# Patient Record
Sex: MALE | Race: WHITE | NOT HISPANIC OR LATINO | Employment: OTHER | URBAN - METROPOLITAN AREA
[De-identification: names, ages, dates, MRNs, and addresses within clinical notes are randomized per-mention and may not be internally consistent; named-entity substitution may affect disease eponyms.]

---

## 2017-02-06 ENCOUNTER — GENERIC CONVERSION - ENCOUNTER (OUTPATIENT)
Dept: OTHER | Facility: OTHER | Age: 57
End: 2017-02-06

## 2017-04-07 ENCOUNTER — ALLSCRIPTS OFFICE VISIT (OUTPATIENT)
Dept: OTHER | Facility: OTHER | Age: 57
End: 2017-04-07

## 2017-04-26 ENCOUNTER — ALLSCRIPTS OFFICE VISIT (OUTPATIENT)
Dept: OTHER | Facility: OTHER | Age: 57
End: 2017-04-26

## 2017-04-26 DIAGNOSIS — R35.1 NOCTURIA: ICD-10-CM

## 2017-04-26 DIAGNOSIS — E11.9 TYPE 2 DIABETES MELLITUS WITHOUT COMPLICATIONS (HCC): ICD-10-CM

## 2017-04-26 DIAGNOSIS — I10 ESSENTIAL (PRIMARY) HYPERTENSION: ICD-10-CM

## 2017-05-03 ENCOUNTER — APPOINTMENT (OUTPATIENT)
Dept: LAB | Facility: CLINIC | Age: 57
End: 2017-05-03
Payer: MEDICARE

## 2017-05-03 DIAGNOSIS — E11.9 TYPE 2 DIABETES MELLITUS WITHOUT COMPLICATIONS (HCC): ICD-10-CM

## 2017-05-03 DIAGNOSIS — R35.1 NOCTURIA: ICD-10-CM

## 2017-05-03 DIAGNOSIS — I10 ESSENTIAL (PRIMARY) HYPERTENSION: ICD-10-CM

## 2017-05-03 DIAGNOSIS — R53.83 OTHER FATIGUE: ICD-10-CM

## 2017-05-03 LAB
ALBUMIN SERPL BCP-MCNC: 3.4 G/DL (ref 3.5–5)
ALP SERPL-CCNC: 74 U/L (ref 46–116)
ALT SERPL W P-5'-P-CCNC: 40 U/L (ref 12–78)
ANION GAP SERPL CALCULATED.3IONS-SCNC: 7 MMOL/L (ref 4–13)
AST SERPL W P-5'-P-CCNC: 34 U/L (ref 5–45)
BILIRUB SERPL-MCNC: 0.74 MG/DL (ref 0.2–1)
BUN SERPL-MCNC: 19 MG/DL (ref 5–25)
CALCIUM SERPL-MCNC: 9.3 MG/DL (ref 8.3–10.1)
CHLORIDE SERPL-SCNC: 101 MMOL/L (ref 100–108)
CHOLEST SERPL-MCNC: 150 MG/DL (ref 50–200)
CO2 SERPL-SCNC: 32 MMOL/L (ref 21–32)
CREAT SERPL-MCNC: 0.88 MG/DL (ref 0.6–1.3)
EST. AVERAGE GLUCOSE BLD GHB EST-MCNC: 166 MG/DL
GFR SERPL CREATININE-BSD FRML MDRD: >60 ML/MIN/1.73SQ M
GLUCOSE P FAST SERPL-MCNC: 106 MG/DL (ref 65–99)
HBA1C MFR BLD: 7.4 % (ref 4.2–6.3)
HDLC SERPL-MCNC: 48 MG/DL (ref 40–60)
LDLC SERPL CALC-MCNC: 85 MG/DL (ref 0–100)
POTASSIUM SERPL-SCNC: 4.5 MMOL/L (ref 3.5–5.3)
PROT SERPL-MCNC: 7.6 G/DL (ref 6.4–8.2)
PSA SERPL-MCNC: 0.8 NG/ML (ref 0–4)
SODIUM SERPL-SCNC: 140 MMOL/L (ref 136–145)
TRIGL SERPL-MCNC: 83 MG/DL
TSH SERPL DL<=0.05 MIU/L-ACNC: 0.65 UIU/ML (ref 0.36–3.74)

## 2017-05-03 PROCEDURE — G0103 PSA SCREENING: HCPCS

## 2017-05-03 PROCEDURE — 80061 LIPID PANEL: CPT

## 2017-05-03 PROCEDURE — 83036 HEMOGLOBIN GLYCOSYLATED A1C: CPT

## 2017-05-03 PROCEDURE — 36415 COLL VENOUS BLD VENIPUNCTURE: CPT

## 2017-05-03 PROCEDURE — 84443 ASSAY THYROID STIM HORMONE: CPT

## 2017-05-03 PROCEDURE — 80053 COMPREHEN METABOLIC PANEL: CPT

## 2017-05-05 ENCOUNTER — GENERIC CONVERSION - ENCOUNTER (OUTPATIENT)
Dept: OTHER | Facility: OTHER | Age: 57
End: 2017-05-05

## 2017-06-29 ENCOUNTER — GENERIC CONVERSION - ENCOUNTER (OUTPATIENT)
Dept: OTHER | Facility: OTHER | Age: 57
End: 2017-06-29

## 2017-07-10 ENCOUNTER — GENERIC CONVERSION - ENCOUNTER (OUTPATIENT)
Dept: OTHER | Facility: OTHER | Age: 57
End: 2017-07-10

## 2017-10-27 ENCOUNTER — ALLSCRIPTS OFFICE VISIT (OUTPATIENT)
Dept: OTHER | Facility: OTHER | Age: 57
End: 2017-10-27

## 2017-10-27 DIAGNOSIS — I10 ESSENTIAL (PRIMARY) HYPERTENSION: ICD-10-CM

## 2017-10-27 DIAGNOSIS — E11.9 TYPE 2 DIABETES MELLITUS WITHOUT COMPLICATIONS (HCC): ICD-10-CM

## 2017-11-03 ENCOUNTER — GENERIC CONVERSION - ENCOUNTER (OUTPATIENT)
Dept: OTHER | Facility: OTHER | Age: 57
End: 2017-11-03

## 2017-11-03 ENCOUNTER — APPOINTMENT (OUTPATIENT)
Dept: LAB | Facility: CLINIC | Age: 57
End: 2017-11-03
Payer: MEDICARE

## 2017-11-03 DIAGNOSIS — E11.9 TYPE 2 DIABETES MELLITUS WITHOUT COMPLICATIONS (HCC): ICD-10-CM

## 2017-11-03 LAB
ALBUMIN SERPL BCP-MCNC: 3.6 G/DL (ref 3.5–5)
ALP SERPL-CCNC: 83 U/L (ref 46–116)
ALT SERPL W P-5'-P-CCNC: 39 U/L (ref 12–78)
ANION GAP SERPL CALCULATED.3IONS-SCNC: 6 MMOL/L (ref 4–13)
AST SERPL W P-5'-P-CCNC: 28 U/L (ref 5–45)
BASOPHILS # BLD AUTO: 0.04 THOUSANDS/ΜL (ref 0–0.1)
BASOPHILS NFR BLD AUTO: 1 % (ref 0–1)
BILIRUB SERPL-MCNC: 0.45 MG/DL (ref 0.2–1)
BUN SERPL-MCNC: 15 MG/DL (ref 5–25)
CALCIUM SERPL-MCNC: 8.9 MG/DL (ref 8.3–10.1)
CHLORIDE SERPL-SCNC: 98 MMOL/L (ref 100–108)
CHOLEST SERPL-MCNC: 162 MG/DL (ref 50–200)
CO2 SERPL-SCNC: 33 MMOL/L (ref 21–32)
CREAT SERPL-MCNC: 0.96 MG/DL (ref 0.6–1.3)
EOSINOPHIL # BLD AUTO: 0.19 THOUSAND/ΜL (ref 0–0.61)
EOSINOPHIL NFR BLD AUTO: 2 % (ref 0–6)
ERYTHROCYTE [DISTWIDTH] IN BLOOD BY AUTOMATED COUNT: 13.5 % (ref 11.6–15.1)
EST. AVERAGE GLUCOSE BLD GHB EST-MCNC: 154 MG/DL
GFR SERPL CREATININE-BSD FRML MDRD: 88 ML/MIN/1.73SQ M
GLUCOSE P FAST SERPL-MCNC: 105 MG/DL (ref 65–99)
HBA1C MFR BLD: 7 % (ref 4.2–6.3)
HCT VFR BLD AUTO: 38.1 % (ref 36.5–49.3)
HDLC SERPL-MCNC: 47 MG/DL (ref 40–60)
HGB BLD-MCNC: 13 G/DL (ref 12–17)
LDLC SERPL CALC-MCNC: 96 MG/DL (ref 0–100)
LYMPHOCYTES # BLD AUTO: 2.89 THOUSANDS/ΜL (ref 0.6–4.47)
LYMPHOCYTES NFR BLD AUTO: 35 % (ref 14–44)
MCH RBC QN AUTO: 28.2 PG (ref 26.8–34.3)
MCHC RBC AUTO-ENTMCNC: 34.1 G/DL (ref 31.4–37.4)
MCV RBC AUTO: 83 FL (ref 82–98)
MONOCYTES # BLD AUTO: 0.72 THOUSAND/ΜL (ref 0.17–1.22)
MONOCYTES NFR BLD AUTO: 9 % (ref 4–12)
NEUTROPHILS # BLD AUTO: 4.32 THOUSANDS/ΜL (ref 1.85–7.62)
NEUTS SEG NFR BLD AUTO: 53 % (ref 43–75)
NRBC BLD AUTO-RTO: 0 /100 WBCS
PLATELET # BLD AUTO: 277 THOUSANDS/UL (ref 149–390)
PMV BLD AUTO: 9.6 FL (ref 8.9–12.7)
POTASSIUM SERPL-SCNC: 3.1 MMOL/L (ref 3.5–5.3)
PROT SERPL-MCNC: 7.2 G/DL (ref 6.4–8.2)
RBC # BLD AUTO: 4.61 MILLION/UL (ref 3.88–5.62)
SODIUM SERPL-SCNC: 137 MMOL/L (ref 136–145)
TRIGL SERPL-MCNC: 97 MG/DL
TSH SERPL DL<=0.05 MIU/L-ACNC: 1.67 UIU/ML (ref 0.36–3.74)
WBC # BLD AUTO: 8.2 THOUSAND/UL (ref 4.31–10.16)

## 2017-11-03 PROCEDURE — 83036 HEMOGLOBIN GLYCOSYLATED A1C: CPT

## 2017-11-03 PROCEDURE — 36415 COLL VENOUS BLD VENIPUNCTURE: CPT

## 2017-11-03 PROCEDURE — 84443 ASSAY THYROID STIM HORMONE: CPT

## 2017-11-03 PROCEDURE — 80061 LIPID PANEL: CPT

## 2017-11-03 PROCEDURE — 80053 COMPREHEN METABOLIC PANEL: CPT

## 2017-11-03 PROCEDURE — 85025 COMPLETE CBC W/AUTO DIFF WBC: CPT

## 2017-11-08 ENCOUNTER — GENERIC CONVERSION - ENCOUNTER (OUTPATIENT)
Dept: OTHER | Facility: OTHER | Age: 57
End: 2017-11-08

## 2017-11-22 ENCOUNTER — APPOINTMENT (OUTPATIENT)
Dept: LAB | Facility: CLINIC | Age: 57
End: 2017-11-22
Payer: MEDICARE

## 2017-11-22 DIAGNOSIS — I10 ESSENTIAL (PRIMARY) HYPERTENSION: ICD-10-CM

## 2017-11-22 LAB
ANION GAP SERPL CALCULATED.3IONS-SCNC: 8 MMOL/L (ref 4–13)
BUN SERPL-MCNC: 13 MG/DL (ref 5–25)
CALCIUM SERPL-MCNC: 9 MG/DL (ref 8.3–10.1)
CHLORIDE SERPL-SCNC: 103 MMOL/L (ref 100–108)
CO2 SERPL-SCNC: 28 MMOL/L (ref 21–32)
CREAT SERPL-MCNC: 0.78 MG/DL (ref 0.6–1.3)
GFR SERPL CREATININE-BSD FRML MDRD: 101 ML/MIN/1.73SQ M
GLUCOSE P FAST SERPL-MCNC: 136 MG/DL (ref 65–99)
POTASSIUM SERPL-SCNC: 3.4 MMOL/L (ref 3.5–5.3)
SODIUM SERPL-SCNC: 139 MMOL/L (ref 136–145)

## 2017-11-22 PROCEDURE — 80048 BASIC METABOLIC PNL TOTAL CA: CPT

## 2017-11-22 PROCEDURE — 36415 COLL VENOUS BLD VENIPUNCTURE: CPT

## 2017-11-24 ENCOUNTER — GENERIC CONVERSION - ENCOUNTER (OUTPATIENT)
Dept: OTHER | Facility: OTHER | Age: 57
End: 2017-11-24

## 2017-12-05 ENCOUNTER — GENERIC CONVERSION - ENCOUNTER (OUTPATIENT)
Dept: OTHER | Facility: OTHER | Age: 57
End: 2017-12-05

## 2018-01-11 NOTE — RESULT NOTES
Discussion/Summary   Patient's blood pressure shows low potassium level  I recommend taking a prescription potassium tablet once a day for 2 weeks as well as mailing patient list of foods rich in potassium and repeat blood work in 3-4 weeks     Verified Results  (1) CBC/PLT/DIFF 57QTX0554 53:85WW Chelsea Overall    Order Number: [id number]     Test Name Result Flag Reference   WBC COUNT 8 20 Thousand/uL  4 31-10 16   RBC COUNT 4 61 Million/uL  3 88-5 62   HEMOGLOBIN 13 0 g/dL  12 0-17 0   HEMATOCRIT 38 1 %  36 5-49 3   MCV 83 fL  82-98   MCH 28 2 pg  26 8-34 3   MCHC 34 1 g/dL  31 4-37 4   RDW 13 5 %  11 6-15 1   MPV 9 6 fL  8 9-12 7   PLATELET COUNT 699 Thousands/uL  149-390   nRBC AUTOMATED 0 /100 WBCs     NEUTROPHILS RELATIVE PERCENT 53 %  43-75   LYMPHOCYTES RELATIVE PERCENT 35 %  14-44   MONOCYTES RELATIVE PERCENT 9 %  4-12   EOSINOPHILS RELATIVE PERCENT 2 %  0-6   BASOPHILS RELATIVE PERCENT 1 %  0-1   NEUTROPHILS ABSOLUTE COUNT 4 32 Thousands/? ??L  1 85-7 62   LYMPHOCYTES ABSOLUTE COUNT 2 89 Thousands/? ??L  0 60-4 47   MONOCYTES ABSOLUTE COUNT 0 72 Thousand/? ??L  0 17-1 22   EOSINOPHILS ABSOLUTE COUNT 0 19 Thousand/? ??L  0 00-0 61   BASOPHILS ABSOLUTE COUNT 0 04 Thousands/? ??L  0 00-0 10     (1) COMPREHENSIVE METABOLIC PANEL [id number] 63:82WT Funmi Sever Order Number: [id number]     Test Name Result Flag Reference   SODIUM 137 mmol/L  136-145   POTASSIUM 3 1 mmol/L L 3 5-5 3   CHLORIDE 98 mmol/L L 100-108   CARBON DIOXIDE 33 mmol/L H 21-32   ANION GAP (CALC) 6 mmol/L  4-13   BLOOD UREA NITROGEN 15 mg/dL  5-25   CREATININE 0 96 mg/dL  0 60-1 30   Standardized to IDMS reference method   CALCIUM 8 9 mg/dL  8 3-10 1   BILI, TOTAL 0 45 mg/dL  0 20-1 00   ALK PHOSPHATAS 83 U/L     ALT (SGPT) 39 U/L  12-78   Specimen collection should occur prior to Sulfasalazine and/or Sulfapyridine administration due to the potential for falsely depressed results     AST(SGOT) 28 U/L  5-45 Specimen collection should occur prior to Sulfasalazine administration due to the potential for falsely depressed results  ALBUMIN 3 6 g/dL  3 5-5 0   TOTAL PROTEIN 7 2 g/dL  6 4-8 2   eGFR 88 ml/min/1 73sq m     National Kidney Disease Education Program recommendations are as follows:  GFR calculation is accurate only with a steady state creatinine  Chronic Kidney disease less than 60 ml/min/1 73 sq  meters  Kidney failure less than 15 ml/min/1 73 sq  meters  GLUCOSE FASTING 105 mg/dL H 65-99   Specimen collection should occur prior to Sulfasalazine administration due to the potential for falsely depressed results  Specimen collection should occur prior to Sulfapyridine administration due to the potential for falsely elevated results  (1) LIPID PANEL, FASTING [id number] 08:74MC [facility] Order Number: [id number]     Test Name Result Flag Reference   CHOLESTEROL 162 mg/dL     HDL,DIRECT 47 mg/dL  40-60   Specimen collection should occur prior to Metamizole administration due to the potential for falsley depressed results  LDL CHOLESTEROL CALCULATED 96 mg/dL  0-100   Triglyceride:        Normal <150 mg/dl   Borderline High 150-199 mg/dl   High 200-499 mg/dl   Very High >499 mg/dl      Cholesterol:       Desirable <200 mg/dl    Borderline High 200-239 mg/dl    High >239 mg/dl      HDL Cholesterol:       High>59 mg/dL    Low <41 mg/dL      This screening LDL is a calculated result  It does not have the accuracy of the Direct Measured LDL in the monitoring of patients with hyperlipidemia and/or statin therapy  Direct Measure LDL (TJP218) must be ordered separately in these patients  TRIGLYCERIDES 97 mg/dL  <=150   Specimen collection should occur prior to N-Acetylcysteine or Metamizole administration due to the potential for falsely depressed results       (1) HEMOGLOBIN A1C [id number] 03:21CE Resonergy Order Number: [id number]     Test Name Result Flag Reference   HEMOGLOBIN A1C 7 0 % H 4 2-6 3   EST  AVG  GLUCOSE 154 mg/dl       (1) TSH 59AHG7574 58:58AB Andria Mcneil Order Number: [id number]     Test Name Result Flag Reference   TSH 1 670 uIU/mL  0 358-3 740   Patients undergoing fluorescein dye angiography may retain small amounts of fluorescein in the body for 48-72 hours post procedure  Samples containing fluorescein can produce falsely depressed TSH values  If the patient had this procedure,a specimen should be resubmitted post fluorescein clearance  Plan  Benign essential hypertension    · (1) BASIC METABOLIC PROFILE; Status:Active; Requested WWK:[id number];   Hypokalemia    · Potassium Chloride ER 20 MEQ Oral Tablet Extended Release;  Take 1 tablet daily

## 2018-01-11 NOTE — RESULT NOTES
Verified Results  * CT HEAD WO CONTRAST [id number] 33:66SO Luz Sheets Order Number: [id number]     Test Name Result Flag Reference   CT HEAD WO CONTRAST (Report)     CT BRAIN - WITHOUT CONTRAST     INDICATION: Dizziness  Syncopal episode  COMPARISON: None  TECHNIQUE: CT examination of the brain was performed  In addition to axial images, coronal reformatted images were created and submitted for interpretation  Examination was performed utilizing techniques to minimize radiation dose, including the use    of dose reduction software  IMAGE QUALITY: Diagnostic  FINDINGS:      PARENCHYMA: No mass, mass effect or midline shift  Subtle areas of decreased attenuation within the frontal lobes may represent chronic microangiopathic disease  No extra-axial fluid collections  Normal basal ganglia and basilar cisterns  Brainstem    and cerebellum demonstrate normal density  No hemorrhage  No signs of acute territorial infarction  VENTRICLES AND EXTRA-AXIAL SPACES: Normal for patient's age  VISUALIZED ORBITS AND PARANASAL SINUSES: Unremarkable  CALVARIUM AND EXTRACRANIAL SOFT TISSUES: Opacification of the left mastoid air cells and partial opacification of the middle ear cavity suggesting chronic otomastoiditis  IMPRESSION:     Subtle areas of decreased attenuation 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

## 2018-01-12 VITALS
HEART RATE: 76 BPM | DIASTOLIC BLOOD PRESSURE: 60 MMHG | OXYGEN SATURATION: 95 % | HEIGHT: 68 IN | WEIGHT: 242.25 LBS | BODY MASS INDEX: 36.71 KG/M2 | TEMPERATURE: 97.6 F | SYSTOLIC BLOOD PRESSURE: 124 MMHG

## 2018-01-13 VITALS
BODY MASS INDEX: 38.23 KG/M2 | DIASTOLIC BLOOD PRESSURE: 80 MMHG | SYSTOLIC BLOOD PRESSURE: 140 MMHG | WEIGHT: 252.25 LBS | HEART RATE: 82 BPM | TEMPERATURE: 97.2 F | HEIGHT: 68 IN

## 2018-01-13 VITALS
HEIGHT: 68 IN | RESPIRATION RATE: 16 BRPM | HEART RATE: 78 BPM | WEIGHT: 240 LBS | SYSTOLIC BLOOD PRESSURE: 158 MMHG | TEMPERATURE: 97.1 F | DIASTOLIC BLOOD PRESSURE: 70 MMHG | BODY MASS INDEX: 36.37 KG/M2

## 2018-01-13 NOTE — PROGRESS NOTES
Patient Health Assessment    Date:            10/25/2016  Blood Pressure:  146/86  Pulse:           68  Age:             56  Weight:          230 lbs  Height/Length:   5' 10"  Body Mass Index: 33 0  Provider:        [id number]  Clinic:          [facility]: Diabetes    High Blood Pressure    Colitis    Jaw Pain  Medications: Allergies:  Since Last Visit: Medical Alert: No Change    Medications: No Change    Allergies:        No Change  Pain Scale Type: Numeric Pain ScalePain Level: 0  Description:    S: Pt was scheduled for comp exam but pt wanted to take care only of tooth  #11 today and wants to re-schedule for his comp exam cause has xrays at home  Pt is concerned about #11 because he sees stain on the tooth and is afraid that   there is a cavity  O: Clinical exam shows decay on distal and facial of #11  A: #11-DF resin needed    P: pt wanted to do the filling today  Notified about the fee and pt agreed to  pay  Used 1 Carpule 2% lido 1:100K epi infiltration used  Removed the caries  Etched, rinsed, bonded and placed Beautifil A-2 composite  Restoration polished   with finishing burs  Advised pt to come back for comp exam and bring his  X-rays  Pt agreed and left in good health  NV: comp exam, pt said he will bring his X-rays    CLARISA Gates    ----- Signed on Tuesday, October 25, 2016 at 12:24:44 PM  -----  ----- Provider: Judith [facility] Dentist -- Clinic: [facility] -----

## 2018-01-13 NOTE — RESULT NOTES
Discussion/Summary   bw shows potassium improving but still a little low  cont potassium tab and recheck prior to next ov     Verified Results  (1) BASIC METABOLIC PROFILE [id number] 78:83WI Don Rodarte    Order Number: [id number]     Test Name Result Flag Reference   SODIUM 139 mmol/L  136-145   POTASSIUM 3 4 mmol/L L 3 5-5 3   CHLORIDE 103 mmol/L  100-108   CARBON DIOXIDE 28 mmol/L  21-32   ANION GAP (CALC) 8 mmol/L  4-13   BLOOD UREA NITROGEN 13 mg/dL  5-25   CREATININE 0 78 mg/dL  0 60-1 30   Standardized to IDMS reference method   CALCIUM 9 0 mg/dL  8 3-10 1   eGFR 101 ml/min/1 73sq m     National Kidney Disease Education Program recommendations are as follows:  GFR calculation is accurate only with a steady state creatinine  Chronic Kidney disease less than 60 ml/min/1 73 sq  meters  Kidney failure less than 15 ml/min/1 73 sq  meters  GLUCOSE FASTING 136 mg/dL H 65-99   Specimen collection should occur prior to Sulfasalazine administration due to the potential for falsely depressed results  Specimen collection should occur prior to Sulfapyridine administration due to the potential for falsely elevated results

## 2018-01-15 NOTE — PROGRESS NOTES
Chief Complaint  Patient is here to receive the influenza vaccination  Active Problems    1  Abnormal weight loss (783 21) (R63 4)   2  Benign essential hypertension (401 1) (I10)   3  Depression (311) (F32 9)   4  Dizziness (780 4) (R42)   5  Dorsodynia (724 5) (M54 9)   6  Encounter for screening colonoscopy (V76 51) (Z12 11)   7  Esophageal reflux (530 81) (K21 9)   8  Fatigue (780 79) (R53 83)   9  Hearing Loss (389 9)   10  Insulin dependent diabetes mellitus type IA (250 01) (E10 9)   11  Neck pain (723 1) (M54 2)   12  Need for influenza vaccination (V04 81) (Z23)   13  Night sweats (780 8) (R61)   14  Nocturia (788 43) (R35 1)   15  Non-toxic uninodular goiter (241 0) (E04 1)   16  Obstructive sleep apnea (327 23) (G47 33)   17  Pain syndrome, chronic (338 4) (G89 4)   18  Postoperative examination (V67 00) (Z09)   19  Sleep disorder (780 50) (G47 9)   20  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 8)   21  Type 2 diabetes mellitus (250 00) (E11 9)   22  URI, acute (465 9) (J06 9)    Current Meds   1  Benazepril-Hydrochlorothiazide 20-12 5 MG Oral Tablet; TAKE 2 TABLETS BY MOUTH   ONCE DAILY EVERY MORNING -DO NOT USE A SALTSUBSTITUTE WITHOUT   CHECKING WITH YOUR DOCTOR OR PHARMACIST; Therapy: 16OUB0849 to [phone]  Requested for: 27Apr2016; Last   Rx:27Apr2016 Ordered   2  Biotene Dry Mouth LIQD;   Therapy: (Recorded:23Mar2015) to Recorded   3  CeleBREX 200 MG Oral Capsule; Therapy: (Recorded:23Mar2015) to Recorded   4  Cyclobenzaprine HCl - 10 MG Oral Tablet; TAKE 1 TABLET 3 TIMES DAILY AS NEEDED; Therapy: 95DXM3875 to (Evaluate:19Umq9370); Last Rx:10Aug2015 Ordered   5  Duragesic-100 100 MCG/HR Transdermal Patch 72 Hour; APPLY ! PATCH EVERY   OTHER DAY; Therapy: [phone]) to Recorded   6  Gabapentin 300 MG Oral Capsule; one tab AM,PM, and two at hs;   Therapy: 20ERQ2149 to Recorded   7  Gentle Stool Softener CAPS;    Therapy: (Recorded:23Mar2015) to Recorded   8  Glimepiride 1 MG Oral Tablet; take one tablet by mouth twice daily; Therapy: 92VVG9681 to (Evaluate:40Ltw0877)  Requested for: 32RGR4330; Last   Rx:66Sow8211 Ordered   9  HumaLOG KwikPen 100 UNIT/ML Subcutaneous Solution Pen-injector; Inject 2 - 4 units   subQ before each meal;   Therapy: 71HQK9630 to Recorded   10  HumaLOG KwikPen 100 UNIT/ML Subcutaneous Solution Pen-injector; INJECT 4UNITS    TO 6 UNITS BEFORE EACH MEAL; Therapy: 96PGR1154 to (Evaluate:15Kgo2500)  Requested for: 74XFZ7762; Last    Rx:95Zgg5703 Ordered   11  Hyoscyamine Sulfate ER 0 375 MG Oral Tablet Extended Release 12 Hour; Therapy: (Recorded:23Mar2015) to Recorded   12  Jentadueto 2 5-1000 MG Oral Tablet; One po BID; Therapy: 06Fjc3950 to (Last Rx:27Apr2016) Ordered   13  Lantus SoloStar 100 UNIT/ML Subcutaneous Solution Pen-injector; inject 15 units at    betime; Therapy: 79MXO7841 to (Last Lisbeth Sea)  Requested for: 43Fhn3637 Ordered   14  Lunesta 3 MG Oral Tablet; Therapy: (Recorded:23Mar2015) to Recorded   15  Galdino Multivitamin for Men TABS; Therapy: (Recorded:23Mar2015) to Recorded   16  OxyCODONE HCl - 30 MG Oral Tablet; Therapy: (Recorded:23Mar2015) to Recorded   17  Prevacid 30 MG Oral Capsule Delayed Release; Therapy: (Recorded:23Mar2015) to Recorded   18  RA E-Zject Lancets Ultra Thin Miscellaneous; Test Blood Sugars four (4) times a day    DX:250 01; Last Rx:20Mar2015 Ordered   19  RA TRUEresult Blood Glucose w/Device Kit; Test Blood Sugars four (4) times a day DX:    250 01; Therapy: (Recorded:20Mar2015) to Recorded   20  RA TRUEtest Test In Vitro Strip; Test Blood Sugars four (4) times a day  DX: 250 01; Therapy: (Recorded:20Mar2015) to Recorded   21  Red Yeast Rice 600 MG Oral Capsule; Therapy: (Recorded:23Mar2015) to Recorded   22  Sertraline HCl - 50 MG Oral Tablet; TAKE 1 & 1/2 TABLET DAILY  Requested for:    53FSR7536; Last Rx:10Rmz7419 Ordered   23   Terazosin HCl - 5 MG Oral Capsule; TAKE 1 CAPSULE DAILY -MAY CAUSE DIZZINESS; Therapy: 74DOR5651 to ([phone]  Requested for: 27Apr2016; Last    Rx:27Apr2016 Ordered   24  TiZANidine HCl - 4 MG Oral Tablet; Therapy: (Recorded:23Mar2015) to Recorded   25  Unifine Pentips 32G X 4 MM Miscellaneous; USE AS DIRECTED WITH INSULIN PENS; Therapy: 09Apr2015 to (Evaluate:76Lwm6189)  Requested for: 27ZXZ6925; Last    Rx:19Nov2015 Ordered    Allergies    1  Cymbalta CPEP   2  Propulsid   3  Vancomycin HCl SOLR    Plan  Need for influenza vaccination    · Fluzone Quadrivalent 0 5 ML Intramuscular Suspension    Future Appointments    Date/Time Provider Specialty Site   91/85/5138 45:53 AM JODI Moya   Family Medicine [address] [place] [address]     Signatures   Electronically signed by : JODI Alexander ; Sep 19 3747  9:30AM EST                       (Author)

## 2018-01-16 NOTE — RESULT NOTES
Discussion/Summary   Blood work results are stable  Continue with current regimen of medications     Verified Results  (1) COMPREHENSIVE METABOLIC PANEL 36KQY3307 04:85VK Melani Marking Order Number: [id number]     Test Name Result Flag Reference   SODIUM 140 mmol/L  136-145   POTASSIUM 4 5 mmol/L  3 5-5 3   Slightly Hemolyzed; Results May be Affected   CHLORIDE 101 mmol/L  100-108   CARBON DIOXIDE 32 mmol/L  21-32   ANION GAP (CALC) 7 mmol/L  4-13   BLOOD UREA NITROGEN 19 mg/dL  5-25   CREATININE 0 88 mg/dL  0 60-1 30   Standardized to IDMS reference method   CALCIUM 9 3 mg/dL  8 3-10 1   BILI, TOTAL 0 74 mg/dL  0 20-1 00   ALK PHOSPHATAS 74 U/L     ALT (SGPT) 40 U/L  12-78   AST(SGOT) 34 U/L  5-45   Slightly Hemolyzed; Results May be Affected   ALBUMIN 3 4 g/dL L 3 5-5 0   TOTAL PROTEIN 7 6 g/dL  6 4-8 2   eGFR Non-African American      >60 0 ml/min/1 73sq [facility] Disease Education Program recommendations are as follows:  GFR calculation is accurate only with a steady state creatinine  Chronic Kidney disease less than 60 ml/min/1 73 sq  meters  Kidney failure less than 15 ml/min/1 73 sq  meters  GLUCOSE FASTING 106 mg/dL H 65-99     (1) LIPID PANEL, FASTING 63YXN9024 19:88FZ Melani Marking Order Number: [id number]     Test Name Result Flag Reference   CHOLESTEROL 150 mg/dL     HDL,DIRECT 48 mg/dL  40-60   Specimen collection should occur prior to Metamizole administration due to the potential for falsely depressed results  LDL CHOLESTEROL CALCULATED 85 mg/dL  0-100   - Patient Instructions:  This is a fasting blood test  Water,black tea or black  coffee only after 9:00pm the night before test   Drink 2 glasses of water the morning of test       Triglyceride:         Normal              <150 mg/dl       Borderline High    150-199 mg/dl       High               200-499 mg/dl       Very High          >499 mg/dl  Cholesterol:         Desirable        <200 mg/dl      Borderline High  200-239 mg/dl      High             >239 mg/dl  HDL Cholesterol:        High    >59 mg/dL      Low     <41 mg/dL  LDL CALCULATED:    This screening LDL is a calculated result  It does not have the accuracy of the Direct Measured LDL in the monitoring of patients with hyperlipidemia and/or statin therapy  Direct Measure LDL (OFD977) must be ordered separately in these patients  TRIGLYCERIDES 83 mg/dL  <=150   Specimen collection should occur prior to N-Acetylcysteine or Metamizole administration due to the potential for falsely depressed results  (1) TSH 52RIV2751 36:52MC Cori Lowing Order Number: [id number]     Test Name Result Flag Reference   TSH 0 650 uIU/mL  0 358-3 740   - Patient Instructions: This bloodwork is non-fasting  Please drink two glasses of water morning of bloodwork  Patients undergoing fluorescein dye angiography may retain small amounts of fluorescein in the body for 48-72 hours post procedure  Samples containing fluorescein can produce falsely depressed TSH values  If the patient had this procedure,a specimen should be resubmitted post fluorescein clearance  (1) HEMOGLOBIN A1C 96MZI5264 86:50FM Cori Lowing Order Number: [id number]     Test Name Result Flag Reference   HEMOGLOBIN A1C 7 4 % H 4 2-6 3   EST  AVG  GLUCOSE 166 mg/dl       (1) PSA (SCREEN) (Dx V76 44 Screen for Prostate Cancer) 66DMZ2408 26:90MI Che Mitchell     Test Name Result Flag Reference   PROSTATE SPECIFIC ANTIGEN 0 8 ng/mL  0 0-4 0   American U[employer] Association Guidelines define biochemical recurrence of prostate cancer as a detectable or rising PSA value post-radical prostatectomy that is greater than or equal to 0 2 ng/mL with a second confirmatory level of greater than or equal to 0 2 ng/mL

## 2018-01-18 NOTE — PROGRESS NOTES
Assessment    1  Insomnia (780 52) (G47 00)   2  Benign essential hypertension (401 1) (I10)   3  Type 2 diabetes mellitus (250 00) (E11 9)    Plan  Type 2 diabetes mellitus    · (1) CBC/PLT/DIFF; Status:Active; Requested IVN:29ZRG2948;    · (1) COMPREHENSIVE METABOLIC PANEL; Status:Active; Requested WWL:31HHJ4406;    · (1) HEMOGLOBIN A1C; Status:Active; Requested BPQ:31IBJ3795;    · (1) LIPID PANEL, FASTING; Status:Active; Requested NNA:[id number];    · (1) TSH; Status:Active; Requested SCE:48KSX5542;     Discussion/Summary  Impression: Subsequent Annual Wellness Visit  Cardiovascular screening and counseling: screening is current  Diabetes screening and counseling: screening is current  Prostate cancer screening and counseling: screening is current  Immunizations: influenza vaccine is up to date this year, influenza vaccination is recommended annually and Zostavax vaccination up to date  Advance Directive Planning: complete and up to date  Patient Discussion: plan discussed with the patient, follow-up visit needed in one year  Chief Complaint  AWV      History of Present Illness  AWV       The patient is being seen for the subsequent annual wellness visit  Medicare Screening and Risk Factors   Hospitalizations: no previous hospitalizations  Medicare Screening Tests Risk Questions   Osteoporosis risk assessment:  and over 48years of age  Drug and Alcohol Use: The patient is a former cigarette smoker  He has smoked for 20 year(s) and has 1 1/2 pack year(s) of cigarette use  The patient reports rare alcohol use  Alcohol concern:   The patient has no concerns about alcohol abuse  He has never used illicit drugs  Diet and Physical Activity: Current diet includes unhealthy food choices, frequent junk food, 3 reg cups of coffee per day and 8 bottles water/day  He is sedentary and exercises infrequently  Exercise: walking, stretching 20 minutes per day     Mood Disorder and Cognitive Impairment Screening: Anxiety screening MMSE=30  He reports feeling down, depressed, or hopeless over the past two weeks  He reports feeling little interest or pleasure in doing things over the past two weeks  Cognitive impairment screening: denies difficulty learning/retaining new information, denies difficulty handling complex tasks, denies difficulty with reasoning, denies difficulty with spatial ability and orientation, denies difficulty with language and denies difficulty with behavior  Functional Ability/Level of Safety: Hearing is slightly decreased  He reports hearing difficulties  He does not use a hearing aid  The patient is currently unable to participate in social activities and able to drive with limitations, but able to do activities of daily living without limitations and able to do instrumental activities of daily living without limitations  Activities of daily living details: does not need help using the phone, no transportation help needed, does not need help shopping, no meal preparation help needed, does not need help doing housework, does not need help doing laundry, does not need help managing medications and does not need help managing money  Fall risk factors: The patient fell 4 times in the past 12 months  Sore muscles/stiffness  Injury History: mobility impairment and up and go test was unsteady or greater than thirty seconds  Home safety risk factors:  no unfamiliar surroundings, no loose rugs, no poor household lighting, no uneven floors, no household clutter, grab bars in the bathroom and handrails on the stairs  Advance Directives: Advance directives: no living will, no durable power of  for health care directives and no advance directives     Co-Managers and Medical Equipment/Suppliers: See Patient Care Team      Patient Care Team    Care Team Member Role Specialty Office Number   Aurealla Gather [address] [phone]   Nikole BURNETT  [facility] [phone]     Review of Systems    Constitutional: negative  Head and Face: negative  Eyes: negative  ENT: negative  Cardiovascular: negative  Respiratory: negative  Genitourinary: negative  Musculoskeletal: Chronic back pain  Integumentary and Breasts: negative  Neurological: negative  Over the past 2 weeks, how often have you been bothered by the following problems? 1 ) Little interest or pleasure in doing things? Nearly every day  2 ) Feeling down, depressed or hopeless? Nearly every day  3 ) Trouble falling asleep or sleeping too much? Nearly every day  4 ) Feeling tired or having little energy? Nearly every day  5 ) Poor appetite or overeating? Half the days or more  6 ) Feeling bad about yourself, or that you are a failure, or have let yourself or your family down? Nearly every day    7 ) Trouble concentrating on things, such as reading a newspaper or watching television? Half the days or more  8 ) Moving or speaking so slowly that other people could have noticed, or the opposite, moving or speaking faster than usual? Half the days or more  severity of depression is severe   How difficult have these problems made it for you to do your work, take care of things at home, or get along with people? Extremely difficult  Score 21      Active Problems    1  Abnormal weight loss (783 21) (R63 4)   2  Benign essential hypertension (401 1) (I10)   3  Bronchitis (490) (J40)   4  Depression (311) (F32 9)   5  Dizziness (780 4) (R42)   6  Dorsodynia (724 5) (M54 9)   7  Encounter for screening colonoscopy (V76 51) (Z12 11)   8  Esophageal reflux (530 81) (K21 9)   9  Fatigue (780 79) (R53 83)   10  Hearing Loss (389 9)   11  Insulin dependent diabetes mellitus type IA (250 01) (E10 9)   12  Neck pain (723 1) (M54 2)   13  Need for influenza vaccination (V04 81) (Z23)   14  Night sweats (780 8) (R61)   15  Nocturia (788 43) (R35 1)   16   Non-toxic uninodular goiter (241 0) (E04 1)   17  Obstructive sleep apnea (327 23) (G47 33)   18  Pain syndrome, chronic (338 4) (G89 4)   19  Postoperative examination (V67 00) (Z09)   20  Sleep disorder (780 50) (G47 9)   21  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 8)   22  Type 2 diabetes mellitus (250 00) (E11 9)   23  URI, acute (465 9) (J06 9)    Surgical History    1  History of Back Surgery   2  History of Back Surgery   3  History of Laminectomy With Disc Removal   4  History of Shoulder Surgery Left   5  History of Surgery Spermatic Cord Excision Of Hydrocele Bilateral    Family History  Maternal Grandmother    1  Family history of Diabetes Mellitus (V18 0)  Family History    2  Family history of Family Health Status Mother   3  Family history of Family Health Status Of Father - Alive   4  Family history of Family Health Status Siblings 6  Living    Social History    · Activities   · Denied: History of Alcohol Use (History)   · Educational Level - Has High School Diploma   · Former smoker (G77 16) (E17 747)   · Living Independently With Spouse   · Marital History - Currently    · Sexual Activity Denied   · Denied: History of Tobacco Use  The social history was reviewed and updated today  Current Meds   1  Benazepril-Hydrochlorothiazide 20-12 5 MG Oral Tablet; TAKE 2 TABLETS BY MOUTH   ONCE DAILY EVERY MORNING -DO NOT USE A SALTSUBSTITUTE WITHOUT   CHECKING WITH YOUR DOCTOR OR PHARMACIST; Therapy: 29QUS2259 to ([phone])  Requested for: 28RFV1833; Last   Rx:07Qgt2137 Ordered   2  Biotene Dry Mouth LIQD; USE AS AN ORAL RINSE, AS DIRECTED ON PACKAGE; Therapy: (Recorded:27Oct2017) to Recorded   3  CeleBREX 200 MG Oral Capsule; Therapy: (Recorded:23Mar2015) to Recorded   4  Duragesic-100 100 MCG/HR Transdermal Patch 72 Hour; APPLY ! PATCH EVERY   OTHER DAY; Therapy: ([id number]) to Recorded   5   Gabapentin 300 MG Oral Capsule; one tab AM,PM, and two at hs; Therapy: 74LXK2481 to Recorded   6  Gentle Stool Softener CAPS; TAKE 1 CAPSULE TWICE DAILY AS NEEDED; Therapy: (OCZUQFQC:14YMM0193) to Recorded   7  Glimepiride 1 MG Oral Tablet; take one tablet by mouth twice daily; Therapy: 19KKX0046 to (Evaluate:16Bea6915)  Requested for: 12Nzd7441; Last   Rx:11Git0300 Ordered   8  Hyoscyamine Sulfate ER 0 375 MG Oral Tablet Extended Release 12 Hour; TAKE 1   TABLET DAILY; Therapy: (JAQDRVRM:05DTL1246) to Recorded   9  Galdino Multivitamin for Men TABS; Therapy: (Recorded:23Mar2015) to Recorded   10  OneTouch Ultra Blue In Vitro Strip; TEST 4 TIMES A DAY/ DX Code: E11 9; Therapy: 03Kpe0352 to (Last Rx:21Aug2017)  Requested for: 12Hgj0953 Ordered   11  OxyCODONE HCl - 30 MG Oral Tablet; Therapy: (Recorded:23Mar2015) to Recorded   12  Prevacid 30 MG Oral Capsule Delayed Release; Therapy: (Recorded:23Mar2015) to Recorded   13  RA TRUEresult Blood Glucose w/Device Kit; Test Blood Sugars four (4) times a day DX:    250 01; Therapy: (Recorded:20Mar2015) to Recorded   14  RA TRUEtest Test In Vitro Strip; Test Blood Sugars once a day  DX: 250 01; Therapy: (Recorded:07Apr2017) to Recorded   15  Red Yeast Rice 600 MG Oral Capsule; Therapy: (Recorded:23Mar2015) to Recorded   16  Sertraline HCl - 50 MG Oral Tablet; TAKE 1 & 1/2 TABLET DAILY  Requested for:    88MAZ6857; Last Rx:25Sep2015 Ordered   17  Terazosin HCl - 5 MG Oral Capsule; TAKE 1 CAPSULE DAILY -MAY CAUSE DIZZINESS; Therapy: 94BHO6451 to (DSARGDGS:28VKJ4398)  Requested for: 01KLR3346; Last    Rx:03Igq1562 Ordered   18  TiZANidine HCl - 4 MG Oral Tablet; Therapy: (IJXDQRCC:72JFZ1156) to Recorded   19  Unifine Pentips 32G X 4 MM Miscellaneous; USE AS DIRECTED WITH INSULIN PENS; Therapy: 09Apr2015 to (Evaluate:73Gyv0101)  Requested for: 97DLV9305; Last    Rx:19Nov2015 Ordered   20  Zaleplon 10 MG Oral Capsule; Therapy: 16QBU0874 to Recorded    The medication list was reviewed and updated today  Allergies    1  Cymbalta CPEP   2  Propulsid   3  Vancomycin HCl SOLR    Immunizations  Influenza --- Rande Finical: 05-Oct-2009  (49y); Series2: 05-Oct-2010  (50y); Series3: 07-Oct-2013   (53y); Series4: 26-Sep-2014  (54y); Series5: 25-Sep-2015  (55y); Series6: 19-Sep-2016  (56y); Series7: 02-Oct-2017  (88P)   PPSV --- Lupeerika Finical: 04-Nov-2003  (43y)   Tdap --- Rande Finical: 18-Jun-2004  (43y); Series2: 18-Jan-2016  (55y)   Zoster --- Rande Finical: 26-Sep-2014  (54y)     Vitals  Signs   Recorded: 27Oct2017 10:34AM   Temperature: 97 1 F, Tympanic  Heart Rate: 78, L Radial  Respiration: 16  Systolic: 793, LUE, Sitting  Diastolic: 70, LUE, Sitting  Height: 5 ft 8 in  Weight: 240 lb   BMI Calculated: 36 49  BSA Calculated: 2 21    Results/Data  Falls Risk Assessment (Dx Z13 89 Screen for Neurologic Disorder) 27Oct2017 10:44AM User, Ahs     Test Name Result Flag Reference   Falls Risk      2 or more falls past year       Health Management  Health Maintenance   Medicare Annual Wellness Visit; every 1 year; Next Due: 92RFN1542; Active    Future Appointments    Date/Time Provider Specialty Site   54/34/7098 26:26 AM JODI Garcia   Family Medicine [address] [place] [address]     Signatures   Electronically signed by : JODI Mccomrack ; Oct 27 8935 11:47AM EST                       (Author)

## 2018-02-14 DIAGNOSIS — E11.9 TYPE 2 DIABETES MELLITUS WITHOUT COMPLICATION, WITHOUT LONG-TERM CURRENT USE OF INSULIN (HCC): ICD-10-CM

## 2018-02-14 DIAGNOSIS — E11.9 TYPE 2 DIABETES MELLITUS WITHOUT COMPLICATION, WITHOUT LONG-TERM CURRENT USE OF INSULIN (HCC): Primary | ICD-10-CM

## 2018-02-18 DIAGNOSIS — E11.9 TYPE 2 DIABETES MELLITUS WITHOUT COMPLICATION, WITHOUT LONG-TERM CURRENT USE OF INSULIN (HCC): Primary | ICD-10-CM

## 2018-02-19 ENCOUNTER — APPOINTMENT (OUTPATIENT)
Dept: RADIOLOGY | Facility: CLINIC | Age: 58
End: 2018-02-19
Payer: OTHER MISCELLANEOUS

## 2018-02-19 ENCOUNTER — OFFICE VISIT (OUTPATIENT)
Dept: NEUROSURGERY | Facility: CLINIC | Age: 58
End: 2018-02-19
Payer: OTHER MISCELLANEOUS

## 2018-02-19 VITALS
SYSTOLIC BLOOD PRESSURE: 135 MMHG | WEIGHT: 251 LBS | DIASTOLIC BLOOD PRESSURE: 75 MMHG | HEART RATE: 78 BPM | HEIGHT: 70 IN | RESPIRATION RATE: 16 BRPM | TEMPERATURE: 98.2 F | BODY MASS INDEX: 35.93 KG/M2

## 2018-02-19 DIAGNOSIS — M96.1 POST LAMINECTOMY SYNDROME: ICD-10-CM

## 2018-02-19 DIAGNOSIS — G89.4 CHRONIC PAIN SYNDROME: ICD-10-CM

## 2018-02-19 DIAGNOSIS — M54.16 LUMBAR RADICULOPATHY: ICD-10-CM

## 2018-02-19 DIAGNOSIS — G89.4 CHRONIC PAIN SYNDROME: Primary | ICD-10-CM

## 2018-02-19 PROCEDURE — 99215 OFFICE O/P EST HI 40 MIN: CPT | Performed by: NEUROLOGICAL SURGERY

## 2018-02-19 PROCEDURE — 72100 X-RAY EXAM L-S SPINE 2/3 VWS: CPT

## 2018-02-19 RX ORDER — NALOXEGOL OXALATE 12.5 MG/1
TABLET, FILM COATED ORAL
Refills: 2 | COMMUNITY
Start: 2018-01-21 | End: 2019-02-28 | Stop reason: ALTCHOICE

## 2018-02-19 RX ORDER — GLIMEPIRIDE 1 MG/1
TABLET ORAL
Qty: 60 TABLET | Refills: 3 | Status: SHIPPED | OUTPATIENT
Start: 2018-02-19 | End: 2018-06-11 | Stop reason: SDUPTHER

## 2018-02-19 RX ORDER — AMPICILLIN TRIHYDRATE 250 MG
600 CAPSULE ORAL 2 TIMES DAILY
COMMUNITY

## 2018-02-19 RX ORDER — LANCETS 33 GAUGE
EACH MISCELLANEOUS DAILY
COMMUNITY
Start: 2017-11-30 | End: 2018-03-19 | Stop reason: SDUPTHER

## 2018-02-19 NOTE — PROGRESS NOTES
Assessment/Plan:    No problem-specific Assessment & Plan notes found for this encounter  Diagnoses and all orders for this visit:    Chronic pain syndrome  -     X-ray lumbar spine 2 or 3 views; Future    Post laminectomy syndrome    Lumbar radiculopathy    Other orders  -     glucose blood test strip; USE ONCE DAILY AS DIRECTED  -     Mouthwashes (BIOTENE DRY MOUTH GENTLE) LIQD; Apply to the mouth or throat Daily  -     MOVANTIK 12 5 MG TABS; TAKE ONE TABLET BY MOUTH EVERY MORNING AS NEEDED FOR CONSTIPATION  -     ONETOUCH DELICA LANCETS 16J MISC; by Does not apply route daily  -     Red Yeast Rice 600 MG CAPS; Take by mouth          Summary:  He is now 4 5 years from placement of his [employer] thoracic spinal cord stimulator system with additional level laminectomy  He no longer has efficacy with the device  In addition, the generator was removed by Dr Leonidas Nguyen  During this office visit I have sent the patient for additional lumbar spine x-rays to evaluate the distal end of the electrode  As suspected, Dr Leonidas Nguyen had cut the electrodes as supposed to  it from the generator  Secondary to this the lead could no longer be utilized and the option of another companies generator cannot be used  His CT of the lumbar spine demonstrates postoperative changes from decompression and bony fusion from L4 through S1  He also has moderate to severe 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 with central stenosis  This would need to be further characterized with either a CT myelogram or MRI of the lumbar spine  This may be the etiology of his right lower extremity pain for the past several months  After discussion with the patient and wife for greater than 60 minutes there is decision to proceed with removal of the thoracic spinal cord stimulator electrode in order to obtain MRI imaging of his lumbar spine  Based on this MRI, decisions can be made of revision spinal surgery    The patient and wife remained very hesitant to proceed with any further lumbar surgical intervention  A CT of his abdomen and pelvis demonstrates the base of the paddle is at the entry laminectomy site  Given his chronic pain pattern, he is a candidate for an intrathecal pain pump  I will have him return to Dr Kacey Pascual to undergo an intrathecal pain pump trial   He can return to me for implantation of the permanent device if successful  The risks and benefits of removal of the thoracic spinal cord stimulator electrode were reviewed with the patient and wife and they wish to proceed  They understand the additional risk of spinal cord injury with revision spinal surgery for the electrode  This is especially true given his history of an infection with removal of an electrode, and then placement of a new electrode with additional level laminectomy via a 2nd procedure  These risks include, but are not limited to bleeding, infection, and paralysis  Subjective:    Patient ID: Janna Ravi is a 65 y o  male  HPI:   He is now 4 5 years from placement of his Medtronic thoracic spinal cord stimulator system with additional level laminectomy  He reports he lost efficacy of the device and head turned off 2 years ago  Dr Kacey Pascual had removed the generator 1 year ago  He remains with his chronic pain, but also has a 4 month history of right lateral and anterior thigh pain to his calf  He presents to discuss options  He is on fentanyl patch, and Roxycodone  From previous office notes:   Patient presented 7 months from SCS placement with addiitonal level laminectomy afte removal from a perc SCS for infection placed at another institution  He is follwed by Dr Kacey Pascual for pain management  He denies incisional pain  No fevers or chills  He uses the stimulator  He obtains some relief of his left leg pain but remains with LBP  He has not had his stimulator adjusted in over 4 months  He remains with good parasthesia coverage   He wears a TLSO brace for comfort that is prescribed by Dr Leonidas Nguyen  Patient presented 6 weeks from Thoracic SCS impantation with additional level laminectomy  He is doing very well and is very pleased  He reports near 100% relief of his leg pain  He has improved ambulation  He remains with some lower back pain which is improved  He underwent a programming adjustment a few days ago with the [employer] rep and had his sensor activated  He feels that one of the programs gives him abdominal stimulation which is uncomfortable  No fevers or chills  No incisional pain  Patient presented in 4 month follow up to rediscuss paddle eletrode implantation  His latest UE doppler returned negative  He is off the coumadin  He remains with his baseline pain  He is undergoing a hypercoagulable workup  In review, this is a 48 yo male with history of chronic LBP and leg pain who underwent placement of a percutanteous SCS system with Dr Leonidas Nguyen who presented to [facility] two weeks following the surgery with an infection requiring removal of the system  He is now 6 weeks post op and doing well  He has completed his course of anitbiotics  He denies incisional pain  He does report his baseline pain which includes low back pain which is a dull ache and bilateral left greater than right posterior thigh and full calf pain  There is also associated numbness in the top of his foot  He obtained great relief with the stimulator  He developed upper extremity DVT's following PICC line placement for which he is now on Lovenox  He will likely be transitioned to oral coumadin for the next three months by his PCP per his report  He presents now as post op and also to discuss reimplantation  He has a history of previous lumbar fusions  Denies associated fecal incontinence, urine incontinence, numbness, tingling and weakness         The following portions of the patient's history were reviewed and updated as appropriate: allergies, current medications, past family history, past medical history, past social history and past surgical history  Review of Systems   Constitutional: Positive for fatigue  HENT: Negative  Eyes:        Eye pressure  Respiratory: Positive for apnea  CPAP for sleep apnea   Cardiovascular: Negative  Gastrointestinal: Positive for constipation  Endocrine: Negative  Genitourinary: Positive for frequency and urgency  Musculoskeletal: Positive for back pain  Skin: Negative  Allergic/Immunologic: Negative  Neurological: Positive for weakness and numbness  Bilateral leg numbness, weakness with heaviness sensation  Hematological: Negative  Psychiatric/Behavioral: Positive for dysphoric mood  Objective:      /75 (BP Location: Left arm, Patient Position: Sitting, Cuff Size: Standard)   Pulse 78   Temp 98 2 °F (36 8 °C) (Tympanic)   Resp 16   Ht 5' 10" (1 778 m)   Wt 114 kg (251 lb)   BMI 36 01 kg/m²          Physical Exam   Constitutional: He is oriented to person, place, and time  He appears well-developed  Neck: Normal range of motion  Pulmonary/Chest: Effort normal    Musculoskeletal: Normal range of motion  Neurological: He is alert and oriented to person, place, and time  He has normal strength and normal reflexes  No sensory deficit  Incisions well healed   Psychiatric: He has a normal mood and affect  His behavior is normal            Results:  Reviewed images and report of lumbar x-ray February 2018 obtained during this office visit  The [employer] tripole paddle electrode is lying flat in the spinal canal   The distal end that would attached to the generator has been cut and there are no contacts  Reviewed images and report of CT lumbar spine November 2017  There are postoperative changes from lumbar decompression and bony fusion from L4 through S1  There is adjacent level disease that is moderate to severe with central stenosis at L3-4    There is mild to moderate degenerative changes at L2-3  Reviewed images of CT abdomen and pelvis 2015 to assess bony anatomy an entry of the thoracic spinal cord stimulator  The base of the paddle remains at the entry laminectomy site which appears to be at the level of T9-10

## 2018-02-20 ENCOUNTER — OFFICE VISIT (OUTPATIENT)
Dept: LAB | Facility: CLINIC | Age: 58
End: 2018-02-20
Payer: OTHER MISCELLANEOUS

## 2018-02-20 ENCOUNTER — TRANSCRIBE ORDERS (OUTPATIENT)
Dept: LAB | Facility: CLINIC | Age: 58
End: 2018-02-20

## 2018-02-20 ENCOUNTER — APPOINTMENT (OUTPATIENT)
Dept: LAB | Facility: CLINIC | Age: 58
End: 2018-02-20
Payer: OTHER MISCELLANEOUS

## 2018-02-20 ENCOUNTER — LAB REQUISITION (OUTPATIENT)
Dept: LAB | Facility: HOSPITAL | Age: 58
End: 2018-02-20
Payer: OTHER MISCELLANEOUS

## 2018-02-20 DIAGNOSIS — G89.4 CHRONIC PAIN SYNDROME: ICD-10-CM

## 2018-02-20 DIAGNOSIS — M96.1 POSTLAMINECTOMY SYNDROME, CERVICAL REGION: ICD-10-CM

## 2018-02-20 DIAGNOSIS — M54.16 LUMBAR RADICULOPATHY: ICD-10-CM

## 2018-02-20 DIAGNOSIS — M96.1 POST LAMINECTOMY SYNDROME: ICD-10-CM

## 2018-02-20 DIAGNOSIS — M54.16 RADICULOPATHY OF LUMBAR REGION: ICD-10-CM

## 2018-02-20 DIAGNOSIS — G89.4 CHRONIC PAIN SYNDROME: Primary | ICD-10-CM

## 2018-02-20 DIAGNOSIS — M96.1 POSTLAMINECTOMY SYNDROME, NOT ELSEWHERE CLASSIFIED: ICD-10-CM

## 2018-02-20 LAB
ABO GROUP BLD: NORMAL
ALBUMIN SERPL BCP-MCNC: 3.6 G/DL (ref 3.5–5)
ALP SERPL-CCNC: 86 U/L (ref 46–116)
ALT SERPL W P-5'-P-CCNC: 37 U/L (ref 12–78)
ANION GAP SERPL CALCULATED.3IONS-SCNC: 5 MMOL/L (ref 4–13)
APTT PPP: 33 SECONDS (ref 23–35)
AST SERPL W P-5'-P-CCNC: 20 U/L (ref 5–45)
BASOPHILS # BLD AUTO: 0.02 THOUSANDS/ΜL (ref 0–0.1)
BASOPHILS NFR BLD AUTO: 0 % (ref 0–1)
BILIRUB SERPL-MCNC: 0.3 MG/DL (ref 0.2–1)
BILIRUB UR QL STRIP: NEGATIVE
BLD GP AB SCN SERPL QL: NEGATIVE
BUN SERPL-MCNC: 14 MG/DL (ref 5–25)
CALCIUM SERPL-MCNC: 9.1 MG/DL (ref 8.3–10.1)
CHLORIDE SERPL-SCNC: 100 MMOL/L (ref 100–108)
CLARITY UR: CLEAR
CO2 SERPL-SCNC: 34 MMOL/L (ref 21–32)
COLOR UR: YELLOW
CREAT SERPL-MCNC: 0.91 MG/DL (ref 0.6–1.3)
EOSINOPHIL # BLD AUTO: 0.13 THOUSAND/ΜL (ref 0–0.61)
EOSINOPHIL NFR BLD AUTO: 2 % (ref 0–6)
ERYTHROCYTE [DISTWIDTH] IN BLOOD BY AUTOMATED COUNT: 13 % (ref 11.6–15.1)
EST. AVERAGE GLUCOSE BLD GHB EST-MCNC: 166 MG/DL
GFR SERPL CREATININE-BSD FRML MDRD: 94 ML/MIN/1.73SQ M
GLUCOSE P FAST SERPL-MCNC: 180 MG/DL (ref 65–99)
GLUCOSE UR STRIP-MCNC: NEGATIVE MG/DL
HBA1C MFR BLD: 7.4 % (ref 4.2–6.3)
HCT VFR BLD AUTO: 38.9 % (ref 36.5–49.3)
HGB BLD-MCNC: 13.3 G/DL (ref 12–17)
HGB UR QL STRIP.AUTO: NEGATIVE
INR PPP: 0.88 (ref 0.86–1.16)
KETONES UR STRIP-MCNC: NEGATIVE MG/DL
LEUKOCYTE ESTERASE UR QL STRIP: NEGATIVE
LYMPHOCYTES # BLD AUTO: 1.83 THOUSANDS/ΜL (ref 0.6–4.47)
LYMPHOCYTES NFR BLD AUTO: 30 % (ref 14–44)
MCH RBC QN AUTO: 27 PG (ref 26.8–34.3)
MCHC RBC AUTO-ENTMCNC: 34.2 G/DL (ref 31.4–37.4)
MCV RBC AUTO: 79 FL (ref 82–98)
MONOCYTES # BLD AUTO: 0.42 THOUSAND/ΜL (ref 0.17–1.22)
MONOCYTES NFR BLD AUTO: 7 % (ref 4–12)
NEUTROPHILS # BLD AUTO: 3.65 THOUSANDS/ΜL (ref 1.85–7.62)
NEUTS SEG NFR BLD AUTO: 61 % (ref 43–75)
NITRITE UR QL STRIP: NEGATIVE
PH UR STRIP.AUTO: 6 [PH] (ref 4.5–8)
PLATELET # BLD AUTO: 265 THOUSANDS/UL (ref 149–390)
PMV BLD AUTO: 9.5 FL (ref 8.9–12.7)
POTASSIUM SERPL-SCNC: 3.5 MMOL/L (ref 3.5–5.3)
PROT SERPL-MCNC: 7.3 G/DL (ref 6.4–8.2)
PROT UR STRIP-MCNC: NEGATIVE MG/DL
PROTHROMBIN TIME: 12.2 SECONDS (ref 12.1–14.4)
RBC # BLD AUTO: 4.92 MILLION/UL (ref 3.88–5.62)
RH BLD: POSITIVE
SODIUM SERPL-SCNC: 139 MMOL/L (ref 136–145)
SP GR UR STRIP.AUTO: 1.02 (ref 1–1.03)
SPECIMEN EXPIRATION DATE: NORMAL
UROBILINOGEN UR QL STRIP.AUTO: 0.2 E.U./DL
WBC # BLD AUTO: 6.05 THOUSAND/UL (ref 4.31–10.16)

## 2018-02-20 PROCEDURE — 86901 BLOOD TYPING SEROLOGIC RH(D): CPT | Performed by: NEUROLOGICAL SURGERY

## 2018-02-20 PROCEDURE — 83036 HEMOGLOBIN GLYCOSYLATED A1C: CPT

## 2018-02-20 PROCEDURE — 85025 COMPLETE CBC W/AUTO DIFF WBC: CPT

## 2018-02-20 PROCEDURE — 80053 COMPREHEN METABOLIC PANEL: CPT

## 2018-02-20 PROCEDURE — 86850 RBC ANTIBODY SCREEN: CPT | Performed by: NEUROLOGICAL SURGERY

## 2018-02-20 PROCEDURE — 93005 ELECTROCARDIOGRAM TRACING: CPT

## 2018-02-20 PROCEDURE — 36415 COLL VENOUS BLD VENIPUNCTURE: CPT

## 2018-02-20 PROCEDURE — 85610 PROTHROMBIN TIME: CPT

## 2018-02-20 PROCEDURE — 86900 BLOOD TYPING SEROLOGIC ABO: CPT | Performed by: NEUROLOGICAL SURGERY

## 2018-02-20 PROCEDURE — 85730 THROMBOPLASTIN TIME PARTIAL: CPT

## 2018-02-20 PROCEDURE — 81003 URINALYSIS AUTO W/O SCOPE: CPT | Performed by: NEUROLOGICAL SURGERY

## 2018-02-21 LAB
ATRIAL RATE: 63 BPM
P AXIS: 48 DEGREES
PR INTERVAL: 190 MS
QRS AXIS: 51 DEGREES
QRSD INTERVAL: 104 MS
QT INTERVAL: 398 MS
QTC INTERVAL: 407 MS
T WAVE AXIS: 24 DEGREES
VENTRICULAR RATE: 63 BPM

## 2018-02-21 PROCEDURE — 93010 ELECTROCARDIOGRAM REPORT: CPT | Performed by: INTERNAL MEDICINE

## 2018-02-22 ENCOUNTER — OFFICE VISIT (OUTPATIENT)
Dept: FAMILY MEDICINE CLINIC | Facility: CLINIC | Age: 58
End: 2018-02-22
Payer: OTHER MISCELLANEOUS

## 2018-02-22 VITALS
BODY MASS INDEX: 35.81 KG/M2 | WEIGHT: 249.6 LBS | RESPIRATION RATE: 16 BRPM | DIASTOLIC BLOOD PRESSURE: 86 MMHG | SYSTOLIC BLOOD PRESSURE: 130 MMHG | HEART RATE: 82 BPM | TEMPERATURE: 96.6 F

## 2018-02-22 DIAGNOSIS — M54.16 LUMBAR RADICULOPATHY: ICD-10-CM

## 2018-02-22 DIAGNOSIS — M96.1 POST LAMINECTOMY SYNDROME: ICD-10-CM

## 2018-02-22 DIAGNOSIS — G89.4 CHRONIC PAIN SYNDROME: ICD-10-CM

## 2018-02-22 DIAGNOSIS — Z01.818 PREOPERATIVE EVALUATION OF A MEDICAL CONDITION TO RULE OUT SURGICAL CONTRAINDICATIONS (TAR REQUIRED): Primary | ICD-10-CM

## 2018-02-22 PROBLEM — K59.00 CONSTIPATION: Status: ACTIVE | Noted: 2017-10-27

## 2018-02-22 PROCEDURE — 99243 OFF/OP CNSLTJ NEW/EST LOW 30: CPT | Performed by: FAMILY MEDICINE

## 2018-02-22 RX ORDER — GABAPENTIN 600 MG/1
TABLET ORAL
Refills: 2 | COMMUNITY
Start: 2018-02-21 | End: 2018-04-07 | Stop reason: ALTCHOICE

## 2018-02-22 RX ORDER — TIZANIDINE 4 MG/1
4 TABLET ORAL EVERY 8 HOURS
Refills: 2 | COMMUNITY
Start: 2018-02-21 | End: 2018-03-07 | Stop reason: SDUPTHER

## 2018-02-22 RX ORDER — OXYCODONE HYDROCHLORIDE 30 MG/1
TABLET ORAL
Refills: 0 | COMMUNITY
Start: 2018-02-21

## 2018-02-22 RX ORDER — ZALEPLON 5 MG/1
CAPSULE ORAL
Refills: 2 | COMMUNITY
Start: 2018-02-21 | End: 2018-06-11 | Stop reason: SDUPTHER

## 2018-02-22 NOTE — PROGRESS NOTES
FAMILY PRACTICE  OFFICE VISIT       NAME: Yaa Crowell  AGE: 65 y o  SEX: male       : 1960        MRN: [id number]    DATE: 2018  TIME: 12:01 PM    Assessment and Plan     Problem List Items Addressed This Visit     Pain syndrome, chronic    Post laminectomy syndrome    Lumbar radiculopathy    Preoperative evaluation of a medical condition to rule out surgical contraindications (TAR required) - Primary        Patient appears to be in stable health  His pre-admission testing including blood work, EKG, and urinalysis were reviewed and appear stable  He is medically cleared for upcoming procedure as described    There are no Patient Instructions on file for this visit  Chief Complaint     Chief Complaint   Patient presents with    Pre-op Exam     back surgery on  by dr Leo Braxton       History of Present Illness     Patient scheduled to have procedure to have electro pads removed from his prior spinal stimulator that were left in place last year  Patient has chronic back pain and needs his these electric pads removed prior to proceeding with an MRI of his spine  Pending results of his MRI is neurosurgeon may consider a pain medicine pump for his chronic pain  Patient denies any recent illness  I reviewed his pre-admission testing including blood work, EKG, and urinalysis        Review of Systems   Review of Systems   Constitutional: Positive for fever  HENT: Negative  Respiratory: Negative  Cardiovascular: Negative  Gastrointestinal: Negative  Genitourinary: Negative  Musculoskeletal:        As per HPI   Skin: Negative  Psychiatric/Behavioral: Negative          Active Problem List     Patient Active Problem List   Diagnosis    Pain syndrome, chronic    Post laminectomy syndrome    Benign essential hypertension    Constipation    Depression    Hearing loss    Non-toxic uninodular goiter    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    [facility]    Lumbar radiculopathy    Preoperative evaluation of a medical condition to rule out surgical contraindications (TAR required)       Past Medical History:  Past Medical History:   Diagnosis Date    Depression     [facility]     Esophageal reflux     Hearing loss     Hypertension     Sleep apnea        Past Surgical History:  Past Surgical History:   Procedure Laterality Date    BACK SURGERY      COLONOSCOPY N/A 8/10/2016    Procedure: COLONOSCOPY;  Surgeon: Rebecca Coon MD;  Location: [facility]; Service:     KNEE ARTHROSCOPY      right X2    SHOULDER SURGERY Left        Family History:  No family history on file  Social History:  Social History     Social History    Marital status: /Civil Union     Spouse name: N/A    Number of children: N/A    Years of education: N/A     Occupational History    Not on file  Social History Main Topics    Smoking status: Former Smoker     Quit date: 1992    Smokeless tobacco: Not on file    Alcohol use No    Drug use: No    Sexual activity: Not on file     Other Topics Concern    Not on file     Social History Narrative    No narrative on file       Objective     Vitals:    02/22/18 0955   BP: 130/86   Pulse: 82   Resp: 16   Temp: (!) 96 6 °F (35 9 °C)     Wt Readings from Last 3 Encounters:   02/22/18 113 kg (249 lb 9 6 oz)   02/19/18 114 kg (251 lb)   10/27/17 109 kg (240 lb)       Physical Exam   Constitutional:   Patient is in no acute distress   HENT:   Mouth/Throat: Oropharynx is clear and moist  No oropharyngeal exudate  Tympanic membranes within normal limits bilaterally   Cardiovascular:   Regular rate and rhythm with no murmurs   Pulmonary/Chest:   Lungs are clear to auscultation without wheezes,rales, or rhonchi   Abdominal:   Abdomen is soft, nontender with positive bowel sounds  There is no rebound or guarding  No masses palpated   Musculoskeletal: He exhibits no edema     Lymphadenopathy: He has no cervical adenopathy  Pertinent Laboratory/Diagnostic Studies:  Lab Results   Component Value Date    GLUCOSE 105 11/08/2016    BUN 14 02/20/2018    CREATININE 0 91 02/20/2018    CALCIUM 9 1 02/20/2018     02/20/2018    K 3 5 02/20/2018    CO2 34 (H) 02/20/2018     02/20/2018     Lab Results   Component Value Date    ALT 37 02/20/2018    AST 20 02/20/2018    ALKPHOS 86 02/20/2018    BILITOT 0 30 02/20/2018       Lab Results   Component Value Date    WBC 6 05 02/20/2018    HGB 13 3 02/20/2018    HCT 38 9 02/20/2018    MCV 79 (L) 02/20/2018     02/20/2018       No results found for: TSH    Lab Results   Component Value Date    CHOL 162 11/03/2017     Lab Results   Component Value Date    TRIG 97 11/03/2017     Lab Results   Component Value Date    HDL 47 11/03/2017     Lab Results   Component Value Date    LDLCALC 96 11/03/2017     Lab Results   Component Value Date    HGBA1C 7 4 (H) 02/20/2018       Results for orders placed or performed in visit on 02/20/18   Type and screen   Result Value Ref Range    ABO Grouping O     Rh Factor Positive     Antibody Screen Negative     Specimen Expiration Date [id number]        No orders of the defined types were placed in this encounter  ALLERGIES:  Allergies   Allergen Reactions    Cymbalta [Duloxetine Hcl]     Propulsid [Cisapride]     Vancomycin        Current Medications     Current Outpatient Prescriptions   Medication Sig Dispense Refill    benazepril-hydrochlorthiazide (LOTENSIN HCT) 20-12 5 MG per tablet Take 1 tablet by mouth daily   celecoxib (CeleBREX) 200 mg capsule Take 200 mg by mouth daily   fentaNYL (DURAGESIC) 100 mcg/hr TD 72 hr patch Place 1 patch on the skin every third day        gabapentin (NEURONTIN) 600 MG tablet TAKE ONE-HALF TO ONE TABLET THREE TIMES DAILY AS TOLERATED  2    glimepiride (AMARYL) 1 mg tablet TAKE 1 TABLET BY MOUTH TWICE A DAY 60 tablet 3    glucose blood (ONE TOUCH ULTRA TEST) test strip Use once daily as directed 100 each 5    glucose blood test strip USE ONCE DAILY AS DIRECTED  0    hyoscyamine (LEVBID) 0 375 mg 12 hr tablet Take 0 375 mg by mouth daily   lansoprazole (PREVACID) 30 mg capsule Take 30 mg by mouth daily   Mouthwashes (BIOTENE DRY MOUTH GENTLE) LIQD Apply to the mouth or throat Daily      MOVANTIK 12 5 MG TABS TAKE ONE TABLET BY MOUTH EVERY MORNING AS NEEDED FOR CONSTIPATION  2    ONETOUCH DELICA LANCETS 74M MISC by Does not apply route daily      oxyCODONE (ROXICODONE) 30 MG immediate release tablet TAKE ONE TABLET BY MOUTH EVERY 8 HOURS AS NEEDED FOR PAIN max daily AMOUNT THREE tablets  0    Red Yeast Rice 600 MG CAPS Take by mouth      sertraline (ZOLOFT) 50 mg tablet Take 75 mg by mouth daily   TiZANidine (ZANAFLEX) 4 MG capsule Take 4 mg by mouth 3 (three) times a day   zaleplon (SONATA) 5 MG capsule TAKE ONE CAPSULE BY MOUTH AT BEDTIME FOR INSOMNIA  2    eszopiclone (LUNESTA) tablet Take 3 mg by mouth daily at bedtime  Take immediately before bedtime      gabapentin (NEURONTIN) 300 mg capsule Take 600 mg by mouth 3 (three) times a day        OxyCODONE HCl ER (OxyCONTIN) 30 MG T12A Take 30 mg by mouth every 12 (twelve) hours   tiZANidine (ZANAFLEX) 4 mg tablet Take 4 mg by mouth every 8 (eight) hours  2     No current facility-administered medications for this visit            Health Maintenance     Health Maintenance   Topic Date Due    HIV SCREENING  1960    Hepatitis C Screening  1960    [facility] PLAN OF CARE  1960    Diabetic Foot Exam  06/25/1971    URINE MICROALBUMIN  06/25/1971    Depression Screening PHQ-9  06/25/1973    DTaP,Tdap,and Td Vaccines (3 - Td) 07/18/2016    OPHTHALMOLOGY EXAM  06/29/2018    INFLUENZA VACCINE  Completed    PNEUMOCOCCAL POLYSACCHARIDE VACCINE AGE 2-64 HIGH RISK  Completed     Immunization History   Administered Date(s) Administered    Influenza Quadrivalent Preservative Free 3 years and older IM 09/25/2015, 09/19/2016    Influenza TIV (IM) 10/05/2009, 10/05/2010, 10/07/2013, 09/26/2014, 10/02/2017    Pneumococcal Polysaccharide PPV23 11/04/2003    Tdap 06/18/2004, 01/18/2016    Zoster 15/64/4944       Dl Botello MD

## 2018-03-06 ENCOUNTER — TELEPHONE (OUTPATIENT)
Dept: NEUROSURGERY | Facility: CLINIC | Age: 58
End: 2018-03-06

## 2018-03-06 NOTE — TELEPHONE ENCOUNTER
Returned call has question regarding medication hold list if ok to continue preascribed oxycodone  ---OK to continue not a combination drug containing ASA or IBUPROFEN

## 2018-03-07 RX ORDER — TERAZOSIN 5 MG/1
5 CAPSULE ORAL DAILY
COMMUNITY
End: 2018-05-13 | Stop reason: SDUPTHER

## 2018-03-07 RX ORDER — DIPHENOXYLATE HYDROCHLORIDE AND ATROPINE SULFATE 2.5; .025 MG/1; MG/1
1 TABLET ORAL DAILY
COMMUNITY

## 2018-03-07 RX ORDER — LISINOPRIL AND HYDROCHLOROTHIAZIDE 20; 12.5 MG/1; MG/1
1 TABLET ORAL DAILY
COMMUNITY
End: 2018-03-24 | Stop reason: ALTCHOICE

## 2018-03-07 RX ORDER — CHOLECALCIFEROL (VITAMIN D3) 125 MCG
10 CAPSULE ORAL
COMMUNITY

## 2018-03-07 NOTE — PRE-PROCEDURE INSTRUCTIONS
Pre-Surgery Instructions:   Medication Instructions    fentaNYL (DURAGESIC) 100 mcg/hr TD 72 hr patch Instructed patient per Anesthesia Guidelines   gabapentin (NEURONTIN) 600 MG tablet Instructed patient per Anesthesia Guidelines   glimepiride (AMARYL) 1 mg tablet Instructed patient per Anesthesia Guidelines   hyoscyamine (LEVBID) 0 375 mg 12 hr tablet Instructed patient per Anesthesia Guidelines   lansoprazole (PREVACID) 30 mg capsule Instructed patient per Anesthesia Guidelines   lisinopril-hydrochlorothiazide (PRINZIDE,ZESTORETIC) 20-12 5 MG per tablet Instructed patient per Anesthesia Guidelines   Melatonin 5 MG TABS Instructed patient per Anesthesia Guidelines   Mouthwashes (BIOTENE DRY MOUTH GENTLE) LIQD Instructed patient per Anesthesia Guidelines   MOVANTIK 12 5 MG TABS Instructed patient per Anesthesia Guidelines   oxyCODONE (ROXICODONE) 30 MG immediate release tablet Instructed patient per Anesthesia Guidelines   sertraline (ZOLOFT) 50 mg tablet Instructed patient per Anesthesia Guidelines   terazosin (HYTRIN) 5 mg capsule Instructed patient per Anesthesia Guidelines   TiZANidine (ZANAFLEX) 4 MG capsule Instructed patient per Anesthesia Guidelines   zaleplon (SONATA) 5 MG capsule Instructed patient per Anesthesia Guidelines  Before your operation, you play an important role in decreasing your risk for infection by washing with special antiseptic soap  This is an effective way to reduce bacteria on the skin which may help to prevent infections at the surgical site  Please read the following directions in advance  1  In the week before your operation purchase a 4 ounce bottle of antiseptic soap containing chlorhexidine gluconate 4%  Some brand names include: Aplicare, Endure, and Hibiclens  The cost is usually less than $5 00  · For your convenience, the [address] [place] [address] carries the soap    · It may also be available at your doctor's office or pre-admission testing center, and at most retail pharmacies  · If you are allergic or sensitive to soaps containing chlorhexidine gluconate (CHG), please let your doctor know so another antiseptic soap can be suggested  · CHG antiseptic soap is for external use only  2      The day before your operation follow these directions carefully to get ready  · Place clean lines (sheets) on your bed; you should sleep on clean sheets after your evening shower  · Get clean towels and washcloths ready - you need enough for 2 showers  · Set aside clean underwear, pajamas, and clothing to wear after the shower  Reminders:  · DO NOT use any other soap or body rinse on your skin during or after the antiseptic showers  · DO NOT use lotion , powder, deodorant, or perfume/aftershave of any kind on your skin after your antiseptic shower  · DO NOT shave any body parts in the 24 hours/the day before your operation  · DO NOT get the antiseptic soap in your eyes, ears, nose, mouth, or vaginal area  3      You will need to shower the night before AND the morning of your Surgery  Shower 1:  · The evening before your operation, take the fist shower  · First, shampoo your hair with regular shampoo and rinse it completely before you use the anitseptic soap  After washing and rinsing your hair, rinse your body  · Next, use a clean wash cloth to apply the antiseptic soap and wash your body from the neck down to your toes using 1/2 bottle of the antiseptic soap  You will use the other 1/2 bottle for the second shower  · Clean the area where your incision will be; later this area well for about 2 minutes  · If you ar having head or neck surgery, wash areas with the antiseptic soap  · Rinse yourself completely with running water  · Use a clean towel to dry off  · Wear clean underwear and clothing/pajamas    Shower 2:  · The Morning of your operation, take the second shower following the same steps as Shower 1 using the second 1/2 of the bottle of antiseptic soap  · Use clean cloths and towels to was and dry yourself off  · Wear clean underwear and clothing

## 2018-03-08 ENCOUNTER — TELEPHONE (OUTPATIENT)
Dept: NEUROSURGERY | Facility: CLINIC | Age: 58
End: 2018-03-08

## 2018-03-08 DIAGNOSIS — Z98.890 STATUS POST SURGERY: Primary | ICD-10-CM

## 2018-03-08 RX ORDER — CEPHALEXIN 500 MG/1
500 CAPSULE ORAL EVERY 6 HOURS SCHEDULED
Qty: 28 CAPSULE | Refills: 0 | Status: SHIPPED | OUTPATIENT
Start: 2018-03-08 | End: 2018-03-15

## 2018-03-08 RX ORDER — BUPIVACAINE HYDROCHLORIDE AND EPINEPHRINE 5; 5 MG/ML; UG/ML
30 INJECTION, SOLUTION EPIDURAL; INTRACAUDAL; PERINEURAL ONCE
Status: CANCELLED | OUTPATIENT
Start: 2018-03-09

## 2018-03-09 ENCOUNTER — ANESTHESIA EVENT (OUTPATIENT)
Dept: PERIOP | Facility: HOSPITAL | Age: 58
End: 2018-03-09
Payer: OTHER MISCELLANEOUS

## 2018-03-09 ENCOUNTER — APPOINTMENT (OUTPATIENT)
Dept: RADIOLOGY | Facility: HOSPITAL | Age: 58
End: 2018-03-09
Payer: OTHER MISCELLANEOUS

## 2018-03-09 ENCOUNTER — ANESTHESIA (OUTPATIENT)
Dept: PERIOP | Facility: HOSPITAL | Age: 58
End: 2018-03-09
Payer: OTHER MISCELLANEOUS

## 2018-03-09 ENCOUNTER — HOSPITAL ENCOUNTER (OUTPATIENT)
Facility: HOSPITAL | Age: 58
Setting detail: OUTPATIENT SURGERY
Discharge: HOME | End: 2018-03-09
Attending: NEUROLOGICAL SURGERY | Admitting: NEUROLOGICAL SURGERY
Payer: OTHER MISCELLANEOUS

## 2018-03-09 VITALS
WEIGHT: 240 LBS | DIASTOLIC BLOOD PRESSURE: 81 MMHG | HEART RATE: 66 BPM | SYSTOLIC BLOOD PRESSURE: 141 MMHG | HEIGHT: 70 IN | BODY MASS INDEX: 34.36 KG/M2 | TEMPERATURE: 97.1 F | RESPIRATION RATE: 17 BRPM | OXYGEN SATURATION: 95 %

## 2018-03-09 DIAGNOSIS — G89.4 PAIN SYNDROME, CHRONIC: ICD-10-CM

## 2018-03-09 LAB
ABO GROUP BLD: NORMAL
BLD GP AB SCN SERPL QL: NEGATIVE
GLUCOSE SERPL-MCNC: 130 MG/DL (ref 65–140)
GLUCOSE SERPL-MCNC: 147 MG/DL (ref 65–140)
RH BLD: POSITIVE
SPECIMEN EXPIRATION DATE: NORMAL

## 2018-03-09 PROCEDURE — 86850 RBC ANTIBODY SCREEN: CPT | Performed by: PHYSICIAN ASSISTANT

## 2018-03-09 PROCEDURE — 63662 REMOVE SPINE ELTRD PLATE: CPT | Performed by: PHYSICIAN ASSISTANT

## 2018-03-09 PROCEDURE — 86900 BLOOD TYPING SEROLOGIC ABO: CPT | Performed by: PHYSICIAN ASSISTANT

## 2018-03-09 PROCEDURE — 72070 X-RAY EXAM THORAC SPINE 2VWS: CPT

## 2018-03-09 PROCEDURE — 82948 REAGENT STRIP/BLOOD GLUCOSE: CPT

## 2018-03-09 PROCEDURE — 86901 BLOOD TYPING SEROLOGIC RH(D): CPT | Performed by: PHYSICIAN ASSISTANT

## 2018-03-09 PROCEDURE — 63662 REMOVE SPINE ELTRD PLATE: CPT | Performed by: NEUROLOGICAL SURGERY

## 2018-03-09 RX ORDER — SODIUM CHLORIDE, SODIUM LACTATE, POTASSIUM CHLORIDE, CALCIUM CHLORIDE 600; 310; 30; 20 MG/100ML; MG/100ML; MG/100ML; MG/100ML
50 INJECTION, SOLUTION INTRAVENOUS CONTINUOUS
Status: DISCONTINUED | OUTPATIENT
Start: 2018-03-09 | End: 2018-03-09 | Stop reason: HOSPADM

## 2018-03-09 RX ORDER — FENTANYL CITRATE 50 UG/ML
INJECTION, SOLUTION INTRAMUSCULAR; INTRAVENOUS AS NEEDED
Status: DISCONTINUED | OUTPATIENT
Start: 2018-03-09 | End: 2018-03-09 | Stop reason: SURG

## 2018-03-09 RX ORDER — LIDOCAINE HYDROCHLORIDE AND EPINEPHRINE 10; 10 MG/ML; UG/ML
INJECTION, SOLUTION INFILTRATION; PERINEURAL AS NEEDED
Status: DISCONTINUED | OUTPATIENT
Start: 2018-03-09 | End: 2018-03-09 | Stop reason: HOSPADM

## 2018-03-09 RX ORDER — GLYCOPYRROLATE 0.2 MG/ML
INJECTION INTRAMUSCULAR; INTRAVENOUS AS NEEDED
Status: DISCONTINUED | OUTPATIENT
Start: 2018-03-09 | End: 2018-03-09 | Stop reason: SURG

## 2018-03-09 RX ORDER — PROPOFOL 10 MG/ML
INJECTION, EMULSION INTRAVENOUS AS NEEDED
Status: DISCONTINUED | OUTPATIENT
Start: 2018-03-09 | End: 2018-03-09 | Stop reason: SURG

## 2018-03-09 RX ORDER — ONDANSETRON 2 MG/ML
INJECTION INTRAMUSCULAR; INTRAVENOUS AS NEEDED
Status: DISCONTINUED | OUTPATIENT
Start: 2018-03-09 | End: 2018-03-09 | Stop reason: SURG

## 2018-03-09 RX ORDER — CHLORHEXIDINE GLUCONATE 0.12 MG/ML
15 RINSE ORAL EVERY 12 HOURS SCHEDULED
Status: DISCONTINUED | OUTPATIENT
Start: 2018-03-09 | End: 2018-03-09 | Stop reason: HOSPADM

## 2018-03-09 RX ORDER — METOCLOPRAMIDE HYDROCHLORIDE 5 MG/ML
10 INJECTION INTRAMUSCULAR; INTRAVENOUS ONCE AS NEEDED
Status: DISCONTINUED | OUTPATIENT
Start: 2018-03-09 | End: 2018-03-09 | Stop reason: HOSPADM

## 2018-03-09 RX ORDER — ROCURONIUM BROMIDE 10 MG/ML
INJECTION, SOLUTION INTRAVENOUS AS NEEDED
Status: DISCONTINUED | OUTPATIENT
Start: 2018-03-09 | End: 2018-03-09 | Stop reason: SURG

## 2018-03-09 RX ORDER — BUPIVACAINE HYDROCHLORIDE AND EPINEPHRINE 5; 5 MG/ML; UG/ML
INJECTION, SOLUTION EPIDURAL; INTRACAUDAL; PERINEURAL AS NEEDED
Status: DISCONTINUED | OUTPATIENT
Start: 2018-03-09 | End: 2018-03-09 | Stop reason: HOSPADM

## 2018-03-09 RX ORDER — MIDAZOLAM HYDROCHLORIDE 1 MG/ML
INJECTION INTRAMUSCULAR; INTRAVENOUS AS NEEDED
Status: DISCONTINUED | OUTPATIENT
Start: 2018-03-09 | End: 2018-03-09 | Stop reason: SURG

## 2018-03-09 RX ORDER — FENTANYL CITRATE/PF 50 MCG/ML
25 SYRINGE (ML) INJECTION
Status: DISCONTINUED | OUTPATIENT
Start: 2018-03-09 | End: 2018-03-09 | Stop reason: HOSPADM

## 2018-03-09 RX ORDER — OXYCODONE HYDROCHLORIDE AND ACETAMINOPHEN 5; 325 MG/1; MG/1
2 TABLET ORAL EVERY 4 HOURS PRN
Status: DISCONTINUED | OUTPATIENT
Start: 2018-03-09 | End: 2018-03-09 | Stop reason: HOSPADM

## 2018-03-09 RX ORDER — LIDOCAINE HYDROCHLORIDE 10 MG/ML
INJECTION, SOLUTION INFILTRATION; PERINEURAL AS NEEDED
Status: DISCONTINUED | OUTPATIENT
Start: 2018-03-09 | End: 2018-03-09 | Stop reason: SURG

## 2018-03-09 RX ADMIN — FENTANYL CITRATE 25 MCG: 50 INJECTION INTRAMUSCULAR; INTRAVENOUS at 14:02

## 2018-03-09 RX ADMIN — SODIUM CHLORIDE, SODIUM LACTATE, POTASSIUM CHLORIDE, AND CALCIUM CHLORIDE 50 ML/HR: .6; .31; .03; .02 INJECTION, SOLUTION INTRAVENOUS at 10:51

## 2018-03-09 RX ADMIN — GLYCOPYRROLATE 0.5 MG: 0.2 INJECTION, SOLUTION INTRAMUSCULAR; INTRAVENOUS at 13:20

## 2018-03-09 RX ADMIN — PROPOFOL 200 MG: 10 INJECTION, EMULSION INTRAVENOUS at 12:25

## 2018-03-09 RX ADMIN — ONDANSETRON 4 MG: 2 INJECTION INTRAMUSCULAR; INTRAVENOUS at 13:00

## 2018-03-09 RX ADMIN — LIDOCAINE HYDROCHLORIDE 20 MG: 10 INJECTION, SOLUTION INFILTRATION; PERINEURAL at 12:25

## 2018-03-09 RX ADMIN — CHLORHEXIDINE GLUCONATE 15 ML: 1.2 RINSE ORAL at 10:52

## 2018-03-09 RX ADMIN — OXYCODONE HYDROCHLORIDE AND ACETAMINOPHEN 2 TABLET: 5; 325 TABLET ORAL at 14:21

## 2018-03-09 RX ADMIN — FENTANYL CITRATE 100 MCG: 50 INJECTION, SOLUTION INTRAMUSCULAR; INTRAVENOUS at 12:25

## 2018-03-09 RX ADMIN — MIDAZOLAM HYDROCHLORIDE 2 MG: 1 INJECTION, SOLUTION INTRAMUSCULAR; INTRAVENOUS at 12:20

## 2018-03-09 RX ADMIN — ROCURONIUM BROMIDE 40 MG: 10 INJECTION INTRAVENOUS at 12:25

## 2018-03-09 RX ADMIN — CEFAZOLIN SODIUM 2000 MG: 2 SOLUTION INTRAVENOUS at 12:22

## 2018-03-09 RX ADMIN — NEOSTIGMINE METHYLSULFATE 3 MG: 1 INJECTION, SOLUTION INTRAMUSCULAR; INTRAVENOUS; SUBCUTANEOUS at 13:20

## 2018-03-09 NOTE — INTERIM OP NOTE
Removal of thoracic spinal cord stimulator paddle electrode placed via laminectomy  Postoperative Note  PATIENT NAME: Anthony Bella  : 1960  MRN: [id number]  [facility] OR ROOM 01    Surgery Date: 3/9/2018    Preop Diagnosis:  Chronic pain syndrome [G89 4]  Post laminectomy syndrome [M96 1]    Post-Op Diagnosis Codes:     * Chronic pain syndrome [G89 4]     * Post laminectomy syndrome [M96 1]    Procedure(s) (LRB):  Removal of thoracic spinal cord stimulator paddle electrode placed via laminectomy (N/A)    Surgeon(s) and Role:     * Mena Short MD - Primary     * Nel Gu PA-C - Assisting    Specimens:  * No specimens in log *    Estimated Blood Loss:   Minimal    Anesthesia Type:   General     Findings:     SSEPS Stable       Complications:   None    SIGNATURE: Mena Short MD   DATE: 2018   TIME: 1:08 PM

## 2018-03-09 NOTE — H&P (VIEW-ONLY)
Assessment/Plan:    No problem-specific Assessment & Plan notes found for this encounter  Diagnoses and all orders for this visit:    Chronic pain syndrome  -     X-ray lumbar spine 2 or 3 views; Future    Post laminectomy syndrome    Lumbar radiculopathy    Other orders  -     glucose blood test strip; USE ONCE DAILY AS DIRECTED  -     Mouthwashes (BIOTENE DRY MOUTH GENTLE) LIQD; Apply to the mouth or throat Daily  -     MOVANTIK 12 5 MG TABS; TAKE ONE TABLET BY MOUTH EVERY MORNING AS NEEDED FOR CONSTIPATION  -     ONETOUCH DELICA LANCETS 96F MISC; by Does not apply route daily  -     Red Yeast Rice 600 MG CAPS; Take by mouth          Summary:  He is now 4 5 years from placement of his [employer] thoracic spinal cord stimulator system with additional level laminectomy  He no longer has efficacy with the device  In addition, the generator was removed by Dr Connor Lopez  During this office visit I have sent the patient for additional lumbar spine x-rays to evaluate the distal end of the electrode  As suspected, Dr Connor Lopez had cut the electrodes as supposed to  it from the generator  Secondary to this the lead could no longer be utilized and the option of another companies generator cannot be used  His CT of the lumbar spine demonstrates postoperative changes from decompression and bony fusion from L4 through S1  He also has moderate to severe 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 with central stenosis  This would need to be further characterized with either a CT myelogram or MRI of the lumbar spine  This may be the etiology of his right lower extremity pain for the past several months  After discussion with the patient and wife for greater than 60 minutes there is decision to proceed with removal of the thoracic spinal cord stimulator electrode in order to obtain MRI imaging of his lumbar spine  Based on this MRI, decisions can be made of revision spinal surgery    The patient and wife remained very hesitant to proceed with any further lumbar surgical intervention  A CT of his abdomen and pelvis demonstrates the base of the paddle is at the entry laminectomy site  Given his chronic pain pattern, he is a candidate for an intrathecal pain pump  I will have him return to Dr Lyla Huerta to undergo an intrathecal pain pump trial   He can return to me for implantation of the permanent device if successful  The risks and benefits of removal of the thoracic spinal cord stimulator electrode were reviewed with the patient and wife and they wish to proceed  They understand the additional risk of spinal cord injury with revision spinal surgery for the electrode  This is especially true given his history of an infection with removal of an electrode, and then placement of a new electrode with additional level laminectomy via a 2nd procedure  These risks include, but are not limited to bleeding, infection, and paralysis  Subjective:    Patient ID: Virgen Alfaro is a 65 y o  male  HPI:   He is now 4 5 years from placement of his [employer] thoracic spinal cord stimulator system with additional level laminectomy  He reports he lost efficacy of the device and head turned off 2 years ago  Dr Lyla Huerta had removed the generator 1 year ago  He remains with his chronic pain, but also has a 4 month history of right lateral and anterior thigh pain to his calf  He presents to discuss options  He is on fentanyl patch, and Roxycodone  From previous office notes:   Patient presented 7 months from SCS placement with addiitonal level laminectomy afte removal from a perc SCS for infection placed at another institution  He is follwed by Dr Lyla Huerta for pain management  He denies incisional pain  No fevers or chills  He uses the stimulator  He obtains some relief of his left leg pain but remains with LBP  He has not had his stimulator adjusted in over 4 months  He remains with good parasthesia coverage   He wears a TLSO brace for comfort that is prescribed by Dr Lindy Overton  Patient presented 6 weeks from Thoracic SCS impantation with additional level laminectomy  He is doing very well and is very pleased  He reports near 100% relief of his leg pain  He has improved ambulation  He remains with some lower back pain which is improved  He underwent a programming adjustment a few days ago with the [employer] rep and had his sensor activated  He feels that one of the programs gives him abdominal stimulation which is uncomfortable  No fevers or chills  No incisional pain  Patient presented in 4 month follow up to rediscuss paddle eletrode implantation  His latest UE doppler returned negative  He is off the coumadin  He remains with his baseline pain  He is undergoing a hypercoagulable workup  In review, this is a 48 yo male with history of chronic LBP and leg pain who underwent placement of a percutanteous SCS system with Dr Lindy Overton who presented to [facility] two weeks following the surgery with an infection requiring removal of the system  He is now 6 weeks post op and doing well  He has completed his course of anitbiotics  He denies incisional pain  He does report his baseline pain which includes low back pain which is a dull ache and bilateral left greater than right posterior thigh and full calf pain  There is also associated numbness in the top of his foot  He obtained great relief with the stimulator  He developed upper extremity DVT's following PICC line placement for which he is now on Lovenox  He will likely be transitioned to oral coumadin for the next three months by his PCP per his report  He presents now as post op and also to discuss reimplantation  He has a history of previous lumbar fusions  Denies associated fecal incontinence, urine incontinence, numbness, tingling and weakness         The following portions of the patient's history were reviewed and updated as appropriate: allergies, current medications, past family history, past medical history, past social history and past surgical history  Review of Systems   Constitutional: Positive for fatigue  HENT: Negative  Eyes:        Eye pressure  Respiratory: Positive for apnea  CPAP for sleep apnea   Cardiovascular: Negative  Gastrointestinal: Positive for constipation  Endocrine: Negative  Genitourinary: Positive for frequency and urgency  Musculoskeletal: Positive for back pain  Skin: Negative  Allergic/Immunologic: Negative  Neurological: Positive for weakness and numbness  Bilateral leg numbness, weakness with heaviness sensation  Hematological: Negative  Psychiatric/Behavioral: Positive for dysphoric mood  Objective:      /75 (BP Location: Left arm, Patient Position: Sitting, Cuff Size: Standard)   Pulse 78   Temp 98 2 °F (36 8 °C) (Tympanic)   Resp 16   Ht 5' 10" (1 778 m)   Wt 114 kg (251 lb)   BMI 36 01 kg/m²          Physical Exam   Constitutional: He is oriented to person, place, and time  He appears well-developed  Neck: Normal range of motion  Pulmonary/Chest: Effort normal    Musculoskeletal: Normal range of motion  Neurological: He is alert and oriented to person, place, and time  He has normal strength and normal reflexes  No sensory deficit  Incisions well healed   Psychiatric: He has a normal mood and affect  His behavior is normal            Results:  Reviewed images and report of lumbar x-ray February 2018 obtained during this office visit  The [employer] tripole paddle electrode is lying flat in the spinal canal   The distal end that would attached to the generator has been cut and there are no contacts  Reviewed images and report of CT lumbar spine November 2017  There are postoperative changes from lumbar decompression and bony fusion from L4 through S1  There is adjacent level disease that is moderate to severe with central stenosis at L3-4    There is mild to moderate degenerative changes at L2-3  Reviewed images of CT abdomen and pelvis 2015 to assess bony anatomy an entry of the thoracic spinal cord stimulator  The base of the paddle remains at the entry laminectomy site which appears to be at the level of T9-10

## 2018-03-09 NOTE — ANESTHESIA PREPROCEDURE EVALUATION
Review of Systems/Medical History          Cardiovascular  Hypertension ,    Pulmonary  Sleep apnea CPAP,        GI/Hepatic    GERD well controlled,             Endo/Other  Diabetes , History of thyroid disease ,      GYN       Hematology   Musculoskeletal  Back pain ,        Neurology   Psychology   Depression ,              Physical Exam    Airway    Mallampati score: II  TM Distance: >3 FB  Neck ROM: full     Dental   upper dentures and lower dentures,     Cardiovascular  Rhythm: regular, Rate: normal,     Pulmonary  Breath sounds clear to auscultation,     Other Findings        Anesthesia Plan  ASA Score- 3     Anesthesia Type- general with ASA Monitors  Additional Monitors:   Airway Plan: ETT  Plan Factors-    Induction- intravenous  Postoperative Plan-     Informed Consent- Anesthetic plan and risks discussed with patient  I personally reviewed this patient with the CRNA  Discussed and agreed on the Anesthesia Plan with the CRNA  Charis Renae

## 2018-03-09 NOTE — DISCHARGE INSTRUCTIONS
Follow Up Dr [facility] 2 weeks  Remove Dressing 3 days  Antibiotics prescription at [facility] 16      Surgical site: The dressing may be removed after 3 days and left off  There is no special care for your incision  Activity:   Resume normal activity level, but limit bending, lifting, and twisting for 2 weeks  Bathing: You may shower after 3 days  Driving: You may resume driving in one week  Pain Medicine: If you were given a prescription for a new pain medicine, it can be taken in addition to any pain medicine you are already taking  The new medicine should only be needed for about one week to help with any surgical pain  Take the new medicine only as needed  Please call the office at [phone] if you have any of the followin  Redness, swelling, heat, or unusual drainage (green, yellow, white, smelly) from the surgical site  2  Heavy bleeding from the surgical site  3  Chills or fever above 101 degrees   4  Pain not relieved by medicine  5  Questions or concerns about your surgery

## 2018-03-09 NOTE — OP NOTE
OPERATIVE REPORT  PATIENT NAME: Alexandre Vera    :  1960  MRN: [id number]  Pt Location: [facility] OR ROOM 01    SURGERY DATE: 3/9/2018    Surgeon(s) and Role:     * Dakota Jewell MD - Primary     * Armen Botello PA-C - Assisting  No qualified resident available for exposure  PA Present throughout procedure  Assisted with exposure and wound closure providing essential assistance throughout including retraction and hemostasis to achieve the necessary surgical goal       Preop Diagnosis:  Chronic pain syndrome [G89 4]  Post laminectomy syndrome [M96 1]    Post-Op Diagnosis Codes:     * Chronic pain syndrome [G89 4]     * Post laminectomy syndrome [M96 1]    Specimen(s):  * No specimens in log *    Estimated Blood Loss:   Minimal    Drains:       Anesthesia Type:   General    Operative Indications:  Chronic pain syndrome [G89 4]  Post laminectomy syndrome [J88 2]      Complications:   None    Procedure and Technique:  1  Removal of a thoracic spinal cord stimulator paddle electrode placed via laminectomy        Operative Findings:  SSEPS Stable  Explants:  1  Medtronic Tripole Paddle Electrode    Indications for procedure; This is a 56-year-old male with a long-standing history of chronic pain involving the lower back and lower extremities  Underwent previous SCS implantation and presents for removal of the paddle  IPG previously removed  The risks and benefits of the procedure reviewed with the patient and family and they wished to proceed  Description of procedure; The patient was identified and brought to the operating room where they underwent the induction of general anesthesia  Placed prone on the operating room table with chest rolls  Prepped and draped in the usual sterile fashion  2g of Ancef was administered as antibiotic prophylaxis  Bilateral lower extremity SCDs were placed for DVT prophylaxis  A timeout was then performed      Live fluoroscopic imaging was performed in order to mary appropriate positioning of the spine as well as skin incision  The prior thoracic midline incision was incised with a 10 blade after it was anesthetized with 1% lidocaine with epinephrine  Bovie electrocautery dissected the subcutaneous tissue down to the underlying reaingspinous processes  Fluoroscopic imaging then confirmed appropriate level  Once this was confirmed the muscle was dissected off in a medial to lateral direction exposing the underlying remaining lamina  The strain relief loop was identified and freed from the underlying tissue  Sharp and blunt dissection was used, as well as Bovie electrocautery to follow the tails of the leads to the entry point at the lamina  The base of the paddle was identified and had the scar tissue capsule removed with a nerve hook at its inferior border  Under flouroscopc imaging the lead was removed in one smooth motion  SSEPS stable  Hemostasis maintained with bipolar electrocautery and Floseal  Flouroscopc imaging  confirmed complete removal of paddle and tails  Incision copiously irrigated with antibiotic-induced solution  Closure was begun  Thoracic paraspinal musculature and fascia was reapproximated with an interrupted 2-0 Vicryl plus suture  The skin was then approximated with an interrupted inverted 2-0 Vicryl plus suture with stainless steel staples for the skin  Routine sterile dressings were then applied  At the end of the case all counts were reported to be correct  There was no breaks in surgical technique or complications encountered during the procedure  The patient woke from anesthesia in stable condition being taken to the recovery room         I was present for the entire procedure    Patient Disposition:  extubated and stable    SIGNATURE: Nunu Hannah MD  DATE: March 9, 2018  TIME: 1:08 PM

## 2018-03-13 ENCOUNTER — TELEPHONE (OUTPATIENT)
Dept: NEUROSURGERY | Facility: CLINIC | Age: 58
End: 2018-03-13

## 2018-03-13 NOTE — TELEPHONE ENCOUNTER
Post operative call s/p surgery on   w/ DR Selvin Perkins s/p ;  Post operative pain:controlled on tylenol   Antibiotic:cephalexin started evening of surgery  Gastrointestinal (BM, NVD,  Appetite /Fluid intake),   --denies problems                  Call if you develop any signs or symptoms of incision (s) redness, drainage, dehiscence, or  fever of 101 or higher , uncontrolled nausea and /or vomiting  ---reinforced   Wound/dressing; as per postoperative discharge instructions remove dressings on Monady   Post operative Hygiene: Gently wash surgical incision(s) with a clean wash cloth and pat dry using a clean wash towel  Reinforced use clean wash cloth and towel daily, use antibacterial soap, change bed linens 1-2 times per week and pajamas several times per week  --reinforced   Post operative Activity:  Ambulate as tolerate, Lift no greater than 10 LBS until 6 weeks, Avoid submersion in water x 3 weeks, and refrain for bending or twisting until about 4 weeks -light duty until then  --reinforced   No NSAID (aspirin, Motrin, ibuprofen, OTC, supplements etc  ) for 2 weeks, may resume after 2 week postoperative visit  reinforced   Confused thought he was to hold B/P medication Prinzide -Zestoretic x 14 days ---advised to take dose immediately as proscribed and continue ---reviewed med he shoul hold dietary supplements  OTC and asa   Patent verbalized an understanding of information communicated

## 2018-03-16 ENCOUNTER — TELEPHONE (OUTPATIENT)
Dept: NEUROSURGERY | Facility: CLINIC | Age: 58
End: 2018-03-16

## 2018-03-16 NOTE — TELEPHONE ENCOUNTER
Returned call 1 week post op  Reports increasing pain in low back, hips , legs, bilateral ankles   Current pain regimen Neurontin, Duragesic 100 mcg, Roxicodone  30 mg , added tylenol 325 mg every 4 hrs post op  Expresses increased pain may be secondary to no longer on Celebrex --nee 14 day hold postop   He also not wearing Calm shell brace over concern of thoracic incision  Suggest he wear over clothing as he already does, wear at intervals when walking/stanmding then remove at rest, the configuration of the brace should not add pressure to thoracic incision  He was having similar pain prior surgery , reason for SCS removal;l, need MRI to assess vertebrae and need for  Fusion or pain pump  Is satisfied with my response will try brace

## 2018-03-19 DIAGNOSIS — E11.9 TYPE 2 DIABETES MELLITUS WITHOUT COMPLICATION, WITHOUT LONG-TERM CURRENT USE OF INSULIN (HCC): Primary | ICD-10-CM

## 2018-03-19 RX ORDER — LANCETS 33 GAUGE
EACH MISCELLANEOUS
Qty: 100 EACH | Refills: 5 | Status: SHIPPED | OUTPATIENT
Start: 2018-03-19 | End: 2019-04-12 | Stop reason: SDUPTHER

## 2018-03-24 ENCOUNTER — HOSPITAL ENCOUNTER (INPATIENT)
Facility: HOSPITAL | Age: 58
LOS: 11 days | Discharge: HOME | DRG: 214 | End: 2018-04-04
Attending: EMERGENCY MEDICINE | Admitting: INTERNAL MEDICINE
Payer: OTHER MISCELLANEOUS

## 2018-03-24 ENCOUNTER — APPOINTMENT (INPATIENT)
Dept: RADIOLOGY | Facility: HOSPITAL | Age: 58
DRG: 214 | End: 2018-03-24
Payer: OTHER MISCELLANEOUS

## 2018-03-24 DIAGNOSIS — M43.16 SPONDYLOLISTHESIS OF LUMBAR REGION: ICD-10-CM

## 2018-03-24 DIAGNOSIS — M47.816 FACET ARTHROPATHY, LUMBAR: ICD-10-CM

## 2018-03-24 DIAGNOSIS — M54.16 LUMBAR RADICULOPATHY: ICD-10-CM

## 2018-03-24 DIAGNOSIS — M51.16 RADICULOPATHY DUE TO LUMBAR INTERVERTEBRAL DISC DISORDER: ICD-10-CM

## 2018-03-24 DIAGNOSIS — M51.26 HERNIATED LUMBAR INTERVERTEBRAL DISC: ICD-10-CM

## 2018-03-24 DIAGNOSIS — M96.1 POST LAMINECTOMY SYNDROME: ICD-10-CM

## 2018-03-24 DIAGNOSIS — G89.4 PAIN SYNDROME, CHRONIC: ICD-10-CM

## 2018-03-24 DIAGNOSIS — M54.10 RADICULAR PAIN OF RIGHT LOWER EXTREMITY: Primary | ICD-10-CM

## 2018-03-24 DIAGNOSIS — I10 BENIGN ESSENTIAL HYPERTENSION: ICD-10-CM

## 2018-03-24 PROBLEM — M79.651 PAIN OF RIGHT THIGH: Status: ACTIVE | Noted: 2018-03-24

## 2018-03-24 PROBLEM — G47.30 SLEEP APNEA: Chronic | Status: ACTIVE | Noted: 2018-03-24

## 2018-03-24 LAB — GLUCOSE SERPL-MCNC: 164 MG/DL (ref 65–140)

## 2018-03-24 PROCEDURE — 96372 THER/PROPH/DIAG INJ SC/IM: CPT

## 2018-03-24 PROCEDURE — 99284 EMERGENCY DEPT VISIT MOD MDM: CPT

## 2018-03-24 PROCEDURE — 99223 1ST HOSP IP/OBS HIGH 75: CPT | Performed by: INTERNAL MEDICINE

## 2018-03-24 PROCEDURE — 73502 X-RAY EXAM HIP UNI 2-3 VIEWS: CPT

## 2018-03-24 PROCEDURE — 82948 REAGENT STRIP/BLOOD GLUCOSE: CPT

## 2018-03-24 PROCEDURE — 94660 CPAP INITIATION&MGMT: CPT

## 2018-03-24 RX ORDER — OXYCODONE HYDROCHLORIDE 10 MG/1
10 TABLET ORAL EVERY 4 HOURS PRN
Status: DISCONTINUED | OUTPATIENT
Start: 2018-03-24 | End: 2018-03-26

## 2018-03-24 RX ORDER — ONDANSETRON 2 MG/ML
4 INJECTION INTRAMUSCULAR; INTRAVENOUS EVERY 6 HOURS PRN
Status: DISCONTINUED | OUTPATIENT
Start: 2018-03-24 | End: 2018-04-04 | Stop reason: HOSPADM

## 2018-03-24 RX ORDER — CELECOXIB 200 MG/1
200 CAPSULE ORAL DAILY
Status: DISCONTINUED | OUTPATIENT
Start: 2018-03-25 | End: 2018-03-29

## 2018-03-24 RX ORDER — LANOLIN ALCOHOL/MO/W.PET/CERES
5 CREAM (GRAM) TOPICAL
Status: DISCONTINUED | OUTPATIENT
Start: 2018-03-24 | End: 2018-04-04 | Stop reason: HOSPADM

## 2018-03-24 RX ORDER — SENNOSIDES 8.6 MG
1 TABLET ORAL 2 TIMES DAILY
Status: DISCONTINUED | OUTPATIENT
Start: 2018-03-24 | End: 2018-04-04 | Stop reason: HOSPADM

## 2018-03-24 RX ORDER — CLONIDINE HYDROCHLORIDE 0.1 MG/1
0.1 TABLET ORAL EVERY 8 HOURS PRN
Status: DISCONTINUED | OUTPATIENT
Start: 2018-03-24 | End: 2018-03-25

## 2018-03-24 RX ORDER — LIDOCAINE 50 MG/G
1 PATCH TOPICAL ONCE
Status: COMPLETED | OUTPATIENT
Start: 2018-03-24 | End: 2018-03-25

## 2018-03-24 RX ORDER — PANTOPRAZOLE SODIUM 40 MG/1
40 TABLET, DELAYED RELEASE ORAL
Status: DISCONTINUED | OUTPATIENT
Start: 2018-03-25 | End: 2018-04-04 | Stop reason: HOSPADM

## 2018-03-24 RX ORDER — FENTANYL 100 UG/H
1 PATCH TRANSDERMAL
Status: DISCONTINUED | OUTPATIENT
Start: 2018-03-25 | End: 2018-03-30 | Stop reason: ALTCHOICE

## 2018-03-24 RX ORDER — INSULIN GLARGINE 100 [IU]/ML
10 INJECTION, SOLUTION SUBCUTANEOUS
Status: DISCONTINUED | OUTPATIENT
Start: 2018-03-24 | End: 2018-04-04 | Stop reason: HOSPADM

## 2018-03-24 RX ORDER — TIZANIDINE 4 MG/1
4 TABLET ORAL 3 TIMES DAILY
Status: DISCONTINUED | OUTPATIENT
Start: 2018-03-24 | End: 2018-03-26

## 2018-03-24 RX ORDER — HYDROCHLOROTHIAZIDE 25 MG/1
25 TABLET ORAL DAILY
Status: DISCONTINUED | OUTPATIENT
Start: 2018-03-25 | End: 2018-03-25

## 2018-03-24 RX ORDER — LORAZEPAM 2 MG/ML
1 INJECTION INTRAMUSCULAR ONCE
Status: COMPLETED | OUTPATIENT
Start: 2018-03-24 | End: 2018-03-25

## 2018-03-24 RX ORDER — CELECOXIB 200 MG/1
200 CAPSULE ORAL DAILY
COMMUNITY
End: 2018-08-09

## 2018-03-24 RX ORDER — HYOSCYAMINE SULFATE EXTENDED-RELEASE 0.38 MG/1
0.38 TABLET ORAL DAILY
Status: DISCONTINUED | OUTPATIENT
Start: 2018-03-25 | End: 2018-04-04 | Stop reason: HOSPADM

## 2018-03-24 RX ORDER — ZOLPIDEM TARTRATE 5 MG/1
5 TABLET ORAL
Status: DISCONTINUED | OUTPATIENT
Start: 2018-03-24 | End: 2018-04-04 | Stop reason: HOSPADM

## 2018-03-24 RX ORDER — GABAPENTIN 300 MG/1
600 CAPSULE ORAL 3 TIMES DAILY
Status: DISCONTINUED | OUTPATIENT
Start: 2018-03-24 | End: 2018-04-04 | Stop reason: HOSPADM

## 2018-03-24 RX ORDER — OXYCODONE HYDROCHLORIDE 5 MG/1
5 TABLET ORAL EVERY 4 HOURS PRN
Status: DISCONTINUED | OUTPATIENT
Start: 2018-03-24 | End: 2018-03-26

## 2018-03-24 RX ORDER — ACETAMINOPHEN 325 MG/1
650 TABLET ORAL EVERY 6 HOURS PRN
Status: DISCONTINUED | OUTPATIENT
Start: 2018-03-24 | End: 2018-03-26

## 2018-03-24 RX ORDER — TERAZOSIN 5 MG/1
5 CAPSULE ORAL
Status: DISCONTINUED | OUTPATIENT
Start: 2018-03-24 | End: 2018-04-04 | Stop reason: HOSPADM

## 2018-03-24 RX ORDER — OXYCODONE HYDROCHLORIDE 10 MG/1
30 TABLET ORAL EVERY 8 HOURS
Status: DISCONTINUED | OUTPATIENT
Start: 2018-03-24 | End: 2018-03-26

## 2018-03-24 RX ORDER — AMPICILLIN TRIHYDRATE 250 MG
CAPSULE ORAL DAILY
Status: DISCONTINUED | OUTPATIENT
Start: 2018-03-25 | End: 2018-03-24

## 2018-03-24 RX ORDER — BENAZEPRIL HYDROCHLORIDE 10 MG/1
40 TABLET ORAL DAILY
Status: DISCONTINUED | OUTPATIENT
Start: 2018-03-25 | End: 2018-04-04 | Stop reason: HOSPADM

## 2018-03-24 RX ORDER — KETOROLAC TROMETHAMINE 30 MG/ML
15 INJECTION, SOLUTION INTRAMUSCULAR; INTRAVENOUS ONCE
Status: COMPLETED | OUTPATIENT
Start: 2018-03-24 | End: 2018-03-24

## 2018-03-24 RX ORDER — BENAZEPRIL HYDROCHLORIDE AND HYDROCHLOROTHIAZIDE 20; 12.5 MG/1; MG/1
2 TABLET ORAL DAILY
COMMUNITY
End: 2018-04-04 | Stop reason: HOSPADM

## 2018-03-24 RX ADMIN — TIZANIDINE 4 MG: 4 TABLET ORAL at 22:10

## 2018-03-24 RX ADMIN — GABAPENTIN 600 MG: 300 CAPSULE ORAL at 22:09

## 2018-03-24 RX ADMIN — MELATONIN TAB 3 MG 4.5 MG: 3 TAB at 22:09

## 2018-03-24 RX ADMIN — KETOROLAC TROMETHAMINE 15 MG: 30 INJECTION, SOLUTION INTRAMUSCULAR at 16:05

## 2018-03-24 RX ADMIN — OXYCODONE HYDROCHLORIDE 30 MG: 10 TABLET ORAL at 21:06

## 2018-03-24 RX ADMIN — INSULIN LISPRO 1 UNITS: 100 INJECTION, SOLUTION INTRAVENOUS; SUBCUTANEOUS at 22:16

## 2018-03-24 RX ADMIN — HYDROMORPHONE HYDROCHLORIDE 0.5 MG: 1 INJECTION, SOLUTION INTRAMUSCULAR; INTRAVENOUS; SUBCUTANEOUS at 20:25

## 2018-03-24 RX ADMIN — HYDROMORPHONE HYDROCHLORIDE 1 MG: 1 INJECTION, SOLUTION INTRAMUSCULAR; INTRAVENOUS; SUBCUTANEOUS at 17:29

## 2018-03-24 RX ADMIN — LIDOCAINE 1 PATCH: 50 PATCH TOPICAL at 17:15

## 2018-03-24 RX ADMIN — SENNOSIDES 8.6 MG: 8.6 TABLET, FILM COATED ORAL at 22:09

## 2018-03-24 RX ADMIN — OXYCODONE HYDROCHLORIDE 10 MG: 10 TABLET ORAL at 23:47

## 2018-03-24 RX ADMIN — TERAZOSIN HYDROCHLORIDE 5 MG: 5 CAPSULE ORAL at 22:15

## 2018-03-24 RX ADMIN — ZOLPIDEM TARTRATE 5 MG: 5 TABLET ORAL at 22:10

## 2018-03-24 RX ADMIN — INSULIN GLARGINE 10 UNITS: 100 INJECTION, SOLUTION SUBCUTANEOUS at 22:11

## 2018-03-24 NOTE — ASSESSMENT & PLAN NOTE
· Patient has definite pains on his right thigh and hip  This may be referred pain from his post laminectomy syndrome of the lumbar spine  However, we cannot still totally rule out possibility that this is a separate problem  · We will do a right hip x-ray  This was also discussed with the neurosurgeon

## 2018-03-24 NOTE — ED ATTENDING ATTESTATION
Ankur Balbuena MD, saw and evaluated the patient  I have discussed the patient with the resident/non-physician practitioner and agree with the resident's/non-physician practitioner's findings, Plan of Care, and MDM as documented in the resident's/non-physician practitioner's note, except where noted  All available labs and Radiology studies were reviewed  At this point I agree with the current assessment done in the Emergency Department  I have conducted an independent evaluation of this patient a history and physical is as follows:  Pt had spinal stimulator removed on march 9th Since then pain has worsened and he is unable to walk due to pain   Pt was seen by neurosurgery and is pending an mri to be set up for eval of radicular pain Pt is currently on significant medicines for pain and he is taking them He sees pain specialist PE; alert tender r gluteus and r lower back + back pain with any movement of legs motor 5/5 decreased dtr no numbness will attempt pain mgt and re-eval will discuss with neuro     Critical Care Time  CritCare Time    Procedures

## 2018-03-24 NOTE — ED NOTES
Patient assigned dirty room on [id number]  Patient provided lunch box at this time         Jennifer Garrido RN  03/24/18 [id number]

## 2018-03-24 NOTE — ASSESSMENT & PLAN NOTE
· Hold off on patient's oral hypoglycemic medications  · In the meantime, will have the patient on insulin  · Adjust insulin doses accordingly

## 2018-03-24 NOTE — ED NOTES
Patient's bed noted to be changed to 338 in the cleaning stage  Patient updated on bed change and status         Dario Amin RN  03/24/18 7247

## 2018-03-24 NOTE — ASSESSMENT & PLAN NOTE
· Likely patient has lumbar radiculopathy with pain on his right hip and right thigh  However, no further pains below his right thigh  · Patient denies any numbness on his bilateral lower extremities  No perineal numbness or anesthesia  No urinary incontinence or bowel incontinence  · Neurosurgery consult

## 2018-03-24 NOTE — ASSESSMENT & PLAN NOTE
· Continue home pain medications  · Pain specialist consult  Possible pain pump  · Pain control medications p r n  Yarelis Claire · Continue patient's Movantik and I will also add laxatives/stool softeners as patient has history of constipation may develop more constipation induced by narcotics

## 2018-03-24 NOTE — ASSESSMENT & PLAN NOTE
· The patient previously had a spine stimulator, that was removed this month  · As per my discussion with the neurosurgeon, Dr Ira Roberts, patient is for MRI of the lumbar spine without contrast   · Patient staples should be removed prior to the MRI  According to my discussion with the ER resident, he will remove those staples to facilitate the MRI  · Pain control  · As per discussion with Neurosurgery, plan is to eventually put a pain pump  Thus for pain specialist consult  · Continue patient's home medications for pain    · PT OT eval and treat
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
## 2018-03-25 ENCOUNTER — APPOINTMENT (INPATIENT)
Dept: RADIOLOGY | Facility: HOSPITAL | Age: 58
DRG: 214 | End: 2018-03-25
Payer: OTHER MISCELLANEOUS

## 2018-03-25 LAB
ALBUMIN SERPL BCP-MCNC: 3.8 G/DL (ref 3.5–5)
ALP SERPL-CCNC: 76 U/L (ref 46–116)
ALT SERPL W P-5'-P-CCNC: 32 U/L (ref 12–78)
ANION GAP SERPL CALCULATED.3IONS-SCNC: 5 MMOL/L (ref 4–13)
APTT PPP: 33 SECONDS (ref 23–35)
AST SERPL W P-5'-P-CCNC: 20 U/L (ref 5–45)
BILIRUB SERPL-MCNC: 0.41 MG/DL (ref 0.2–1)
BUN SERPL-MCNC: 14 MG/DL (ref 5–25)
CALCIUM SERPL-MCNC: 9.1 MG/DL (ref 8.3–10.1)
CHLORIDE SERPL-SCNC: 102 MMOL/L (ref 100–108)
CO2 SERPL-SCNC: 32 MMOL/L (ref 21–32)
CREAT SERPL-MCNC: 0.87 MG/DL (ref 0.6–1.3)
ERYTHROCYTE [DISTWIDTH] IN BLOOD BY AUTOMATED COUNT: 12.8 % (ref 11.6–15.1)
GFR SERPL CREATININE-BSD FRML MDRD: 96 ML/MIN/1.73SQ M
GLUCOSE SERPL-MCNC: 123 MG/DL (ref 65–140)
GLUCOSE SERPL-MCNC: 129 MG/DL (ref 65–140)
GLUCOSE SERPL-MCNC: 131 MG/DL (ref 65–140)
GLUCOSE SERPL-MCNC: 139 MG/DL (ref 65–140)
GLUCOSE SERPL-MCNC: 148 MG/DL (ref 65–140)
HCT VFR BLD AUTO: 36.6 % (ref 36.5–49.3)
HGB BLD-MCNC: 12.6 G/DL (ref 12–17)
INR PPP: 0.96 (ref 0.86–1.16)
MCH RBC QN AUTO: 27.3 PG (ref 26.8–34.3)
MCHC RBC AUTO-ENTMCNC: 34.4 G/DL (ref 31.4–37.4)
MCV RBC AUTO: 79 FL (ref 82–98)
PLATELET # BLD AUTO: 280 THOUSANDS/UL (ref 149–390)
PMV BLD AUTO: 9.1 FL (ref 8.9–12.7)
POTASSIUM SERPL-SCNC: 3.4 MMOL/L (ref 3.5–5.3)
PROT SERPL-MCNC: 7.7 G/DL (ref 6.4–8.2)
PROTHROMBIN TIME: 12.8 SECONDS (ref 12.1–14.4)
RBC # BLD AUTO: 4.61 MILLION/UL (ref 3.88–5.62)
SODIUM SERPL-SCNC: 139 MMOL/L (ref 136–145)
WBC # BLD AUTO: 9.81 THOUSAND/UL (ref 4.31–10.16)

## 2018-03-25 PROCEDURE — 82948 REAGENT STRIP/BLOOD GLUCOSE: CPT

## 2018-03-25 PROCEDURE — 85730 THROMBOPLASTIN TIME PARTIAL: CPT | Performed by: INTERNAL MEDICINE

## 2018-03-25 PROCEDURE — 80053 COMPREHEN METABOLIC PANEL: CPT | Performed by: INTERNAL MEDICINE

## 2018-03-25 PROCEDURE — 85610 PROTHROMBIN TIME: CPT | Performed by: INTERNAL MEDICINE

## 2018-03-25 PROCEDURE — 70030 X-RAY EYE FOR FOREIGN BODY: CPT

## 2018-03-25 PROCEDURE — 72072 X-RAY EXAM THORAC SPINE 3VWS: CPT

## 2018-03-25 PROCEDURE — 99232 SBSQ HOSP IP/OBS MODERATE 35: CPT | Performed by: NURSE PRACTITIONER

## 2018-03-25 PROCEDURE — 72148 MRI LUMBAR SPINE W/O DYE: CPT

## 2018-03-25 PROCEDURE — 99024 POSTOP FOLLOW-UP VISIT: CPT | Performed by: PHYSICIAN ASSISTANT

## 2018-03-25 PROCEDURE — 85027 COMPLETE CBC AUTOMATED: CPT | Performed by: INTERNAL MEDICINE

## 2018-03-25 RX ORDER — METHOCARBAMOL 500 MG/1
500 TABLET, FILM COATED ORAL EVERY 6 HOURS PRN
Status: DISCONTINUED | OUTPATIENT
Start: 2018-03-25 | End: 2018-03-26

## 2018-03-25 RX ORDER — LORAZEPAM 0.5 MG/1
0.5 TABLET ORAL EVERY 6 HOURS PRN
Status: DISCONTINUED | OUTPATIENT
Start: 2018-03-25 | End: 2018-03-26

## 2018-03-25 RX ORDER — CLONIDINE HYDROCHLORIDE 0.1 MG/1
0.1 TABLET ORAL EVERY 12 HOURS SCHEDULED
Status: DISCONTINUED | OUTPATIENT
Start: 2018-03-25 | End: 2018-03-26

## 2018-03-25 RX ORDER — POTASSIUM CHLORIDE 20 MEQ/1
40 TABLET, EXTENDED RELEASE ORAL ONCE
Status: COMPLETED | OUTPATIENT
Start: 2018-03-25 | End: 2018-03-25

## 2018-03-25 RX ADMIN — PANTOPRAZOLE SODIUM 40 MG: 40 TABLET, DELAYED RELEASE ORAL at 05:06

## 2018-03-25 RX ADMIN — OXYCODONE HYDROCHLORIDE 10 MG: 10 TABLET ORAL at 03:36

## 2018-03-25 RX ADMIN — HYDROMORPHONE HYDROCHLORIDE 0.5 MG: 1 INJECTION, SOLUTION INTRAMUSCULAR; INTRAVENOUS; SUBCUTANEOUS at 00:29

## 2018-03-25 RX ADMIN — MELATONIN TAB 3 MG 4.5 MG: 3 TAB at 21:44

## 2018-03-25 RX ADMIN — METHOCARBAMOL 500 MG: 500 TABLET ORAL at 17:33

## 2018-03-25 RX ADMIN — BENAZEPRIL HYDROCHLORIDE 40 MG: 10 TABLET ORAL at 08:35

## 2018-03-25 RX ADMIN — POTASSIUM CHLORIDE 40 MEQ: 1500 TABLET, EXTENDED RELEASE ORAL at 08:35

## 2018-03-25 RX ADMIN — TIZANIDINE 4 MG: 4 TABLET ORAL at 15:42

## 2018-03-25 RX ADMIN — GABAPENTIN 600 MG: 300 CAPSULE ORAL at 08:35

## 2018-03-25 RX ADMIN — OXYCODONE HYDROCHLORIDE 30 MG: 10 TABLET ORAL at 05:06

## 2018-03-25 RX ADMIN — ZOLPIDEM TARTRATE 5 MG: 5 TABLET ORAL at 21:44

## 2018-03-25 RX ADMIN — GABAPENTIN 600 MG: 300 CAPSULE ORAL at 15:42

## 2018-03-25 RX ADMIN — HYDROCHLOROTHIAZIDE 25 MG: 25 TABLET ORAL at 08:36

## 2018-03-25 RX ADMIN — TERAZOSIN HYDROCHLORIDE 5 MG: 5 CAPSULE ORAL at 21:44

## 2018-03-25 RX ADMIN — SERTRALINE HYDROCHLORIDE 75 MG: 50 TABLET ORAL at 08:35

## 2018-03-25 RX ADMIN — FENTANYL 1 PATCH: 100 PATCH TRANSDERMAL at 14:44

## 2018-03-25 RX ADMIN — LORAZEPAM 1 MG: 2 INJECTION INTRAMUSCULAR; INTRAVENOUS at 13:35

## 2018-03-25 RX ADMIN — INSULIN GLARGINE 10 UNITS: 100 INJECTION, SOLUTION SUBCUTANEOUS at 21:45

## 2018-03-25 RX ADMIN — HYOSCYAMINE SULFATE 0.38 MG: 0.38 TABLET, EXTENDED RELEASE ORAL at 08:37

## 2018-03-25 RX ADMIN — GABAPENTIN 600 MG: 300 CAPSULE ORAL at 20:31

## 2018-03-25 RX ADMIN — ACETAMINOPHEN 650 MG: 325 TABLET, FILM COATED ORAL at 16:29

## 2018-03-25 RX ADMIN — OXYCODONE HYDROCHLORIDE 5 MG: 5 TABLET ORAL at 16:29

## 2018-03-25 RX ADMIN — TIZANIDINE 4 MG: 4 TABLET ORAL at 08:35

## 2018-03-25 RX ADMIN — METHOCARBAMOL 500 MG: 500 TABLET ORAL at 05:49

## 2018-03-25 RX ADMIN — CELECOXIB 200 MG: 200 CAPSULE ORAL at 08:37

## 2018-03-25 RX ADMIN — TIZANIDINE 4 MG: 4 TABLET ORAL at 20:31

## 2018-03-25 RX ADMIN — CLONIDINE HYDROCHLORIDE 0.1 MG: 0.1 TABLET ORAL at 20:31

## 2018-03-25 RX ADMIN — LORAZEPAM 0.5 MG: 0.5 TABLET ORAL at 17:32

## 2018-03-25 RX ADMIN — OXYCODONE HYDROCHLORIDE 30 MG: 10 TABLET ORAL at 13:30

## 2018-03-25 RX ADMIN — OXYCODONE HYDROCHLORIDE 30 MG: 10 TABLET ORAL at 20:31

## 2018-03-25 RX ADMIN — Medication 1 TABLET: at 08:35

## 2018-03-25 RX ADMIN — HYDROMORPHONE HYDROCHLORIDE 0.5 MG: 1 INJECTION, SOLUTION INTRAMUSCULAR; INTRAVENOUS; SUBCUTANEOUS at 05:51

## 2018-03-25 RX ADMIN — OXYCODONE HYDROCHLORIDE 10 MG: 10 TABLET ORAL at 08:36
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
## 2018-03-25 NOTE — PROGRESS NOTES
Spoke to Jorge A Kothari from [facility]  Pain meds not helping pts thigh pain  Muscle feels tight in the anterior thigh  No noticeable swelling, redness, warmth  Order to be placed for muscle relaxer,will continue to monitor

## 2018-03-25 NOTE — ASSESSMENT & PLAN NOTE
· Continue home pain medications  · Pain specialist consul, however will not see today  · Pain control medications p r n  Acworth Piles · Pt takes oxy 30mg q8 hrs (total 90mg daily) now on 10mg q4 which is 60mg daily will increase dosing to at least equal dosing of home dosing with prn break through dilaudid   · Continue patient's Movantik and I will also add laxatives/stool softeners as patient has history of constipation may develop more constipation induced by narcotics

## 2018-03-25 NOTE — PROGRESS NOTES
Progress Note - Jose Guadalupe Brought 1960, 65 y o  male MRN: [id number]    Unit/Bed#: CW3 338-01 Encounter: [id number]    Primary Care Provider: Nelly Juárez MD   Date and time admitted to hospital: 3/24/2018  3:33 PM        Pain of right thigh   Assessment & Plan    · Patient has definite pains on his right thigh and hip  · This may be referred pain from his post laminectomy syndrome of the lumbar spine  However, we cannot still totally rule out possibility that this is a separate problem  · We will do a right hip x-ray  · This was also discussed with the neurosurgeon  * Post laminectomy syndrome   Assessment & Plan    ·  previously had a spine stimulator, that was removed this month  · As per admitting providers discussion with the neurosurgeon, Dr Shavonne Jerome, patient is for MRI of the lumbar spine without contrast   · Patient staples should be removed prior to the MRI  ·   According to admitting providers discussion with the ER resident, he will remove those staples to facilitate the MRI  · Pain control, will modify meds until seen by pain service tomorrow   · As per discussion with Neurosurgery, plan is to eventually put a pain pump  · Continue patient's home medications for pain  · PT OT eval and treat still pending evaluation         Lumbar radiculopathy   Assessment & Plan    · Likely patient has lumbar radiculopathy with pain on his right hip and right thigh  ·   However, no further pains below his right thigh  · Patient denies any numbness on his bilateral lower extremities  · No perineal numbness    · No urinary incontinence or bowel incontinence  · Neurosurgery consult, appreciated         Type 2 diabetes mellitus ([facility] [address] )   Assessment & Plan    · Hold off on patient's oral hypoglycemic medications  · In the meantime, will have the patient on insulin  · Adjust insulin doses accordingly    · lantus 10 units         Depression   Assessment & Plan    · Continue home psychiatric medications  Benign essential hypertension   Assessment & Plan    · Poorly controlled at this point, but improving   · Blood pressure medications  (combination (lotensin/htz ),  Hytrin, hctz with clonidine ,(will dc hctz wonder if contributing to cramping like sensation in right upper lateral thigh may be a  Factor) will stop but pt with elevated bp adjust and and clonodine  · Adjust patient's blood pressure medications on as needed basis  · IV blood pressure medication as needed basis  · Elevation patient's blood pressures may be due to pain  Sleep apnea   Assessment & Plan    · CPAP  Pain syndrome, chronic   Assessment & Plan    · Continue home pain medications  · Pain specialist consul, however will not see today  · Pain control medications p r n  Emilia Sinks · Pt takes oxy 30mg q8 hrs (total 90mg daily) now on 10mg q4 which is 60mg daily will increase dosing to at least equal dosing of home dosing with prn break through dilaudid   · Continue patient's Movantik and I will also add laxatives/stool softeners as patient has history of constipation may develop more constipation induced by narcotics  VTE Pharmacologic Prophylaxis:   Pharmacologic: Enoxaparin (Lovenox)  Mechanical VTE Prophylaxis in Place: Yes    Patient Centered Rounds: I have performed bedside rounds with nursing staff today  Discussions with Specialists or Other Care Team Provider: nursing     Education and Discussions with Family / Patient: patient     Time Spent for Care: 30 minutes  More than 50% of total time spent on counseling and coordination of care as described above      Current Length of Stay: 1 day(s)    Current Patient Status: Inpatient   Certification Statement: The patient will continue to require additional inpatient hospital stay due to pending consult and ongoing workup     Discharge Plan: discharge home when medically stable     Code Status: Level 1 - Full Code      Subjective:   Pt with pain in upper thigh describes it like a cramping sensation ? Pees a lot ? Dehydration with low potassium  Pt afraid of mri due to pain     Objective:     Vitals:   Temp (24hrs), Av 9 °F (36 6 °C), Min:97 8 °F (36 6 °C), Max:98 2 °F (36 8 °C)    HR:  [62-80] 62  Resp:  [18-19] 18  BP: (163-197)/() 170/87  SpO2:  [93 %-96 %] 94 %  Body mass index is 34 44 kg/m²  Input and Output Summary (last 24 hours): Intake/Output Summary (Last 24 hours) at 18 0915  Last data filed at 18 0825   Gross per 24 hour   Intake              480 ml   Output               29 ml   Net              451 ml       Physical Exam:     Physical Exam   Constitutional: He is oriented to person, place, and time  He appears well-developed and well-nourished  No distress  HENT:   Head: Normocephalic  Mouth/Throat: No oropharyngeal exudate  Eyes: Conjunctivae and EOM are normal  Right eye exhibits no discharge  Left eye exhibits no discharge  No scleral icterus  Neck: No JVD present  No tracheal deviation present  No thyromegaly present  Cardiovascular: Normal rate  Exam reveals no gallop and no friction rub  No murmur heard  Pulmonary/Chest: Effort normal  No stridor  No respiratory distress  He has no wheezes  He has no rales  He exhibits no tenderness  Abdominal: He exhibits no distension and no mass  There is no tenderness  There is no rebound and no guarding  Musculoskeletal: He exhibits no edema, tenderness or deformity  Lymphadenopathy:     He has no cervical adenopathy  Neurological: He is oriented to person, place, and time  Skin: No rash noted  He is not diaphoretic  No erythema  No pallor     Psychiatric:   Frustrated          Additional Data:     Labs:      Results from last 7 days  Lab Units 18  0500   WBC Thousand/uL 9 81   HEMOGLOBIN g/dL 12 6   HEMATOCRIT % 36 6   PLATELETS Thousands/uL 280       Results from last 7 days  Lab Units 18  0500   SODIUM mmol/L 139   POTASSIUM mmol/L 3 4*   CHLORIDE mmol/L 102   CO2 mmol/L 32   BUN mg/dL 14   CREATININE mg/dL 0 87   CALCIUM mg/dL 9 1   TOTAL PROTEIN g/dL 7 7   BILIRUBIN TOTAL mg/dL 0 41   ALK PHOS U/L 76   ALT U/L 32   AST U/L 20   GLUCOSE RANDOM mg/dL 139       Results from last 7 days  Lab Units 03/25/18  0001   INR  0 96       * I Have Reviewed All Lab Data Listed Above  * Additional Pertinent Lab Tests Reviewed:  All Labs Within Last 24 Hours Reviewed    Imaging:    Imaging Reports Reviewed Today Include: reviewed       Recent Cultures (last 7 days):           Last 24 Hours Medication List:     Current Facility-Administered Medications:  acetaminophen 650 mg Oral Q6H PRN Jose E Palumbo MD   benazepril 40 mg Oral Daily Jose E Palumbo MD   And       hydrochlorothiazide 25 mg Oral Daily Jose E Palumbo MD   celecoxib 200 mg Oral Daily Jose E Palumbo MD   cloNIDine 0 1 mg Oral Q8H PRN Rene Benitez PA-C   enoxaparin 40 mg Subcutaneous Daily Jose E Palumbo MD   fentaNYL 1 patch Transdermal Q48H Jose E Palumbo MD   gabapentin 600 mg Oral TID Jose E Palumbo MD   HYDROmorphone 0 5 mg Intravenous Q4H PRN Jose E Palumbo MD   hyoscyamine 0 375 mg Oral Daily Jose E Palumbo MD   insulin glargine 10 Units Subcutaneous HS Jose E Palumbo MD   insulin lispro 1-5 Units Subcutaneous TID AC Jose E Palumbo MD   insulin lispro 1-5 Units Subcutaneous HS Jose E Palumbo MD   LORazepam 1 mg Intravenous Once Jose E Palumbo MD   melatonin 4 5 mg Oral HS Jose E Palumbo MD   methocarbamol 500 mg Oral Q6H PRN Rene Benitez, PA-C   multivitamin-minerals 1 tablet Oral Daily Jose E Palumbo MD   Naloxegol Oxalate  Oral Daily Jose E Palumbo MD   ondansetron 4 mg Intravenous Q6H PRN Jose E Palumbo MD   oxyCODONE 10 mg Oral Q4H PRN Jose E Palumbo MD   oxyCODONE 30 mg Oral Q8H Jose E Palumbo MD   oxyCODONE 5 mg Oral Q4H PRN Jose E Palumbo MD   pantoprazole 40 mg Oral Early Morning Jose E Palumbo MD   senna 1 tablet Oral BID Jose E Palumbo MD   sertraline 75 mg Oral Daily Jose E Palumbo MD   terazosin 5 mg Oral HS Jose E Palumbo MD   tiZANidine 4 mg Oral TID Jose E Palumbo MD   zolpidem 5 mg Oral HS PRN Alexa Delgado MD        Today, Patient Was Seen By: JAMIE Lainez    ** Please Note: Dictation voice to text software may have been used in the creation of this document   **

## 2018-03-25 NOTE — ASSESSMENT & PLAN NOTE
· Patient has definite pains on his right thigh and hip  · This may be referred pain from his post laminectomy syndrome of the lumbar spine  However, we cannot still totally rule out possibility that this is a separate problem  · We will do a right hip x-ray  · This was also discussed with the neurosurgeon

## 2018-03-25 NOTE — ASSESSMENT & PLAN NOTE
· Hold off on patient's oral hypoglycemic medications  · In the meantime, will have the patient on insulin  · Adjust insulin doses accordingly    · lantus 10 units

## 2018-03-25 NOTE — OCCUPATIONAL THERAPY NOTE
OCCUPATIONAL THERAPY CANCEL NOTE:    ORDERS RECEIVED  CHART REVIEW COMPLETED  PT PENDING MRI  WILL HOLD THERAPY AT THIS TIME  WILL CONT TO FOLLOW TO COMPLETE OT EVAL AS APPROPRIATE       Roney Jeronimo, ISMAEL, OTR/L

## 2018-03-25 NOTE — PROGRESS NOTES
MRI called stating patients fently patch was found on floor in MRI   PCA went to retrieve patch and I will waste dispose with next on coming nurse

## 2018-03-25 NOTE — PHYSICAL THERAPY NOTE
Physical Therapy Cancellation Note:    PENDING MRI   Cancel PT services for today and will cont to follow as able to initiate PT eval when appropriate

## 2018-03-25 NOTE — ASSESSMENT & PLAN NOTE
· Likely patient has lumbar radiculopathy with pain on his right hip and right thigh  ·   However, no further pains below his right thigh  · Patient denies any numbness on his bilateral lower extremities  · No perineal numbness    · No urinary incontinence or bowel incontinence    · Neurosurgery consult, appreciated

## 2018-03-25 NOTE — CASE MANAGEMENT
Initial Clinical Review    Thank you,  [facility] in the [facility] by Clint Hamm for 2017  Network Utilization Review Department  Phone: [phone]; Fax [phone]  ATTENTION: The Network Utilization Review Department is now centralized for our 7 Facilities  Make a note that we have a new phone and fax numbers for our Department  Please call with any questions or concerns to [phone] and carefully follow the prompts so that you are directed to the right person  All voicemails are confidential  Fax any determinations, approvals, denials, and requests for initial or continue stay review clinical to [phone]  Due to HIGH CALL volume, it would be easier if you could please send faxed requests to expedite your requests and in part, help us provide discharge notifications faster  Admission: Date/Time/Statement: 3/24/18 @ 1956    Orders Placed This Encounter   Procedures    Inpatient Admission     Standing Status:   Standing     Number of Occurrences:   1     Order Specific Question:   Admitting Physician     Answer:   Tonja Merino [[id number]]     Order Specific Question:   Level of Care     Answer:   Med Surg [[id number]]     Order Specific Question:   Estimated length of stay     Answer:   More than 2 Midnights     Order Specific Question:   Certification     Answer:   I certify that inpatient services are medically necessary for this patient for a duration of greater than two midnights  See H&P and MD Progress Notes for additional information about the patient's course of treatment         ED: Date/Time/Mode of Arrival:   ED Arrival Information     Expected Arrival Acuity Means of Arrival Escorted By Service Admission Type    - 3/24/2018 15:20 Urgent Wheelchair Family Member General Medicine Urgent    Arrival Complaint    right leg pain-unable to walk           Chief Complaint:   Chief Complaint   Patient presents with    Leg Pain     Patient presents to the E R  with right thigh pain that makes him unable to walk or bear weight  Patient had a spinal stimulator paddle removed 3/9/18 so that he could have an MRI related to the pain in his leg  History of Illness:  65 y o  male who presents to the emergency room with intractable pains  Patient has history of laminectomy of the lumbar spine and has a history of post-laminectomy syndrome  Patient also has chronic pain syndrome particularly with low back pains and history of lumbar radiculopathy  Earlier this month, patient's spine stimulator was removed as patient will need another MRI  Presently, the wound has healed on the back, but still has staples  Patient came in today due to intractable pains on his right lateral lower back, lateral part of the right hip and at the upper part of the lateral part of the right thigh  Patient denies any pains at the middle of the lower back  Patient also denies any numbness on bilateral lower extremities as well as denies any numbness at the perineal and groin areas  When asked, no weakness on the left lower extremity, but has some slight weakness on the right lower extremity, but patient told me that this is due to the pains  Patient denies any trauma to his back or right hip  Patient denies any urinary continence or any bowel incontinence  Patient claims that the pain that he is having is worse on ambulation and on standing as well as on laying down  Patient finds some relief of the pain only when he sits up  Patient denies any other symptoms other the ones mentioned above  Exam:  Musculoskeletal: He exhibits tenderness  He exhibits no edema or deformity  Upper back with staples (from where the previous spine stimulator was; status post removal); with good wound healing  Positive for surgical incision scars at the lower back  Positive for tenderness at the lateral part of the lower back on the right side    Positive for tenderness at the lateral part of the right hip and upper portion of the right femoral area  No overlying findings of erythema, abnormal warmth  Neurological: He is alert and oriented to person, place, and time  No cranial nerve deficit or sensory deficit  Motor examination were all 5/5 in all extremities except for the right lower extremity, were patient has around 4 to 5-over 5  However, according to the patient, this is due to pains rather than actual weakness of the right lower extremity  ED Vital Signs:   ED Triage Vitals [03/24/18 1527]   Temperature Pulse Respirations Blood Pressure SpO2   97 8 °F (36 6 °C) 70 19 (!) 190/105 95 %      Temp Source Heart Rate Source Patient Position - Orthostatic VS BP Location FiO2 (%)   Oral Monitor Sitting Left arm --      Pain Score       9        Wt Readings from Last 1 Encounters:   03/24/18 109 kg (240 lb)       Vital Signs (abnormal): /78 - 197-93    Abnormal Labs/Diagnostic Test Results: K 3 4, Glucose 164  XR right hip/pelvis -- No acute osseous abnormality  XR thoracic spine -- Unremarkable thoracic spine  No radiopaque foreign body    XR orbits -- No radiopaque orbital foreign body         ED Treatment:   Medication Administration from 03/24/2018 1519 to 03/24/2018 2041       Date/Time Order Dose Route Action Action by Comments     03/24/2018 1605 ketorolac (TORADOL) injection 15 mg 15 mg Intramuscular Given Elana Mondragon RN                 03/24/2018 1715 lidocaine (LIDODERM) 5 % patch 1 patch 1 patch Transdermal Medication Applied Ilia Berman RN      03/24/2018 1729 HYDROmorphone (DILAUDID) injection 1 mg 1 mg Intramuscular Given Elana Mondragon RN      03/24/2018 2025 HYDROmorphone (DILAUDID) injection 0 5 mg 0 5 mg Intravenous Given Ilia Berman RN           Past Medical/Surgical History:   Past Medical History:   Diagnosis Date    Chronic narcotic dependence (HCC)     Chronic pain disorder     CPAP (continuous positive airway pressure) dependence     Depression     Diabetes mellitus ([facility] [address] )     Esophageal reflux     Hearing loss     Hypertension     Sleep apnea        Admitting Diagnosis: Leg pain [M79 606]  Post laminectomy syndrome [M96 1]  Pain syndrome, chronic [G89 4]  Radicular pain of right lower extremity [M54 10]    Age/Sex: 65 y o  male    Assessment/Plan:   * Post laminectomy syndrome   Assessment & Plan     · The patient previously had a spine stimulator, that was removed this month  · As per my discussion with the neurosurgeon, Dr Arnulfo Peters, patient is for MRI of the lumbar spine without contrast   · Patient staples should be removed prior to the MRI  According to my discussion with the ER resident, he will remove those staples to facilitate the MRI  · Pain control  · As per discussion with Neurosurgery, plan is to eventually put a pain pump  Thus for pain specialist consult  · Continue patient's home medications for pain  · PT OT eval and treat           Lumbar radiculopathy   Assessment & Plan     · Likely patient has lumbar radiculopathy with pain on his right hip and right thigh  However, no further pains below his right thigh  · Patient denies any numbness on his bilateral lower extremities  No perineal numbness or anesthesia  No urinary incontinence or bowel incontinence  · Neurosurgery consult           Pain syndrome, chronic   Assessment & Plan     · Continue home pain medications  · Pain specialist consult  Possible pain pump  · Pain control medications p r n  Ceferino Abad · Continue patient's Movantik and I will also add laxatives/stool softeners as patient has history of constipation may develop more constipation induced by narcotics          Pain of right thigh   Assessment & Plan     · Patient has definite pains on his right thigh and hip  This may be referred pain from his post laminectomy syndrome of the lumbar spine  However, we cannot still totally rule out possibility that this is a separate problem    · We will do a right hip x-ray  This was also discussed with the neurosurgeon           Sleep apnea   Assessment & Plan     · CPAP           Type 2 diabetes mellitus ([facility] [address] )   Assessment & Plan     · Hold off on patient's oral hypoglycemic medications  · In the meantime, will have the patient on insulin  · Adjust insulin doses accordingly           Depression   Assessment & Plan     · Continue home psychiatric medications           Benign essential hypertension   Assessment & Plan     · Poorly controlled at this point  · Blood pressure medications  · Adjust patient's blood pressure medications on as needed basis  · IV blood pressure medication as needed basis  · Elevation patient's blood pressures may be due to pain                 VTE Prophylaxis: Enoxaparin (Lovenox) (as per discussion with Neurosurgery, no contraindication with pharmacologic DVT prophylaxis)  / sequential compression device   Code Status:  Full code  POLST: POLST form is not discussed and not completed at this time      Anticipated Length of Stay:  Patient will be admitted on an inpatient basis with an anticipated length of stay of  greater than 2 midnights     Justification for Hospital Stay:  Above findings and plans      Admission Orders:  Admit to M/S unit  Consult Ns, acute pain service  PT/OT evals  Cpap @ hs  Cons carb diet  accuchecks qid w/ ssi  SCD's  Up with assistance  Mri lumbar spine ordered    Scheduled Meds:   Current Facility-Administered Medications:  acetaminophen 650 mg Oral Q6H PRN Jose E Palumbo MD   benazepril 40 mg Oral Daily Jose E Palumbo MD   celecoxib 200 mg Oral Daily Jose E Palumbo MD   cloNIDine 0 1 mg Oral Q12H [address] JAMIE Boyce   enoxaparin 40 mg Subcutaneous Daily Jose E Palumbo MD   fentaNYL 1 patch Transdermal Q48H Jose E Palumbo MD   gabapentin 600 mg Oral TID Jose E Palumbo MD   HYDROmorphone 0 5 mg Intravenous Q4H PRN Jose E Palumbo MD   hyoscyamine 0 375 mg Oral Daily Echo Callaway MD   insulin glargine 10 Units Subcutaneous HS Jose E Palumbo MD   insulin lispro 1-5 Units Subcutaneous TID AC Jose E Palumbo MD   insulin lispro 1-5 Units Subcutaneous HS Jose E Palumbo MD   LORazepam 1 mg Intravenous Once Jose E Palumbo MD   melatonin 4 5 mg Oral HS Jose E Palumbo MD   methocarbamol 500 mg Oral Q6H PRN Randeen Derick, PA-C   multivitamin-minerals 1 tablet Oral Daily Jose E Palumbo MD   Naloxegol Oxalate  Oral Daily Jose E Palumbo MD   ondansetron 4 mg Intravenous Q6H PRN Jose E Palumbo MD   oxyCODONE 10 mg Oral Q4H PRN Jose E Palumbo MD   oxyCODONE 30 mg Oral Q8H Jose E Palumbo MD   oxyCODONE 5 mg Oral Q4H PRN Jose E Palumbo MD   pantoprazole 40 mg Oral Early Morning Jose E Palumbo MD   senna 1 tablet Oral BID Jose E Palumbo MD   sertraline 75 mg Oral Daily Jose E Palumbo MD   terazosin 5 mg Oral HS Jose E Palumbo MD   tiZANidine 4 mg Oral TID Jose E Palumbo MD   zolpidem 5 mg Oral HS PRN Jose E Palumbo MD     Continuous Infusions:    PRN Meds:   acetaminophen    HYDROmorphone 0 5 mg IV 3/24 x1, 3/25 x2    Methocarbamol 500 mg po3/25 x1    ondansetron    oxyCODONE IR 10 mg po 3/24 x1, 3/25 x2    Zolpidem 3/24 x1

## 2018-03-25 NOTE — ASSESSMENT & PLAN NOTE
· Poorly controlled at this point, but improving   · Blood pressure medications  (combination (lotensin/htz ),  Hytrin, hctz with clonidine ,(will dc hctz wonder if contributing to cramping like sensation in right upper lateral thigh may be a  Factor) will stop but pt with elevated bp adjust and and clonodine  · Adjust patient's blood pressure medications on as needed basis  · IV blood pressure medication as needed basis  · Elevation patient's blood pressures may be due to pain

## 2018-03-25 NOTE — ASSESSMENT & PLAN NOTE
·  previously had a spine stimulator, that was removed this month  · As per admitting providers discussion with the neurosurgeon, Dr Buzz Rosas, patient is for MRI of the lumbar spine without contrast   · Patient staples should be removed prior to the MRI  ·   According to admitting providers discussion with the ER resident, he will remove those staples to facilitate the MRI  · Pain control, will modify meds until seen by pain service tomorrow   · As per discussion with Neurosurgery, plan is to eventually put a pain pump  · Continue patient's home medications for pain    · PT OT eval and treat still pending evaluation

## 2018-03-26 ENCOUNTER — APPOINTMENT (INPATIENT)
Dept: RADIOLOGY | Facility: HOSPITAL | Age: 58
DRG: 214 | End: 2018-03-26
Payer: OTHER MISCELLANEOUS

## 2018-03-26 LAB
ANION GAP SERPL CALCULATED.3IONS-SCNC: 4 MMOL/L (ref 4–13)
BUN SERPL-MCNC: 18 MG/DL (ref 5–25)
CALCIUM SERPL-MCNC: 9.2 MG/DL (ref 8.3–10.1)
CHLORIDE SERPL-SCNC: 101 MMOL/L (ref 100–108)
CO2 SERPL-SCNC: 34 MMOL/L (ref 21–32)
CREAT SERPL-MCNC: 0.91 MG/DL (ref 0.6–1.3)
GFR SERPL CREATININE-BSD FRML MDRD: 94 ML/MIN/1.73SQ M
GLUCOSE SERPL-MCNC: 139 MG/DL (ref 65–140)
GLUCOSE SERPL-MCNC: 139 MG/DL (ref 65–140)
GLUCOSE SERPL-MCNC: 140 MG/DL (ref 65–140)
GLUCOSE SERPL-MCNC: 140 MG/DL (ref 65–140)
GLUCOSE SERPL-MCNC: 175 MG/DL (ref 65–140)
POTASSIUM SERPL-SCNC: 3.7 MMOL/L (ref 3.5–5.3)
SODIUM SERPL-SCNC: 139 MMOL/L (ref 136–145)

## 2018-03-26 PROCEDURE — 80048 BASIC METABOLIC PNL TOTAL CA: CPT | Performed by: NURSE PRACTITIONER

## 2018-03-26 PROCEDURE — G8988 SELF CARE GOAL STATUS: HCPCS

## 2018-03-26 PROCEDURE — 99232 SBSQ HOSP IP/OBS MODERATE 35: CPT | Performed by: PHYSICIAN ASSISTANT

## 2018-03-26 PROCEDURE — G8987 SELF CARE CURRENT STATUS: HCPCS

## 2018-03-26 PROCEDURE — 97163 PT EVAL HIGH COMPLEX 45 MIN: CPT

## 2018-03-26 PROCEDURE — G8989 SELF CARE D/C STATUS: HCPCS

## 2018-03-26 PROCEDURE — 99024 POSTOP FOLLOW-UP VISIT: CPT | Performed by: PHYSICIAN ASSISTANT

## 2018-03-26 PROCEDURE — 82948 REAGENT STRIP/BLOOD GLUCOSE: CPT

## 2018-03-26 PROCEDURE — 72114 X-RAY EXAM L-S SPINE BENDING: CPT

## 2018-03-26 PROCEDURE — 99232 SBSQ HOSP IP/OBS MODERATE 35: CPT | Performed by: NURSE PRACTITIONER

## 2018-03-26 PROCEDURE — G8979 MOBILITY GOAL STATUS: HCPCS

## 2018-03-26 PROCEDURE — G8978 MOBILITY CURRENT STATUS: HCPCS

## 2018-03-26 PROCEDURE — 97166 OT EVAL MOD COMPLEX 45 MIN: CPT

## 2018-03-26 RX ORDER — ACETAMINOPHEN 325 MG/1
975 TABLET ORAL EVERY 8 HOURS SCHEDULED
Status: DISCONTINUED | OUTPATIENT
Start: 2018-03-26 | End: 2018-04-04 | Stop reason: HOSPADM

## 2018-03-26 RX ORDER — METHOCARBAMOL 750 MG/1
750 TABLET, FILM COATED ORAL EVERY 6 HOURS SCHEDULED
Status: DISCONTINUED | OUTPATIENT
Start: 2018-03-26 | End: 2018-03-29

## 2018-03-26 RX ORDER — LORAZEPAM 2 MG/ML
1 INJECTION INTRAMUSCULAR
Status: COMPLETED | OUTPATIENT
Start: 2018-03-26 | End: 2018-03-29

## 2018-03-26 RX ORDER — HALOPERIDOL 5 MG/ML
2 INJECTION INTRAMUSCULAR
Status: DISCONTINUED | OUTPATIENT
Start: 2018-03-26 | End: 2018-03-30

## 2018-03-26 RX ORDER — GLYCOPYRROLATE 0.2 MG/ML
0.2 INJECTION INTRAMUSCULAR; INTRAVENOUS EVERY 4 HOURS PRN
Status: DISCONTINUED | OUTPATIENT
Start: 2018-03-26 | End: 2018-03-30

## 2018-03-26 RX ORDER — CLONIDINE HYDROCHLORIDE 0.1 MG/1
0.2 TABLET ORAL EVERY 12 HOURS SCHEDULED
Status: DISCONTINUED | OUTPATIENT
Start: 2018-03-26 | End: 2018-04-04 | Stop reason: HOSPADM

## 2018-03-26 RX ORDER — OXYCODONE HYDROCHLORIDE 10 MG/1
20 TABLET ORAL EVERY 4 HOURS PRN
Status: DISCONTINUED | OUTPATIENT
Start: 2018-03-26 | End: 2018-04-02

## 2018-03-26 RX ADMIN — METHOCARBAMOL 750 MG: 750 TABLET ORAL at 17:33

## 2018-03-26 RX ADMIN — OXYCODONE HYDROCHLORIDE 10 MG: 10 TABLET ORAL at 08:27

## 2018-03-26 RX ADMIN — OXYCODONE HYDROCHLORIDE 30 MG: 10 TABLET ORAL at 04:51

## 2018-03-26 RX ADMIN — HYOSCYAMINE SULFATE 0.38 MG: 0.38 TABLET, EXTENDED RELEASE ORAL at 08:28

## 2018-03-26 RX ADMIN — METHOCARBAMOL 500 MG: 500 TABLET ORAL at 09:56

## 2018-03-26 RX ADMIN — HYDROMORPHONE HYDROCHLORIDE 0.5 MG: 1 INJECTION, SOLUTION INTRAMUSCULAR; INTRAVENOUS; SUBCUTANEOUS at 19:47

## 2018-03-26 RX ADMIN — GABAPENTIN 600 MG: 300 CAPSULE ORAL at 21:33

## 2018-03-26 RX ADMIN — INSULIN GLARGINE 10 UNITS: 100 INJECTION, SOLUTION SUBCUTANEOUS at 21:31

## 2018-03-26 RX ADMIN — CLONIDINE HYDROCHLORIDE 0.1 MG: 0.1 TABLET ORAL at 08:26

## 2018-03-26 RX ADMIN — KETAMINE HYDROCHLORIDE 0.1 MG/KG/HR: 50 INJECTION, SOLUTION INTRAMUSCULAR; INTRAVENOUS at 16:50

## 2018-03-26 RX ADMIN — CELECOXIB 200 MG: 200 CAPSULE ORAL at 08:23

## 2018-03-26 RX ADMIN — PANTOPRAZOLE SODIUM 40 MG: 40 TABLET, DELAYED RELEASE ORAL at 04:51

## 2018-03-26 RX ADMIN — SERTRALINE HYDROCHLORIDE 75 MG: 50 TABLET ORAL at 08:27

## 2018-03-26 RX ADMIN — GABAPENTIN 600 MG: 300 CAPSULE ORAL at 16:33

## 2018-03-26 RX ADMIN — MELATONIN TAB 3 MG 4.5 MG: 3 TAB at 21:37

## 2018-03-26 RX ADMIN — INSULIN LISPRO 1 UNITS: 100 INJECTION, SOLUTION INTRAVENOUS; SUBCUTANEOUS at 21:38

## 2018-03-26 RX ADMIN — LORAZEPAM 1 MG: 2 INJECTION INTRAMUSCULAR; INTRAVENOUS at 22:42

## 2018-03-26 RX ADMIN — GABAPENTIN 600 MG: 300 CAPSULE ORAL at 08:27

## 2018-03-26 RX ADMIN — CLONIDINE HYDROCHLORIDE 0.2 MG: 0.2 TABLET ORAL at 21:25

## 2018-03-26 RX ADMIN — HYDROMORPHONE HYDROCHLORIDE 0.5 MG: 1 INJECTION, SOLUTION INTRAMUSCULAR; INTRAVENOUS; SUBCUTANEOUS at 23:57

## 2018-03-26 RX ADMIN — TERAZOSIN HYDROCHLORIDE 5 MG: 5 CAPSULE ORAL at 21:21

## 2018-03-26 RX ADMIN — OXYCODONE HYDROCHLORIDE 30 MG: 10 TABLET ORAL at 13:54

## 2018-03-26 RX ADMIN — TIZANIDINE 4 MG: 4 TABLET ORAL at 08:27

## 2018-03-26 RX ADMIN — METHOCARBAMOL 750 MG: 750 TABLET ORAL at 23:52

## 2018-03-26 RX ADMIN — BENAZEPRIL HYDROCHLORIDE 40 MG: 10 TABLET ORAL at 08:24

## 2018-03-26 RX ADMIN — ACETAMINOPHEN 975 MG: 325 TABLET, FILM COATED ORAL at 21:32

## 2018-03-26 RX ADMIN — OXYCODONE HYDROCHLORIDE 20 MG: 10 TABLET ORAL at 21:43

## 2018-03-26 RX ADMIN — Medication 1 TABLET: at 08:24

## 2018-03-26 NOTE — ASSESSMENT & PLAN NOTE
· Likely patient has lumbar radiculopathy with pain on his right hip and right thigh  ·   However, no further pains below his right thigh  · Patient denies any numbness on his bilateral lower extremities  · No perineal numbness    · No urinary incontinence or bowel incontinence  · Neurosurgery consult, appreciated per attending plan for no further surgical procedures a this time  · Mri demonstrates: There is epidural soft tissue which extends into the right foramen at the level of the L3-4 and also extends cranially in relation to the mid body of the L3 vertebra with resultant narrowing of the right lateral recess of the L4 and severe   right foraminal narrowing likely due to an extruded/sequestered disc fragment  Evaluate for right L3 and L4 radiculopathy  Moderate central canal narrowing at L3 level   Adhesion of the cauda equina nerve roots at the level of the L4-L5 vertebra with the posterior aspect of the thecal sac, suggest arachnoiditis  Mild to moderate central canal narrowing at L2-3 with the small central protrusion  The study was marked in EPIC for significant notification

## 2018-03-26 NOTE — PLAN OF CARE
Problem: Potential for Falls  Goal: Patient will remain free of falls  INTERVENTIONS:  - Assess patient frequently for physical needs  -  Identify cognitive and physical deficits and behaviors that affect risk of falls    -  [place] fall precautions as indicated by assessment   - Educate patient/family on patient safety including physical limitations  - Instruct patient to call for assistance with activity based on assessment  - Modify environment to reduce risk of injury  - Consider OT/PT consult to assist with strengthening/mobility   Outcome: Progressing      Problem: PAIN - ADULT  Goal: Verbalizes/displays adequate comfort level or baseline comfort level  Interventions:  - Encourage patient to monitor pain and request assistance  - Assess pain using appropriate pain scale  - Administer analgesics based on type and severity of pain and evaluate response  - Implement non-pharmacological measures as appropriate and evaluate response  - Consider cultural and social influences on pain and pain management  - Notify physician/advanced practitioner if interventions unsuccessful or patient reports new pain   Outcome: Progressing      Problem: INFECTION - ADULT  Goal: Absence or prevention of progression during hospitalization  INTERVENTIONS:  - Assess and monitor for signs and symptoms of infection  - Monitor lab/diagnostic results  - Monitor all insertion sites, i e  indwelling lines, tubes, and drains  - Monitor endotracheal (as able) and nasal secretions for changes in amount and color  - [place] appropriate cooling/warming therapies per order  - Administer medications as ordered  - Instruct and encourage patient and family to use good hand hygiene technique  - Identify and instruct in appropriate isolation precautions for identified infection/condition   Outcome: Progressing    Goal: Absence of fever/infection during neutropenic period  INTERVENTIONS:  - Monitor WBC  - Implement neutropenic guidelines   Outcome: Progressing      Problem: SAFETY ADULT  Goal: Maintain or return to baseline ADL function  INTERVENTIONS:  -  Assess patient's ability to carry out ADLs; assess patient's baseline for ADL function and identify physical deficits which impact ability to perform ADLs (bathing, care of mouth/teeth, toileting, grooming, dressing, etc )  - Assess/evaluate cause of self-care deficits   - Assess range of motion  - Assess patient's mobility; develop plan if impaired  - Assess patient's need for assistive devices and provide as appropriate  - Encourage maximum independence but intervene and supervise when necessary  ¯ Involve family in performance of ADLs  ¯ Assess for home care needs following discharge   ¯ Request OT consult to assist with ADL evaluation and planning for discharge  ¯ Provide patient education as appropriate   Outcome: Progressing    Goal: Maintain or return mobility status to optimal level  INTERVENTIONS:  - Assess patient's baseline mobility status (ambulation, transfers, stairs, etc )    - Identify cognitive and physical deficits and behaviors that affect mobility  - Identify mobility aids required to assist with transfers and/or ambulation (gait belt, sit-to-stand, lift, walker, cane, etc )  - [place] fall precautions as indicated by assessment  - Record patient progress and toleration of activity level on Mobility SBAR; progress patient to next Phase/Stage  - Instruct patient to call for assistance with activity based on assessment  - Request Rehabilitation consult to assist with strengthening/weightbearing, etc    Outcome: Progressing      Problem: DISCHARGE PLANNING  Goal: Discharge to home or other facility with appropriate resources  INTERVENTIONS:  - Identify barriers to discharge w/patient and caregiver  - Arrange for needed discharge resources and transportation as appropriate  - Identify discharge learning needs (meds, wound care, etc )  - Arrange for interpretive services to assist at discharge as needed  - Refer to Case Management Department for coordinating discharge planning if the patient needs post-hospital services based on physician/advanced practitioner order or complex needs related to functional status, cognitive ability, or social support system   Outcome: Progressing      Problem: Knowledge Deficit  Goal: Patient/family/caregiver demonstrates understanding of disease process, treatment plan, medications, and discharge instructions  Complete learning assessment and assess knowledge base    Interventions:  - Provide teaching at level of understanding  - Provide teaching via preferred learning methods   Outcome: Progressing

## 2018-03-26 NOTE — ASSESSMENT & PLAN NOTE
· Poorly controlled at this point, but improving   · Blood pressure medications  (combination (lotensin/htz ),  Hytrin, hctz with clonidine ,( dcd hctz on 3/25 ? contributing to cramping like sensation in right upper lateral thigh may be a  Factor in conjunction with severe spinal issues ) will stop but pt with elevated bp adjust and added  clonodine  Which will now increase to 0 2mg now q12   · Adjust patient's blood pressure medications on as needed basis  · IV blood pressure medication as needed basis    · Elevation patient's blood pressures may be due to pain, vs chronic hypertension
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
## 2018-03-26 NOTE — PROGRESS NOTES
Progress Note - Kathy Gustafson 1960, 65 y o  male MRN: [id number]    Unit/Bed#: CW3 338-01 Encounter: [id number]    Primary Care Provider: Alee Foreman MD   Date and time admitted to hospital: 3/24/2018  3:33 PM        Pain of right thigh   Assessment & Plan    · Patient has definite pains on his right thigh and hip  · This may be referred pain from his post laminectomy syndrome of the lumbar spine  However, we cannot still totally rule out possibility that this is a separate problem  · We will do a right hip x-ray  No acute osseous abnormality  ·  pain a factor and pain management discussed with me today with probable plan to do surgery         * Post laminectomy syndrome   Assessment & Plan    ·  previously had a spine stimulator, that was removed this month  · As per admitting providers discussion with the neurosurgeon, Dr Anjel Lee, patient is for MRI of the lumbar spine without contrast   · Patient staples should be removed prior to the MRI  ·   According to admitting providers discussion with the ER resident, he will remove those staples to facilitate the MRI  · Pain control, will modify meds until seen by pain service tomorrow   · As per discussion with Neurosurgery, plan is to eventually put a pain pump  · Continue patient's home medications for pain  · PT OT eval and treat still pending evaluation         Lumbar radiculopathy   Assessment & Plan    · Likely patient has lumbar radiculopathy with pain on his right hip and right thigh  ·   However, no further pains below his right thigh  · Patient denies any numbness on his bilateral lower extremities  · No perineal numbness    · No urinary incontinence or bowel incontinence  · Neurosurgery consult, appreciated per attending plan for no further surgical procedures a this time  · Mri demonstrates:  There is epidural soft tissue which extends into the right foramen at the level of the L3-4 and also extends cranially in relation to the mid body of the L3 vertebra with resultant narrowing of the right lateral recess of the L4 and severe   right foraminal narrowing likely due to an extruded/sequestered disc fragment  Evaluate for right L3 and L4 radiculopathy  Moderate central canal narrowing at L3 level   Adhesion of the cauda equina nerve roots at the level of the L4-L5 vertebra with the posterior aspect of the thecal sac, suggest arachnoiditis  Mild to moderate central canal narrowing at L2-3 with the small central protrusion  The study was marked in EPIC for significant notification  Type 2 diabetes mellitus ([address] )   Assessment & Plan    · Hold off on patient's oral hypoglycemic medications  · In the meantime, will have the patient on insulin  · Adjust insulin doses accordingly  · lantus 10 units         Depression   Assessment & Plan    · Continue home psychiatric medications  Benign essential hypertension   Assessment & Plan    · Poorly controlled at this point, but improving   · Blood pressure medications  (combination (lotensin/htz ),  Hytrin, hctz with clonidine ,( dcd hctz on 3/25 ? contributing to cramping like sensation in right upper lateral thigh may be a  Factor in conjunction with severe spinal issues ) will stop but pt with elevated bp adjust and added  clonodine  Which will now increase to 0 2mg now q12   · Adjust patient's blood pressure medications on as needed basis  · IV blood pressure medication as needed basis  · Elevation patient's blood pressures may be due to pain, vs chronic hypertension         Sleep apnea   Assessment & Plan    · CPAP  Pain syndrome, chronic   Assessment & Plan    · Continue home pain medications  · Pain specialist consult, however will not see today  · Pain control medications p r n  Malathi Navarro   · See pain mgmt note medications changed to recommendations and ketamine started   · Will need to adjust as needed for procedure           VTE Pharmacologic Prophylaxis:   Pharmacologic: Enoxaparin (Lovenox)  Mechanical VTE Prophylaxis in Place: Yes    Patient Centered Rounds: I have performed bedside rounds with nursing staff today  Discussions with Specialists or Other Care Team Provider: reviewed    Education and Discussions with Family / Patient: patient   Time Spent for Care: 30 minutes  More than 50% of total time spent on counseling and coordination of care as described above  Current Length of Stay: 2 day(s)    Current Patient Status: Inpatient   Certification Statement: The patient will continue to require additional inpatient hospital stay due to pending final plan of care     Discharge Plan: rehab   Code Status: Level 1 - Full Code      Subjective:   Pt still with severe pain in right buttock to above the knee  No n/v/d pt sttes    Objective:     Vitals:   Temp (24hrs), Av 9 °F (36 6 °C), Min:97 6 °F (36 4 °C), Max:98 1 °F (36 7 °C)    HR:  [60-77] 75  Resp:  [18-20] 18  BP: (154-174)/() 174/83  SpO2:  [96 %-98 %] 97 %  Body mass index is 34 44 kg/m²  Input and Output Summary (last 24 hours): Intake/Output Summary (Last 24 hours) at 182  Last data filed at 18 1945   Gross per 24 hour   Intake             1160 ml   Output             1000 ml   Net              160 ml       Physical Exam:     Physical Exam   Constitutional: He is oriented to person, place, and time  He appears well-developed and well-nourished  No distress  HENT:   Head: Normocephalic and atraumatic  Mouth/Throat: No oropharyngeal exudate  Eyes: Conjunctivae are normal  Right eye exhibits no discharge  Left eye exhibits no discharge  No scleral icterus  Neck: No JVD present  No tracheal deviation present  No thyromegaly present  Cardiovascular: Normal rate  Exam reveals no gallop and no friction rub  No murmur heard  Pulmonary/Chest: No stridor  No respiratory distress  He has no wheezes  He has no rales  He exhibits no tenderness     Abdominal: He exhibits no distension and no mass  There is no tenderness  There is no rebound and no guarding  Musculoskeletal: He exhibits no edema, tenderness or deformity  Lymphadenopathy:     He has no cervical adenopathy  Neurological: He is oriented to person, place, and time  Skin: No rash noted  He is not diaphoretic  No erythema  No pallor  Psychiatric: He has a normal mood and affect  Additional Data:     Labs:      Results from last 7 days  Lab Units 03/25/18  0500   WBC Thousand/uL 9 81   HEMOGLOBIN g/dL 12 6   HEMATOCRIT % 36 6   PLATELETS Thousands/uL 280       Results from last 7 days  Lab Units 03/26/18  0449 03/25/18  0500   SODIUM mmol/L 139 139   POTASSIUM mmol/L 3 7 3 4*   CHLORIDE mmol/L 101 102   CO2 mmol/L 34* 32   BUN mg/dL 18 14   CREATININE mg/dL 0 91 0 87   CALCIUM mg/dL 9 2 9 1   TOTAL PROTEIN g/dL  --  7 7   BILIRUBIN TOTAL mg/dL  --  0 41   ALK PHOS U/L  --  76   ALT U/L  --  32   AST U/L  --  20   GLUCOSE RANDOM mg/dL 140 139       Results from last 7 days  Lab Units 03/25/18  0001   INR  0 96       * I Have Reviewed All Lab Data Listed Above  * Additional Pertinent Lab Tests Reviewed:  All Labs Within Last 24 Hours Reviewed    Imaging:    Imaging Reports Reviewed Today Include: reviewed       Recent Cultures (last 7 days):           Last 24 Hours Medication List:     Current Facility-Administered Medications:  acetaminophen 975 mg Oral Q8H [address] JAMIE Boyce    benazepril 40 mg Oral Daily Jose E Palumbo MD    celecoxib 200 mg Oral Daily Jose E Palumbo MD    cloNIDine 0 2 mg Oral Q12H [address] JAMIE Boyce    enoxaparin 40 mg Subcutaneous Daily Jose E Palumbo MD    fentaNYL 1 patch Transdermal Q48H Jose E Palumbo MD    gabapentin 600 mg Oral TID Jose E Palumbo MD    glycopyrrolate 0 2 mg Intravenous Q4H PRN Lanny Botello MD    haloperidol lactate 2 mg Intramuscular Q30 Min PRN Lanny Botello MD    HYDROmorphone 0 5 mg Intravenous Q4H PRN Jose E DE GUZMAN MD [place]    hyoscyamine 0 375 mg Oral Daily Jose E Palumbo MD    insulin glargine 10 Units Subcutaneous HS Jose E Palumbo MD    insulin lispro 1-5 Units Subcutaneous TID AC Jose E Palumbo MD    insulin lispro 1-5 Units Subcutaneous HS Jose E Palumbo MD    ketamine 0 1 mg/kg/hr (Adjusted) Intravenous Continuous Fariba Escobar MD Last Rate: 0 1 mg/kg/hr (03/26/18 1650)   LORazepam 1 mg Intravenous Q1H PRN Fariba Escobar MD    melatonin 4 5 mg Oral HS Jose E Palumbo MD    methocarbamol 750 mg Oral Q6H [address] JAMIE Pagan    multivitamin-minerals 1 tablet Oral Daily Jose E Palumbo MD    Naloxegol Oxalate 12 5 mg Oral Daily Jose E Palumbo MD    ondansetron 4 mg Intravenous Q6H PRN Jose E Palumbo MD    oxyCODONE 20 mg Oral Q4H PRN JAMIE Pagan    pantoprazole 40 mg Oral Early Morning Jose E Palumbo MD    senna 1 tablet Oral BID Jose E Palumbo MD    sertraline 75 mg Oral Daily Jose E Palumbo MD    terazosin 5 mg Oral HS Jose E Palumbo MD    zolpidem 5 mg Oral HS PRN Gus Stokes MD         Today, Patient Was Seen By: JAMIE Pagan    ** Please Note: Dictation voice to text software may have been used in the creation of this document   **

## 2018-03-26 NOTE — ASSESSMENT & PLAN NOTE
· Continue home pain medications  · Pain specialist consult, however will not see today  · Pain control medications p delilah Martin   · See pain mgmt note medications changed to recommendations and ketamine started   · Will need to adjust as needed for procedure

## 2018-03-26 NOTE — ASSESSMENT & PLAN NOTE
· Patient has definite pains on his right thigh and hip  · This may be referred pain from his post laminectomy syndrome of the lumbar spine  However, we cannot still totally rule out possibility that this is a separate problem  · We will do a right hip x-ray  No acute osseous abnormality    ·  pain a factor and pain management discussed with me today with probable plan to do surgery
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
## 2018-03-26 NOTE — ASSESSMENT & PLAN NOTE
·  previously had a spine stimulator, that was removed this month  · As per admitting providers discussion with the neurosurgeon, Dr Zoltan Mcbride, patient is for MRI of the lumbar spine without contrast   · Patient staples should be removed prior to the MRI  ·   According to admitting providers discussion with the ER resident, he will remove those staples to facilitate the MRI  · Pain control, will modify meds until seen by pain service tomorrow   · As per discussion with Neurosurgery, plan is to eventually put a pain pump  · Continue patient's home medications for pain    · PT OT eval and treat still pending evaluation
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
## 2018-03-26 NOTE — MEDICAL STUDENT
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  · Had back stimulator removed earlier this month  Pt states it was not effective for pain relief  Had previous back stimulator in which became infected, this was several years ago  · C/o pain "varied right thigh pain with new right hip pain as of this morning currently rated 7/10 in a sitting position  Pt states pain is the worst with ambulation, better with lying flat, best when in seated position  · Xray Lumbar Degenerative disc disease at L2-L3 and L3-L4  ,   · Xray right hip/pelvis There is no acute fracture or dislocation  · Xray of orbits shows The paranasal sinuses are clear and no radiopaque orbital foreign body  · Xray thoracic spine shows Unremarkable thoracic spine  No radiopaque foreign body     · MRI without contrast shows There is epidural soft tissue which extends into the right foramen at the level of the L3-4 and also extends cranially in relation to the mid body of the L3 vertebra with resultant narrowing of the right lateral recess of the L4 and severe   · right foraminal narrowing likely due to an extruded/sequestered disc fragment   Evaluate for right L3 and L4 radiculopathy and mild to moderate central canal narrowing at L2-3 with the small central protrusion  · Pain management currently  · Fentanyl patch 100mcg   · Gabapentin 600mg TID  · Levbid 0 375mg bid  · Oxycodone IR 5mg Q4hrs PRN  · Oxycodone 10mg Q4hrs PRN  · Pt management consulted-    Active Problems:    Pain syndrome, chronic  · Pain management following discussed increasing Robaxin, increasing frequency of oxycodone, d/c zanaflex, and giving IV dilaudid for breakthrough pain  An intrathecal pump was also discussed as well as a ketamine infusing during this hospitalization  · Pain is in Lower back  · PT/OT to eval today- appropriate discharge recommendations TBD pending plan per neurosurgery (possible surgery to take place)      Benign essential hypertension  · HTCZ on hold secondary to the possibility contributing to right hip cramping related to hypokalemia  Current pain is 7/10  · Blood pressure remains elevated -170  HR stable 60's so BP is likely not related to pain  · Increase clonidine to 0 2mg Q12hrs  · Continue Benazepril 40mg daily and hytrin 5mg at HS      Depression  · Continue Zoloft and Celebrex (home meds)  · Spouse at bedside, pt verbalizes good support system with spouse      Type 2 diabetes mellitus (HCC)  · Blood sugars 120-140's   · Continue lantus 15 units at HS  · Continue Carb controlled diet (75g/meal)  · Continue sliding scale with meals      Pain of right thigh  · "cramping pain of right hip and right thigh" 7/10  Feels best when in seated position  Hurts most when ambulating  · ??lumbar radiculopathy  · Per pt neurosurgery is requesting more xrays to determine if pt would benefit from laminectomy vs  Spinal fusion  · Neurosurgery following, awaiting final plan      Sleep apnea  · Continue home CPAP settings           VTE Pharmacologic Prophylaxis:   Pharmacologic: Enoxaparin (Lovenox)  Mechanical VTE Prophylaxis in Place: No    Patient Centered Rounds: I have performed bedside rounds with nursing staff today  Discussions with Specialists or Other Care Team Provider:     Education and Discussions with Family / Patient:     Time Spent for Care: 30 minutes  More than 50% of total time spent on counseling and coordination of care as described above      Current Length of Stay: 2 day(s)    Current Patient Status: Inpatient   Certification Statement: Continue testing and final plan by neurosurgery  Irractable pain continues, pain management following  Discharge Plan / Estimated Discharge Date: one week or more if surgical procedure done laminectomy vs  Spinal fusion  Code Status: Level 1 - Full Code      Subjective:   Pt c/o varied pain of right thigh and new right hip pain as of this morning, currently rated 7/10  States he thinks the robaxin helps the most and hates to have a "pain seeking" stigmatism with continual requests for increased medications  States he has been on these pain meds for a long time and "they just don't work as well as they used to"  Objective:     Vitals:   Temp (24hrs), Av °F (36 7 °C), Min:97 9 °F (36 6 °C), Max:98 1 °F (36 7 °C)    HR:  [66-77] 68  Resp:  [18] 18  BP: (154-174)/() 156/89  SpO2:  [93 %-98 %] 97 %  Body mass index is 34 44 kg/m²  Input and Output Summary (last 24 hours): Intake/Output Summary (Last 24 hours) at 18 1049  Last data filed at 18 0900   Gross per 24 hour   Intake              680 ml   Output             1100 ml   Net             -420 ml       Physical Exam:     Physical Exam   Constitutional: He is oriented to person, place, and time  He appears well-developed and well-nourished  No distress  HENT:   Head: Normocephalic and atraumatic  Eyes: Pupils are equal, round, and reactive to light  Neck: Normal range of motion  Neck supple  No JVD present  Cardiovascular: Normal rate, regular rhythm, normal heart sounds and intact distal pulses  Exam reveals no gallop and no friction rub  No murmur heard  Pulmonary/Chest: Breath sounds normal  No stridor  No respiratory distress  He has no wheezes  He has no rales  He exhibits no tenderness  Abdominal: Soft  Bowel sounds are normal  He exhibits no distension and no mass  There is no tenderness  There is no rebound and no guarding  Musculoskeletal: Normal range of motion  He exhibits no edema, tenderness or deformity     Neurological: He is alert and oriented to person, place, and time  No cranial nerve deficit  Skin: Skin is warm and dry  No rash noted  He is not diaphoretic  No erythema  No pallor  Psychiatric: He has a normal mood and affect  His behavior is normal  Judgment and thought content normal        Additional Data:     Labs:      Results from last 7 days  Lab Units 03/25/18  0500   WBC Thousand/uL 9 81   HEMOGLOBIN g/dL 12 6   HEMATOCRIT % 36 6   PLATELETS Thousands/uL 280       Results from last 7 days  Lab Units 03/26/18  0449 03/25/18  0500   SODIUM mmol/L 139 139   POTASSIUM mmol/L 3 7 3 4*   CHLORIDE mmol/L 101 102   CO2 mmol/L 34* 32   BUN mg/dL 18 14   CREATININE mg/dL 0 91 0 87   CALCIUM mg/dL 9 2 9 1   TOTAL PROTEIN g/dL  --  7 7   BILIRUBIN TOTAL mg/dL  --  0 41   ALK PHOS U/L  --  76   ALT U/L  --  32   AST U/L  --  20   GLUCOSE RANDOM mg/dL 140 139       Results from last 7 days  Lab Units 03/25/18  0001   INR  0 96       * I Have Reviewed All Lab Data Listed Above  * Additional Pertinent Lab Tests Reviewed:  [facility] Admission Reviewed    Imaging:    Imaging Reports Reviewed Today Include: xrays, MRI   Imaging Personally Reviewed by Myself Includes:  Xray of lumbar spine    Recent Cultures (last 7 days):           Last 24 Hours Medication List:     Current Facility-Administered Medications:  acetaminophen 650 mg Oral Q6H PRN Jose E Palumbo MD   benazepril 40 mg Oral Daily Jose E Palumbo MD   celecoxib 200 mg Oral Daily Jose E Palumbo MD   cloNIDine 0 1 mg Oral Q12H [address] JAMIE Boyce   enoxaparin 40 mg Subcutaneous Daily Jose E Palumbo MD   fentaNYL 1 patch Transdermal Q48H Jose E Palumbo MD   gabapentin 600 mg Oral TID Jose E Palumob MD   HYDROmorphone 0 5 mg Intravenous Q4H PRN Jose E Palumbo MD   hyoscyamine 0 375 mg Oral Daily Jose E Palumbo MD   insulin glargine 10 Units Subcutaneous HS Jose E Palumbo MD   insulin lispro 1-5 Units Subcutaneous TID Fortino Palumbo MD   insulin lispro 1-5 Units Subcutaneous HS Jose E Palumbo MD   LORazepam 0 5 mg Oral Q6H PRN JAMIE Guevara   melatonin 4 5 mg Oral HS Jose E Palumbo MD   methocarbamol 500 mg Oral Q6H PRN Keely Derick, PA-C   multivitamin-minerals 1 tablet Oral Daily Jose E Palumbo MD   Naloxegol Oxalate 12 5 mg Oral Daily Jose E Palumbo MD   ondansetron 4 mg Intravenous Q6H PRN Jose E Palumbo MD   oxyCODONE 10 mg Oral Q4H PRN Jose E Palumbo MD   oxyCODONE 30 mg Oral Q8H Jose E Palumbo MD   oxyCODONE 5 mg Oral Q4H PRN Jose E Palumbo MD   pantoprazole 40 mg Oral Early Morning Jose E Palumbo MD   senna 1 tablet Oral BID Jose E Palumbo MD   sertraline 75 mg Oral Daily Jose E Palumbo MD   terazosin 5 mg Oral HS Jose E Palumbo MD   tiZANidine 4 mg Oral TID Jose E Palumbo MD   zolpidem 5 mg Oral HS PRN Echo Callaway MD        Today, Patient Was Seen By: Valencia Prater    ** Please Note: This note has been constructed using a voice recognition system   **

## 2018-03-26 NOTE — PLAN OF CARE
Problem: PHYSICAL THERAPY ADULT  Goal: Performs mobility at highest level of function for planned discharge setting  See evaluation for individualized goals  Treatment/Interventions: Functional transfer training, LE strengthening/ROM, Therapeutic exercise, Elevations, Endurance training, Patient/family training, Equipment eval/education, Bed mobility, Gait training          See flowsheet documentation for full assessment, interventions and recommendations  Prognosis: Good  Problem List: Decreased strength, Decreased endurance, Impaired balance, Decreased mobility, Pain  Assessment: Pt is 65 y o  male seen for PT evaluation s/p admit to [facility] 3/24/2018 w/ Post laminectomy syndrome  Pt s/p removal of spine stimulator 3/9/18  He is now c/o R back pain radiating to anterior thigh and knee  MRI L-spine revealed: "epidural soft tissue which extends into the right foramen at the level of the L3-4 and also extends cranially in relation to the mid body of the L3 vertebra with resultant narrowing of the right lateral recess of the L4 and severe right foraminal narrowing likely due to an extruded/sequestered disc fragment  Moderate central canal narrowing at L3 level  Adhesion of the cauda equina nerve roots at the level of the L4-L5 vertebra with the posterior aspect of the thecal sac, suggest arachnoiditis  Mild to moderate central canal narrowing at L2-3 with the small central protrusion " PT consulted to assess pt's functional mobility and d/c needs  Order placed for PT eval and tx, w/ up w/ A order  Comorbidities affecting pt's physical performance at time of assessment include: chronic pain syndrome, chronic narcotic dependence, diabetes, hearing loss, depression, HTN  PTA, pt was independent w/ all functional mobility w/ SPC   Personal factors affecting pt at time of IE include: ambulating w/ assistive device, stairs to enter home, inability to ambulate household distances, inability to navigate community distances, inability to navigate level surfaces w/o external assistance and hearing impairments  Please find objective findings from PT assessment regarding body systems outlined above with impairments and limitations including weakness, impaired balance, decreased endurance, gait deviations, pain, decreased activity tolerance, decreased functional mobility tolerance and fall risk  The following objective measures performed on IE also reveal limitations: Barthel Index: 55/100  Pt's clinical presentation is currently unstable/unpredictable seen in pt's presentation of pain, fall risk, decline from baseline, and pending further w/u by neurosurgery  Pt able to perform transfers with close supervision and slow pace  Pt amb 20' x2 CGA/Jonathan with SPC and is limited by pain  Pt to benefit from continued PT tx to address deficits as defined above and maximize level of functional independent mobility and consistency  Recs pending neurosurgery plan at this time (surgical intervention?), as well as progress with functional mobility  Will continue to follow and make appropriate d/c recs  Recommendation:  (TBD; pending plan per neurosurgery)          See flowsheet documentation for full assessment

## 2018-03-26 NOTE — PROGRESS NOTES
Progress Note - Neurosurgery   Brittany Lewis 65 y o  male MRN: [id number]  Unit/Bed#: CW3 338-01 Encounter: [id number]    Assessment:  1  LBP with RLE radiculopathy  2  L3/4 foraminal stenosis secondary to extruded disc, ligamentous hypertrophy  3  Neurogenic claudication   4  Chronic pain syndrome  5  HTN  6  Type 2 DM  7  Sleep apnea    Plan:  · Exam reveals mild pain inhibition RLE  PP sensation grossly intact  TTP right PSIS  · Imaging reviewed personally and by attending  Final results as below  · MRI lumbar spine wo 3/25/18: L3/4 extruded disc herniation causing right lateral recess and foraminal stenosis  There is also facet arthropathy and ligamentous hypertrophy  Preserved surgical changes noted L4-S1   · Ordered lumbar spine flexion-extension x-rays to evaluate for any mobility at the L3-4 level to assist in termination appropriate intervention  · Pain control per primary team  APS consult appreciated  · Mobilize as tolerated  Using cane  · DVT PPX: Lovenox ordered and pending  · Discuss possible treatment options with patient including analgesics and conservative monitoring, epidural steroid injections, decompression of surgery +/-fixation of fusion  Patient's and wife's questions were answered  Subjective/Objective   Chief Complaint: "My leg hurts"/follow-up lumbar radiculopathy    Subjective:  Patient states severe sharp stabbing right lower extremity radicular pain radiating from lumbar spine through buttock for crosses thigh to the knee  As of this morning he noted a dull achy pain along the medial right lower leg  Pain is exacerbated with ambulation and quickly improves when he sits  Pain is also noted to be worse when lying flat  Denies any sensation changes  Patient states he is able to ambulate to the bathroom but unable to stand avoid secondary to pain  Denies any bowel bladder incontinence  Denies any saddle paresthesias  Denies any chest pain or shortness of breath    Patient states leg pain is greater than back pain  Admits to history of epidural steroid injections at least over one year ago with poor tolerance for procedure and limited benefit  Objective: Sitting up on edge of bed  I/O       03/24 3758 - 03/25 0700 03/25 0701 - 03/26 0700 03/26 0701 - 03/27 0700    P  O  480 800 480    Total Intake(mL/kg) 480 (4 4) 800 (7 3) 480 (4 4)    Urine (mL/kg/hr)  1029 (0 4) 450 (0 6)    Stool  0 (0)     Total Output   1029 450    Net +480 -229 +30           Unmeasured Urine Occurrence 13 x 6 x 2 x    Unmeasured Stool Occurrence  0 x           Invasive Devices     Peripheral Intravenous Line            Peripheral IV 03/24/18 Left Forearm 1 day                Physical Exam:  Vitals: Blood pressure 156/89, pulse 68, temperature 98 °F (36 7 °C), temperature source Oral, resp  rate 18, height 5' 10" (1 778 m), weight 109 kg (240 lb), SpO2 97 %  ,Body mass index is 34 44 kg/m²  General appearance: alert, appears stated age, cooperative and no distress  Head: Normocephalic, without obvious abnormality, atraumatic  Eyes: EOMI  Neck: supple, symmetrical, trachea midline and NT  Back: no kyphosis present, no tenderness to percussion or palpation, TTP right PSIS region  Lungs: non labored breathing  Heart: regular heart rate  Neurologic:   Mental status: Alert, oriented, thought content appropriate  Sensory: normal to PP   Tenderness to LT right thigh  Motor: moving all extremities with pain limitation RLE 4+/5  Reflexes: no clonus b/l     Lab Results:    Results from last 7 days  Lab Units 03/25/18  0500   WBC Thousand/uL 9 81   HEMOGLOBIN g/dL 12 6   HEMATOCRIT % 36 6   PLATELETS Thousands/uL 280       Results from last 7 days  Lab Units 03/26/18  0449 03/25/18  0500   SODIUM mmol/L 139 139   POTASSIUM mmol/L 3 7 3 4*   CHLORIDE mmol/L 101 102   CO2 mmol/L 34* 32   BUN mg/dL 18 14   CREATININE mg/dL 0 91 0 87   CALCIUM mg/dL 9 2 9 1   TOTAL PROTEIN g/dL  --  7 7   BILIRUBIN TOTAL mg/dL  -- 0 41   ALK PHOS U/L  --  76   ALT U/L  --  32   AST U/L  --  20   GLUCOSE RANDOM mg/dL 140 139               Results from last 7 days  Lab Units 03/25/18  0001   INR  0 96   PTT seconds 33     No results found for: TROPONINT  ABG:No results found for: PHART, LLL0JHK, PO2ART, OFP5GKG, N2SGKCKG, BEART, SOURCE    Imaging Studies: I have personally reviewed pertinent reports  and I have personally reviewed pertinent films in PACS    Xr Orbits For Foreign Body    Result Date: 3/25/2018  Narrative: ORBITS INDICATION:  Pre-MRI  COMPARISON:  None VIEWS:  2 IMAGES:  2 FINDINGS: There is no evidence of radiopaque orbital foreign body  The paranasal sinuses are clear  Impression: No radiopaque orbital foreign body  Workstation performed: [id number]     Xr Spine Thoracic 3 Vw    Result Date: 3/25/2018  Narrative: THORACIC SPINE INDICATION:   mri eval foreign body  COMPARISON:  None VIEWS:  XR SPINE THORACIC 3 VW FINDINGS: Thoracic vertebral alignment is within normal limits  No significant degenerative changes  There is no fracture or pathologic bone lesion  There is no displacement of the paraspinal line  The pedicles are intact  Impression: Unremarkable thoracic spine  No radiopaque foreign body    Workstation performed: [id number]     Xr Hip/pelv 2-3 Vws Right If Performed    Result Date: 3/25/2018  Narrative: RIGHT HIP INDICATION:   RIGHT HIP PAIN AND RIGHT FEMORAL PAIN  Noelle Lightning COMPARISON:  None VIEWS:  XR HIP/PELV 2-3 VWS RIGHT W PELVIS IF PERFORMED FINDINGS: There is no acute fracture or dislocation  No significant hip degenerative changes  No lytic or blastic osseous lesions  Soft tissues are unremarkable  The visualized lumbar spine is unremarkable  Impression: No acute osseous abnormality  Workstation performed: [id number]     Mri Lumbar Spine Wo Contrast    Result Date: 3/25/2018  Narrative: MRI LUMBAR SPINE WITHOUT CONTRAST INDICATION:  Right leg pain COMPARISON:  November 19, 2006   TECHNIQUE:  Sagittal T1, sagittal T2, sagittal inversion recovery, axial T1 and axial T2, coronal T2   IMAGE QUALITY:  Diagnostic FINDINGS: ALIGNMENT:  Normal alignment of the lumbar spine  No compression fracture  No spondylolysis or spondylolisthesis  No scoliosis  MARROW SIGNAL:  Normal marrow signal is identified within the visualized bony structures  No discrete marrow lesion  DISTAL CORD AND CONUS:  Normal size and signal within the distal cord and conus  The conus ends at the L1 level  PARASPINAL SOFT TISSUES:  Paraspinal soft tissues are unremarkable  SACRUM:  Normal signal within the sacrum  No evidence of insufficiency or stress fracture  LOWER THORACIC DISC SPACES:  Normal disc height and signal   No disc herniation, canal stenosis or foraminal narrowing  LUMBAR DISC SPACES: L1-L2:  Demonstrates a mild disc bulge with no significant central canal narrowing of foraminal narrowing L2-L3:  At L2-3 level there is disc desiccation with moderate size central protrusion with mild to moderate central canal narrowing  There is no significant right and there is no significant left foraminal narrowing L3-L4:  At L3-L4 level there is a moderate size central protrusion with moderate central canal narrowing and moderate indentation thecal sac  There is a epidural soft tissue which extends cranially in relation to the lower to mid body of the L3 vertebra  with impingement of the traversing right L4 nerve root and narrowing of the right lateral recess likely due to extruded or sequestered disc fragment  This measures 1 4 x 1 1 cm  This also extends into the right foramen and and results in severe right foraminal narrowing  There is moderate to severe left foraminal narrowing  There is associated deformity of the thecal sac There are facet proliferative changes    There is ligamentous hypertrophy at the level of the L4 L4-L5:  Postsurgical changes of laminectomy with fusion are noted at L4-5 level with the no evidence of central canal narrowing  There is a tethering of the cauda equina nerve roots with the posterior aspect of the thecal sac suggesting arachnoiditis  There is mild bilateral foraminal narrowing There is no evidence of disc herniation  There is no central canal narrowing L5-S1:  There are postsurgical surgical changes seen at L5-S1 with the solid osseous fusion noted  There is mild retrolisthesis of L5 over S1  There is mild right and mild left foraminal narrowing  Left renal cyst seen    Impression: There is epidural soft tissue which extends into the right foramen at the level of the L3-4 and also extends cranially in relation to the mid body of the L3 vertebra with resultant narrowing of the right lateral recess of the L4 and severe right foraminal narrowing likely due to an extruded/sequestered disc fragment  Evaluate for right L3 and L4 radiculopathy  Moderate central canal narrowing at L3 level Adhesion of the cauda equina nerve roots at the level of the L4-L5 vertebra with the posterior aspect of the thecal sac, suggest arachnoiditis Mild to moderate central canal narrowing at L2-3 with the small central protrusion The study was marked in EPIC for significant notification  Workstation performed: [id number]     EKG, Pathology, and Other Studies: I have personally reviewed pertinent reports        VTE Pharmacologic Prophylaxis: Enoxaparin (Lovenox)    VTE Mechanical Prophylaxis: sequential compression device

## 2018-03-27 ENCOUNTER — ANESTHESIA EVENT (INPATIENT)
Dept: PERIOP | Facility: HOSPITAL | Age: 58
DRG: 214 | End: 2018-03-27
Payer: OTHER MISCELLANEOUS

## 2018-03-27 PROBLEM — M47.816 FACET ARTHROPATHY, LUMBAR: Status: ACTIVE | Noted: 2018-03-27

## 2018-03-27 PROBLEM — M51.16 RADICULOPATHY DUE TO LUMBAR INTERVERTEBRAL DISC DISORDER: Status: ACTIVE | Noted: 2018-03-27

## 2018-03-27 PROBLEM — M43.16 SPONDYLOLISTHESIS OF LUMBAR REGION: Status: ACTIVE | Noted: 2018-03-27

## 2018-03-27 PROBLEM — M54.10 RADICULAR PAIN OF RIGHT LOWER EXTREMITY: Status: ACTIVE | Noted: 2018-03-24

## 2018-03-27 PROBLEM — M51.26 HERNIATED LUMBAR INTERVERTEBRAL DISC: Status: ACTIVE | Noted: 2018-03-27

## 2018-03-27 LAB
ABO GROUP BLD: NORMAL
BLD GP AB SCN SERPL QL: NEGATIVE
GLUCOSE SERPL-MCNC: 134 MG/DL (ref 65–140)
GLUCOSE SERPL-MCNC: 137 MG/DL (ref 65–140)
GLUCOSE SERPL-MCNC: 141 MG/DL (ref 65–140)
GLUCOSE SERPL-MCNC: 155 MG/DL (ref 65–140)
RH BLD: POSITIVE
SPECIMEN EXPIRATION DATE: NORMAL

## 2018-03-27 PROCEDURE — 87081 CULTURE SCREEN ONLY: CPT | Performed by: PHYSICIAN ASSISTANT

## 2018-03-27 PROCEDURE — 99232 SBSQ HOSP IP/OBS MODERATE 35: CPT | Performed by: PHYSICIAN ASSISTANT

## 2018-03-27 PROCEDURE — 94760 N-INVAS EAR/PLS OXIMETRY 1: CPT

## 2018-03-27 PROCEDURE — 82948 REAGENT STRIP/BLOOD GLUCOSE: CPT

## 2018-03-27 PROCEDURE — 86850 RBC ANTIBODY SCREEN: CPT | Performed by: PHYSICIAN ASSISTANT

## 2018-03-27 PROCEDURE — 99024 POSTOP FOLLOW-UP VISIT: CPT | Performed by: PHYSICIAN ASSISTANT

## 2018-03-27 PROCEDURE — 97530 THERAPEUTIC ACTIVITIES: CPT

## 2018-03-27 PROCEDURE — 94660 CPAP INITIATION&MGMT: CPT

## 2018-03-27 PROCEDURE — 97116 GAIT TRAINING THERAPY: CPT

## 2018-03-27 PROCEDURE — 86901 BLOOD TYPING SEROLOGIC RH(D): CPT | Performed by: PHYSICIAN ASSISTANT

## 2018-03-27 PROCEDURE — 86900 BLOOD TYPING SEROLOGIC ABO: CPT | Performed by: PHYSICIAN ASSISTANT

## 2018-03-27 PROCEDURE — 99232 SBSQ HOSP IP/OBS MODERATE 35: CPT | Performed by: INTERNAL MEDICINE

## 2018-03-27 RX ADMIN — OXYCODONE HYDROCHLORIDE 20 MG: 10 TABLET ORAL at 15:16

## 2018-03-27 RX ADMIN — OXYCODONE HYDROCHLORIDE 20 MG: 10 TABLET ORAL at 05:34

## 2018-03-27 RX ADMIN — ZOLPIDEM TARTRATE 5 MG: 5 TABLET ORAL at 00:02

## 2018-03-27 RX ADMIN — HYOSCYAMINE SULFATE 0.38 MG: 0.38 TABLET, EXTENDED RELEASE ORAL at 08:04

## 2018-03-27 RX ADMIN — HYDROMORPHONE HYDROCHLORIDE 0.5 MG: 1 INJECTION, SOLUTION INTRAMUSCULAR; INTRAVENOUS; SUBCUTANEOUS at 08:08

## 2018-03-27 RX ADMIN — METHOCARBAMOL 750 MG: 750 TABLET ORAL at 11:25

## 2018-03-27 RX ADMIN — Medication 1 TABLET: at 08:02

## 2018-03-27 RX ADMIN — TERAZOSIN HYDROCHLORIDE 5 MG: 5 CAPSULE ORAL at 21:24

## 2018-03-27 RX ADMIN — METHOCARBAMOL 750 MG: 750 TABLET ORAL at 05:28

## 2018-03-27 RX ADMIN — ACETAMINOPHEN 975 MG: 325 TABLET, FILM COATED ORAL at 21:24

## 2018-03-27 RX ADMIN — GABAPENTIN 600 MG: 300 CAPSULE ORAL at 08:01

## 2018-03-27 RX ADMIN — CLONIDINE HYDROCHLORIDE 0.2 MG: 0.2 TABLET ORAL at 08:00

## 2018-03-27 RX ADMIN — CLONIDINE HYDROCHLORIDE 0.2 MG: 0.2 TABLET ORAL at 21:24

## 2018-03-27 RX ADMIN — CELECOXIB 200 MG: 200 CAPSULE ORAL at 08:03

## 2018-03-27 RX ADMIN — METHOCARBAMOL 750 MG: 750 TABLET ORAL at 17:41

## 2018-03-27 RX ADMIN — HYDROMORPHONE HYDROCHLORIDE 0.5 MG: 1 INJECTION, SOLUTION INTRAMUSCULAR; INTRAVENOUS; SUBCUTANEOUS at 23:04

## 2018-03-27 RX ADMIN — MELATONIN TAB 3 MG 4.5 MG: 3 TAB at 21:25

## 2018-03-27 RX ADMIN — INSULIN LISPRO 1 UNITS: 100 INJECTION, SOLUTION INTRAVENOUS; SUBCUTANEOUS at 21:40

## 2018-03-27 RX ADMIN — ACETAMINOPHEN 975 MG: 325 TABLET, FILM COATED ORAL at 13:21

## 2018-03-27 RX ADMIN — KETAMINE HYDROCHLORIDE 0.2 MG/KG/HR: 50 INJECTION, SOLUTION INTRAMUSCULAR; INTRAVENOUS at 16:47

## 2018-03-27 RX ADMIN — PANTOPRAZOLE SODIUM 40 MG: 40 TABLET, DELAYED RELEASE ORAL at 05:27

## 2018-03-27 RX ADMIN — GABAPENTIN 600 MG: 300 CAPSULE ORAL at 21:24

## 2018-03-27 RX ADMIN — OXYCODONE HYDROCHLORIDE 20 MG: 10 TABLET ORAL at 10:15

## 2018-03-27 RX ADMIN — GABAPENTIN 600 MG: 300 CAPSULE ORAL at 17:41

## 2018-03-27 RX ADMIN — BENAZEPRIL HYDROCHLORIDE 40 MG: 10 TABLET ORAL at 08:02

## 2018-03-27 RX ADMIN — FENTANYL 1 PATCH: 100 PATCH TRANSDERMAL at 13:46

## 2018-03-27 RX ADMIN — OXYCODONE HYDROCHLORIDE 20 MG: 10 TABLET ORAL at 20:09

## 2018-03-27 RX ADMIN — INSULIN GLARGINE 5 UNITS: 100 INJECTION, SOLUTION SUBCUTANEOUS at 21:40

## 2018-03-27 RX ADMIN — ACETAMINOPHEN 975 MG: 325 TABLET, FILM COATED ORAL at 05:26

## 2018-03-27 RX ADMIN — METHOCARBAMOL 750 MG: 750 TABLET ORAL at 23:04

## 2018-03-27 RX ADMIN — SERTRALINE HYDROCHLORIDE 75 MG: 50 TABLET ORAL at 08:02

## 2018-03-27 NOTE — ASSESSMENT & PLAN NOTE
· Continue zoloft daily and ambien qHS (on sonata at home)   · Patient is tearful today, and per our conversation suspect his depression derived from chronic back pain

## 2018-03-27 NOTE — PROGRESS NOTES
Progress Note - Radha Carey 1960, 65 y o  male MRN: [id number]  Unit/Bed#: CW3 338-01 Encounter: [id number] DOS: 3/27/18  Primary Care Provider: Dudley Hansen MD   Date and time admitted to hospital: 3/24/2018  3:33 PM    Lumbar radiculopathy   Assessment & Plan    · Patient with hx of chronic LBP presented with RLE pain likely consistent with L3-4/lumbar radiculopathy  Now reporting pain distal to right knee, continues to denies saddle anesthesia or paresthesias  · Appreciate neurosurgery input   · S/p MRI lumbar spine   · L3/4 extruded disc herniation causing right lateral recess and foraminal stenosis, right facet arthropathy and ligamentous hypertrophy   · Lumbar spine flexion/extension xrays yesterday   · APS following and patient was started on ketamine infusion 3/26 and rate increased today   · Per neurosx attending attestation, patient will likely need extensive decompression with fixation/fusion, which would be best accomplished after integrated with outpatient pain management and outpatient discussion   · Consider medrol dose charles?  Will reach out to neurosx        * Post laminectomy syndrome   Assessment & Plan    · Previously had a spine stimulator that was removed this month   · Staples removed in ER on admission  · Per neurosx, plan is to eventually place a pain pump          Benign essential hypertension   Assessment & Plan    · Poorly controlled at this point, although improving  · SBP ranging 140-170s but suspect in setting of acute pain   · Home regimen includes, benazepril 40mg daily with HCTZ 25mg daily, clonidine 0 1mg q12, terazosin 5mg daily   · Clonidine was increased to 0 2 mg BID   · PRN antihypertensives for SBP > 180        Type 2 diabetes mellitus ([facility] [address] )   Assessment & Plan    · Hold amaryl, resume on d/c   · Last HA1C 7 4%   · Started on lantus 10units qHS for better glycemic control inpatient   · Continue SSI, accuchecks, diabetic diet         Pain syndrome, chronic Assessment & Plan    · Home regimen includes Celebrex, fentanyl, oxy IR   · Secondary to chronic back pain         Depression   Assessment & Plan    · Continue zoloft daily and ambien qHS (on sonata at home)   · Patient is tearful today, and per our conversation suspect his depression derived from chronic back pain         Sleep apnea   Assessment & Plan    · Continue CPAP qhs          VTE Pharmacologic Prophylaxis:   Pharmacologic: Enoxaparin (Lovenox)  Mechanical VTE Prophylaxis in Place: Yes    Patient Centered Rounds: I have performed bedside rounds with nursing staff today  Discussions with Specialists or Other Care Team Provider: nursing, tiger text sent to neurosx    Education and Discussions with Family / Patient: patient    Time Spent for Care: 30 minutes  More than 50% of total time spent on counseling and coordination of care as described above  Current Length of Stay: 3 day(s)    Current Patient Status: Inpatient   Certification Statement: The patient will continue to require additional inpatient hospital stay due to acute on chronic back pain requiring IV ketamine inusion, pain management, safe discharge planning       Discharge Plan / Estimated Discharge Date: pending clinical course     Code Status: Level 1 - Full Code    Subjective:   Patient seen and examined at bedside  Still with left hip and thigh pain now radiating down to his shin which is to  Able to ambulate but painful  No numbness or tingling  No saddle anesthesia  He is tearful  States he worked hard all his life but had to retire early due to back pain and is remorseful of working too hard  He is inquiring about medrol dose charles as this has helped him before  Objective:     Vitals:   Temp (24hrs), Av 9 °F (36 6 °C), Min:97 6 °F (36 4 °C), Max:98 1 °F (36 7 °C)    HR:  [59-75] 60  Resp:  [17-20] 18  BP: (144-174)/(76-94) 170/89  SpO2:  [93 %-98 %] 96 %  Body mass index is 34 44 kg/m²       Input and Output Summary (last  hours): Intake/Output Summary (Last 24 hours) at 03/27/18 1357  Last data filed at 03/27/18 1232   Gross per 24 hour   Intake             1380 ml   Output             1050 ml   Net              330 ml     Physical Exam:     Physical Exam   Constitutional: He is oriented to person, place, and time  He appears well-developed and well-nourished  No distress  HENT:   Head: Normocephalic and atraumatic  Mouth/Throat: Oropharynx is clear and moist    Eyes: EOM are normal  No scleral icterus  Neck: Normal range of motion  Neck supple  Cardiovascular: Normal rate, regular rhythm and normal heart sounds  No murmur heard  Pulmonary/Chest: Effort normal and breath sounds normal    Abdominal: Soft  Bowel sounds are normal    Obese abdomen    Musculoskeletal: Normal range of motion  He exhibits no edema  Neurological: He is alert and oriented to person, place, and time  Tenderness to palpation right thigh, sensation grossly intact  Moves all extrem x 4   Skin: Skin is warm and dry  Psychiatric: He has a normal mood and affect  Nursing note and vitals reviewed  Additional Data:   Labs:    Results from last 7 days  Lab Units 03/25/18  0500   WBC Thousand/uL 9 81   HEMOGLOBIN g/dL 12 6   HEMATOCRIT % 36 6   PLATELETS Thousands/uL 280       Results from last 7 days  Lab Units 03/26/18  0449 03/25/18  0500   SODIUM mmol/L 139 139   POTASSIUM mmol/L 3 7 3 4*   CHLORIDE mmol/L 101 102   CO2 mmol/L 34* 32   BUN mg/dL 18 14   CREATININE mg/dL 0 91 0 87   CALCIUM mg/dL 9 2 9 1   TOTAL PROTEIN g/dL  --  7 7   BILIRUBIN TOTAL mg/dL  --  0 41   ALK PHOS U/L  --  76   ALT U/L  --  32   AST U/L  --  20   GLUCOSE RANDOM mg/dL 140 139       Results from last 7 days  Lab Units 03/25/18  0001   INR  0 96     * I Have Reviewed All Lab Data Listed Above  * Additional Pertinent Lab Tests Reviewed:  All Labs For Current Hospital Admission Reviewed    Imaging:    Imaging Reports Reviewed Today Include: lumbar mri, xray  Imaging Personally Reviewed by Myself Includes:  none    Recent Cultures (last 7 days):         Last 24 Hours Medication List:     Current Facility-Administered Medications:  acetaminophen 975 mg Oral Q8H [address] JAMIE Boyce    benazepril 40 mg Oral Daily Jose E Palumbo MD    celecoxib 200 mg Oral Daily Jose E Palumbo MD    cloNIDine 0 2 mg Oral Q12H [address] JAMIE Boyce    enoxaparin 40 mg Subcutaneous Daily Jose E Palumbo MD    fentaNYL 1 patch Transdermal Q48H Jose E Palumbo MD    gabapentin 600 mg Oral TID Jose E Palumbo MD    glycopyrrolate 0 2 mg Intravenous Q4H PRN Alona Park MD    haloperidol lactate 2 mg Intramuscular Q30 Min PRN Alona Park MD    HYDROmorphone 0 5 mg Intravenous Q4H PRN Jose E Palumbo MD    hyoscyamine 0 375 mg Oral Daily Jose E Palumbo MD    insulin glargine 10 Units Subcutaneous HS Jose E Palumbo MD    insulin lispro 1-5 Units Subcutaneous TID AC Jose E Palumbo MD    insulin lispro 1-5 Units Subcutaneous HS Jose E Palumbo MD    ketamine 0 2 mg/kg/hr (Adjusted) Intravenous Continuous Alona Park MD Last Rate: 0 2 mg/kg/hr (03/27/18 1012)   LORazepam 1 mg Intravenous Q1H PRN Alona Park MD    melatonin 4 5 mg Oral HS Jose E Palumbo MD    methocarbamol 750 mg Oral Q6H [address] JAMIE Vargas    multivitamin-minerals 1 tablet Oral Daily Jose E Palumbo MD    Naloxegol Oxalate 12 5 mg Oral Daily Jose E Palumbo MD    ondansetron 4 mg Intravenous Q6H PRN Jose E Palumbo MD    oxyCODONE 20 mg Oral Q4H PRN JAMIE Vargas    pantoprazole 40 mg Oral Early Morning Jose E Palumbo MD    senna 1 tablet Oral BID Jose E Palumbo MD    sertraline 75 mg Oral Daily Jose E Palumbo MD    terazosin 5 mg Oral HS Jose E Palumbo MD    zolpidem 5 mg Oral HS PRN Ji Arechiga MD         Today, Patient Was Seen By: Nisha Rao PA-C    ** Please Note: This note has been constructed using a voice recognition system   **

## 2018-03-27 NOTE — PROGRESS NOTES
Progress Note - Neurosurgery   Rufina Mercado 65 y o  male MRN: [id number]  Unit/Bed#: CW3 338-01 Encounter: [id number]    Assessment:  1  LBP with RLE radiculopathy  2  L3/4 foraminal stenosis secondary to extruded disc, ligamentous hypertrophy and facet arthropathy   3  Chronic pain syndrome  4  HTN  5  Type 2 DM  6  Sleep apnea    Plan:  · Exam reveals mild pain inhibition RLE  PP sensation grossly intact  · Imaging reviewed personally and by attending  Final results as below  · MRI lumbar spine wo 3/25/18: L3/4 extruded disc herniation causing right lateral recess and foraminal stenosis  There is also facet arthropathy and ligamentous hypertrophy  Preserved surgical changes noted L4-S1  · Lumbar spine flexion-extension x-rays 3/26/18: Grade 1 anterolisthesis L3 on L4 without instability on flexion/extension  · Pain control per primary team  APS consult appreciated  On ketamine gtt  · Mobilize as tolerated  Using cane  · DVT PPX:SCDs at this time  Hold lovenox in AM    · Aggressive bowel regimen  Add Miralax daily postoperatively  · Discussed recommendations for lumbar decompression and TLIF L2/3 and L3/4  Diaphragms drawn  Surgery explained  Expectation for postoperative course reviewed  Anticipate improvement of RLE pain but likely increased LBP following surgery  · Will order pre-op labs and nose to toes  · Call with questions/concerns  Subjective/Objective   Chief Complaint: "My leg pain in worse"    Subjective: Patient states severe leg pain overnight  States LBP with radiation into RLE and now extending below his knee  C/w L3 and L4 radiculopathy  Denies CP/SOB/N/V  Patient admits to bowel obstruction following prior fusion requiring rectal tube  Objective: sitting on edge of bed  NAD  I/O       03/25 1273 - 03/26 0700 03/26 0701 - 03/27 0700 03/27 0701 - 03/28 0700    P  O  800 1440 420    Total Intake(mL/kg) 800 (7 3) 1440 (13 2) 420 (3 9)    Urine (mL/kg/hr) 1029 (0 4) 1500 (0 6)     Stool 0 (0)      Total Output 1029 1500      Net -229 -60 +420           Unmeasured Urine Occurrence 6 x 4 x     Unmeasured Stool Occurrence 0 x            Invasive Devices     Peripheral Intravenous Line            Peripheral IV 03/24/18 Left Forearm 2 days                Physical Exam:  Vitals: Blood pressure 170/89, pulse 60, temperature 97 8 °F (36 6 °C), temperature source Oral, resp  rate 18, height 5' 10" (1 778 m), weight 109 kg (240 lb), SpO2 96 %  ,Body mass index is 34 44 kg/m²  General appearance: alert, appears stated age, cooperative and no distress  Head: Normocephalic, without obvious abnormality, atraumatic  Eyes: EOMI  Neck: supple, symmetrical, trachea midline and NT  Back: TTP paraspinal low lumbar spine  Lungs: non labored breathing  Heart: regular heart rate  Neurologic:   Mental status: Alert, oriented, thought content appropriate  Sensory: normal to PP  Motor: moving all extremities without focal weakness, mild pain limitation proximal RLE  Reflexes: No clonus b/l     Lab Results:    Results from last 7 days  Lab Units 03/25/18  0500   WBC Thousand/uL 9 81   HEMOGLOBIN g/dL 12 6   HEMATOCRIT % 36 6   PLATELETS Thousands/uL 280       Results from last 7 days  Lab Units 03/26/18  0449 03/25/18  0500   SODIUM mmol/L 139 139   POTASSIUM mmol/L 3 7 3 4*   CHLORIDE mmol/L 101 102   CO2 mmol/L 34* 32   BUN mg/dL 18 14   CREATININE mg/dL 0 91 0 87   CALCIUM mg/dL 9 2 9 1   TOTAL PROTEIN g/dL  --  7 7   BILIRUBIN TOTAL mg/dL  --  0 41   ALK PHOS U/L  --  76   ALT U/L  --  32   AST U/L  --  20   GLUCOSE RANDOM mg/dL 140 139               Results from last 7 days  Lab Units 03/25/18  0001   INR  0 96   PTT seconds 33     No results found for: TROPONINT  ABG:No results found for: PHART, NTL9AJP, PO2ART, HBW7KIN, L6OPZPGF, BEART, SOURCE    Imaging Studies: I have personally reviewed pertinent reports     and I have personally reviewed pertinent films in PACS    XR spine lumbar complete w bending minimum 6 views   Final Result by Piero Oshea MD (03/27 0864)      Stable degenerative and postoperative changes  Workstation performed: [id number]         MRI lumbar spine wo contrast   Final Result by Chi Bearden MD (03/25 1525)   There is epidural soft tissue which extends into the right foramen at the level of the L3-4 and also extends cranially in relation to the mid body of the L3 vertebra with resultant narrowing of the right lateral recess of the L4 and severe    right foraminal narrowing likely due to an extruded/sequestered disc fragment  Evaluate for right L3 and L4 radiculopathy  Moderate central canal narrowing at L3 level       Adhesion of the cauda equina nerve roots at the level of the L4-L5 vertebra with the posterior aspect of the thecal sac, suggest arachnoiditis      Mild to moderate central canal narrowing at L2-3 with the small central protrusion   The study was marked in EPIC for significant notification  Workstation performed: [id number]         XR orbits for foreign body   Final Result by Aaron Graves DO (03/25 1139)      No radiopaque orbital foreign body  Workstation performed: [id number]         XR spine thoracic 3 vw   Final Result by Aaron Graves DO (03/25 1139)      Unremarkable thoracic spine  No radiopaque foreign body            Workstation performed: [id number]         XR hip/pelv 2-3 vws right if performed   Final Result by Verna Rodriguez MD (03/25 1026)      No acute osseous abnormality  Workstation performed: [id number]             EKG, Pathology, and Other Studies: I have personally reviewed pertinent reports        VTE Pharmacologic Prophylaxis: Reason for no pharmacologic prophylaxis Or tomorrow    VTE Mechanical Prophylaxis: sequential compression device

## 2018-03-27 NOTE — PLAN OF CARE
Problem: DISCHARGE PLANNING - CARE MANAGEMENT  Goal: Discharge to post-acute care or home with appropriate resources  INTERVENTIONS:  - Conduct assessment to determine patient/family and health care team treatment goals, and need for post-acute services based on payer coverage, community resources, and patient preferences, and barriers to discharge  - Address psychosocial, clinical, and financial barriers to discharge as identified in assessment in conjunction with the patient/family and health care team  - Arrange appropriate level of post-acute services according to patient's   needs and preference and payer coverage in collaboration with the physician and health care team  - Communicate with and update the patient/family, physician, and health care team regarding progress on the discharge plan  - Arrange appropriate transportation to post-acute venues  -Assist patient with making referrals for [facility], inpatient rehab, follow up care    Outcome: Progressing

## 2018-03-27 NOTE — PLAN OF CARE
Problem: PHYSICAL THERAPY ADULT  Goal: Performs mobility at highest level of function for planned discharge setting  See evaluation for individualized goals  Treatment/Interventions: Functional transfer training, LE strengthening/ROM, Therapeutic exercise, Elevations, Endurance training, Patient/family training, Equipment eval/education, Bed mobility, Gait training          See flowsheet documentation for full assessment, interventions and recommendations  Outcome: Progressing  Prognosis: Good  Problem List: Decreased strength, Decreased mobility, Decreased endurance, Impaired balance, Pain  Assessment: Trial ambulation with RW versus SPC with demonstrated improved gait quiality, gait distance and increased stability  Would benefit from continued gait training with RW to reinforce use  Patient significantly limited by pain requiring short distance ambulation and seated rest breaks  Pain is major barrier limiting patients ability for household ambulation  Recommendation: Other (Comment) (TBD pending plan per neurosurgery)          See flowsheet documentation for full assessment

## 2018-03-27 NOTE — ASSESSMENT & PLAN NOTE
· Hold amaryl, resume on d/c   · Last HA1C 7 4%   · Started on lantus 10units qHS for better glycemic control inpatient   · Continue SSI, accuchecks, diabetic diet

## 2018-03-27 NOTE — PHYSICAL THERAPY NOTE
Physical Therapy Progress Note         03/27/18 1000   Pain Assessment   Pain Assessment 0-10   Pain Score 7   Pain Type Chronic pain   Pain Location Back;Leg   Pain Orientation Right   Pain Descriptors Sharp; Aching   Pain Frequency Constant/continuous   Pain Onset Gradual   Clinical Progression Not changed   Patient's Stated Pain Goal No pain   Hospital Pain Intervention(s) Ambulation/increased activity;Repositioned; Emotional support   Restrictions/Precautions   Other Precautions Fall Risk;Pain   General   Family/Caregiver Present Yes   Subjective   Subjective Patient agreeable to therapy session however states pain is high today  Bed Mobility   Supine to Sit 5  Supervision   Additional items Assist x 1   Sit to Supine 5  Supervision   Additional items Assist x 1   Transfers   Sit to Stand 6  Modified independent   Stand to Sit 6  Modified independent   Ambulation/Elevation   Gait pattern Short stride; Excessively slow; Antalgic; Forward Flexion;Decreased foot clearance   Gait Assistance 4  Minimal assist   Additional items Assist x 1  (CGA)   Assistive Device Rolling walker   Distance 15' x 3   Balance   Static Sitting Good   Dynamic Sitting Good   Static Standing Fair +   Dynamic Standing Fair   Ambulatory Fair   Assessment   Prognosis Good   Problem List Decreased strength;Decreased mobility; Decreased endurance; Impaired balance;Pain   Assessment Trial ambulation with RW versus SPC with demonstrated improved gait quiality, gait distance and increased stability  Would benefit from continued gait training with RW to reinforce use  Patient significantly limited by pain requiring short distance ambulation and seated rest breaks  Pain is major barrier limiting patients ability for household ambulation  Goals   Patient Goals decrease pain   STG Expiration Date 04/05/18   Treatment Day 1   Plan   Treatment/Interventions Functional transfer training;LE strengthening/ROM; Therapeutic exercise; Endurance training;Patient/family training;Equipment eval/education; Bed mobility;Gait training   PT Frequency 5x/wk   Recommendation   Recommendation Other (Comment)  (TBD pending plan per neurosurgery)   Equipment Recommended Francesco Leal PTA

## 2018-03-27 NOTE — ASSESSMENT & PLAN NOTE
· Patient with hx of chronic LBP presented with RLE pain likely consistent with L3-4/lumbar radiculopathy  Now reporting pain distal to right knee, continues to denies saddle anesthesia or paresthesias  · Appreciate neurosurgery input   · S/p MRI lumbar spine   · L3/4 extruded disc herniation causing right lateral recess and foraminal stenosis, right facet arthropathy and ligamentous hypertrophy   · Lumbar spine flexion/extension xrays yesterday   · APS following and patient was started on ketamine infusion 3/26 and rate increased today   · Per neurosx attending attestation, patient will likely need extensive decompression with fixation/fusion, which would be best accomplished after integrated with outpatient pain management and outpatient discussion   · Consider medrol dose charles?  Will reach out to neurosx

## 2018-03-27 NOTE — PROGRESS NOTES
Progress Note - Anesthesia Acute Pain Management    Charis Han 65 y o  male MRN: [id number]  Unit/Bed#: CW3 338-01 Encounter: [id number]      Assessment:   Principal Problem:    Post laminectomy syndrome  Active Problems:    Pain syndrome, chronic    Benign essential hypertension    Depression    Type 2 diabetes mellitus (HCC)    Lumbar radiculopathy    Pain of right thigh    Sleep apnea      Plan:   - Pt reports minimal improvement in pain   No definite plans for surgery at this time, still waiting for decision   - Recommend:   - APS will increase Ketamine to 0 2mg/kg/hr   - Continue current regimen otherwise    Pain History  Current pain location(s): R hip/leg  Pain Scale: 8/10    Meds/Allergies   all current active meds have been reviewed    Allergies   Allergen Reactions    Propulsid [Cisapride] Tongue Swelling    Cymbalta [Duloxetine Hcl] Headache    Vancomycin Rash     Red man syndrome       Objective     Temp:  [97 6 °F (36 4 °C)-98 1 °F (36 7 °C)] 98 °F (36 7 °C)  HR:  [59-75] 66  Resp:  [17-20] 18  BP: (144-174)/(76-94) 169/87      Intake/Output Summary (Last 24 hours) at 03/27/18 0939  Last data filed at 03/27/18 0838   Gross per 24 hour   Intake             1440 ml   Output             1250 ml   Net              190 ml                 Physical Exam: /87 (BP Location: Left arm)   Pulse 66   Temp 98 °F (36 7 °C) (Oral)   Resp 18   Ht 5' 10" (1 778 m)   Wt 109 kg (240 lb)   SpO2 95%   BMI 34 44 kg/m²   Gen: NAD  Ext:  No cyanosis or edema  Neuro: AAOx3; CN II-XII grossly intact; moving all extremities    Lab Results:  Lab Results   Component Value Date    WBC 9 81 03/25/2018    HGB 12 6 03/25/2018    HCT 36 6 03/25/2018    MCV 79 (L) 03/25/2018     03/25/2018     Lab Results   Component Value Date    GLUCOSE 140 03/26/2018    CALCIUM 9 2 03/26/2018     03/26/2018    K 3 7 03/26/2018    CO2 34 (H) 03/26/2018     03/26/2018    BUN 18 03/26/2018    CREATININE 0 91 03/26/2018 Imaging Studies: I have personally reviewed pertinent reports  EKG, Pathology, and Other Studies: I have personally reviewed pertinent reports        SIGNATURE: Deepika Castro MD  DATE: March 27, 2018  TIME: 9:39 AM

## 2018-03-27 NOTE — PROGRESS NOTES
Patient seen and examined  I reviewed the extensive chart including the patient is past medical history of a dorsal column stimulator complicated by infection  His chronic pain story was also carefully monitored  I reviewed his MRI which demonstrates degenerative disc disease, spondylolisthesis, a new herniated disc with free fragment and facet arthropathy  There is evidence of the previous fixation fusion at L4-5-1  I believe given his acute pain, the herniated disc with free fragment is the explanation  Unfortunately attacking this alone would not change the dynamics of his back pain or spine and would likely lead to another herniated disc in that is not fundamental we fixed the problems that led to the herniated disc in the 1st place  In addition to this his chronic pain would not be explained by this more acutely herniated disc  It is for each of these reasons that I have recommended that he undergo a transforaminal lumbar interbody fusion at L2-3 and L3-4 as well as resecting the herniated disc  The risks, benefits and complications of surgery were explained in detail to Noah Smith  including hemorrhage, infection, CSF leakage, wound problems, pain, weakness, numbness, dysesthesiae, paralysis, coma, and death  Also, the possibility  of further surgery being required was emphasized  Other potential medical complications were outlined, including deep venous thrombosis, pulmonary embolism, pneumonia, urinary tract infection, myocardial infarction,  and stroke  The need for physical therapy, occupational therapy, and rehabilitation was also mentioned  The alternatives to surgery were discussed  Noah Smith  asked relevant questions and asked that we proceed with arrangements for surgery

## 2018-03-27 NOTE — SOCIAL WORK
CM met with patient,explained CM role with introduction  Patient lives with wife 35year old daughter ,2 grand children ages 3and 11years old in [facility] with 1 MARLA, patient lives on the first floor  Patient has a walker,cane shower chair and shower grab bars, has previous [address] uncertain name of agency, prefers [facility] if needed on discharge  Patient has no prior inpatient rehab experience  Patient's wife and daughter provide transportation  PCP is Dr Colleen Haro  Patient has prescription coverage for [employer], gets meds at [place], and with health insurance, [facility] on [address]  Patient has no POA, has hx of depression, meds written by [facility] management  Patient has workmen's comp injury in 1994, has not worked since then  [facility] is [employer],  is [employer] [phone] Claim #[id number]  Primary  is patient's wife Wilfredojuhi Praneeth R:[phone] cell: [phone]  CM reviewed d/c planning process including the following: identifying help at home, patient preference for d/c planning needs, [facility] Meds to Bed program, availability of treatment team to discuss questions or concerns patient and/or family may have regarding understanding medications and recognizing signs and symptoms once discharged  CM also encouraged patient to follow up with all recommended appointments after discharge  CM will follow for discharge needs

## 2018-03-27 NOTE — ASSESSMENT & PLAN NOTE
· Poorly controlled at this point, although improving  · SBP ranging 140-170s but suspect in setting of acute pain   · Home regimen includes, benazepril 40mg daily with HCTZ 25mg daily, clonidine 0 1mg q12, terazosin 5mg daily   · Clonidine was increased to 0 2 mg BID   · PRN antihypertensives for SBP > 180

## 2018-03-27 NOTE — ASSESSMENT & PLAN NOTE
· Previously had a spine stimulator that was removed this month   · Staples removed in ER on admission  · Per neurosx, plan is to eventually place a pain pump

## 2018-03-28 ENCOUNTER — ANESTHESIA (INPATIENT)
Dept: PERIOP | Facility: HOSPITAL | Age: 58
DRG: 214 | End: 2018-03-28
Payer: OTHER MISCELLANEOUS

## 2018-03-28 ENCOUNTER — APPOINTMENT (INPATIENT)
Dept: RADIOLOGY | Facility: HOSPITAL | Age: 58
DRG: 214 | End: 2018-03-28
Payer: OTHER MISCELLANEOUS

## 2018-03-28 PROBLEM — M54.9 ACUTE BACK PAIN: Status: ACTIVE | Noted: 2018-02-19

## 2018-03-28 LAB
ANION GAP SERPL CALCULATED.3IONS-SCNC: 6 MMOL/L (ref 4–13)
BUN SERPL-MCNC: 19 MG/DL (ref 5–25)
CALCIUM SERPL-MCNC: 8.9 MG/DL (ref 8.3–10.1)
CHLORIDE SERPL-SCNC: 105 MMOL/L (ref 100–108)
CO2 SERPL-SCNC: 31 MMOL/L (ref 21–32)
CREAT SERPL-MCNC: 0.85 MG/DL (ref 0.6–1.3)
ERYTHROCYTE [DISTWIDTH] IN BLOOD BY AUTOMATED COUNT: 12.8 % (ref 11.6–15.1)
GFR SERPL CREATININE-BSD FRML MDRD: 97 ML/MIN/1.73SQ M
GLUCOSE SERPL-MCNC: 153 MG/DL (ref 65–140)
GLUCOSE SERPL-MCNC: 154 MG/DL (ref 65–140)
GLUCOSE SERPL-MCNC: 155 MG/DL (ref 65–140)
GLUCOSE SERPL-MCNC: 187 MG/DL (ref 65–140)
HCT VFR BLD AUTO: 35.9 % (ref 36.5–49.3)
HGB BLD-MCNC: 11.9 G/DL (ref 12–17)
MCH RBC QN AUTO: 27.3 PG (ref 26.8–34.3)
MCHC RBC AUTO-ENTMCNC: 33.1 G/DL (ref 31.4–37.4)
MCV RBC AUTO: 82 FL (ref 82–98)
MRSA NOSE QL CULT: NORMAL
PLATELET # BLD AUTO: 261 THOUSANDS/UL (ref 149–390)
PMV BLD AUTO: 9.2 FL (ref 8.9–12.7)
POTASSIUM SERPL-SCNC: 3.4 MMOL/L (ref 3.5–5.3)
RBC # BLD AUTO: 4.36 MILLION/UL (ref 3.88–5.62)
SODIUM SERPL-SCNC: 142 MMOL/L (ref 136–145)
WBC # BLD AUTO: 8.6 THOUSAND/UL (ref 4.31–10.16)

## 2018-03-28 PROCEDURE — 82947 ASSAY GLUCOSE BLOOD QUANT: CPT

## 2018-03-28 PROCEDURE — 72100 X-RAY EXAM L-S SPINE 2/3 VWS: CPT

## 2018-03-28 PROCEDURE — 82948 REAGENT STRIP/BLOOD GLUCOSE: CPT

## 2018-03-28 PROCEDURE — 94660 CPAP INITIATION&MGMT: CPT

## 2018-03-28 PROCEDURE — C1713 ANCHOR/SCREW BN/BN,TIS/BN: HCPCS | Performed by: NEUROLOGICAL SURGERY

## 2018-03-28 PROCEDURE — 85027 COMPLETE CBC AUTOMATED: CPT | Performed by: PHYSICIAN ASSISTANT

## 2018-03-28 PROCEDURE — 22842 INSERT SPINE FIXATION DEVICE: CPT | Performed by: NEUROLOGICAL SURGERY

## 2018-03-28 PROCEDURE — 20936 SP BONE AGRFT LOCAL ADD-ON: CPT | Performed by: NEUROLOGICAL SURGERY

## 2018-03-28 PROCEDURE — 0SG10AJ FUSION OF 2 OR MORE LUMBAR VERTEBRAL JOINTS WITH INTERBODY FUSION DEVICE, POSTERIOR APPROACH, ANTERIOR COLUMN, OPEN APPROACH: ICD-10-PCS | Performed by: NEUROLOGICAL SURGERY

## 2018-03-28 PROCEDURE — 22853 INSJ BIOMECHANICAL DEVICE: CPT | Performed by: NEUROLOGICAL SURGERY

## 2018-03-28 PROCEDURE — 99232 SBSQ HOSP IP/OBS MODERATE 35: CPT | Performed by: PHYSICIAN ASSISTANT

## 2018-03-28 PROCEDURE — 22634 ARTHRD CMBN 1NTRSPC EA ADDL: CPT | Performed by: NEUROLOGICAL SURGERY

## 2018-03-28 PROCEDURE — 94760 N-INVAS EAR/PLS OXIMETRY 1: CPT

## 2018-03-28 PROCEDURE — C1769 GUIDE WIRE: HCPCS | Performed by: NEUROLOGICAL SURGERY

## 2018-03-28 PROCEDURE — 85049 AUTOMATED PLATELET COUNT: CPT | Performed by: NEUROLOGICAL SURGERY

## 2018-03-28 PROCEDURE — 0ST20ZZ RESECTION OF LUMBAR VERTEBRAL DISC, OPEN APPROACH: ICD-10-PCS | Performed by: NEUROLOGICAL SURGERY

## 2018-03-28 PROCEDURE — 84132 ASSAY OF SERUM POTASSIUM: CPT

## 2018-03-28 PROCEDURE — 80048 BASIC METABOLIC PNL TOTAL CA: CPT | Performed by: NEUROLOGICAL SURGERY

## 2018-03-28 PROCEDURE — 82803 BLOOD GASES ANY COMBINATION: CPT

## 2018-03-28 PROCEDURE — 85014 HEMATOCRIT: CPT

## 2018-03-28 PROCEDURE — 22633 ARTHRD CMBN 1NTRSPC LUMBAR: CPT | Performed by: NEUROLOGICAL SURGERY

## 2018-03-28 PROCEDURE — 80048 BASIC METABOLIC PNL TOTAL CA: CPT | Performed by: PHYSICIAN ASSISTANT

## 2018-03-28 PROCEDURE — 82330 ASSAY OF CALCIUM: CPT

## 2018-03-28 PROCEDURE — 84295 ASSAY OF SERUM SODIUM: CPT

## 2018-03-28 PROCEDURE — 95940 IONM IN OPERATNG ROOM 15 MIN: CPT | Performed by: NEUROLOGICAL SURGERY

## 2018-03-28 DEVICE — ROD 641000070 70MM PERC ROD 4.75MM CCM
Type: IMPLANTABLE DEVICE | Site: SPINE LUMBAR | Status: FUNCTIONAL
Brand: CD HORIZON® SOLERA® SPINAL SYSTEM

## 2018-03-28 DEVICE — DBM 005005 PROGENIX DBM 5 CC SRVC FEE
Type: IMPLANTABLE DEVICE | Site: SPINE LUMBAR | Status: FUNCTIONAL
Brand: PROGENIX® PUTTY AND PROGENIX® PLUS

## 2018-03-28 DEVICE — SPACER 7770828 ELEVATE STD 28X8MM
Type: IMPLANTABLE DEVICE | Site: SPINE LUMBAR | Status: FUNCTIONAL
Brand: ELEVATE™ SPINAL SYSTEM

## 2018-03-28 DEVICE — SPACER 7770928 ELEVATE STD 28X9MM
Type: IMPLANTABLE DEVICE | Site: SPINE LUMBAR | Status: FUNCTIONAL
Brand: ELEVATE™ SPINAL SYSTEM

## 2018-03-28 RX ORDER — SODIUM CHLORIDE, SODIUM LACTATE, POTASSIUM CHLORIDE, CALCIUM CHLORIDE 600; 310; 30; 20 MG/100ML; MG/100ML; MG/100ML; MG/100ML
20 INJECTION, SOLUTION INTRAVENOUS CONTINUOUS
Status: DISCONTINUED | OUTPATIENT
Start: 2018-03-28 | End: 2018-03-29

## 2018-03-28 RX ORDER — DOCUSATE SODIUM 100 MG/1
100 CAPSULE, LIQUID FILLED ORAL 2 TIMES DAILY
Status: DISCONTINUED | OUTPATIENT
Start: 2018-03-28 | End: 2018-03-28

## 2018-03-28 RX ORDER — BISACODYL 10 MG
10 SUPPOSITORY, RECTAL RECTAL DAILY PRN
Status: DISCONTINUED | OUTPATIENT
Start: 2018-03-28 | End: 2018-04-04 | Stop reason: HOSPADM

## 2018-03-28 RX ORDER — HEPARIN SODIUM 5000 [USP'U]/ML
5000 INJECTION, SOLUTION INTRAVENOUS; SUBCUTANEOUS EVERY 8 HOURS SCHEDULED
Status: DISCONTINUED | OUTPATIENT
Start: 2018-03-29 | End: 2018-04-04 | Stop reason: HOSPADM

## 2018-03-28 RX ORDER — POTASSIUM CHLORIDE 20 MEQ/1
40 TABLET, EXTENDED RELEASE ORAL ONCE
Status: COMPLETED | OUTPATIENT
Start: 2018-03-28 | End: 2018-03-28

## 2018-03-28 RX ORDER — ALBUMIN, HUMAN INJ 5% 5 %
SOLUTION INTRAVENOUS CONTINUOUS PRN
Status: DISCONTINUED | OUTPATIENT
Start: 2018-03-28 | End: 2018-03-28 | Stop reason: SURG

## 2018-03-28 RX ORDER — KETAMINE HYDROCHLORIDE 50 MG/ML
INJECTION, SOLUTION, CONCENTRATE INTRAMUSCULAR; INTRAVENOUS AS NEEDED
Status: DISCONTINUED | OUTPATIENT
Start: 2018-03-28 | End: 2018-03-28 | Stop reason: SURG

## 2018-03-28 RX ORDER — BACLOFEN 10 MG/1
5 TABLET ORAL 3 TIMES DAILY
Status: DISCONTINUED | OUTPATIENT
Start: 2018-03-28 | End: 2018-03-30

## 2018-03-28 RX ORDER — SODIUM CHLORIDE, SODIUM LACTATE, POTASSIUM CHLORIDE, CALCIUM CHLORIDE 600; 310; 30; 20 MG/100ML; MG/100ML; MG/100ML; MG/100ML
INJECTION, SOLUTION INTRAVENOUS CONTINUOUS PRN
Status: DISCONTINUED | OUTPATIENT
Start: 2018-03-28 | End: 2018-03-28 | Stop reason: SURG

## 2018-03-28 RX ORDER — OXYCODONE HYDROCHLORIDE 10 MG/1
30 TABLET ORAL ONCE
Status: COMPLETED | OUTPATIENT
Start: 2018-03-28 | End: 2018-03-28

## 2018-03-28 RX ORDER — MIDAZOLAM HYDROCHLORIDE 1 MG/ML
2 INJECTION INTRAMUSCULAR; INTRAVENOUS ONCE
Status: COMPLETED | OUTPATIENT
Start: 2018-03-28 | End: 2018-03-28

## 2018-03-28 RX ORDER — SODIUM CHLORIDE 9 MG/ML
100 INJECTION, SOLUTION INTRAVENOUS CONTINUOUS
Status: DISCONTINUED | OUTPATIENT
Start: 2018-03-28 | End: 2018-03-29

## 2018-03-28 RX ORDER — BUPIVACAINE HYDROCHLORIDE AND EPINEPHRINE 5; 5 MG/ML; UG/ML
30 INJECTION, SOLUTION EPIDURAL; INTRACAUDAL; PERINEURAL ONCE
Status: COMPLETED | OUTPATIENT
Start: 2018-03-28 | End: 2018-03-28

## 2018-03-28 RX ORDER — ONDANSETRON 2 MG/ML
4 INJECTION INTRAMUSCULAR; INTRAVENOUS EVERY 6 HOURS PRN
Status: DISCONTINUED | OUTPATIENT
Start: 2018-03-28 | End: 2018-03-28 | Stop reason: SDUPTHER

## 2018-03-28 RX ORDER — PROPOFOL 10 MG/ML
INJECTION, EMULSION INTRAVENOUS CONTINUOUS PRN
Status: DISCONTINUED | OUTPATIENT
Start: 2018-03-28 | End: 2018-03-28 | Stop reason: SURG

## 2018-03-28 RX ORDER — FENTANYL CITRATE/PF 50 MCG/ML
50 SYRINGE (ML) INJECTION
Status: DISCONTINUED | OUTPATIENT
Start: 2018-03-28 | End: 2018-03-28 | Stop reason: HOSPADM

## 2018-03-28 RX ORDER — METOCLOPRAMIDE HYDROCHLORIDE 5 MG/ML
10 INJECTION INTRAMUSCULAR; INTRAVENOUS ONCE AS NEEDED
Status: DISCONTINUED | OUTPATIENT
Start: 2018-03-28 | End: 2018-03-28 | Stop reason: HOSPADM

## 2018-03-28 RX ORDER — LORAZEPAM 2 MG/ML
0.5 INJECTION INTRAMUSCULAR ONCE
Status: COMPLETED | OUTPATIENT
Start: 2018-03-28 | End: 2018-03-28

## 2018-03-28 RX ORDER — GABAPENTIN 300 MG/1
600 CAPSULE ORAL ONCE
Status: COMPLETED | OUTPATIENT
Start: 2018-03-28 | End: 2018-03-28

## 2018-03-28 RX ORDER — DOCUSATE SODIUM 100 MG/1
100 CAPSULE, LIQUID FILLED ORAL 2 TIMES DAILY PRN
Status: DISCONTINUED | OUTPATIENT
Start: 2018-03-28 | End: 2018-04-04 | Stop reason: HOSPADM

## 2018-03-28 RX ORDER — METHOCARBAMOL 750 MG/1
750 TABLET, FILM COATED ORAL 4 TIMES DAILY PRN
Status: DISCONTINUED | OUTPATIENT
Start: 2018-03-28 | End: 2018-03-28 | Stop reason: SDUPTHER

## 2018-03-28 RX ORDER — FENTANYL CITRATE 50 UG/ML
INJECTION, SOLUTION INTRAMUSCULAR; INTRAVENOUS AS NEEDED
Status: DISCONTINUED | OUTPATIENT
Start: 2018-03-28 | End: 2018-03-28 | Stop reason: SURG

## 2018-03-28 RX ORDER — MIDAZOLAM HYDROCHLORIDE 1 MG/ML
INJECTION INTRAMUSCULAR; INTRAVENOUS AS NEEDED
Status: DISCONTINUED | OUTPATIENT
Start: 2018-03-28 | End: 2018-03-28 | Stop reason: SURG

## 2018-03-28 RX ORDER — PREGABALIN 75 MG/1
150 CAPSULE ORAL ONCE
Status: COMPLETED | OUTPATIENT
Start: 2018-03-28 | End: 2018-03-28

## 2018-03-28 RX ORDER — GABAPENTIN 100 MG/1
100 CAPSULE ORAL 3 TIMES DAILY
Status: DISCONTINUED | OUTPATIENT
Start: 2018-03-28 | End: 2018-03-28 | Stop reason: SDUPTHER

## 2018-03-28 RX ORDER — PROPOFOL 10 MG/ML
INJECTION, EMULSION INTRAVENOUS AS NEEDED
Status: DISCONTINUED | OUTPATIENT
Start: 2018-03-28 | End: 2018-03-28 | Stop reason: SURG

## 2018-03-28 RX ORDER — LIDOCAINE HYDROCHLORIDE AND EPINEPHRINE 10; 10 MG/ML; UG/ML
INJECTION, SOLUTION INFILTRATION; PERINEURAL AS NEEDED
Status: DISCONTINUED | OUTPATIENT
Start: 2018-03-28 | End: 2018-03-28 | Stop reason: HOSPADM

## 2018-03-28 RX ORDER — CEFAZOLIN SODIUM 1 G/3ML
INJECTION, POWDER, FOR SOLUTION INTRAMUSCULAR; INTRAVENOUS AS NEEDED
Status: DISCONTINUED | OUTPATIENT
Start: 2018-03-28 | End: 2018-03-28 | Stop reason: SURG

## 2018-03-28 RX ORDER — ONDANSETRON 2 MG/ML
4 INJECTION INTRAMUSCULAR; INTRAVENOUS ONCE AS NEEDED
Status: DISCONTINUED | OUTPATIENT
Start: 2018-03-28 | End: 2018-03-28 | Stop reason: HOSPADM

## 2018-03-28 RX ORDER — HYDROMORPHONE HYDROCHLORIDE 2 MG/ML
INJECTION, SOLUTION INTRAMUSCULAR; INTRAVENOUS; SUBCUTANEOUS AS NEEDED
Status: DISCONTINUED | OUTPATIENT
Start: 2018-03-28 | End: 2018-03-28 | Stop reason: SURG

## 2018-03-28 RX ORDER — PREGABALIN 75 MG/1
150 CAPSULE ORAL ONCE
Status: DISCONTINUED | OUTPATIENT
Start: 2018-03-28 | End: 2018-04-04 | Stop reason: HOSPADM

## 2018-03-28 RX ORDER — SODIUM CHLORIDE 9 MG/ML
INJECTION, SOLUTION INTRAVENOUS CONTINUOUS PRN
Status: DISCONTINUED | OUTPATIENT
Start: 2018-03-28 | End: 2018-03-28 | Stop reason: SURG

## 2018-03-28 RX ORDER — LABETALOL HYDROCHLORIDE 5 MG/ML
10 INJECTION, SOLUTION INTRAVENOUS
Status: DISCONTINUED | OUTPATIENT
Start: 2018-03-28 | End: 2018-03-28 | Stop reason: HOSPADM

## 2018-03-28 RX ORDER — CHLORHEXIDINE GLUCONATE 0.12 MG/ML
15 RINSE ORAL EVERY 12 HOURS SCHEDULED
Status: DISCONTINUED | OUTPATIENT
Start: 2018-03-28 | End: 2018-03-28 | Stop reason: HOSPADM

## 2018-03-28 RX ORDER — FENTANYL 100 UG/H
100 PATCH TRANSDERMAL
Status: DISCONTINUED | OUTPATIENT
Start: 2018-03-28 | End: 2018-03-29

## 2018-03-28 RX ORDER — BISACODYL 10 MG
10 SUPPOSITORY, RECTAL RECTAL DAILY PRN
Status: DISCONTINUED | OUTPATIENT
Start: 2018-03-28 | End: 2018-03-28 | Stop reason: SDUPTHER

## 2018-03-28 RX ORDER — LABETALOL HYDROCHLORIDE 5 MG/ML
15 INJECTION, SOLUTION INTRAVENOUS ONCE
Status: COMPLETED | OUTPATIENT
Start: 2018-03-28 | End: 2018-03-28

## 2018-03-28 RX ORDER — HYDRALAZINE HYDROCHLORIDE 20 MG/ML
10 INJECTION INTRAMUSCULAR; INTRAVENOUS
Status: COMPLETED | OUTPATIENT
Start: 2018-03-28 | End: 2018-03-28

## 2018-03-28 RX ORDER — SUCCINYLCHOLINE CHLORIDE 20 MG/ML
INJECTION INTRAMUSCULAR; INTRAVENOUS AS NEEDED
Status: DISCONTINUED | OUTPATIENT
Start: 2018-03-28 | End: 2018-03-28 | Stop reason: SURG

## 2018-03-28 RX ORDER — ACETAMINOPHEN 325 MG/1
975 TABLET ORAL ONCE
Status: DISCONTINUED | OUTPATIENT
Start: 2018-03-28 | End: 2018-04-04 | Stop reason: HOSPADM

## 2018-03-28 RX ORDER — LABETALOL HYDROCHLORIDE 5 MG/ML
INJECTION, SOLUTION INTRAVENOUS AS NEEDED
Status: DISCONTINUED | OUTPATIENT
Start: 2018-03-28 | End: 2018-03-28 | Stop reason: SURG

## 2018-03-28 RX ORDER — ACETAMINOPHEN 325 MG/1
975 TABLET ORAL ONCE
Status: COMPLETED | OUTPATIENT
Start: 2018-03-28 | End: 2018-03-28

## 2018-03-28 RX ADMIN — LABETALOL HYDROCHLORIDE 10 MG: 5 INJECTION, SOLUTION INTRAVENOUS at 11:54

## 2018-03-28 RX ADMIN — Medication 10 MCG: at 16:35

## 2018-03-28 RX ADMIN — HYDROMORPHONE HYDROCHLORIDE 0.5 MG: 1 INJECTION, SOLUTION INTRAMUSCULAR; INTRAVENOUS; SUBCUTANEOUS at 17:25

## 2018-03-28 RX ADMIN — ACETAMINOPHEN 975 MG: 325 TABLET, FILM COATED ORAL at 08:10

## 2018-03-28 RX ADMIN — KETAMINE HYDROCHLORIDE 0.2 MG/KG/HR: 50 INJECTION, SOLUTION INTRAMUSCULAR; INTRAVENOUS at 14:10

## 2018-03-28 RX ADMIN — GABAPENTIN 600 MG: 300 CAPSULE ORAL at 21:02

## 2018-03-28 RX ADMIN — SODIUM CHLORIDE 100 ML/HR: 0.9 INJECTION, SOLUTION INTRAVENOUS at 16:49

## 2018-03-28 RX ADMIN — METHOCARBAMOL 750 MG: 750 TABLET ORAL at 23:52

## 2018-03-28 RX ADMIN — PROPOFOL 200 MG: 10 INJECTION, EMULSION INTRAVENOUS at 08:22

## 2018-03-28 RX ADMIN — LABETALOL HYDROCHLORIDE 10 MG: 5 INJECTION, SOLUTION INTRAVENOUS at 11:50

## 2018-03-28 RX ADMIN — OXYCODONE HYDROCHLORIDE 20 MG: 10 TABLET ORAL at 06:05

## 2018-03-28 RX ADMIN — FENTANYL CITRATE 100 MCG: 50 INJECTION, SOLUTION INTRAMUSCULAR; INTRAVENOUS at 08:00

## 2018-03-28 RX ADMIN — HYDROMORPHONE HYDROCHLORIDE 0.5 MG: 2 INJECTION, SOLUTION INTRAMUSCULAR; INTRAVENOUS; SUBCUTANEOUS at 14:01

## 2018-03-28 RX ADMIN — HYDROMORPHONE HYDROCHLORIDE 0.5 MG: 1 INJECTION, SOLUTION INTRAMUSCULAR; INTRAVENOUS; SUBCUTANEOUS at 03:10

## 2018-03-28 RX ADMIN — SODIUM CHLORIDE: 0.9 INJECTION, SOLUTION INTRAVENOUS at 08:18

## 2018-03-28 RX ADMIN — HYDROMORPHONE HYDROCHLORIDE 1 MG: 1 INJECTION, SOLUTION INTRAMUSCULAR; INTRAVENOUS; SUBCUTANEOUS at 16:52

## 2018-03-28 RX ADMIN — FENTANYL CITRATE 50 MCG: 50 INJECTION, SOLUTION INTRAMUSCULAR; INTRAVENOUS at 14:38

## 2018-03-28 RX ADMIN — LORAZEPAM 0.5 MG: 2 INJECTION INTRAMUSCULAR; INTRAVENOUS at 16:29

## 2018-03-28 RX ADMIN — FENTANYL 100 MCG: 100 PATCH TRANSDERMAL at 14:48

## 2018-03-28 RX ADMIN — HYDROMORPHONE HYDROCHLORIDE 0.5 MG: 2 INJECTION, SOLUTION INTRAMUSCULAR; INTRAVENOUS; SUBCUTANEOUS at 11:51

## 2018-03-28 RX ADMIN — ONDANSETRON 4 MG: 2 INJECTION INTRAMUSCULAR; INTRAVENOUS at 03:10

## 2018-03-28 RX ADMIN — OXYCODONE HYDROCHLORIDE 30 MG: 10 TABLET ORAL at 16:11

## 2018-03-28 RX ADMIN — POTASSIUM CHLORIDE 40 MEQ: 1500 TABLET, EXTENDED RELEASE ORAL at 21:01

## 2018-03-28 RX ADMIN — DEXAMETHASONE SODIUM PHOSPHATE 10 MG: 10 INJECTION INTRAMUSCULAR; INTRAVENOUS at 09:35

## 2018-03-28 RX ADMIN — CEFAZOLIN SODIUM 1000 MG: 1 SOLUTION INTRAVENOUS at 21:03

## 2018-03-28 RX ADMIN — HYDROMORPHONE HYDROCHLORIDE 0.5 MG: 2 INJECTION, SOLUTION INTRAMUSCULAR; INTRAVENOUS; SUBCUTANEOUS at 13:55

## 2018-03-28 RX ADMIN — HYDROMORPHONE HYDROCHLORIDE 0.5 MG: 1 INJECTION, SOLUTION INTRAMUSCULAR; INTRAVENOUS; SUBCUTANEOUS at 18:12

## 2018-03-28 RX ADMIN — OXYCODONE HYDROCHLORIDE 20 MG: 10 TABLET ORAL at 21:03

## 2018-03-28 RX ADMIN — FENTANYL CITRATE 50 MCG: 50 INJECTION, SOLUTION INTRAMUSCULAR; INTRAVENOUS at 09:36

## 2018-03-28 RX ADMIN — PROPOFOL 150 MCG/KG/MIN: 10 INJECTION, EMULSION INTRAVENOUS at 08:45

## 2018-03-28 RX ADMIN — FENTANYL CITRATE 50 MCG: 50 INJECTION, SOLUTION INTRAMUSCULAR; INTRAVENOUS at 14:44

## 2018-03-28 RX ADMIN — PREGABALIN 150 MG: 75 CAPSULE ORAL at 08:12

## 2018-03-28 RX ADMIN — SODIUM CHLORIDE, SODIUM LACTATE, POTASSIUM CHLORIDE, AND CALCIUM CHLORIDE: .6; .31; .03; .02 INJECTION, SOLUTION INTRAVENOUS at 08:18

## 2018-03-28 RX ADMIN — HYDROMORPHONE HYDROCHLORIDE 0.5 MG: 1 INJECTION, SOLUTION INTRAMUSCULAR; INTRAVENOUS; SUBCUTANEOUS at 14:58

## 2018-03-28 RX ADMIN — LABETALOL 20 MG/4 ML (5 MG/ML) INTRAVENOUS SYRINGE 10 MG: at 15:32

## 2018-03-28 RX ADMIN — HYDROMORPHONE HYDROCHLORIDE 0.5 MG: 1 INJECTION, SOLUTION INTRAMUSCULAR; INTRAVENOUS; SUBCUTANEOUS at 18:00

## 2018-03-28 RX ADMIN — CHLORHEXIDINE GLUCONATE 15 ML: 1.2 RINSE ORAL at 07:56

## 2018-03-28 RX ADMIN — KETAMINE HYDROCHLORIDE 50 MG: 50 INJECTION, SOLUTION INTRAMUSCULAR; INTRAVENOUS at 08:52

## 2018-03-28 RX ADMIN — MIDAZOLAM 2 MG: 1 INJECTION INTRAMUSCULAR; INTRAVENOUS at 15:15

## 2018-03-28 RX ADMIN — INSULIN GLARGINE 10 UNITS: 100 INJECTION, SOLUTION SUBCUTANEOUS at 21:14

## 2018-03-28 RX ADMIN — INSULIN LISPRO 1 UNITS: 100 INJECTION, SOLUTION INTRAVENOUS; SUBCUTANEOUS at 21:13

## 2018-03-28 RX ADMIN — HYDRALAZINE HYDROCHLORIDE 10 MG: 20 INJECTION INTRAMUSCULAR; INTRAVENOUS at 16:46

## 2018-03-28 RX ADMIN — OXYCODONE HYDROCHLORIDE 20 MG: 10 TABLET ORAL at 02:00

## 2018-03-28 RX ADMIN — ACETAMINOPHEN 975 MG: 325 TABLET, FILM COATED ORAL at 21:01

## 2018-03-28 RX ADMIN — GABAPENTIN 600 MG: 300 CAPSULE ORAL at 17:47

## 2018-03-28 RX ADMIN — Medication 1 MCG/KG/HR: at 11:45

## 2018-03-28 RX ADMIN — CEFAZOLIN 2000 MG: 1 INJECTION, POWDER, FOR SOLUTION INTRAVENOUS at 09:00

## 2018-03-28 RX ADMIN — HYDROMORPHONE HYDROCHLORIDE 0.5 MG: 1 INJECTION, SOLUTION INTRAMUSCULAR; INTRAVENOUS; SUBCUTANEOUS at 14:48

## 2018-03-28 RX ADMIN — KETAMINE HYDROCHLORIDE 20 MG: 50 INJECTION, SOLUTION INTRAMUSCULAR; INTRAVENOUS at 15:00

## 2018-03-28 RX ADMIN — HYDROMORPHONE HYDROCHLORIDE 0.5 MG: 1 INJECTION, SOLUTION INTRAMUSCULAR; INTRAVENOUS; SUBCUTANEOUS at 17:45

## 2018-03-28 RX ADMIN — ALBUMIN HUMAN: 0.05 INJECTION, SOLUTION INTRAVENOUS at 09:39

## 2018-03-28 RX ADMIN — METHOCARBAMOL 750 MG: 750 TABLET ORAL at 06:05

## 2018-03-28 RX ADMIN — MIDAZOLAM HYDROCHLORIDE 2 MG: 1 INJECTION, SOLUTION INTRAMUSCULAR; INTRAVENOUS at 08:01

## 2018-03-28 RX ADMIN — BACLOFEN 5 MG: 10 TABLET ORAL at 18:40

## 2018-03-28 RX ADMIN — CLONIDINE HYDROCHLORIDE 0.2 MG: 0.2 TABLET ORAL at 21:01

## 2018-03-28 RX ADMIN — HYDRALAZINE HYDROCHLORIDE 10 MG: 20 INJECTION INTRAMUSCULAR; INTRAVENOUS at 16:29

## 2018-03-28 RX ADMIN — LABETALOL 20 MG/4 ML (5 MG/ML) INTRAVENOUS SYRINGE 15 MG: at 16:28

## 2018-03-28 RX ADMIN — FENTANYL CITRATE 50 MCG: 50 INJECTION, SOLUTION INTRAMUSCULAR; INTRAVENOUS at 08:22

## 2018-03-28 RX ADMIN — MELATONIN TAB 3 MG 4.5 MG: 3 TAB at 21:03

## 2018-03-28 RX ADMIN — HYDROMORPHONE HYDROCHLORIDE 0.5 MG: 2 INJECTION, SOLUTION INTRAMUSCULAR; INTRAVENOUS; SUBCUTANEOUS at 13:50

## 2018-03-28 RX ADMIN — KETAMINE HYDROCHLORIDE 20 MG: 50 INJECTION, SOLUTION INTRAMUSCULAR; INTRAVENOUS at 14:05

## 2018-03-28 RX ADMIN — CEFAZOLIN 2000 MG: 1 INJECTION, POWDER, FOR SOLUTION INTRAVENOUS at 13:09

## 2018-03-28 RX ADMIN — TERAZOSIN HYDROCHLORIDE 5 MG: 5 CAPSULE ORAL at 21:06

## 2018-03-28 RX ADMIN — HYDROMORPHONE HYDROCHLORIDE 1 MG: 2 INJECTION, SOLUTION INTRAMUSCULAR; INTRAVENOUS; SUBCUTANEOUS at 08:51

## 2018-03-28 RX ADMIN — SUCCINYLCHOLINE CHLORIDE 100 MG: 20 INJECTION, SOLUTION INTRAMUSCULAR; INTRAVENOUS at 08:24

## 2018-03-28 RX ADMIN — ONDANSETRON 4 MG: 2 INJECTION INTRAMUSCULAR; INTRAVENOUS at 13:16

## 2018-03-28 RX ADMIN — LABETALOL 20 MG/4 ML (5 MG/ML) INTRAVENOUS SYRINGE 10 MG: at 14:51

## 2018-03-28 RX ADMIN — Medication 0.1 MCG/KG/MIN: at 08:45

## 2018-03-28 NOTE — ASSESSMENT & PLAN NOTE
· Post-laminectomy syndrome   Previously had spine stimulator that was removed this month  · Home regimen includes Celebrex, fentanyl, oxy IR   · Secondary to chronic back pain

## 2018-03-28 NOTE — PROGRESS NOTES
Patient crying with pain, Dr Miller Reasons at beside   5mg of Ativan given, medicating with hydralazine for SBP >200

## 2018-03-28 NOTE — ADDENDUM NOTE
Addendum  created 03/28/18 1531 by Kranthi Carmen MD    Anesthesia Intra Meds edited, Order list changed, Sign clinical note

## 2018-03-28 NOTE — PROGRESS NOTES
Progress Note - Loretta Barron 1960, 65 y o  male MRN: [id number]    Unit/Bed#: [facility] 921-01 Encounter: [id number]    Primary Care Provider: Hedy Byrne MD   Date and time admitted to hospital: 3/24/2018  3:33 PM  * Acute back pain   Assessment & Plan    · Acute on chronic back pain likely due to new herniated disc  · S/p L2/3 and L3/4 MIS transforaminal lumbar interbody fixation fusion from right sided approach, L3/L4 discectomy on 3/28/18, POD #0  · Spoke with anesthesia- recommending d/c ketamine drip and use dilaudid 1 mg q 2 hrs for breakthrough pain  Continue oxycodone, Fentanyl patch, gabapentin, and Robaxin  Continuous pulse ox  · PT/OT        Pain syndrome, chronic   Assessment & Plan    · Post-laminectomy syndrome  Previously had spine stimulator that was removed this month  · Home regimen includes Celebrex, fentanyl, oxy IR   · Secondary to chronic back pain         Sleep apnea   Assessment & Plan    · Continue CPAP qhs        Type 2 diabetes mellitus (HCC)   Assessment & Plan    · Hold amaryl, resume on d/c   · Last HA1C 7 4%   · Started on lantus 10units qHS for better glycemic control inpatient   · Continue SSI, accuchecks, diabetic diet         Depression   Assessment & Plan    · Continue zoloft daily and ambien qHS (on sonata at home)         Benign essential hypertension   Assessment & Plan    · Poorly controlled at this point, although improving  · SBP ranging 140-170s but also in setting of acute pain   · Home regimen includes, benazepril 40mg daily with HCTZ 25mg daily, clonidine 0 1mg q12, terazosin 5mg daily   · Clonidine was increased to 0 2 mg BID   · PRN antihypertensives for SBP > 180  · Will adjust as needed          VTE Pharmacologic Prophylaxis:   Pharmacologic: Heparin  Mechanical VTE Prophylaxis in Place: Yes    Patient Centered Rounds: I have performed bedside rounds with nursing staff today      Discussions with Specialists or Other Care Team Provider: Anesthesiology and acute pain services    Education and Discussions with Family / Patient: Patient    Time Spent for Care: 30 minutes  More than 50% of total time spent on counseling and coordination of care as described above  Current Length of Stay: 4 day(s)    Current Patient Status: Inpatient   Certification Statement: The patient will continue to require additional inpatient hospital stay due to intractable pain requiring IV narcotics, neurologic monitoring  Discharge Plan: When able to be weaned off IV narcotics and cleared from neurosurgical standpoint  PT eval for dispo    Code Status: Level 1 - Full Code      Subjective:   Seen after OR  Spoke with anesthesiology who states patient was sedating and then woke up crying in pain  He currently is better  Pain is 6/10  Denies numbness/tingling  States pain in right hip is already improved as well as his strength  Objective:     Vitals:   Temp (24hrs), Av 9 °F (36 6 °C), Min:97 5 °F (36 4 °C), Max:98 4 °F (36 9 °C)    HR:  [57-88] 83  Resp:  [12-34] 18  BP: (145-216)/() 145/78  SpO2:  [94 %-100 %] 96 %  Body mass index is 34 44 kg/m²  Input and Output Summary (last 24 hours): Intake/Output Summary (Last 24 hours) at 18 1930  Last data filed at 18 1815   Gross per 24 hour   Intake             3980 ml   Output             4650 ml   Net             -670 ml       Physical Exam:     Physical Exam   Constitutional: He is oriented to person, place, and time  No distress  HENT:   Head: Normocephalic and atraumatic  Eyes: No scleral icterus  Neck: Normal range of motion  Neck supple  Cardiovascular: Normal rate, regular rhythm and normal heart sounds  Pulmonary/Chest: Effort normal and breath sounds normal  No respiratory distress  He has no wheezes  He has no rales  Abdominal: Soft  Bowel sounds are normal  He exhibits no distension  There is no tenderness  There is no rebound  Musculoskeletal: He exhibits no edema     Neurological: He is alert and oriented to person, place, and time  Skin: Skin is warm and dry  He is not diaphoretic  Psychiatric: He has a normal mood and affect  His behavior is normal  Thought content normal        Additional Data:     Labs:      Results from last 7 days  Lab Units 03/28/18  0449   WBC Thousand/uL 8 60   HEMOGLOBIN g/dL 11 9*   HEMATOCRIT % 35 9*   PLATELETS Thousands/uL 261       Results from last 7 days  Lab Units 03/28/18  0449  03/25/18  0500   SODIUM mmol/L 142  < > 139   POTASSIUM mmol/L 3 4*  < > 3 4*   CHLORIDE mmol/L 105  < > 102   CO2 mmol/L 31  < > 32   BUN mg/dL 19  < > 14   CREATININE mg/dL 0 85  < > 0 87   CALCIUM mg/dL 8 9  < > 9 1   TOTAL PROTEIN g/dL  --   --  7 7   BILIRUBIN TOTAL mg/dL  --   --  0 41   ALK PHOS U/L  --   --  76   ALT U/L  --   --  32   AST U/L  --   --  20   GLUCOSE RANDOM mg/dL 155*  < > 139   < > = values in this interval not displayed  Results from last 7 days  Lab Units 03/25/18  0001   INR  0 96       * I Have Reviewed All Lab Data Listed Above  * Additional Pertinent Lab Tests Reviewed:  All Labs Within Last 24 Hours Reviewed    Imaging:    Imaging Reports Reviewed Today Include: None  Imaging Personally Reviewed by Myself Includes:  None    Recent Cultures (last 7 days):           Last 24 Hours Medication List:     Current Facility-Administered Medications:  acetaminophen 975 mg Oral Q8H [facility] JAMIE Boyce    acetaminophen 975 mg Oral Once Ashli Schuler MD    baclofen 5 mg Oral TID Lisa Justice MD    benazepril 40 mg Oral Daily Jose E Palumbo MD    bisacodyl 10 mg Rectal Daily PRN Lisa Justice MD    cefazolin 1,000 mg Intravenous Q8H Lisa Justice MD    celecoxib 200 mg Oral Daily Jose E Palumbo MD    cloNIDine 0 2 mg Oral Q12H [facility] JAMIE Boyce    docusate sodium 100 mg Oral BID PRN Lisa Justice PA-C    fentaNYL 100 mcg Transdermal Q72H Ashli Schuler MD    gabapentin 600 mg Oral TID Petty Barrios MD glycopyrrolate 0 2 mg Intravenous Q4H PRN Lanny Botello MD    haloperidol lactate 2 mg Intramuscular Q30 Min PRN Lanny Botello MD    [START ON 3/29/2018] heparin (porcine) 5,000 Units Subcutaneous [facility] Malachi Palma MD    HYDROmorphone 0 5 mg Intravenous Q4H PRN Jose E Palumbo MD    hyoscyamine 0 375 mg Oral Daily Jose E Palumbo MD    insulin glargine 10 Units Subcutaneous HS Jose E Palumbo MD    insulin lispro 1-5 Units Subcutaneous TID AC Jose E Palumbo MD    insulin lispro 1-5 Units Subcutaneous HS Jose E Palumbo MD    lactated ringers 20 mL/hr Intravenous Continuous Chrissy Cuellar MD Last Rate: Stopped (03/28/18 1649)   LORazepam 1 mg Intravenous Q1H PRN Lanny Botello MD    melatonin 4 5 mg Oral HS Jose E Palumbo MD    methocarbamol 750 mg Oral Q6H [address] JAMIE Dunham    multivitamin-minerals 1 tablet Oral Daily Jose E Palumbo MD    Naloxegol Oxalate 12 5 mg Oral Daily Jose E Palumbo MD    ondansetron 4 mg Intravenous Q6H PRN Jose E Palumbo MD    oxyCODONE 20 mg Oral Q4H PRN JAMIE Dunham    pantoprazole 40 mg Oral Early Morning Jose E Palumbo MD    potassium chloride 40 mEq Oral Once Malachi Palma PA-C    [facility] Hold] pregabalin 150 mg Oral Once Chrissy Cuellar MD    senna 1 tablet Oral BID Jose E Palmubo MD    sertraline 75 mg Oral Daily Jose E Palumbo MD    sodium chloride 100 mL/hr Intravenous Continuous Malachi Palma MD Last Rate: 100 mL/hr (03/28/18 1649)   terazosin 5 mg Oral HS Jose E Palumbo MD    zolpidem 5 mg Oral HS PRN Sergio Bateman MD         Today, Patient Was Seen By: Malachi Palma PA-C    ** Please Note: Dragon 360 Dictation voice to text software may have been used in the creation of this document   **

## 2018-03-28 NOTE — ASSESSMENT & PLAN NOTE
· Poorly controlled at this point, although improving  · SBP ranging 140-170s but also in setting of acute pain   · Home regimen includes, benazepril 40mg daily with HCTZ 25mg daily, clonidine 0 1mg q12, terazosin 5mg daily   · Clonidine was increased to 0 2 mg BID   · PRN antihypertensives for SBP > 180  · Will adjust as needed

## 2018-03-28 NOTE — OP NOTE
OPERATIVE REPORT  PATIENT NAME: Noah Smith    :  1960  MRN: [id number]  Pt Location: [facility] OR ROOM 17    SURGERY DATE: 3/28/2018    Surgeon(s) and Role:     * Erich Johnson MD - Primary    Preop Diagnosis:  Lumbar radiculopathy [M54 16]  Post laminectomy syndrome [M96 1]  Herniated lumbar intervertebral disc [M51 26]  Pain syndrome, chronic [G89 4]  Facet arthropathy, lumbar [M12 88]  Spondylolisthesis of lumbar region [M43 16]  Radicular pain of right lower extremity [M54 10]  Radiculopathy due to lumbar intervertebral disc disorder [M51 16]    Post-Op Diagnosis Codes:     * Lumbar radiculopathy [M54 16]     * Post laminectomy syndrome [M96 1]     * Herniated lumbar intervertebral disc [M51 26]     * Pain syndrome, chronic [G89 4]     * Facet arthropathy, lumbar [M12 88]     * Spondylolisthesis of lumbar region [M43 16]     * Radicular pain of right lower extremity [M54 10]     * Radiculopathy due to lumbar intervertebral disc disorder [M51 16]    Procedure(s) (LRB):  L2/3 and L3/4 MIS transforaminal lumbar interbody fixation fusion from right sided approach; L3/4 right discectomy (Right)    Specimen(s):  * No specimens in log *    Estimated Blood Loss:   Minimal    Drains:  Urethral Catheter Latex 16 Fr   (Active)   Collection Container Standard drainage bag 3/28/2018  8:55 AM   Securement Method Securing device (Describe) 3/28/2018  1:38 PM   Number of days: 0       Anesthesia Type:   General    Operative Indications:  Lumbar radiculopathy [M54 16]  Post laminectomy syndrome [M96 1]  Herniated lumbar intervertebral disc [M51 26]  Pain syndrome, chronic [G89 4]  Facet arthropathy, lumbar [M12 88]  Spondylolisthesis of lumbar region [M43 16]  Radicular pain of right lower extremity [M54 10]  Radiculopathy due to lumbar intervertebral disc disorder [M51 16]    Operative Findings:  Herniated disc with severe spondyloarthropathy    Complications:   None    Procedure and Technique:  After adequate general endotracheal anesthesia the patient was placed prone on the operating room table  The back was prepped with Betadine soap then DuraPrep  Double layer drapes were placed in normal fashion and a Betadine impregnated sticky drape was placed over these  A time-out was called and all parameters a time-out were followed    The procedure began by imaging the projection of the pedicles onto the skin of the low back  In this region, a 30 mm incision was made  These were made in the pedicular line on both sides  K-wires were then introduced into the  The pedicles of   L2, L3, and L4 utilizing a   Jamshede needle  Each was tapped and stimulated and found to be within threshold  Solara screws with a percutaneous extender utilizing a  6 5 x 45 mm screw was placed at the  L2, L3, and L4 levels  On the right side  No screws were introduced initially  This was measured to be  70 mm and a precut mirza was utilized  The screws were then tightened into final position  Next attention was placed to the right side  Progressive dilators were placed on the left side and the quadrant retractor system was placed  This was angled medially and checked in the AP and lateral planes fluoroscopically  The intraoperative microscope was used at various degrees of magnification to aid in the identification, illumination, and microdissection necessary to perform this procedure  Next the pars interarticularis was drilled and a partial medial facetectomy was performed  The thecal sac was identified  There were no CSF leaks  The L3/4 disc space was thus identified  A herniated disk was identifies at the L3/4 level on the right  The thecal sac was retracted minimally and an anulotomy was performed  A series of pituitary rongeurs Kerrison rongeurs curettes and rasps were used to perform a complete diskectomy    Demineralized bone matrix was then placed in the to the contralateral side with the use of a funnel and a 9 mm x 28 mm (Elevate - Medtronic) expandable cage was impacted into position without difficulty  This was expanded  And checked for   Appropriate position prior to release with fluoroscopy   The cage had been previously filled with demineralized bone matrix  The L2/3 disc space was thus identified  The thecal sac was retracted minimally and an anulotomy was performed  A series of pituitary rongeurs Kerrison rongeurs curettes and rasps were used to perform a complete diskectomy  Demineralized bone matrix was then placed in the to the contralateral side with the use of a funnel and a 8 mm x 28 mm expandable cage (Elevate - Medtronic) was impacted into position without difficulty  This was expanded  And checked for   Appropriate position prior to release with fluoroscopy   The cage had been previously filled with demineralized bone matrix  Next 45 mm x 6 5 mm Solara screws were placed at L2,3, and 4  On the right  A 70 mm precut mirza was placed  The fascia was approximated with interrupted 2-0 Vicryl suture  The epidermis was approximated with interrupted inverted 3 0 Vicryl suture  Benzoin and Steri-Strips were placed  Clean sterile dressings were placed  Final x-rays were obtained  The instrumentation was in acceptable position  Patient was then taken to the recovery room having tolerated the procedure well      There were no alterations in EMG or SSEP following this procedure   I was present for the entire procedure    Patient Disposition:  PACU     SIGNATURE: Jarvis Rao MD  DATE: March 28, 2018  TIME: 2:14 PM

## 2018-03-28 NOTE — PROGRESS NOTES
Progress Note - Anesthesia Acute Pain Management    Jim Ham 65 y o  male MRN: [id number]  Unit/Bed#: OR POOL Encounter: [id number]      SURGERY DATE: 3/28/2018  Post-Op Diagnosis Codes:      * Lumbar radiculopathy [M54 16]     * Post laminectomy syndrome [M96 1]     * Herniated lumbar intervertebral disc [M51 26]     * Pain syndrome, chronic [G89 4]     * Facet arthropathy, lumbar [M12 88]     * Spondylolisthesis of lumbar region [M43 16]     * Radicular pain of right lower extremity [M54 10]     * Radiculopathy due to lumbar intervertebral disc disorder [M51 16]    Assessment:   65 y o  male status post Procedure(s):  L2/3 and L3/4 MIS transforaminal lumbar interbody fixation fusion from right sided approach; L3/4 right discectomy POD# 0    Principal Problem:    Post laminectomy syndrome  Active Problems:    Pain syndrome, chronic    Benign essential hypertension    Depression    Type 2 diabetes mellitus (HCC)    Lumbar radiculopathy    Pain of right thigh    Sleep apnea    Spondylolisthesis of lumbar region    Facet arthropathy, lumbar    Herniated lumbar intervertebral disc    Radiculopathy due to lumbar intervertebral disc disorder    Radicular pain of right lower extremity      Plan:   1  Acute surgical pain-  I examined patient in PACU  Patient was mildly sedated and unable to answer my questions appropriately  Instead, he proceeded to moan in pain on and off, and in and out of sleep  Patient received 100mcg of IV Fentanyl, 2mg IV dilaudid, two ketamine bolus doses of 20 mg each (total 40mg), remains on ketamine gtt with increase to 0 3mg/kg/hr,  2mg versed, and his fentanyl patch was applied at 1448  Patients SaO2 dropped in 75%, nurse encouraging patient to take deep breaths and SaO2 reached 96%  Currently on facemask  Nurse calling for CPAP  BPs 214/100  I would feel obligated to allow some time to let the above medication take its full affect    Nursing attempting to give patient pills, however, he is unable to follow commands  Patient was sleeping soundly went I left PACU  Once patient reaches floor, if vital signs stable, and he is not over sedated I would recommend following the plan below    Upon arrival to the floor  · Once on floor DC IV fentanyl  · Once on floor DC Dilaudid 0 5 mg q 10 minutes  · Once on floor DC Ativan 1 mg IV q 1 hour p r n  · Decrease ketamine drip 0 2mg/kg/hr - Plan to begin weaning tomorrow    · Continue Tylenol 975 p o  Q  8 hours scheduled  · Start oxycodone 15 mg p o  Q 4 hours p r n  Moderate pain  · Continue oxycodone 20 mg PO Q 4 HRS PRN severe pain, hold for sedation  ·   · Continue Dilaudid 0 5 mg IV q 4 hours p r n  Breakthrough pain hold for oversedation    · Continue Fentanyl Duragesic patch 100 mcgs topical every 48 hours (applied already 03/28)  · Continue Gabapentin 600 mg p o  T i d   · ContinueMethocarbamol 750 p o  Q 6 hours scheduled    2  Plan of care- Paged Primary team to discuss plan    3  APS will continue to follow; please call  / [phone] ( [facility] [phone]) with any questions      Pain Course:  Current pain location(s): Unable to describe  Pain Scale:   Unable to rate  24 hour history:  I evaluated patient in PACU  Patient appeared mildly sedated  Patient with O2 facemask SaO2 anywhere from 75-96%  Patient was unable to answer my questions appropriately, instead, he moaned in pain, off and on  Patient was in and out of sleep during my exam  At this time, I would give time for the medications to take the full effect and reevaluate patient         Meds/Allergies   all current active meds have been reviewed    Allergies   Allergen Reactions    Propulsid [Cisapride] Tongue Swelling    Cymbalta [Duloxetine Hcl] Headache    Vancomycin Rash     Red man syndrome       Objective     Physical Exam: /84 (BP Location: Left arm)   Pulse 64   Temp 97 5 °F (36 4 °C) (Tympanic)   Resp 18   Ht 5' 10" (1 778 m)   Wt 109 kg (240 lb) SpO2 95%   BMI 34 44 kg/m²   Gen: Well appearing and well nourished, NAD  Skin: Warm, Dry   HEENT:  PERRLA, EOMI  Pul: Nonlabored   Abd: round  Ext:  No cyanosis or edema  Neuro: AAOx3; CN II-XII grossly intact; no gross focal neurologic deficits  Psych:  Unable     Labs:  CBC - WBC/Hgb/Hct/Plts: 8 60/11 9*/35 9*/261 (03/28 0449)  CHEM - BUN/Cr/glu/ALT/AST/amyl/lip:19/0 85/--/--/--/--/-- (03/28 0449)     LYTES - Na/K/Cl/CO2: 142/3 4*/105/31 (03/28 0449)      SIGNATURE: JAMIE Chavez  DATE: March 28, 2018  TIME: 3:33 PM    Please note that the APS provides consultative services regarding pain management only  With the exception of ketamine and epidural infusions and except when indicated, final decisions regarding starting or changing doses of analgesic medications are at the discretion of the consulting service  Off hours consultation and/or medication management is generally not available

## 2018-03-28 NOTE — ANESTHESIA PREPROCEDURE EVALUATION
Review of Systems/Medical History  Patient summary reviewed  Chart reviewed  No history of anesthetic complications     Cardiovascular  EKG reviewed, Negative cardio ROS Hypertension ,    Pulmonary  Sleep apnea CPAP,   Comment: Remote smoking history     GI/Hepatic    GERD well controlled,             Endo/Other  Diabetes ,      GYN  Negative gynecology ROS          Hematology   Musculoskeletal    Arthritis     Neurology    Neuromuscular disease ,    Psychology   Negative psychology ROS   Chronic opioid dependence Chronic pain           Physical Exam    Airway    Mallampati score: III  TM Distance: >3 FB  Neck ROM: full     Dental       Cardiovascular  Comment: Negative ROS, Rhythm: regular, Rate: normal,     Pulmonary  Breath sounds clear to auscultation,     Other Findings        Anesthesia Plan  ASA Score- 2     Anesthesia Type- general with ASA Monitors  Additional Monitors: arterial line  Airway Plan: ETT  Plan Factors-Patient not instructed to abstain from smoking on day of procedure  Patient did not smoke on day of surgery  Induction- intravenous  Postoperative Plan- Plan for postoperative opioid use  Planned trial extubation    Informed Consent- Anesthetic plan and risks discussed with patient  I personally reviewed this patient with the CRNA  Discussed and agreed on the Anesthesia Plan with the CRNA  Kasi Zhou

## 2018-03-28 NOTE — ANESTHESIA PROCEDURE NOTES
Arterial Line Insertion  Performed by: Marifer Araiza  Authorized by: Marifer Araiza   Consent: Verbal consent obtained  Risks and benefits: risks, benefits and alternatives were discussed  Consent given by: patient  Patient understanding: patient states understanding of the procedure being performed  Patient consent: the patient's understanding of the procedure matches consent given  Procedure consent: procedure consent matches procedure scheduled  Relevant documents: relevant documents present and verified  Test results: test results available and properly labeled  Site marked: the operative site was marked  Imaging studies: imaging studies available  Patient identity confirmed: arm band, provided demographic data and hospital-assigned identification number  Time out: Immediately prior to procedure a "time out" was called to verify the correct patient, procedure, equipment, support staff and site/side marked as required  Preparation: Patient was prepped and draped in the usual sterile fashion    Indications: hemodynamic monitoring  Orientation:  LeftLocation: radial artery    Sedation:  Patient sedated: no  Needle gauge: 20  Number of attempts: 2  Post-procedure CNS: normal  Patient tolerance: Patient tolerated the procedure well with no immediate complications

## 2018-03-28 NOTE — ASSESSMENT & PLAN NOTE
· Acute on chronic back pain likely due to new herniated disc  · S/p L2/3 and L3/4 MIS transforaminal lumbar interbody fixation fusion from right sided approach, L3/L4 discectomy on 3/28/18, POD #0  · Spoke with anesthesia- recommending d/c ketamine drip and use dilaudid 1 mg q 2 hrs for breakthrough pain  Continue oxycodone, Fentanyl patch, gabapentin, and Robaxin   Continuous pulse ox  · PT/OT

## 2018-03-29 ENCOUNTER — APPOINTMENT (INPATIENT)
Dept: RADIOLOGY | Facility: HOSPITAL | Age: 58
DRG: 214 | End: 2018-03-29
Payer: OTHER MISCELLANEOUS

## 2018-03-29 LAB
ANION GAP SERPL CALCULATED.3IONS-SCNC: 5 MMOL/L (ref 4–13)
BUN SERPL-MCNC: 12 MG/DL (ref 5–25)
CALCIUM SERPL-MCNC: 8.7 MG/DL (ref 8.3–10.1)
CHLORIDE SERPL-SCNC: 107 MMOL/L (ref 100–108)
CO2 SERPL-SCNC: 30 MMOL/L (ref 21–32)
CREAT SERPL-MCNC: 0.73 MG/DL (ref 0.6–1.3)
ERYTHROCYTE [DISTWIDTH] IN BLOOD BY AUTOMATED COUNT: 13.4 % (ref 11.6–15.1)
GFR SERPL CREATININE-BSD FRML MDRD: 104 ML/MIN/1.73SQ M
GLUCOSE SERPL-MCNC: 108 MG/DL (ref 65–140)
GLUCOSE SERPL-MCNC: 113 MG/DL (ref 65–140)
GLUCOSE SERPL-MCNC: 146 MG/DL (ref 65–140)
GLUCOSE SERPL-MCNC: 148 MG/DL (ref 65–140)
GLUCOSE SERPL-MCNC: 168 MG/DL (ref 65–140)
HCT VFR BLD AUTO: 33.1 % (ref 36.5–49.3)
HGB BLD-MCNC: 10.8 G/DL (ref 12–17)
MCH RBC QN AUTO: 27.3 PG (ref 26.8–34.3)
MCHC RBC AUTO-ENTMCNC: 32.6 G/DL (ref 31.4–37.4)
MCV RBC AUTO: 84 FL (ref 82–98)
PLATELET # BLD AUTO: 235 THOUSANDS/UL (ref 149–390)
PLATELET # BLD AUTO: 256 THOUSANDS/UL (ref 149–390)
PMV BLD AUTO: 9.1 FL (ref 8.9–12.7)
PMV BLD AUTO: 9.3 FL (ref 8.9–12.7)
POTASSIUM SERPL-SCNC: 3.7 MMOL/L (ref 3.5–5.3)
RBC # BLD AUTO: 3.96 MILLION/UL (ref 3.88–5.62)
SODIUM SERPL-SCNC: 142 MMOL/L (ref 136–145)
WBC # BLD AUTO: 12.03 THOUSAND/UL (ref 4.31–10.16)

## 2018-03-29 PROCEDURE — 72100 X-RAY EXAM L-S SPINE 2/3 VWS: CPT

## 2018-03-29 PROCEDURE — 85027 COMPLETE CBC AUTOMATED: CPT | Performed by: NEUROLOGICAL SURGERY

## 2018-03-29 PROCEDURE — 82948 REAGENT STRIP/BLOOD GLUCOSE: CPT

## 2018-03-29 PROCEDURE — 99232 SBSQ HOSP IP/OBS MODERATE 35: CPT | Performed by: INTERNAL MEDICINE

## 2018-03-29 PROCEDURE — G8988 SELF CARE GOAL STATUS: HCPCS

## 2018-03-29 PROCEDURE — G8987 SELF CARE CURRENT STATUS: HCPCS

## 2018-03-29 PROCEDURE — 97168 OT RE-EVAL EST PLAN CARE: CPT

## 2018-03-29 RX ORDER — FENTANYL 100 UG/H
100 PATCH TRANSDERMAL
Status: DISCONTINUED | OUTPATIENT
Start: 2018-03-31 | End: 2018-03-30

## 2018-03-29 RX ORDER — POLYETHYLENE GLYCOL 3350 17 G/17G
17 POWDER, FOR SOLUTION ORAL DAILY PRN
Status: DISCONTINUED | OUTPATIENT
Start: 2018-03-29 | End: 2018-03-30

## 2018-03-29 RX ADMIN — SENNOSIDES 8.6 MG: 8.6 TABLET, FILM COATED ORAL at 17:25

## 2018-03-29 RX ADMIN — METHOCARBAMOL 750 MG: 750 TABLET ORAL at 11:22

## 2018-03-29 RX ADMIN — TERAZOSIN HYDROCHLORIDE 5 MG: 5 CAPSULE ORAL at 22:02

## 2018-03-29 RX ADMIN — SERTRALINE HYDROCHLORIDE 75 MG: 50 TABLET ORAL at 08:51

## 2018-03-29 RX ADMIN — HEPARIN SODIUM 5000 UNITS: 5000 INJECTION, SOLUTION INTRAVENOUS; SUBCUTANEOUS at 21:52

## 2018-03-29 RX ADMIN — BACLOFEN 5 MG: 10 TABLET ORAL at 16:32

## 2018-03-29 RX ADMIN — GABAPENTIN 600 MG: 300 CAPSULE ORAL at 08:53

## 2018-03-29 RX ADMIN — HYDROMORPHONE HYDROCHLORIDE 1 MG: 1 INJECTION, SOLUTION INTRAMUSCULAR; INTRAVENOUS; SUBCUTANEOUS at 12:29

## 2018-03-29 RX ADMIN — CLONIDINE HYDROCHLORIDE 0.2 MG: 0.2 TABLET ORAL at 08:51

## 2018-03-29 RX ADMIN — HYDROMORPHONE HYDROCHLORIDE 1 MG: 1 INJECTION, SOLUTION INTRAMUSCULAR; INTRAVENOUS; SUBCUTANEOUS at 16:33

## 2018-03-29 RX ADMIN — Medication 1 TABLET: at 08:53

## 2018-03-29 RX ADMIN — ACETAMINOPHEN 975 MG: 325 TABLET, FILM COATED ORAL at 05:13

## 2018-03-29 RX ADMIN — GABAPENTIN 600 MG: 300 CAPSULE ORAL at 21:58

## 2018-03-29 RX ADMIN — CEFAZOLIN SODIUM 1000 MG: 1 SOLUTION INTRAVENOUS at 05:13

## 2018-03-29 RX ADMIN — HYOSCYAMINE SULFATE 0.38 MG: 0.38 TABLET, EXTENDED RELEASE ORAL at 08:55

## 2018-03-29 RX ADMIN — HEPARIN SODIUM 5000 UNITS: 5000 INJECTION, SOLUTION INTRAVENOUS; SUBCUTANEOUS at 14:11

## 2018-03-29 RX ADMIN — LORAZEPAM 1 MG: 2 INJECTION INTRAMUSCULAR; INTRAVENOUS at 09:30

## 2018-03-29 RX ADMIN — OXYCODONE HYDROCHLORIDE 20 MG: 10 TABLET ORAL at 14:11

## 2018-03-29 RX ADMIN — SODIUM CHLORIDE 100 ML/HR: 0.9 INJECTION, SOLUTION INTRAVENOUS at 00:21

## 2018-03-29 RX ADMIN — METHOCARBAMOL 750 MG: 750 TABLET ORAL at 05:12

## 2018-03-29 RX ADMIN — HYDROMORPHONE HYDROCHLORIDE 1 MG: 1 INJECTION, SOLUTION INTRAMUSCULAR; INTRAVENOUS; SUBCUTANEOUS at 00:21

## 2018-03-29 RX ADMIN — CLONIDINE HYDROCHLORIDE 0.2 MG: 0.2 TABLET ORAL at 22:08

## 2018-03-29 RX ADMIN — MELATONIN TAB 3 MG 4.5 MG: 3 TAB at 21:53

## 2018-03-29 RX ADMIN — BENAZEPRIL HYDROCHLORIDE 40 MG: 10 TABLET ORAL at 08:51

## 2018-03-29 RX ADMIN — INSULIN LISPRO 1 UNITS: 100 INJECTION, SOLUTION INTRAVENOUS; SUBCUTANEOUS at 21:43

## 2018-03-29 RX ADMIN — ACETAMINOPHEN 975 MG: 325 TABLET, FILM COATED ORAL at 14:11

## 2018-03-29 RX ADMIN — GABAPENTIN 600 MG: 300 CAPSULE ORAL at 16:32

## 2018-03-29 RX ADMIN — ACETAMINOPHEN 975 MG: 325 TABLET, FILM COATED ORAL at 21:59

## 2018-03-29 RX ADMIN — INSULIN GLARGINE 10 UNITS: 100 INJECTION, SOLUTION SUBCUTANEOUS at 21:53

## 2018-03-29 RX ADMIN — BACLOFEN 5 MG: 10 TABLET ORAL at 22:00

## 2018-03-29 RX ADMIN — PANTOPRAZOLE SODIUM 40 MG: 40 TABLET, DELAYED RELEASE ORAL at 05:12

## 2018-03-29 RX ADMIN — BACLOFEN 5 MG: 10 TABLET ORAL at 08:51

## 2018-03-29 RX ADMIN — BISACODYL 10 MG: 10 SUPPOSITORY RECTAL at 16:32

## 2018-03-29 RX ADMIN — HYDROMORPHONE HYDROCHLORIDE 1 MG: 1 INJECTION, SOLUTION INTRAMUSCULAR; INTRAVENOUS; SUBCUTANEOUS at 22:16

## 2018-03-29 RX ADMIN — HYDROMORPHONE HYDROCHLORIDE 1 MG: 1 INJECTION, SOLUTION INTRAMUSCULAR; INTRAVENOUS; SUBCUTANEOUS at 05:17

## 2018-03-29 RX ADMIN — CELECOXIB 200 MG: 200 CAPSULE ORAL at 08:55

## 2018-03-29 RX ADMIN — OXYCODONE HYDROCHLORIDE 20 MG: 10 TABLET ORAL at 08:53

## 2018-03-29 RX ADMIN — ZOLPIDEM TARTRATE 5 MG: 5 TABLET ORAL at 22:09

## 2018-03-29 RX ADMIN — OXYCODONE HYDROCHLORIDE 20 MG: 10 TABLET ORAL at 03:36

## 2018-03-29 NOTE — CASE MANAGEMENT
Continued Stay Review    Date: 3/29    Vital Signs: /69 (BP Location: Right arm)   Pulse 86   Temp 98 7 °F (37 1 °C) (Oral)   Resp 18   Ht 5' 10" (1 778 m)   Wt 109 kg (240 lb 4 8 oz)   SpO2 94%   BMI 34 48 kg/m²     Medications:   Scheduled Meds:   Current Facility-Administered Medications:  acetaminophen 975 mg Oral Q8H [address]   acetaminophen 975 mg Oral Once   baclofen 5 mg Oral TID   benazepril 40 mg Oral Daily   cloNIDine 0 2 mg Oral Q12H [address]   [START ON 3/31/2018] fentaNYL 100 mcg Transdermal Q72H   gabapentin 600 mg Oral TID   heparin (porcine) 5,000 Units Subcutaneous Q8H [address]   hyoscyamine 0 375 mg Oral Daily   insulin glargine 10 Units Subcutaneous HS   insulin lispro 1-5 Units Subcutaneous TID AC   insulin lispro 1-5 Units Subcutaneous HS   melatonin 4 5 mg Oral HS   multivitamin-minerals 1 tablet Oral Daily   Naloxegol Oxalate 12 5 mg Oral Daily   pantoprazole 40 mg Oral Early Morning   pregabalin 150 mg Oral Once   senna 1 tablet Oral BID   sertraline 75 mg Oral Daily   terazosin 5 mg Oral HS     Continuous Infusions:    PRN Meds: bisacodyl    bisacodyl    docusate sodium    glycopyrrolate    haloperidol lactate    HYDROmorphone Iv x3    ondansetron    oxyCODONE po x3      Abnormal Labs/Diagnostic Results:   Xray Spine Lumbar - Degenerative and postoperative changes lumbar spine  03/29/18 0640     WBC 4 31 - 10 16 Thousand/uL 12 03     RBC 3 88 - 5 62 Million/uL 3 96    Hemoglobin 12 0 - 17 0 g/dL 10 8     Hematocrit 36 5 - 49 3 % 33 1       Age/Sex: 65 y o  male       Assessment:  1  POD1 L2/3 and L3/4 MIS transforaminal lumbar interbody fixation and fusion with L3/4 right diskectomy  2  LBP with RLE radiculopathy  3  L3/4 foraminal stenosis secondary to extruded disc, ligamentous hypertrophy and facet arthropathy   4  Spondylolisthesis of the lumbar region  5  Chronic pain syndrome  6  Post laminectomy syndrome  7  HTN  8  Type 2 DM  9   Sleep apnea     Plan:  · Exam reveals minimal bilateral hip flexor pain inhibition 4+/5 secondary to back pain  Otherwise full strength  Abnormal light touch intermittently right L3  Otherwise sensation grossly intact  · Dressing change secondary to saturation  · Imaging reviewed personally and by attending  Final results as below  ? MRI lumbar spine wo 3/25/18: L3/4 extruded disc herniation causing right lateral recess and foraminal stenosis  Chriswhitneyharley Petersen is also facet arthropathy and ligamentous hypertrophy   Preserved surgical changes noted L4-S1  ? Lumbar spine flexion-extension x-rays 3/26/18: Grade 1 anterolisthesis L3 on L4 without instability on flexion/extension  ? Upright lumbar spine x-rays March 29, 2018:  Satisfactory alignment and hardware placement  · Pain control per primary team  APS consult appreciated  Medication adjustments per APS recommendations  · Mobilize as tolerated  Using cane  Recommend mobilization with physical therapy  · DVT PPX:SCDs at this time  Heparin to start today  · Aggressive bowel regimen  Ordered Miralax daily p r n  Juarez Castaneda Patient with history of obstruction following prior fusion surgery  · Will follow-up for 2wk and 6wk pov         Discharge Plan: Home w [facility] service when medically cleared

## 2018-03-29 NOTE — PLAN OF CARE
Problem: Potential for Falls  Goal: Patient will remain free of falls  INTERVENTIONS:  - Assess patient frequently for physical needs  -  Identify cognitive and physical deficits and behaviors that affect risk of falls    -  Oral fall precautions as indicated by assessment   - Educate patient/family on patient safety including physical limitations  - Instruct patient to call for assistance with activity based on assessment  - Modify environment to reduce risk of injury  - Consider OT/PT consult to assist with strengthening/mobility   Outcome: Progressing      Problem: PAIN - ADULT  Goal: Verbalizes/displays adequate comfort level or baseline comfort level  Interventions:  - Encourage patient to monitor pain and request assistance  - Assess pain using appropriate pain scale  - Administer analgesics based on type and severity of pain and evaluate response  - Implement non-pharmacological measures as appropriate and evaluate response  - Consider cultural and social influences on pain and pain management  - Notify physician/advanced practitioner if interventions unsuccessful or patient reports new pain   Outcome: Progressing      Problem: INFECTION - ADULT  Goal: Absence or prevention of progression during hospitalization  INTERVENTIONS:  - Assess and monitor for signs and symptoms of infection  - Monitor lab/diagnostic results  - Monitor all insertion sites, i e  indwelling lines, tubes, and drains  - Monitor endotracheal (as able) and nasal secretions for changes in amount and color  - Oral appropriate cooling/warming therapies per order  - Administer medications as ordered  - Instruct and encourage patient and family to use good hand hygiene technique  - Identify and instruct in appropriate isolation precautions for identified infection/condition   Outcome: Progressing    Goal: Absence of fever/infection during neutropenic period  INTERVENTIONS:  - Monitor WBC  - Implement neutropenic guidelines   Outcome: Progressing      Problem: SAFETY ADULT  Goal: Maintain or return to baseline ADL function  INTERVENTIONS:  -  Assess patient's ability to carry out ADLs; assess patient's baseline for ADL function and identify physical deficits which impact ability to perform ADLs (bathing, care of mouth/teeth, toileting, grooming, dressing, etc )  - Assess/evaluate cause of self-care deficits   - Assess range of motion  - Assess patient's mobility; develop plan if impaired  - Assess patient's need for assistive devices and provide as appropriate  - Encourage maximum independence but intervene and supervise when necessary  ¯ Involve family in performance of ADLs  ¯ Assess for home care needs following discharge   ¯ Request OT consult to assist with ADL evaluation and planning for discharge  ¯ Provide patient education as appropriate   Outcome: Progressing    Goal: Maintain or return mobility status to optimal level  INTERVENTIONS:  - Assess patient's baseline mobility status (ambulation, transfers, stairs, etc )    - Identify cognitive and physical deficits and behaviors that affect mobility  - Identify mobility aids required to assist with transfers and/or ambulation (gait belt, sit-to-stand, lift, walker, cane, etc )  - [place] fall precautions as indicated by assessment  - Record patient progress and toleration of activity level on Mobility SBAR; progress patient to next Phase/Stage  - Instruct patient to call for assistance with activity based on assessment  - Request Rehabilitation consult to assist with strengthening/weightbearing, etc    Outcome: Progressing      Problem: DISCHARGE PLANNING  Goal: Discharge to home or other facility with appropriate resources  INTERVENTIONS:  - Identify barriers to discharge w/patient and caregiver  - Arrange for needed discharge resources and transportation as appropriate  - Identify discharge learning needs (meds, wound care, etc )  - Arrange for interpretive services to assist at discharge as needed  - Refer to Case Management Department for coordinating discharge planning if the patient needs post-hospital services based on physician/advanced practitioner order or complex needs related to functional status, cognitive ability, or social support system   Outcome: Progressing      Problem: Knowledge Deficit  Goal: Patient/family/caregiver demonstrates understanding of disease process, treatment plan, medications, and discharge instructions  Complete learning assessment and assess knowledge base  Interventions:  - Provide teaching at level of understanding  - Provide teaching via preferred learning methods   Outcome: Progressing      Problem: DISCHARGE PLANNING - CARE MANAGEMENT  Goal: Discharge to post-acute care or home with appropriate resources  INTERVENTIONS:  - Conduct assessment to determine patient/family and health care team treatment goals, and need for post-acute services based on payer coverage, community resources, and patient preferences, and barriers to discharge  - Address psychosocial, clinical, and financial barriers to discharge as identified in assessment in conjunction with the patient/family and health care team  - Arrange appropriate level of post-acute services according to patient's   needs and preference and payer coverage in collaboration with the physician and health care team  - Communicate with and update the patient/family, physician, and health care team regarding progress on the discharge plan  - Arrange appropriate transportation to post-acute venues  -Assist patient with making referrals for [facility], inpatient rehab, follow up care     Outcome: Progressing

## 2018-03-29 NOTE — PROGRESS NOTES
Spoke with Amy Hager with [facility], okay for pt to be transported to xray without nurse, plan for today is d/c stepdown order, continue to monitor pt

## 2018-03-29 NOTE — PLAN OF CARE
Problem: OCCUPATIONAL THERAPY ADULT  Goal: Performs self-care activities at highest level of function for planned discharge setting  See evaluation for individualized goals  Treatment Interventions: ADL retraining, Functional transfer training, Endurance training, Patient/family training, Equipment evaluation/education, Compensatory technique education, Energy conservation  Equipment Recommended: Tub seat with back, Bedside commode (RW)       See flowsheet documentation for full assessment, interventions and recommendations  Limitation: Decreased ADL status, Decreased Safe judgement during ADL, Decreased endurance, Decreased self-care trans, Decreased high-level ADLs  Prognosis: Good  Assessment: 56 YO MALE SEEN FOR OT REEVAL S/P "S/P L2/3 and L3/4 MIS transforaminal lumbar interbody fusion, L3/4 right discectomy"  SEE INITIAL OT EVAL FOR FURTHER DETAILS  PT CURRENTLY HAS [employer]O BRACE THAT HE REPORTS WEARING FOR WEARS, [facility] PA-C AWARE AND APPROPROVED  PT CURRENTLY REQUIRED OVERALL MIN A FOR ADLS, TRANSFERS AND FUNCTIONAL MOBILITY WITH USE OF RW  PT IS LIMITED 2' PAIN, FATIGUE, IMPAIRED BALANCE, LIMITED RECALL OF PRECAUTIONS AND LIMITED ACTIVITY TOLERANCE  FROM AN OT PERSPECTIVE, ANTICIPATE PT CAN RETURN HOME WITH INCREASED FAMILY SUPPORT + RW/SC WHEN MEDICALLY CLEARED  WILL CONT TO FOLLOW        OT Discharge Recommendation: Home with family support  OT - OK to Discharge: Yes ([facility] )

## 2018-03-29 NOTE — ASSESSMENT & PLAN NOTE
· Post-laminectomy syndrome   Previously had spine stimulator that was removed this month  · Home regimen includes Celebrex, fentanyl, oxy IR  Will adjust  · Pain management consult  · Secondary to chronic back pain

## 2018-03-29 NOTE — SOCIAL WORK
CM met with the Pt at bedside to discuss D/C plan  Pt recommended for R/W and commode, Pt agreeable to referral to [facility] for order to be delivered to bedside

## 2018-03-29 NOTE — ASSESSMENT & PLAN NOTE
· Poorly controlled possibly secondary to pain, improving  · SBP ranging 140-170s but also in setting of acute pain   · Home regimen includes, benazepril 40mg daily with HCTZ 25mg daily, clonidine 0 1mg q12, terazosin 5mg daily will restart  · Clonidine was increased to 0 2 mg BID   · PRN antihypertensives for SBP > 180  · Will adjust as needed

## 2018-03-29 NOTE — ASSESSMENT & PLAN NOTE
· Hold amaryl, resume on d/c   · Last HA1C 7 4%   · Started on lantus 10units qHS for better glycemic control inpatient   · Continue SSI, accuchecks, diabetic diet   · Glucose is better controlled

## 2018-03-29 NOTE — POST OP PROGRESS NOTES
Progress Note - Neurosurgery   Ann Marie Duty 65 y o  male MRN: [id number]  Unit/Bed#: [facility] 921-01 Encounter: [id number]    Assessment:  1  POD1 L2/3 and L3/4 MIS transforaminal lumbar interbody fixation and fusion with L3/4 right diskectomy  2  LBP with RLE radiculopathy  3  L3/4 foraminal stenosis secondary to extruded disc, ligamentous hypertrophy and facet arthropathy   4  Spondylolisthesis of the lumbar region  5  Chronic pain syndrome  6  Post laminectomy syndrome  7  HTN  8  Type 2 DM  9  Sleep apnea     Plan:  · Exam reveals minimal bilateral hip flexor pain inhibition 4+/5 secondary to back pain  Otherwise full strength  Abnormal light touch intermittently right L3  Otherwise sensation grossly intact  · Dressing change secondary to saturation  · Imaging reviewed personally and by attending  Final results as below  ? MRI lumbar spine wo 3/25/18: L3/4 extruded disc herniation causing right lateral recess and foraminal stenosis  There is also facet arthropathy and ligamentous hypertrophy  Preserved surgical changes noted L4-S1  ? Lumbar spine flexion-extension x-rays 3/26/18: Grade 1 anterolisthesis L3 on L4 without instability on flexion/extension  ? Upright lumbar spine x-rays March 29, 2018:  Satisfactory alignment and hardware placement  · Pain control per primary team  APS consult appreciated  Medication adjustments per APS recommendations  · Mobilize as tolerated  Using cane  Recommend mobilization with physical therapy  · DVT PPX:SCDs at this time  Heparin to start today  · Aggressive bowel regimen  Ordered Miralax daily p r n  Diaz Stinson Patient with history of obstruction following prior fusion surgery  · Will follow-up for 2wk and 6wk pov  · Call with questions/concerns  Subjective/Objective   Chief Complaint: "I feel better than it did"/postoperative follow-up    Subjective:  Patient states resolution of right lower extremity radiculopathy    He does admit to intermittent L3 distribution of tingling  Soreness and back pain as expected which is worse with movement  Voiding well  Tolerating oral intake  No bowel movement  +flatus  Objective:  Sitting on edge of bed  NAD  I/O       03/27 0701 - 03/28 0700 03/28 0701 - 03/29 0700 03/29 0701 - 03/30 0700    P  O  660 1920 480    I V  (mL/kg)  4000 (36 7)     IV Piggyback  500     Total Intake(mL/kg) 660 (6 1) 6420 (58 9) 480 (4 4)    Urine (mL/kg/hr)  5450 (2 1) 500 (1 5)    Blood  300 (0 1)     Total Output   5750 500    Net +660 +670 -20           Unmeasured Urine Occurrence 1 x            Invasive Devices     Peripheral Intravenous Line            Peripheral IV 03/28/18 Left Hand 1 day    Peripheral IV 03/28/18 Right Forearm 1 day                Physical Exam:  Vitals: Blood pressure 133/69, pulse 86, temperature 98 7 °F (37 1 °C), temperature source Oral, resp  rate 18, height 5' 10" (1 778 m), weight 109 kg (240 lb 4 8 oz), SpO2 94 %  ,Body mass index is 34 48 kg/m²  General appearance: alert, appears stated age, cooperative and no distress  Head: Normocephalic, without obvious abnormality, atraumatic  Eyes: EOMI  Neck: supple, symmetrical, trachea midline   Back: steri-strips moist but intact     Lungs: non labored breathing  Heart: regular heart rate  Neurologic:   Mental status: Alert, oriented, thought content appropriate  Sensory: normal to LT except right L3 with mild tingling to light touch  Motor: moving all extremities with mild back pain limiting hip flexor bilaterally 4+/5    Lab Results:    Results from last 7 days  Lab Units 03/29/18  0640 03/28/18  2358 03/28/18  0449 03/25/18  0500   WBC Thousand/uL 12 03*  --  8 60 9 81   HEMOGLOBIN g/dL 10 8*  --  11 9* 12 6   HEMATOCRIT % 33 1*  --  35 9* 36 6   PLATELETS Thousands/uL 256 235 261 280       Results from last 7 days  Lab Units 03/28/18  2358 03/28/18  0449 03/26/18  0449 03/25/18  0500   SODIUM mmol/L 142 142 139 139   POTASSIUM mmol/L 3 7 3 4* 3 7 3 4* CHLORIDE mmol/L 107 105 101 102   CO2 mmol/L 30 31 34* 32   BUN mg/dL 12 19 18 14   CREATININE mg/dL 0 73 0 85 0 91 0 87   CALCIUM mg/dL 8 7 8 9 9 2 9 1   TOTAL PROTEIN g/dL  --   --   --  7 7   BILIRUBIN TOTAL mg/dL  --   --   --  0 41   ALK PHOS U/L  --   --   --  76   ALT U/L  --   --   --  32   AST U/L  --   --   --  20   GLUCOSE RANDOM mg/dL 108 155* 140 139               Results from last 7 days  Lab Units 03/25/18  0001   INR  0 96   PTT seconds 33     No results found for: TROPONINT  ABG:No results found for: PHART, EBL0UGV, PO2ART, RDK0YTY, S0GBTPNM, BEART, SOURCE    Imaging Studies: I have personally reviewed pertinent reports  and I have personally reviewed pertinent films in PACS    XR lumbar spine 2 or 3 views   Final Result by Solange Noonan DO (03/29 1101)   Degenerative and postoperative changes lumbar spine  Workstation performed: [id number]         XR spine lumbar 2 or 3 views injury   Final Result by Clemencia Paris MD (03/29 0906)      Fluoroscopic guidance provided for surgical procedure  Please refer to the separate procedure notes for additional details  Workstation performed: [id number]         XR spine lumbar complete w bending minimum 6 views   Final Result by Mya Ngo MD (03/27 4087)      Stable degenerative and postoperative changes  Workstation performed: [id number]         MRI lumbar spine wo contrast   Final Result by Belinda Maria MD (03/25 2734)   There is epidural soft tissue which extends into the right foramen at the level of the L3-4 and also extends cranially in relation to the mid body of the L3 vertebra with resultant narrowing of the right lateral recess of the L4 and severe    right foraminal narrowing likely due to an extruded/sequestered disc fragment  Evaluate for right L3 and L4 radiculopathy        Moderate central canal narrowing at L3 level       Adhesion of the cauda equina nerve roots at the level of the L4-L5 vertebra with the posterior aspect of the thecal sac, suggest arachnoiditis      Mild to moderate central canal narrowing at L2-3 with the small central protrusion   The study was marked in EPIC for significant notification  Workstation performed: [id number]         XR orbits for foreign body   Final Result by Aaron Graves DO (03/25 1139)      No radiopaque orbital foreign body  Workstation performed: [id number]         XR spine thoracic 3 vw   Final Result by Aaron Graves DO (03/25 1139)      Unremarkable thoracic spine  No radiopaque foreign body            Workstation performed: [id number]         XR hip/pelv 2-3 vws right if performed   Final Result by Erica Soto MD (03/25 1026)      No acute osseous abnormality  Workstation performed: [id number]             EKG, Pathology, and Other Studies: I have personally reviewed pertinent reports        VTE Pharmacologic Prophylaxis: Heparin    VTE Mechanical Prophylaxis: sequential compression device

## 2018-03-29 NOTE — CONSULTS
APS initial consultation completed on 3/26/2018/ Please see consultation note and subsequent progress notes for ongoing follow-up  APS will continue to follow this patient      Miri Barnes MS, RN-BC  Acute Pain

## 2018-03-29 NOTE — SOCIAL WORK
Workers comp info: [employer]; [employer] Claim# [id number] phone: [phone] [facility]ART[facility] to [facility]

## 2018-03-29 NOTE — PROGRESS NOTES
Progress Note - Mike Noonan 1960, 65 y o  male MRN: [id number]    Unit/Bed#: [facility] 921-01 Encounter: [id number]    Primary Care Provider: Ale Lynch MD   Date and time admitted to hospital: 3/24/2018  3:33 PM        Sleep apnea   Assessment & Plan    · Continue CPAP qhs        Pain of right thigh   Assessment & Plan    · Patient has definite pains on his right thigh and hip  · This may be referred pain from his post laminectomy syndrome of the lumbar spine  However, we cannot still totally rule out possibility that this is a separate problem  · We will do a right hip x-ray  No acute osseous abnormality  ·  pain a factor and pain management discussed with me today with probable plan to do surgery         Lumbar radiculopathy   Assessment & Plan    · Patient with hx of chronic LBP presented with RLE pain likely consistent with L3-4/lumbar radiculopathy  Now reporting pain distal to right knee, continues to denies saddle anesthesia or paresthesias  · Appreciate neurosurgery input   · S/p MRI lumbar spine   · L3/4 extruded disc herniation causing right lateral recess and foraminal stenosis, right facet arthropathy and ligamentous hypertrophy   · Lumbar spine flexion/extension xrays yesterday   · APS following and patient was started on ketamine infusion 3/26 and rate increased today   · Per neurosx attending attestation, patient will likely need extensive decompression with fixation/fusion, which would be best accomplished after integrated with outpatient pain management and outpatient discussion   · Consider medrol dose charles?  Will reach out to neurosx        Type 2 diabetes mellitus ([facility] [address] )   Assessment & Plan    · Hold amaryl, resume on d/c   · Last HA1C 7 4%   · Started on lantus 10units qHS for better glycemic control inpatient   · Continue SSI, accuchecks, diabetic diet   · Glucose is better controlled        Depression   Assessment & Plan    · Continue zoloft daily and ambien qHS (on sonata at home)         Benign essential hypertension   Assessment & Plan    · Poorly controlled possibly secondary to pain, improving  · SBP ranging 140-170s but also in setting of acute pain   · Home regimen includes, benazepril 40mg daily with HCTZ 25mg daily, clonidine 0 1mg q12, terazosin 5mg daily will restart  · Clonidine was increased to 0 2 mg BID   · PRN antihypertensives for SBP > 180  · Will adjust as needed        Pain syndrome, chronic   Assessment & Plan    · Post-laminectomy syndrome  Previously had spine stimulator that was removed this month  · Home regimen includes Celebrex, fentanyl, oxy IR  Will adjust  · Pain management consult  · Secondary to chronic back pain         * Acute back pain   Assessment & Plan    · Acute on chronic back pain likely due to new herniated disc  · S/p L2/3 and L3/4 MIS transforaminal lumbar interbody fixation fusion from right sided approach, L3/L4 discectomy on 3/28/18, POD #1  ·  d/c ketamine drip and started on dilaudid 1 mg q 2 hrs for breakthrough pain  Continue oxycodone, Fentanyl patch, gabapentin, and Robaxin  Continuous pulse ox  · PT/OT              VTE Pharmacologic Prophylaxis:   Pharmacologic: Heparin  Mechanical VTE Prophylaxis in Place: Yes    Patient Centered Rounds: I have performed bedside rounds with nursing staff today  Discussions with Specialists or Other Care Team Provider:      Education and Discussions with Family / Patient: patient     Time Spent for Care: 45 minutes  More than 50% of total time spent on counseling and coordination of care as described above  Current Length of Stay: 5 day(s)    Current Patient Status: Inpatient   Certification Statement: The patient will continue to require additional inpatient hospital stay due to pain    Discharge Plan: need pain management and ot/pt     Code Status: Level 1 - Full Code      Subjective:   I am doing better  I need pain management  I am able to walk  Denied other ROS      Objective:     Vitals: Temp (24hrs), Av 1 °F (36 7 °C), Min:97 6 °F (36 4 °C), Max:98 7 °F (37 1 °C)    HR:  [68-88] 86  Resp:  [12-34] 18  BP: (125-216)/() 133/69  SpO2:  [94 %-100 %] 94 %  Body mass index is 34 48 kg/m²  Input and Output Summary (last 24 hours): Intake/Output Summary (Last 24 hours) at 18 0825  Last data filed at 18 6124   Gross per 24 hour   Intake             6420 ml   Output             5750 ml   Net              670 ml       Physical Exam:     Physical Exam   Constitutional: He is oriented to person, place, and time  He appears well-developed and well-nourished  No distress  HENT:   Head: Normocephalic and atraumatic  Right Ear: External ear normal    Left Ear: External ear normal    Nose: Nose normal    Mouth/Throat: Oropharynx is clear and moist  No oropharyngeal exudate  Eyes: Conjunctivae and EOM are normal  Pupils are equal, round, and reactive to light  Right eye exhibits no discharge  Left eye exhibits no discharge  No scleral icterus  Neck: Normal range of motion  Neck supple  No JVD present  No tracheal deviation present  No thyromegaly present  Cardiovascular: Normal rate  Exam reveals no gallop and no friction rub  No murmur heard  Pulmonary/Chest: No stridor  He is in respiratory distress  He has wheezes  He has no rales  He exhibits no tenderness  Abdominal: Soft  Bowel sounds are normal  He exhibits no distension and no mass  There is no tenderness  There is no rebound and no guarding  Musculoskeletal: Normal range of motion  He exhibits no edema, tenderness or deformity  Lymphadenopathy:     He has no cervical adenopathy  Neurological: He is alert and oriented to person, place, and time  He displays normal reflexes  No cranial nerve deficit  He exhibits normal muscle tone  Coordination normal    Skin: Skin is warm and dry  No rash noted  He is not diaphoretic  No erythema  No pallor  Psychiatric: He has a normal mood and affect   His behavior is normal  Judgment and thought content normal    Nursing note and vitals reviewed  Additional Data:     Labs:      Results from last 7 days  Lab Units 03/29/18  0640   WBC Thousand/uL 12 03*   HEMOGLOBIN g/dL 10 8*   HEMATOCRIT % 33 1*   PLATELETS Thousands/uL 256       Results from last 7 days  Lab Units 03/28/18  2358  03/25/18  0500   SODIUM mmol/L 142  < > 139   POTASSIUM mmol/L 3 7  < > 3 4*   CHLORIDE mmol/L 107  < > 102   CO2 mmol/L 30  < > 32   BUN mg/dL 12  < > 14   CREATININE mg/dL 0 73  < > 0 87   CALCIUM mg/dL 8 7  < > 9 1   TOTAL PROTEIN g/dL  --   --  7 7   BILIRUBIN TOTAL mg/dL  --   --  0 41   ALK PHOS U/L  --   --  76   ALT U/L  --   --  32   AST U/L  --   --  20   GLUCOSE RANDOM mg/dL 108  < > 139   < > = values in this interval not displayed  Results from last 7 days  Lab Units 03/25/18  0001   INR  0 96       * I Have Reviewed All Lab Data Listed Above  * Additional Pertinent Lab Tests Reviewed:  [address] Admission Reviewed    Imaging:    Imaging Reports Reviewed Today Include:    Imaging Personally Reviewed by Myself Includes:      Recent Cultures (last 7 days):           Last 24 Hours Medication List:     Current Facility-Administered Medications:  acetaminophen 975 mg Oral Q8H [address] JAMIE Boyce    acetaminophen 975 mg Oral Once Orlando Colunga MD    baclofen 5 mg Oral TID Leyla Petersen MD    benazepril 40 mg Oral Daily Jose E Palumbo MD    bisacodyl 10 mg Rectal Daily PRN Leyla Petersen MD    celecoxib 200 mg Oral Daily Jose E Palumbo MD    cloNIDine 0 2 mg Oral Q12H [address] JAMIE Boyce    docusate sodium 100 mg Oral BID PRN Leyla Petersen PA-C    fentaNYL 100 mcg Transdermal Q72H Orlando Colunga MD    gabapentin 600 mg Oral TID Jose E Palumbo MD    glycopyrrolate 0 2 mg Intravenous Q4H PRN Deepika Castro MD    haloperidol lactate 2 mg Intramuscular Q30 Min PRN Deepika Castro MD    heparin (porcine) 5,000 Units Subcutaneous [facility] Jennifer Zaragoza MD    HYDROmorphone 1 mg Intravenous Q2H PRN Jennifer Zaragoza PA-C    hyoscyamine 0 375 mg Oral Daily Jose E Palumbo MD    insulin glargine 10 Units Subcutaneous HS Jose E Palumbo MD    insulin lispro 1-5 Units Subcutaneous TID AC Jose E Palumbo MD    insulin lispro 1-5 Units Subcutaneous HS Jose E Palumbo MD    lactated ringers 20 mL/hr Intravenous Continuous Lisa Major MD Last Rate: Stopped (03/28/18 1649)   LORazepam 1 mg Intravenous Q1H PRN Ruben Cuevas MD    melatonin 4 5 mg Oral HS Jose E Palumbo MD    methocarbamol 750 mg Oral Q6H [address] JAMIE Elizabeth    multivitamin-minerals 1 tablet Oral Daily Jose E Palumbo MD    Naloxegol Oxalate 12 5 mg Oral Daily Jose E Palumbo MD    ondansetron 4 mg Intravenous Q6H PRN Jose E aPlumbo MD    oxyCODONE 20 mg Oral Q4H PRN JAMIE Elizabeth    pantoprazole 40 mg Oral Early Morning Jose E Palumbo MD    pregabalin 150 mg Oral Once Lisa Major MD    senna 1 tablet Oral BID Jose E Palumbo MD    sertraline 75 mg Oral Daily Jose E Palumbo MD    sodium chloride 100 mL/hr Intravenous Continuous Jennifer Zaragoza MD Last Rate: 100 mL/hr (03/29/18 0021)   terazosin 5 mg Oral HS Jose E Palumbo MD    zolpidem 5 mg Oral HS PRN Yuni Omer MD         Today, Patient Was Seen By: Trae Jackman MD    ** Please Note: Dictation voice to text software may have been used in the creation of this document   **

## 2018-03-29 NOTE — ASSESSMENT & PLAN NOTE
· Acute on chronic back pain likely due to new herniated disc  · S/p L2/3 and L3/4 MIS transforaminal lumbar interbody fixation fusion from right sided approach, L3/L4 discectomy on 3/28/18, POD #1  ·  d/c ketamine drip and started on dilaudid 1 mg q 2 hrs for breakthrough pain  Continue oxycodone, Fentanyl patch, gabapentin, and Robaxin   Continuous pulse ox  · PT/OT

## 2018-03-29 NOTE — PROGRESS NOTES
Progress Note - Inpatient Pain Management    David Scanlon 65 y o  male MRN: [id number]  Unit/Bed#: [facility] 921-01 Encounter: [id number]      Assessment:   Principal Problem:    Acute back pain  Active Problems:    Pain syndrome, chronic    Benign essential hypertension    Depression    Type 2 diabetes mellitus (HCC)    Lumbar radiculopathy    Pain of right thigh    Sleep apnea    Spondylolisthesis of lumbar region    Facet arthropathy, lumbar    Herniated lumbar intervertebral disc    Radiculopathy due to lumbar intervertebral disc disorder    Radicular pain of right lower extremity      Plan/Recommendations:   Acute on chronic pain S/P L2/3 and L3/4 MIS transforaminal lumbar interbody fusion, L3/4 right discectomy  · Acetaminophen 975mg Q8 hours  · Baclofen 5mg TID; may consider increasing the dose to 10mg in next 24 hours if pain persists  · Discontinue Robaxin  · Discontinue Celebrex due to recent surgery  · Change Fentanyl patch 100mcg to Q48 hours (home regimen)  · Gabapentin 600mg TID  · D/C PRN glycopyrrolate, haldol,   · Decrease IV Dilaudid to 1mg Q4 hours PRN today and plan to discontinue tomorrow  · Oxycodone 20mg Q4 hours PRN severe pain     Reviewed with SLIM and neuro-surg    Pain History  Current pain location(s): lower back   Pain Scale:   0-8  Severity:  Severe at times  Quality: sharp, pressure  24 hour history: Patient sitting up in bed eating breakfast  Appears comfortable at rest  Pain improved since PACU yesterday  Intermittent spikes in pain which is managed with PRN pain medication  Asking for ativan prior to x-ray due to increased anxiety       Current Analgesic regimen:  Acetaminophen 975mg Q8 hours, Baclofen 5mg TID, Celebrex 200mg daily, Fentanyl 100mcg patch, Gabapentin 600mg TID, Robaxin 750mg Q6 hours, Dilaudid 1mg IV Q2 hours PRN (4mg since PACU), Oxycodone 20mg Q4 hours PRN (70mg total)  Bowel Regimen: Senna BID, Colace BID PRN, Dulcolax PRN    Meds/Allergies   all current active meds have been reviewed and current meds:   Current Facility-Administered Medications   Medication Dose Route Frequency    acetaminophen (TYLENOL) tablet 975 mg  975 mg Oral Q8H [address]    acetaminophen (TYLENOL) tablet 975 mg  975 mg Oral Once    baclofen tablet 5 mg  5 mg Oral TID    benazepril (LOTENSIN) tablet 40 mg  40 mg Oral Daily    bisacodyl (DULCOLAX) rectal suppository 10 mg  10 mg Rectal Daily PRN    celecoxib (CeleBREX) capsule 200 mg  200 mg Oral Daily    cloNIDine (CATAPRES) tablet 0 2 mg  0 2 mg Oral Q12H [address]    docusate sodium (COLACE) capsule 100 mg  100 mg Oral BID PRN    fentaNYL (DURAGESIC) 100 mcg/hr TD 72 hr patch 100 mcg  100 mcg Transdermal Q72H    gabapentin (NEURONTIN) capsule 600 mg  600 mg Oral TID    glycopyrrolate (ROBINUL) injection 0 2 mg  0 2 mg Intravenous Q4H PRN    haloperidol lactate (HALDOL) injection 2 mg  2 mg Intramuscular Q30 Min PRN    heparin (porcine) subcutaneous injection 5,000 Units  5,000 Units Subcutaneous Q8H [address]    HYDROmorphone (DILAUDID) injection 1 mg  1 mg Intravenous Q2H PRN    hyoscyamine (LEVBID) 12 hr tablet 0 375 mg  0 375 mg Oral Daily    insulin glargine (LANTUS) subcutaneous injection 10 Units  10 Units Subcutaneous HS    insulin lispro (HumaLOG) 100 units/mL subcutaneous injection 1-5 Units  1-5 Units Subcutaneous TID AC    insulin lispro (HumaLOG) 100 units/mL subcutaneous injection 1-5 Units  1-5 Units Subcutaneous HS    lactated ringers infusion  20 mL/hr Intravenous Continuous    LORazepam (ATIVAN) 2 mg/mL injection 1 mg  1 mg Intravenous Q1H PRN    melatonin tablet 4 5 mg  4 5 mg Oral HS    methocarbamol (ROBAXIN) tablet 750 mg  750 mg Oral Q6H [address]    multivitamin-minerals (CENTRUM) tablet 1 tablet  1 tablet Oral Daily    Naloxegol Oxalate TABS 12 5 mg  12 5 mg Oral Daily    ondansetron (ZOFRAN) injection 4 mg  4 mg Intravenous Q6H PRN    oxyCODONE (ROXICODONE) immediate release tablet 20 mg  20 mg Oral Q4H PRN    pantoprazole (PROTONIX) EC tablet 40 mg  40 mg Oral Early Morning    pregabalin (LYRICA) capsule 150 mg  150 mg Oral Once    senna (SENOKOT) tablet 8 6 mg  1 tablet Oral BID    sertraline (ZOLOFT) tablet 75 mg  75 mg Oral Daily    sodium chloride 0 9 % infusion  100 mL/hr Intravenous Continuous    terazosin (HYTRIN) capsule 5 mg  5 mg Oral HS    zolpidem (AMBIEN) tablet 5 mg  5 mg Oral HS PRN       Allergies   Allergen Reactions    Propulsid [Cisapride] Tongue Swelling    Cymbalta [Duloxetine Hcl] Headache    Vancomycin Rash     Red man syndrome       Objective     Temp:  [97 6 °F (36 4 °C)-98 7 °F (37 1 °C)] 98 7 °F (37 1 °C)  HR:  [68-88] 86  Resp:  [12-34] 18  BP: (125-216)/() 133/69  Arterial Line BP: (148-184)/() 148/108      Intake/Output Summary (Last 24 hours) at 03/29/18 0838  Last data filed at 03/29/18 2329   Gross per 24 hour   Intake             6420 ml   Output             5750 ml   Net              670 ml               Physical Exam: /69 (BP Location: Right arm)   Pulse 86   Temp 98 7 °F (37 1 °C) (Oral)   Resp 18   Ht 5' 10" (1 778 m)   Wt 109 kg (240 lb 4 8 oz)   SpO2 94%   BMI 34 48 kg/m²   General Appearance:    Alert, cooperative, no distress, appears stated age   Neurological:   Oriented to person, place, and time   Head:    Normocephalic, without obvious abnormality, atraumatic   Eyes:    EOM's intact   Lungs:     Decreased,Clear to auscultation bilaterally, respirations unlabored   Chest Wall:    No tenderness or deformity   Abdomen:        Large, round, Soft, no distention or tenderness, bowel sounds present    Heart:    Regular rate and rhythm   Extremities:   Extremities intact sensation to light touch   Skin:   Skin warm and dry, no rashes or lesions     Lab Results:   Results from last 7 days  Lab Units 03/29/18  0640   WBC Thousand/uL 12 03*   HEMOGLOBIN g/dL 10 8*   HEMATOCRIT % 33 1*   PLATELETS Thousands/uL 256      Results from last 7 days  Lab Units 03/28/18  2358  03/25/18  0500   SODIUM mmol/L 142  < > 139   POTASSIUM mmol/L 3 7  < > 3 4*   CHLORIDE mmol/L 107  < > 102   CO2 mmol/L 30  < > 32   BUN mg/dL 12  < > 14   CREATININE mg/dL 0 73  < > 0 87   CALCIUM mg/dL 8 7  < > 9 1   TOTAL PROTEIN g/dL  --   --  7 7   BILIRUBIN TOTAL mg/dL  --   --  0 41   ALK PHOS U/L  --   --  76   ALT U/L  --   --  32   AST U/L  --   --  20   GLUCOSE RANDOM mg/dL 108  < > 139   < > = values in this interval not displayed  Imaging Studies: I have personally reviewed pertinent reports  Counseling / Coordination of Care  Total floor / unit time spent today 15 minutes  Greater than 50% of total time was spent with the patient and / or family counseling and / or coordination of care   A description of the counseling / coordination of care: Reviewed plan of care and medications with patient, RN staff and primary care team     Jose Juan Chester, MS, RN-BC  Acute Pain
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
## 2018-03-30 ENCOUNTER — DOCUMENTATION (OUTPATIENT)
Dept: NEUROSURGERY | Facility: CLINIC | Age: 58
End: 2018-03-30

## 2018-03-30 LAB
BASE EXCESS BLDA CALC-SCNC: 2 MMOL/L (ref -2–3)
BASE EXCESS BLDA CALC-SCNC: 3 MMOL/L (ref -2–3)
CA-I BLD-SCNC: 1.15 MMOL/L (ref 1.12–1.32)
CA-I BLD-SCNC: 1.21 MMOL/L (ref 1.12–1.32)
GLUCOSE SERPL-MCNC: 133 MG/DL (ref 65–140)
GLUCOSE SERPL-MCNC: 141 MG/DL (ref 65–140)
GLUCOSE SERPL-MCNC: 144 MG/DL (ref 65–140)
GLUCOSE SERPL-MCNC: 164 MG/DL (ref 65–140)
GLUCOSE SERPL-MCNC: 175 MG/DL (ref 65–140)
GLUCOSE SERPL-MCNC: 175 MG/DL (ref 65–140)
GLUCOSE SERPL-MCNC: 185 MG/DL (ref 65–140)
HCO3 BLDA-SCNC: 25.9 MMOL/L (ref 22–28)
HCO3 BLDA-SCNC: 26.8 MMOL/L (ref 22–28)
HCT VFR BLD CALC: 28 % (ref 36.5–49.3)
HCT VFR BLD CALC: 33 % (ref 36.5–49.3)
HGB BLDA-MCNC: 11.2 G/DL (ref 12–17)
HGB BLDA-MCNC: 9.5 G/DL (ref 12–17)
PCO2 BLD: 27 MMOL/L (ref 21–32)
PCO2 BLD: 28 MMOL/L (ref 21–32)
PCO2 BLD: 37.4 MM HG (ref 36–44)
PCO2 BLD: 38.3 MM HG (ref 36–44)
PH BLD: 7.45 [PH] (ref 7.35–7.45)
PH BLD: 7.45 [PH] (ref 7.35–7.45)
PO2 BLD: 124 MM HG (ref 75–129)
PO2 BLD: 136 MM HG (ref 75–129)
POTASSIUM BLD-SCNC: 3.3 MMOL/L (ref 3.5–5.3)
POTASSIUM BLD-SCNC: 3.6 MMOL/L (ref 3.5–5.3)
SAO2 % BLD FROM PO2: 99 % (ref 95–98)
SAO2 % BLD FROM PO2: 99 % (ref 95–98)
SODIUM BLD-SCNC: 143 MMOL/L (ref 136–145)
SODIUM BLD-SCNC: 143 MMOL/L (ref 136–145)
SPECIMEN SOURCE: ABNORMAL
SPECIMEN SOURCE: ABNORMAL

## 2018-03-30 PROCEDURE — 97535 SELF CARE MNGMENT TRAINING: CPT

## 2018-03-30 PROCEDURE — 82948 REAGENT STRIP/BLOOD GLUCOSE: CPT

## 2018-03-30 PROCEDURE — 97164 PT RE-EVAL EST PLAN CARE: CPT

## 2018-03-30 PROCEDURE — 99232 SBSQ HOSP IP/OBS MODERATE 35: CPT | Performed by: INTERNAL MEDICINE

## 2018-03-30 PROCEDURE — G8978 MOBILITY CURRENT STATUS: HCPCS

## 2018-03-30 PROCEDURE — 97530 THERAPEUTIC ACTIVITIES: CPT

## 2018-03-30 PROCEDURE — 97116 GAIT TRAINING THERAPY: CPT

## 2018-03-30 PROCEDURE — G8979 MOBILITY GOAL STATUS: HCPCS

## 2018-03-30 RX ORDER — BACLOFEN 10 MG/1
10 TABLET ORAL ONCE
Status: COMPLETED | OUTPATIENT
Start: 2018-03-30 | End: 2018-03-30

## 2018-03-30 RX ORDER — POLYETHYLENE GLYCOL 3350 17 G/17G
17 POWDER, FOR SOLUTION ORAL DAILY
Status: DISCONTINUED | OUTPATIENT
Start: 2018-03-30 | End: 2018-04-01 | Stop reason: SDUPTHER

## 2018-03-30 RX ORDER — HYDRALAZINE HYDROCHLORIDE 20 MG/ML
5 INJECTION INTRAMUSCULAR; INTRAVENOUS EVERY 6 HOURS PRN
Status: DISCONTINUED | OUTPATIENT
Start: 2018-03-30 | End: 2018-04-04 | Stop reason: HOSPADM

## 2018-03-30 RX ORDER — BACLOFEN 10 MG/1
10 TABLET ORAL 3 TIMES DAILY
Status: DISCONTINUED | OUTPATIENT
Start: 2018-03-30 | End: 2018-04-04 | Stop reason: HOSPADM

## 2018-03-30 RX ORDER — FENTANYL 100 UG/H
100 PATCH TRANSDERMAL
Status: DISCONTINUED | OUTPATIENT
Start: 2018-03-30 | End: 2018-04-04 | Stop reason: HOSPADM

## 2018-03-30 RX ORDER — BACLOFEN 10 MG/1
10 TABLET ORAL 3 TIMES DAILY
Status: DISCONTINUED | OUTPATIENT
Start: 2018-03-30 | End: 2018-03-30

## 2018-03-30 RX ORDER — POLYETHYLENE GLYCOL 3350 17 G/17G
17 POWDER, FOR SOLUTION ORAL DAILY
Status: DISCONTINUED | OUTPATIENT
Start: 2018-03-30 | End: 2018-04-04 | Stop reason: HOSPADM

## 2018-03-30 RX ADMIN — GABAPENTIN 600 MG: 300 CAPSULE ORAL at 20:09

## 2018-03-30 RX ADMIN — OXYCODONE HYDROCHLORIDE 20 MG: 10 TABLET ORAL at 09:59

## 2018-03-30 RX ADMIN — FENTANYL 100 MCG: 100 PATCH, EXTENDED RELEASE TRANSDERMAL at 09:59

## 2018-03-30 RX ADMIN — HYOSCYAMINE SULFATE 0.38 MG: 0.38 TABLET, EXTENDED RELEASE ORAL at 10:02

## 2018-03-30 RX ADMIN — INSULIN GLARGINE 10 UNITS: 100 INJECTION, SOLUTION SUBCUTANEOUS at 21:38

## 2018-03-30 RX ADMIN — HEPARIN SODIUM 5000 UNITS: 5000 INJECTION, SOLUTION INTRAVENOUS; SUBCUTANEOUS at 13:47

## 2018-03-30 RX ADMIN — HEPARIN SODIUM 5000 UNITS: 5000 INJECTION, SOLUTION INTRAVENOUS; SUBCUTANEOUS at 21:41

## 2018-03-30 RX ADMIN — BACLOFEN 10 MG: 10 TABLET ORAL at 16:20

## 2018-03-30 RX ADMIN — MELATONIN TAB 3 MG 4.5 MG: 3 TAB at 21:37

## 2018-03-30 RX ADMIN — TERAZOSIN HYDROCHLORIDE 5 MG: 5 CAPSULE ORAL at 21:37

## 2018-03-30 RX ADMIN — INSULIN LISPRO 1 UNITS: 100 INJECTION, SOLUTION INTRAVENOUS; SUBCUTANEOUS at 21:44

## 2018-03-30 RX ADMIN — INSULIN LISPRO 1 UNITS: 100 INJECTION, SOLUTION INTRAVENOUS; SUBCUTANEOUS at 13:48

## 2018-03-30 RX ADMIN — POLYETHYLENE GLYCOL 3350 17 G: 17 POWDER, FOR SOLUTION ORAL at 09:59

## 2018-03-30 RX ADMIN — CLONIDINE HYDROCHLORIDE 0.2 MG: 0.2 TABLET ORAL at 10:00

## 2018-03-30 RX ADMIN — GABAPENTIN 600 MG: 300 CAPSULE ORAL at 10:00

## 2018-03-30 RX ADMIN — OXYCODONE HYDROCHLORIDE 20 MG: 10 TABLET ORAL at 14:35

## 2018-03-30 RX ADMIN — OXYCODONE HYDROCHLORIDE 20 MG: 10 TABLET ORAL at 18:41

## 2018-03-30 RX ADMIN — ACETAMINOPHEN 975 MG: 325 TABLET, FILM COATED ORAL at 05:14

## 2018-03-30 RX ADMIN — POLYETHYLENE GLYCOL 3350 17 G: 17 POWDER, FOR SOLUTION ORAL at 13:46

## 2018-03-30 RX ADMIN — ACETAMINOPHEN 975 MG: 325 TABLET, FILM COATED ORAL at 21:34

## 2018-03-30 RX ADMIN — HEPARIN SODIUM 5000 UNITS: 5000 INJECTION, SOLUTION INTRAVENOUS; SUBCUTANEOUS at 05:12

## 2018-03-30 RX ADMIN — CLONIDINE HYDROCHLORIDE 0.2 MG: 0.2 TABLET ORAL at 20:09

## 2018-03-30 RX ADMIN — HYDROMORPHONE HYDROCHLORIDE 1 MG: 1 INJECTION, SOLUTION INTRAMUSCULAR; INTRAVENOUS; SUBCUTANEOUS at 05:18

## 2018-03-30 RX ADMIN — SERTRALINE HYDROCHLORIDE 75 MG: 50 TABLET ORAL at 10:00

## 2018-03-30 RX ADMIN — GABAPENTIN 600 MG: 300 CAPSULE ORAL at 16:20

## 2018-03-30 RX ADMIN — ACETAMINOPHEN 975 MG: 325 TABLET, FILM COATED ORAL at 13:46

## 2018-03-30 RX ADMIN — OXYCODONE HYDROCHLORIDE 20 MG: 10 TABLET ORAL at 22:53

## 2018-03-30 RX ADMIN — PANTOPRAZOLE SODIUM 40 MG: 40 TABLET, DELAYED RELEASE ORAL at 05:14

## 2018-03-30 RX ADMIN — SENNOSIDES 8.6 MG: 8.6 TABLET, FILM COATED ORAL at 10:00

## 2018-03-30 RX ADMIN — BACLOFEN 10 MG: 10 TABLET ORAL at 21:34

## 2018-03-30 RX ADMIN — OXYCODONE HYDROCHLORIDE 20 MG: 10 TABLET ORAL at 02:38

## 2018-03-30 RX ADMIN — BACLOFEN 10 MG: 10 TABLET ORAL at 11:06

## 2018-03-30 RX ADMIN — Medication 1 TABLET: at 10:00

## 2018-03-30 RX ADMIN — HYDROMORPHONE HYDROCHLORIDE 1 MG: 1 INJECTION, SOLUTION INTRAMUSCULAR; INTRAVENOUS; SUBCUTANEOUS at 23:50

## 2018-03-30 RX ADMIN — BENAZEPRIL HYDROCHLORIDE 40 MG: 10 TABLET ORAL at 10:00

## 2018-03-30 RX ADMIN — BACLOFEN 10 MG: 10 TABLET ORAL at 20:09

## 2018-03-30 RX ADMIN — SENNOSIDES 8.6 MG: 8.6 TABLET, FILM COATED ORAL at 17:10

## 2018-03-30 NOTE — PROGRESS NOTES
Progress Note - Inpatient Pain Management    Rose Leavitt 65 y o  male MRN: [id number]  Unit/Bed#: [facility] 921-01 Encounter: [id number]      Assessment:   Principal Problem:    Acute back pain  Active Problems:    Pain syndrome, chronic    Benign essential hypertension    Depression    Type 2 diabetes mellitus (HCC)    Lumbar radiculopathy    Pain of right thigh    Sleep apnea    Spondylolisthesis of lumbar region    Facet arthropathy, lumbar    Herniated lumbar intervertebral disc    Radiculopathy due to lumbar intervertebral disc disorder    Radicular pain of right lower extremity      Plan/Recommendations:   Acute on chronic pain S/P L2/3 and L3/4 MIS transforaminal lumbar interbody fusion, L3/4 right discectomy  · Acetaminophen 975mg Q8 hours  · Increase Baclofen to 10mg TID for ongoing spasms   · Fentanyl patch 100mcg Q48 hours (home medication)  · Gabapentin 600mg TID  · Discontinue IV Dilaudid   · Oxycodone 20mg Q4 hours PRN severe pain     Reviewed with SLIM and neuro-surg    Pain History  Current pain location(s): lower back   Pain Scale:   6-8  Severity:  Severe at times  Quality: sharp, intermittent spasms  24 hour history: Patient resting in bed  Appears comfortable  Reporting pain in his lower back that travels to his legs, 7/10 at present  Intermittent spasms are present  C/O constipation       Current Analgesic regimen:  Acetaminophen 975mg Q8 hours, Baclofen 5mg TID, Fentanyl 100mcg patch, Gabapentin 600mg TID, Dilaudid 1mg IV Q4 hours PRN (4mg), Oxycodone 20mg Q4 hours PRN (2 doses)  Bowel Regimen: Senna BID, Colace BID PRN, Dulcolax PRN, Miralax PRN    Meds/Allergies   all current active meds have been reviewed and current meds:   Current Facility-Administered Medications   Medication Dose Route Frequency    acetaminophen (TYLENOL) tablet 975 mg  975 mg Oral Q8H [address]    acetaminophen (TYLENOL) tablet 975 mg  975 mg Oral Once    baclofen tablet 10 mg  10 mg Oral TID    benazepril (LOTENSIN) tablet 40 mg  40 mg Oral Daily    bisacodyl (DULCOLAX) EC tablet 5 mg  5 mg Oral Daily PRN    bisacodyl (DULCOLAX) rectal suppository 10 mg  10 mg Rectal Daily PRN    cloNIDine (CATAPRES) tablet 0 2 mg  0 2 mg Oral Q12H [facility]    docusate sodium (COLACE) capsule 100 mg  100 mg Oral BID PRN    fentaNYL (DURAGESIC) 100 mcg/hr TD 72 hr patch 100 mcg  100 mcg Transdermal Q48H    gabapentin (NEURONTIN) capsule 600 mg  600 mg Oral TID    heparin (porcine) subcutaneous injection 5,000 Units  5,000 Units Subcutaneous Q8H [facility]    hyoscyamine (LEVBID) 12 hr tablet 0 375 mg  0 375 mg Oral Daily    insulin glargine (LANTUS) subcutaneous injection 10 Units  10 Units Subcutaneous HS    insulin lispro (HumaLOG) 100 units/mL subcutaneous injection 1-5 Units  1-5 Units Subcutaneous TID AC    insulin lispro (HumaLOG) 100 units/mL subcutaneous injection 1-5 Units  1-5 Units Subcutaneous HS    melatonin tablet 4 5 mg  4 5 mg Oral HS    multivitamin-minerals (CENTRUM) tablet 1 tablet  1 tablet Oral Daily    Naloxegol Oxalate TABS 12 5 mg  12 5 mg Oral Daily    ondansetron (ZOFRAN) injection 4 mg  4 mg Intravenous Q6H PRN    oxyCODONE (ROXICODONE) immediate release tablet 20 mg  20 mg Oral Q4H PRN    pantoprazole (PROTONIX) EC tablet 40 mg  40 mg Oral Early Morning    polyethylene glycol (MIRALAX) packet 17 g  17 g Oral Daily PRN    polyethylene glycol (MIRALAX) packet 17 g  17 g Oral Daily    pregabalin (LYRICA) capsule 150 mg  150 mg Oral Once    senna (SENOKOT) tablet 8 6 mg  1 tablet Oral BID    sertraline (ZOLOFT) tablet 75 mg  75 mg Oral Daily    terazosin (HYTRIN) capsule 5 mg  5 mg Oral HS    zolpidem (AMBIEN) tablet 5 mg  5 mg Oral HS PRN       Allergies   Allergen Reactions    Propulsid [Cisapride] Tongue Swelling    Cymbalta [Duloxetine Hcl] Headache    Vancomycin Rash     Red man syndrome       Objective     Temp:  [98 7 °F (37 1 °C)-99 °F (37 2 °C)] 99 °F (37 2 °C)  HR:  [71-86] 76  Resp:  [18] 18  BP: (156-197)/(82-92) 156/92      Intake/Output Summary (Last 24 hours) at 03/30/18 1012  Last data filed at 03/30/18 0900   Gross per 24 hour   Intake             2800 ml   Output             2375 ml   Net              425 ml               Physical Exam: /92 (BP Location: Right arm)   Pulse 76   Temp 99 °F (37 2 °C) (Oral)   Resp 18   Ht 5' 10" (1 778 m)   Wt 109 kg (240 lb 4 8 oz)   SpO2 95%   BMI 34 48 kg/m²   General Appearance:    Alert, cooperative, no distress, appears stated age   Neurological:   Oriented to person, place, and time   Head:    Normocephalic, without obvious abnormality, atraumatic   Eyes:    EOM's intact   Lungs:     Respirations unlabored   Chest Wall:    No deformity   Abdomen:        Large, round   Skin:   Skin no rashes or lesions     Lab Results:     Results from last 7 days  Lab Units 03/29/18  0640   WBC Thousand/uL 12 03*   HEMOGLOBIN g/dL 10 8*   HEMATOCRIT % 33 1*   PLATELETS Thousands/uL 256      Results from last 7 days  Lab Units 03/28/18  2358  03/25/18  0500   SODIUM mmol/L 142  < > 139   POTASSIUM mmol/L 3 7  < > 3 4*   CHLORIDE mmol/L 107  < > 102   CO2 mmol/L 30  < > 32   BUN mg/dL 12  < > 14   CREATININE mg/dL 0 73  < > 0 87   CALCIUM mg/dL 8 7  < > 9 1   TOTAL PROTEIN g/dL  --   --  7 7   BILIRUBIN TOTAL mg/dL  --   --  0 41   ALK PHOS U/L  --   --  76   ALT U/L  --   --  32   AST U/L  --   --  20   GLUCOSE RANDOM mg/dL 108  < > 139   < > = values in this interval not displayed  Imaging Studies: I have personally reviewed pertinent reports  Counseling / Coordination of Care  Total floor / unit time spent today 15 minutes  Greater than 50% of total time was spent with the patient and / or family counseling and / or coordination of care   A description of the counseling / coordination of care: Reviewed plan of care and medications with patient, RN staff, neuro-surg and primary care team     Janan Collet, MS, RN-BC  Acute Pain
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
## 2018-03-30 NOTE — PLAN OF CARE
Problem: PHYSICAL THERAPY ADULT  Goal: Performs mobility at highest level of function for planned discharge setting  See evaluation for individualized goals  Treatment/Interventions: Functional transfer training, LE strengthening/ROM, Elevations, Therapeutic exercise, Endurance training, Cognitive reorientation, Patient/family training, Equipment eval/education, Bed mobility, Gait training, Compensatory technique education, Continued evaluation, Spoke to nursing, Spoke to advanced practitioner, Family  Equipment Recommended: Harvey [place]       See flowsheet documentation for full assessment, interventions and recommendations    Prognosis: Good  Problem List: Decreased strength, Decreased range of motion, Decreased endurance, Impaired balance, Decreased mobility, Decreased coordination, Impaired judgement, Decreased safety awareness, Decreased skin integrity, Orthopedic restrictions, Pain  Assessment: Pt is a 65 y o  male admitted to [facility] on 3/24/2018 w/ Acute back pain; currently s/p L2/3 and L3/4 MIS transforaminal lumbar interbody fixation and fusion with L3/4 right diskectomy and reevaluated by PT for mob and d/c planning Pt exhibits significant impairments with weakness, decreased ROM, impaired balance, decreased endurance, impaired coordination, gait deviations, pain, decreased activity tolerance, decreased functional mobility tolerance, decreased safety awareness, impaired judgement, fall risk, orthopedic restrictions and decreased skin integrity; these impact independence with mobility, ADLs, and IADLs; requires min assist w transf and amb 3 ft bed-chair using LSO - Nsx contacted and awaiting clarification on brace use; at time of intervention, pt reports he has been using brace PTA and was approved to cont use for comfort; no orders for brace noted in Nsx note or orders; objective measures on the Barthel Index also reveal limitations;  therapy prognosis is impacted by relevant co morbidities as noted in evaluation; clinical presentation is currently unstable/unpredictable - pain inadequately controlled, presents w significant phys imapirments as noted - a regression from baseline; skilled PT is indicated to to optimize functional independence and discharge planning; pending functional progress, PT recommendation at discharge is Home PT  and home with family support RW; updated goals as noted below        Recommendation: Home PT, Home with family support          See flowsheet documentation for full assessment

## 2018-03-30 NOTE — SOCIAL WORK
CM reviewed Pt in care coordination rounds, Pt will D/C home when pain controlled  R/w and commode delivered to bedside  Pt refusing Home PT services  No further needs identified by the Pt at this time

## 2018-03-30 NOTE — PHYSICAL THERAPY NOTE
PT Progress Note     03/30/18 1030   Pain Assessment   Pain Assessment 0-10   Pain Score 7   Pain Type Acute pain   Pain Location Back   Hospital Pain Intervention(s) Ambulation/increased activity   Response to Interventions unchanged   Restrictions/Precautions   Braces or Orthoses (pt using LSO for comfort)   General   Family/Caregiver Present Yes   Cognition   Attention Within functional limits   Orientation Level Oriented to person;Oriented to place   Following Commands Follows multistep commands without difficulty   Subjective   Subjective pt agreeable for amb and stair trail in anticipation of d/c home   Transfers   Sit to Stand 5  Supervision   Additional items Increased time required;Verbal cues   Stand to Sit 5  Supervision   Additional items Increased time required;Verbal cues   Stand pivot 5  Supervision   Additional items Increased time required;Verbal cues  (RW)   Ambulation/Elevation   Gait pattern Improper Weight shift;Decreased foot clearance; Short stride; Excessively slow   Gait Assistance 5  Supervision   Additional items Verbal cues; Tactile cues   Assistive Device Rolling walker   Distance 60 ft 140 ft   Stair Management Assistance 5  Supervision   Additional items Verbal cues   Stair Management Technique Step to pattern; One rail R  (1 step)   Balance   Dynamic Sitting Fair  (forward reach)   Static Standing Fair -  (RW)   Dynamic Standing Poor  (RW)   Endurance Deficit   Endurance Deficit Yes   Endurance Deficit Description antalgic LE instability   Activity Tolerance   Activity Tolerance Patient limited by pain   Nurse Made Aware yes   Assessment   Prognosis Good   Problem List Decreased strength;Decreased range of motion;Decreased endurance; Impaired balance;Decreased mobility; Decreased coordination; Impaired judgement;Decreased safety awareness;Decreased skin integrity;Orthopedic restrictions;Pain   Assessment pt progressed w mob; requires supervision w transf and amb using RW; pt maintained his personal LSO donned for comfort; at this time still awaiting clarification from Nsx on ongoing use; pt requires seated rest interval and RW w amb to minimize WB stresses on spine; educated on freq short amb dist and garduated progression to increased activity tolerance; pt also requires supervision up/down 1 step w rail support; this session pt educated on post op spine precautions, appropriate body mech and positioning to minimize pain exacer; rec cont PT home w fam support home PT when med cleared   Goals   Patient Goals decrease pain   STG Expiration Date 04/13/18   Plan   Treatment/Interventions Functional transfer training;LE strengthening/ROM; Therapeutic exercise;Elevations; Endurance training;Cognitive reorientation;Patient/family training;Equipment eval/education; Bed mobility;Gait training; Compensatory technique education;Continued evaluation;Spoke to case management   Progress Slow progress, decreased activity tolerance   Recommendation   Recommendation Home PT; Home with family support   Equipment Recommended Walker   PT - OK to Discharge Yes

## 2018-03-30 NOTE — POST OP PROGRESS NOTES
Progress Note - Neurosurgery   Flory Gonzales 65 y o  male MRN: [id number]  Unit/Bed#: [facility] 921-01 Encounter: [id number]    Assessment:  1  POD2 L2/3 and L3/4 MIS transforaminal lumbar interbody fixation and fusion with L3/4 right diskectomy  2  LBP with RLE radiculopathy  3  L3/4 foraminal stenosis secondary to extruded disc, ligamentous hypertrophy and facet arthropathy   4  Spondylolisthesis of the lumbar region  5  Chronic pain syndrome  6  Post laminectomy syndrome  7  HTN  8  Type 2 DM  9  Sleep apnea     Plan:  · Exam reveals full strength and sensation to LT throughout BLE  · Dressing 50% saturated  Steri-Strips mildly adherent  Removed and replaced  New sterile dressing placed  · Imaging reviewed personally and by attending  Final results as below  ? MRI lumbar spine wo 3/25/18: L3/4 extruded disc herniation causing right lateral recess and foraminal stenosis  There is also facet arthropathy and ligamentous hypertrophy  Preserved surgical changes noted L4-S1  ? Lumbar spine flexion-extension x-rays 3/26/18: Grade 1 anterolisthesis L3 on L4 without instability on flexion/extension  ? Upright lumbar spine x-rays March 29, 2018:  Satisfactory alignment and hardware placement  · Pain control per primary team  APS consult appreciated  Agree with discontinuation of IV medication  Baclofen increased  The patient's pain should be controlled on current regimen if provided in a timely manner  · Mobilize as tolerated  Using cane  Therapy recommending home PT and home with family support with use of roller walker  ? Brace to be worn when OOB  Patient has hard shell brace at home which he will continue to use  · DVT PPX:SCDs at this time  Heparin  · Aggressive bowel regimen  Continue MiraLax daily until regular BM  Dorathy Infield Patient with history of obstruction following prior fusion surgery  · Will follow-up for 2wk and 6wk pov  · Call with questions/concerns     · Patient clear discharge neurosurgical standpoint  We will see as needed during remainder of hospitalization  Subjective/Objective   Chief Complaint: "I am okay"/postoperative follow-up    Subjective:  Patient complaining slightly increased incisional/low back pain currently rated 7/10  He also notes throbbing/spasm pain of bilateral posterior thighs  She continues with resolution preoperative right radicular leg pain  States improvement right thigh tingling yesterday  Voiding well  No bowel movement  + flatus  Mild abdominal discomfort  Objective:  Sitting up in chair  NAD  I/O       03/28 0701 - 03/29 0700 03/29 0701 - 03/30 0700 03/30 0701 - 03/31 0700    P  O  1920 1880 360    I V  (mL/kg) 4000 (36 7) 1040 (9 5)     IV Piggyback 500      Total Intake(mL/kg) 6420 (58 9) 2920 (26 8) 360 (3 3)    Urine (mL/kg/hr) 5450 (2 1) 2675 (1) 200 (0 4)    Stool  0 (0) 0 (0)    Blood 300 (0 1)      Total Output 5750 2675 200    Net +670 +245 +160           Unmeasured Urine Occurrence  3 x 1 x    Unmeasured Stool Occurrence  2 x 1 x          Invasive Devices     Peripheral Intravenous Line            Peripheral IV 03/28/18 Left Hand 2 days                Physical Exam:  Vitals: Blood pressure 156/92, pulse 76, temperature 99 °F (37 2 °C), temperature source Oral, resp  rate 18, height 5' 10" (1 778 m), weight 109 kg (240 lb 4 8 oz), SpO2 95 %  ,Body mass index is 34 48 kg/m²  General appearance: alert, appears stated age, cooperative and no distress  Head: Normocephalic, without obvious abnormality, atraumatic  Eyes: EOMI  Neck: supple, symmetrical, trachea midline   Back:  Incisions intact  Skin edges well aligned  Mild serosanguineous drainage from right superior incision    Lungs: non labored breathing  Heart: regular heart rate  Neurologic:   Mental status: Alert, oriented, thought content appropriate  Sensory: normal to LT  Motor: moving all extremities without focal weakness     Lab Results:    Results from last 7 days  Lab Units 03/29/18  0640 03/28/18  2358 03/28/18 0449 03/25/18  0500   WBC Thousand/uL 12 03*  --  8 60 9 81   HEMOGLOBIN g/dL 10 8*  --  11 9* 12 6   HEMATOCRIT % 33 1*  --  35 9* 36 6   PLATELETS Thousands/uL 256 235 261 280       Results from last 7 days  Lab Units 03/28/18  2358 03/28/18  0449 03/26/18  0449 03/25/18  0500   SODIUM mmol/L 142 142 139 139   POTASSIUM mmol/L 3 7 3 4* 3 7 3 4*   CHLORIDE mmol/L 107 105 101 102   CO2 mmol/L 30 31 34* 32   BUN mg/dL 12 19 18 14   CREATININE mg/dL 0 73 0 85 0 91 0 87   CALCIUM mg/dL 8 7 8 9 9 2 9 1   TOTAL PROTEIN g/dL  --   --   --  7 7   BILIRUBIN TOTAL mg/dL  --   --   --  0 41   ALK PHOS U/L  --   --   --  76   ALT U/L  --   --   --  32   AST U/L  --   --   --  20   GLUCOSE RANDOM mg/dL 108 155* 140 139               Results from last 7 days  Lab Units 03/25/18  0001   INR  0 96   PTT seconds 33     No results found for: TROPONINT  ABG:No results found for: PHART, PQP3KLU, PO2ART, VIP4JJX, T5IHWGDB, BEART, SOURCE    Imaging Studies: I have personally reviewed pertinent reports  and I have personally reviewed pertinent films in PACS    EKG, Pathology, and Other Studies: I have personally reviewed pertinent reports        VTE Pharmacologic Prophylaxis: Heparin    VTE Mechanical Prophylaxis: sequential compression device

## 2018-03-30 NOTE — DISCHARGE INSTRUCTIONS
Neurosurgical discharge instructions     Please keep incision clean and dry  Avoid applying creams, lotion or antiseptic to incision area   Allow steri-strips to fall off  May remove them completely in 10-14 days   Check the wound daily  If the incision becomes red, swollen, tender, warm, or has increased drainage please notify MD immediately   May shower 3 days after surgery, but do not soak in a tub and no swimming  NO SHOWER UNTIL NO DRAINAGE FROM INCISION   No bending or twisting at the waist  No heavy lifting greater than 5 - 10 lbs   No prolonged sitting greater than 30 minutes, but may walk as tolerated   Take frequent short walks each day  Increase your walking time as you heal    Continue to wear hard shell brace when out of bed until cleared by Dr Jagjit Laurent   Please take pain medications to relieved incision pain, and muscle relaxants to prevent spasms as directed by MD or Extender  Please see med  Rec  completed at the time of discharge   Please take over the counter stool softeners such as colace or senna-s to avoid constipation while on narcotics  Increase your fiber and water intake   No driving for 2 weeks or while on narcotics   Do not take ibuprofen, Naproxen/Aleve or any NSAID: May take Tylenol instead   If taking Coumadin, Aspirin, or Plavix, you may resume these medications once cleared by Neurosurgery    Follow-up as scheduled for a 2 week incision check and staple removal   Follow-up for a 6 week post-operative visit with x-rays to be completed prior to the appointment  **Please notify MD if you experience a fever 101  F or have increased back or leg pain, numbness and/or weakness in your leg**

## 2018-03-30 NOTE — PROGRESS NOTES
Progress Note - Daniel Mercado 1960, 65 y o  male MRN: [id number]    Unit/Bed#: [facility] 921-01 Encounter: [id number]    Primary Care Provider: Bharath Quarles MD   Date and time admitted to hospital: 3/24/2018  3:33 PM        Sleep apnea   Assessment & Plan    · Continue CPAP qhs        Pain of right thigh   Assessment & Plan    · Patient has definite pains on his right thigh and hip  · This may be referred pain from his post laminectomy syndrome of the lumbar spine  However, we cannot still totally rule out possibility that this is a separate problem  · We will do a right hip x-ray  No acute osseous abnormality  ·  pain a factor and pain management discussed with me today with probable plan to do surgery         Lumbar radiculopathy   Assessment & Plan    · Patient with hx of chronic LBP presented with RLE pain likely consistent with L3-4/lumbar radiculopathy  Now reporting pain distal to right knee, continues to denies saddle anesthesia or paresthesias  · Appreciate neurosurgery input   · S/p MRI lumbar spine   · L3/4 extruded disc herniation causing right lateral recess and foraminal stenosis, right facet arthropathy and ligamentous hypertrophy   · Lumbar spine flexion/extension xrays yesterday   · APS following and patient was started on ketamine infusion 3/26 and rate increased today   · Per neurosx attending attestation, patient will likely need extensive decompression with fixation/fusion, which would be best accomplished after integrated with outpatient pain management and outpatient discussion   · Consider medrol dose charles? Will reach out to neurosx        Type 2 diabetes mellitus ([facility] [address] )   Assessment & Plan    · Hold amaryl, resume on d/c   · Last HA1C 7 4%   · Started on lantus 10units qHS for better glycemic control inpatient   · Continue SSI, accuchecks, diabetic diet   · Glucose is better controlled        Pain syndrome, chronic   Assessment & Plan    · Post-laminectomy syndrome   Previously had spine stimulator that was removed this month  · Home regimen includes Celebrex, fentanyl, oxy IR  Will adjust  · Pain management consult  · Secondary to chronic back pain         * Acute back pain   Assessment & Plan    · Acute on chronic back pain likely due to new herniated disc  · S/p L2/3 and L3/4 MIS transforaminal lumbar interbody fixation fusion from right sided approach, L3/L4 discectomy on 3/28/18, POD #1  ·  d/c ketamine drip and started on dilaudid 1 mg q 2 hrs for breakthrough pain  Continue oxycodone, Fentanyl patch, gabapentin, and Robaxin  Continuous pulse ox  · Dilaudid was discontinued  · PT/OT          VTE Pharmacologic Prophylaxis:   Pharmacologic: Heparin  Mechanical VTE Prophylaxis in Place: Yes    Patient Centered Rounds: I have performed bedside rounds with nursing staff today  Discussions with Specialists or Other Care Team Provider: neurosurgery, acute pain    Education and Discussions with Family / Patient: patient    Time Spent for Care: 45 minutes  More than 50% of total time spent on counseling and coordination of care as described above  Current Length of Stay: 6 day(s)    Current Patient Status: Inpatient   Certification Statement: The patient will continue to require additional inpatient hospital stay due to pain    Discharge Plan: possibly sat    Code Status: Level 1 - Full Code      Subjective:   I am doing well  I am using less of dilaudid  I still have some pain  Objective:     Vitals:   Temp (24hrs), Av 6 °F (37 °C), Min:98 4 °F (36 9 °C), Max:99 °F (37 2 °C)    HR:  [64-77] 64  Resp:  [18] 18  BP: (145-196)/(73-92) 145/73  SpO2:  [95 %-97 %] 97 %  Body mass index is 34 48 kg/m²  Input and Output Summary (last 24 hours):        Intake/Output Summary (Last 24 hours) at 18 1625  Last data filed at 18 1346   Gross per 24 hour   Intake             2000 ml   Output             1675 ml   Net              325 ml       Physical Exam:     Physical Exam Constitutional: He is oriented to person, place, and time  HENT:   Head: Normocephalic and atraumatic  Right Ear: External ear normal    Left Ear: External ear normal    Nose: Nose normal    Mouth/Throat: Oropharynx is clear and moist  No oropharyngeal exudate  Eyes: Conjunctivae and EOM are normal  Pupils are equal, round, and reactive to light  Right eye exhibits no discharge  Left eye exhibits no discharge  No scleral icterus  Neck: Normal range of motion  Neck supple  No JVD present  No tracheal deviation present  No thyromegaly present  Cardiovascular: Normal rate, regular rhythm and normal heart sounds  Exam reveals no gallop  No murmur heard  Pulmonary/Chest: Effort normal and breath sounds normal  No stridor  No respiratory distress  He has no wheezes  He has no rales  He exhibits no tenderness  Abdominal: Soft  Bowel sounds are normal  He exhibits no distension and no mass  There is no tenderness  There is no rebound and no guarding  Musculoskeletal: Normal range of motion  He exhibits no edema, tenderness or deformity  Lymphadenopathy:     He has no cervical adenopathy  Neurological: He is alert and oriented to person, place, and time  He displays normal reflexes  No cranial nerve deficit  He exhibits normal muscle tone  Coordination normal    Skin: Skin is warm and dry  No rash noted  No erythema  No pallor  Psychiatric: He has a normal mood and affect  His behavior is normal  Judgment and thought content normal    Nursing note and vitals reviewed        Additional Data:     Labs:      Results from last 7 days  Lab Units 03/29/18  0640   WBC Thousand/uL 12 03*   HEMOGLOBIN g/dL 10 8*   HEMATOCRIT % 33 1*   PLATELETS Thousands/uL 256       Results from last 7 days  Lab Units 03/28/18  2358  03/25/18  0500   SODIUM mmol/L 142  < > 139   POTASSIUM mmol/L 3 7  < > 3 4*   CHLORIDE mmol/L 107  < > 102   CO2 mmol/L 30  < > 32   BUN mg/dL 12  < > 14   CREATININE mg/dL 0 73  < > 0 87 CALCIUM mg/dL 8 7  < > 9 1   TOTAL PROTEIN g/dL  --   --  7 7   BILIRUBIN TOTAL mg/dL  --   --  0 41   ALK PHOS U/L  --   --  76   ALT U/L  --   --  32   AST U/L  --   --  20   GLUCOSE RANDOM mg/dL 108  < > 139   GLUCOSE, ISTAT   --   < >  --    < > = values in this interval not displayed  Results from last 7 days  Lab Units 03/25/18  0001   INR  0 96       * I Have Reviewed All Lab Data Listed Above  * Additional Pertinent Lab Tests Reviewed:  [address] Admission Reviewed    Imaging:    Imaging Reports Reviewed Today Include:    Imaging Personally Reviewed by Myself Includes:      Recent Cultures (last 7 days):           Last 24 Hours Medication List:     Current Facility-Administered Medications:  acetaminophen 975 mg Oral Q8H [facility] JAMIE Boyce   acetaminophen 975 mg Oral Once Sully Whitlock MD   baclofen 10 mg Oral TID Radha Ballesteros MD   benazepril 40 mg Oral Daily Jose E Palumbo MD   bisacodyl 5 mg Oral Daily PRN Radha Ballesteros MD   bisacodyl 10 mg Rectal Daily PRN Deepa Silva MD   cloNIDine 0 2 mg Oral Q12H [facility] JAMIE Boyce   docusate sodium 100 mg Oral BID PRN Deepa Silva PA-C   fentaNYL 100 mcg Transdermal Q48H Radha Ballesteros MD   gabapentin 600 mg Oral TID Jose E Palumbo MD   heparin (porcine) 5,000 Units Subcutaneous [facility] Deepa Silva MD   hyoscyamine 0 375 mg Oral Daily Jose E Palumbo MD   insulin glargine 10 Units Subcutaneous HS Jose E Palumbo MD   insulin lispro 1-5 Units Subcutaneous TID AC Jose E Palumbo MD   insulin lispro 1-5 Units Subcutaneous HS Jose E Palumbo MD   melatonin 4 5 mg Oral HS Jose E Palumbo MD   multivitamin-minerals 1 tablet Oral Daily Jose E Palumbo MD   Naloxegol Oxalate 12 5 mg Oral Daily Jose E Palumbo MD   ondansetron 4 mg Intravenous Q6H PRN Jose E Palumbo MD   oxyCODONE 20 mg Oral Q4H PRN JAMIE Vicente   pantoprazole 40 mg Oral Early Morning Jose E Palumbo MD   polyethylene glycol 17 g Oral Daily Segundo Turk MD   polyethylene glycol 17 g Oral Daily Alexsandra Vang PA-C   pregabalin 150 mg Oral Once Balaji Valentin MD   senna 1 tablet Oral BID Jose E Palumbo MD   sertraline 75 mg Oral Daily Jose E Palumbo MD   terazosin 5 mg Oral HS Jose E Palumbo MD   zolpidem 5 mg Oral HS PRN Ara Hines MD        Today, Patient Was Seen By: Segundo Turk MD    ** Please Note: Dictation voice to text software may have been used in the creation of this document   **

## 2018-03-30 NOTE — RESPIRATORY THERAPY NOTE
RT Protocol Note  Radha Carey 65 y o  male MRN: [id number]  [facility] Encounter: [id number]    Assessment    Principal Problem:    Acute back pain  Active Problems:    Pain syndrome, chronic    Benign essential hypertension    Depression    Type 2 diabetes mellitus (HCC)    Lumbar radiculopathy    Pain of right thigh    Sleep apnea    Spondylolisthesis of lumbar region    Facet arthropathy, lumbar    Herniated lumbar intervertebral disc    Radiculopathy due to lumbar intervertebral disc disorder    Radicular pain of right lower extremity      Home Pulmonary Medications:      Past Medical History:   Diagnosis Date    Chronic narcotic dependence (HCC)     Chronic pain disorder     CPAP (continuous positive airway pressure) dependence     Depression     Diabetes mellitus ([address] )     Esophageal reflux     Hearing loss     Hypertension     Sleep apnea      Social History     Social History    Marital status: /Civil Union     Spouse name: N/A    Number of children: N/A    Years of education: N/A     Social History Main Topics    Smoking status: Former Smoker     Packs/day: 1 50     Years: 15 00     Quit date: 1992    Smokeless tobacco: Never Used    Alcohol use No    Drug use: No    Sexual activity: Not Asked     Other Topics Concern    None     Social History Narrative    None       Subjective         Objective    Physical Exam:        Vitals:  Blood pressure 160/82, pulse 71, temperature 98 7 °F (37 1 °C), temperature source Oral, resp  rate 18, height 5' 10" (1 778 m), weight 109 kg (240 lb 4 8 oz), SpO2 96 %  Imaging and other studies: I have personally reviewed pertinent reports              Plan             Patient refuse night CPAP after several attempts

## 2018-03-30 NOTE — ASSESSMENT & PLAN NOTE
· Acute on chronic back pain likely due to new herniated disc  · S/p L2/3 and L3/4 MIS transforaminal lumbar interbody fixation fusion from right sided approach, L3/L4 discectomy on 3/28/18, POD #1  ·  d/c ketamine drip and started on dilaudid 1 mg q 2 hrs for breakthrough pain  Continue oxycodone, Fentanyl patch, gabapentin, and Robaxin   Continuous pulse ox  · Dilaudid was discontinued  · PT/OT

## 2018-03-31 ENCOUNTER — APPOINTMENT (INPATIENT)
Dept: RADIOLOGY | Facility: HOSPITAL | Age: 58
DRG: 214 | End: 2018-03-31
Payer: OTHER MISCELLANEOUS

## 2018-03-31 LAB
ANION GAP SERPL CALCULATED.3IONS-SCNC: 6 MMOL/L (ref 4–13)
BASOPHILS # BLD AUTO: 0.02 THOUSANDS/ΜL (ref 0–0.1)
BASOPHILS NFR BLD AUTO: 0 % (ref 0–1)
BUN SERPL-MCNC: 10 MG/DL (ref 5–25)
CALCIUM SERPL-MCNC: 9.1 MG/DL (ref 8.3–10.1)
CHLORIDE SERPL-SCNC: 104 MMOL/L (ref 100–108)
CO2 SERPL-SCNC: 29 MMOL/L (ref 21–32)
CREAT SERPL-MCNC: 0.68 MG/DL (ref 0.6–1.3)
EOSINOPHIL # BLD AUTO: 0.07 THOUSAND/ΜL (ref 0–0.61)
EOSINOPHIL NFR BLD AUTO: 1 % (ref 0–6)
ERYTHROCYTE [DISTWIDTH] IN BLOOD BY AUTOMATED COUNT: 12.9 % (ref 11.6–15.1)
GFR SERPL CREATININE-BSD FRML MDRD: 107 ML/MIN/1.73SQ M
GLUCOSE SERPL-MCNC: 147 MG/DL (ref 65–140)
GLUCOSE SERPL-MCNC: 151 MG/DL (ref 65–140)
GLUCOSE SERPL-MCNC: 165 MG/DL (ref 65–140)
GLUCOSE SERPL-MCNC: 177 MG/DL (ref 65–140)
GLUCOSE SERPL-MCNC: 186 MG/DL (ref 65–140)
GLUCOSE SERPL-MCNC: 202 MG/DL (ref 65–140)
HCT VFR BLD AUTO: 30.9 % (ref 36.5–49.3)
HGB BLD-MCNC: 10.6 G/DL (ref 12–17)
LYMPHOCYTES # BLD AUTO: 2 THOUSANDS/ΜL (ref 0.6–4.47)
LYMPHOCYTES NFR BLD AUTO: 19 % (ref 14–44)
MCH RBC QN AUTO: 27.4 PG (ref 26.8–34.3)
MCHC RBC AUTO-ENTMCNC: 34.3 G/DL (ref 31.4–37.4)
MCV RBC AUTO: 80 FL (ref 82–98)
MONOCYTES # BLD AUTO: 0.79 THOUSAND/ΜL (ref 0.17–1.22)
MONOCYTES NFR BLD AUTO: 7 % (ref 4–12)
NEUTROPHILS # BLD AUTO: 7.72 THOUSANDS/ΜL (ref 1.85–7.62)
NEUTS SEG NFR BLD AUTO: 73 % (ref 43–75)
NRBC BLD AUTO-RTO: 0 /100 WBCS
PLATELET # BLD AUTO: 243 THOUSANDS/UL (ref 149–390)
PMV BLD AUTO: 9.7 FL (ref 8.9–12.7)
POTASSIUM SERPL-SCNC: 3.4 MMOL/L (ref 3.5–5.3)
RBC # BLD AUTO: 3.87 MILLION/UL (ref 3.88–5.62)
SODIUM SERPL-SCNC: 139 MMOL/L (ref 136–145)
WBC # BLD AUTO: 10.68 THOUSAND/UL (ref 4.31–10.16)

## 2018-03-31 PROCEDURE — 72131 CT LUMBAR SPINE W/O DYE: CPT

## 2018-03-31 PROCEDURE — 94760 N-INVAS EAR/PLS OXIMETRY 1: CPT

## 2018-03-31 PROCEDURE — 82948 REAGENT STRIP/BLOOD GLUCOSE: CPT

## 2018-03-31 PROCEDURE — 97110 THERAPEUTIC EXERCISES: CPT

## 2018-03-31 PROCEDURE — 97535 SELF CARE MNGMENT TRAINING: CPT | Performed by: STUDENT IN AN ORGANIZED HEALTH CARE EDUCATION/TRAINING PROGRAM

## 2018-03-31 PROCEDURE — 94660 CPAP INITIATION&MGMT: CPT

## 2018-03-31 PROCEDURE — 97116 GAIT TRAINING THERAPY: CPT

## 2018-03-31 PROCEDURE — 99233 SBSQ HOSP IP/OBS HIGH 50: CPT | Performed by: INTERNAL MEDICINE

## 2018-03-31 PROCEDURE — 99024 POSTOP FOLLOW-UP VISIT: CPT | Performed by: PHYSICIAN ASSISTANT

## 2018-03-31 PROCEDURE — 80048 BASIC METABOLIC PNL TOTAL CA: CPT | Performed by: INTERNAL MEDICINE

## 2018-03-31 PROCEDURE — 97530 THERAPEUTIC ACTIVITIES: CPT | Performed by: STUDENT IN AN ORGANIZED HEALTH CARE EDUCATION/TRAINING PROGRAM

## 2018-03-31 PROCEDURE — 85025 COMPLETE CBC W/AUTO DIFF WBC: CPT | Performed by: INTERNAL MEDICINE

## 2018-03-31 PROCEDURE — 97535 SELF CARE MNGMENT TRAINING: CPT

## 2018-03-31 RX ORDER — POTASSIUM CHLORIDE 20 MEQ/1
40 TABLET, EXTENDED RELEASE ORAL DAILY
Status: COMPLETED | OUTPATIENT
Start: 2018-03-31 | End: 2018-03-31

## 2018-03-31 RX ORDER — METHYLPREDNISOLONE 4 MG/1
8 TABLET ORAL DAILY
Status: DISCONTINUED | OUTPATIENT
Start: 2018-04-05 | End: 2018-04-04 | Stop reason: HOSPADM

## 2018-03-31 RX ORDER — METHYLPREDNISOLONE 16 MG/1
16 TABLET ORAL DAILY
Status: COMPLETED | OUTPATIENT
Start: 2018-04-03 | End: 2018-04-03

## 2018-03-31 RX ORDER — HYDROMORPHONE HCL 110MG/55ML
2 PATIENT CONTROLLED ANALGESIA SYRINGE INTRAVENOUS
Status: DISCONTINUED | OUTPATIENT
Start: 2018-03-31 | End: 2018-03-31

## 2018-03-31 RX ORDER — METHYLPREDNISOLONE 4 MG/1
4 TABLET ORAL DAILY
Status: DISCONTINUED | OUTPATIENT
Start: 2018-04-06 | End: 2018-04-04 | Stop reason: HOSPADM

## 2018-03-31 RX ORDER — POTASSIUM CHLORIDE 20 MEQ/1
40 TABLET, EXTENDED RELEASE ORAL DAILY
Status: DISCONTINUED | OUTPATIENT
Start: 2018-03-31 | End: 2018-03-31

## 2018-03-31 RX ORDER — METHYLPREDNISOLONE 4 MG/1
12 TABLET ORAL DAILY
Status: COMPLETED | OUTPATIENT
Start: 2018-04-04 | End: 2018-04-04

## 2018-03-31 RX ADMIN — ACETAMINOPHEN 975 MG: 325 TABLET, FILM COATED ORAL at 21:54

## 2018-03-31 RX ADMIN — INSULIN LISPRO 1 UNITS: 100 INJECTION, SOLUTION INTRAVENOUS; SUBCUTANEOUS at 21:51

## 2018-03-31 RX ADMIN — GABAPENTIN 600 MG: 300 CAPSULE ORAL at 08:09

## 2018-03-31 RX ADMIN — OXYCODONE HYDROCHLORIDE 20 MG: 10 TABLET ORAL at 12:23

## 2018-03-31 RX ADMIN — INSULIN GLARGINE 10 UNITS: 100 INJECTION, SOLUTION SUBCUTANEOUS at 21:55

## 2018-03-31 RX ADMIN — HYDROMORPHONE HYDROCHLORIDE 2 MG: 2 INJECTION, SOLUTION INTRAMUSCULAR; INTRAVENOUS; SUBCUTANEOUS at 02:11

## 2018-03-31 RX ADMIN — HEPARIN SODIUM 5000 UNITS: 5000 INJECTION, SOLUTION INTRAVENOUS; SUBCUTANEOUS at 21:52

## 2018-03-31 RX ADMIN — HYDROMORPHONE HYDROCHLORIDE 1 MG: 1 INJECTION, SOLUTION INTRAMUSCULAR; INTRAVENOUS; SUBCUTANEOUS at 10:35

## 2018-03-31 RX ADMIN — INSULIN LISPRO 1 UNITS: 100 INJECTION, SOLUTION INTRAVENOUS; SUBCUTANEOUS at 18:27

## 2018-03-31 RX ADMIN — BENAZEPRIL HYDROCHLORIDE 40 MG: 10 TABLET ORAL at 08:09

## 2018-03-31 RX ADMIN — HEPARIN SODIUM 5000 UNITS: 5000 INJECTION, SOLUTION INTRAVENOUS; SUBCUTANEOUS at 05:04

## 2018-03-31 RX ADMIN — ACETAMINOPHEN 975 MG: 325 TABLET, FILM COATED ORAL at 14:41

## 2018-03-31 RX ADMIN — POLYETHYLENE GLYCOL 3350 17 G: 17 POWDER, FOR SOLUTION ORAL at 08:10

## 2018-03-31 RX ADMIN — OXYCODONE HYDROCHLORIDE 20 MG: 10 TABLET ORAL at 03:30

## 2018-03-31 RX ADMIN — ACETAMINOPHEN 975 MG: 325 TABLET, FILM COATED ORAL at 05:04

## 2018-03-31 RX ADMIN — BACLOFEN 10 MG: 10 TABLET ORAL at 08:08

## 2018-03-31 RX ADMIN — CLONIDINE HYDROCHLORIDE 0.2 MG: 0.2 TABLET ORAL at 20:24

## 2018-03-31 RX ADMIN — SENNOSIDES 8.6 MG: 8.6 TABLET, FILM COATED ORAL at 18:27

## 2018-03-31 RX ADMIN — Medication 1 TABLET: at 08:08

## 2018-03-31 RX ADMIN — HYOSCYAMINE SULFATE 0.38 MG: 0.38 TABLET, EXTENDED RELEASE ORAL at 08:07

## 2018-03-31 RX ADMIN — OXYCODONE HYDROCHLORIDE 20 MG: 10 TABLET ORAL at 20:24

## 2018-03-31 RX ADMIN — CLONIDINE HYDROCHLORIDE 0.2 MG: 0.2 TABLET ORAL at 08:09

## 2018-03-31 RX ADMIN — PANTOPRAZOLE SODIUM 40 MG: 40 TABLET, DELAYED RELEASE ORAL at 05:04

## 2018-03-31 RX ADMIN — HYDROMORPHONE HYDROCHLORIDE 1 MG: 1 INJECTION, SOLUTION INTRAMUSCULAR; INTRAVENOUS; SUBCUTANEOUS at 04:35

## 2018-03-31 RX ADMIN — TERAZOSIN HYDROCHLORIDE 5 MG: 5 CAPSULE ORAL at 21:53

## 2018-03-31 RX ADMIN — HYDROMORPHONE HYDROCHLORIDE 1 MG: 1 INJECTION, SOLUTION INTRAMUSCULAR; INTRAVENOUS; SUBCUTANEOUS at 21:55

## 2018-03-31 RX ADMIN — POTASSIUM CHLORIDE 40 MEQ: 1500 TABLET, EXTENDED RELEASE ORAL at 12:23

## 2018-03-31 RX ADMIN — OXYCODONE HYDROCHLORIDE 20 MG: 10 TABLET ORAL at 07:29

## 2018-03-31 RX ADMIN — SERTRALINE HYDROCHLORIDE 75 MG: 50 TABLET ORAL at 08:08

## 2018-03-31 RX ADMIN — OXYCODONE HYDROCHLORIDE 20 MG: 10 TABLET ORAL at 16:35

## 2018-03-31 RX ADMIN — GABAPENTIN 600 MG: 300 CAPSULE ORAL at 20:23

## 2018-03-31 RX ADMIN — SENNOSIDES 8.6 MG: 8.6 TABLET, FILM COATED ORAL at 08:09

## 2018-03-31 RX ADMIN — BACLOFEN 10 MG: 10 TABLET ORAL at 15:33

## 2018-03-31 RX ADMIN — INSULIN LISPRO 1 UNITS: 100 INJECTION, SOLUTION INTRAVENOUS; SUBCUTANEOUS at 11:22

## 2018-03-31 RX ADMIN — BACLOFEN 10 MG: 10 TABLET ORAL at 20:24

## 2018-03-31 RX ADMIN — GABAPENTIN 600 MG: 300 CAPSULE ORAL at 15:33

## 2018-03-31 RX ADMIN — HEPARIN SODIUM 5000 UNITS: 5000 INJECTION, SOLUTION INTRAVENOUS; SUBCUTANEOUS at 14:40

## 2018-03-31 RX ADMIN — MELATONIN TAB 3 MG 4.5 MG: 3 TAB at 21:53

## 2018-03-31 NOTE — PLAN OF CARE
Problem: Potential for Falls  Goal: Patient will remain free of falls  INTERVENTIONS:  - Assess patient frequently for physical needs  -  Identify cognitive and physical deficits and behaviors that affect risk of falls    -  [place] fall precautions as indicated by assessment   - Educate patient/family on patient safety including physical limitations  - Instruct patient to call for assistance with activity based on assessment  - Modify environment to reduce risk of injury  - Consider OT/PT consult to assist with strengthening/mobility   Outcome: Progressing      Problem: PAIN - ADULT  Goal: Verbalizes/displays adequate comfort level or baseline comfort level  Interventions:  - Encourage patient to monitor pain and request assistance  - Assess pain using appropriate pain scale  - Administer analgesics based on type and severity of pain and evaluate response  - Implement non-pharmacological measures as appropriate and evaluate response  - Consider cultural and social influences on pain and pain management  - Notify physician/advanced practitioner if interventions unsuccessful or patient reports new pain   Outcome: Progressing      Problem: INFECTION - ADULT  Goal: Absence or prevention of progression during hospitalization  INTERVENTIONS:  - Assess and monitor for signs and symptoms of infection  - Monitor lab/diagnostic results  - Monitor all insertion sites, i e  indwelling lines, tubes, and drains  - Monitor endotracheal (as able) and nasal secretions for changes in amount and color  - [place] appropriate cooling/warming therapies per order  - Administer medications as ordered  - Instruct and encourage patient and family to use good hand hygiene technique  - Identify and instruct in appropriate isolation precautions for identified infection/condition   Outcome: Progressing    Goal: Absence of fever/infection during neutropenic period  INTERVENTIONS:  - Monitor WBC  - Implement neutropenic guidelines   Outcome: Progressing      Problem: SAFETY ADULT  Goal: Maintain or return to baseline ADL function  INTERVENTIONS:  -  Assess patient's ability to carry out ADLs; assess patient's baseline for ADL function and identify physical deficits which impact ability to perform ADLs (bathing, care of mouth/teeth, toileting, grooming, dressing, etc )  - Assess/evaluate cause of self-care deficits   - Assess range of motion  - Assess patient's mobility; develop plan if impaired  - Assess patient's need for assistive devices and provide as appropriate  - Encourage maximum independence but intervene and supervise when necessary  ¯ Involve family in performance of ADLs  ¯ Assess for home care needs following discharge   ¯ Request OT consult to assist with ADL evaluation and planning for discharge  ¯ Provide patient education as appropriate   Outcome: Progressing    Goal: Maintain or return mobility status to optimal level  INTERVENTIONS:  - Assess patient's baseline mobility status (ambulation, transfers, stairs, etc )    - Identify cognitive and physical deficits and behaviors that affect mobility  - Identify mobility aids required to assist with transfers and/or ambulation (gait belt, sit-to-stand, lift, walker, cane, etc )  - [place] fall precautions as indicated by assessment  - Record patient progress and toleration of activity level on Mobility SBAR; progress patient to next Phase/Stage  - Instruct patient to call for assistance with activity based on assessment  - Request Rehabilitation consult to assist with strengthening/weightbearing, etc    Outcome: Progressing      Problem: DISCHARGE PLANNING  Goal: Discharge to home or other facility with appropriate resources  INTERVENTIONS:  - Identify barriers to discharge w/patient and caregiver  - Arrange for needed discharge resources and transportation as appropriate  - Identify discharge learning needs (meds, wound care, etc )  - Arrange for interpretive services to assist at discharge as needed  - Refer to Case Management Department for coordinating discharge planning if the patient needs post-hospital services based on physician/advanced practitioner order or complex needs related to functional status, cognitive ability, or social support system   Outcome: Progressing      Problem: Knowledge Deficit  Goal: Patient/family/caregiver demonstrates understanding of disease process, treatment plan, medications, and discharge instructions  Complete learning assessment and assess knowledge base  Interventions:  - Provide teaching at level of understanding  - Provide teaching via preferred learning methods   Outcome: Progressing      Problem: DISCHARGE PLANNING - CARE MANAGEMENT  Goal: Discharge to post-acute care or home with appropriate resources  INTERVENTIONS:  - Conduct assessment to determine patient/family and health care team treatment goals, and need for post-acute services based on payer coverage, community resources, and patient preferences, and barriers to discharge  - Address psychosocial, clinical, and financial barriers to discharge as identified in assessment in conjunction with the patient/family and health care team  - Arrange appropriate level of post-acute services according to patient's   needs and preference and payer coverage in collaboration with the physician and health care team  - Communicate with and update the patient/family, physician, and health care team regarding progress on the discharge plan  - Arrange appropriate transportation to post-acute venues  -Assist patient with making referrals for [facility], [facility], inpatient rehab, follow up care     Outcome: Progressing

## 2018-03-31 NOTE — PLAN OF CARE
Problem: PHYSICAL THERAPY ADULT  Goal: Performs mobility at highest level of function for planned discharge setting  See evaluation for individualized goals  Treatment/Interventions: Functional transfer training, LE strengthening/ROM, Elevations, Therapeutic exercise, Endurance training, Cognitive reorientation, Patient/family training, Equipment eval/education, Bed mobility, Gait training, Compensatory technique education, Continued evaluation, Spoke to nursing, Spoke to advanced practitioner, Family  Equipment Recommended: Conan Boxer       See flowsheet documentation for full assessment, interventions and recommendations  Outcome: Progressing  Prognosis: Good  Problem List: Decreased strength, Decreased endurance, Impaired balance, Decreased mobility, Orthopedic restrictions, Pain  Assessment: Patient particiapted in PT treatment session focussing on safe d/c planning  Spoke with wife and patient at length in regards to any concerns for home  Pt only expressing frustration with pain but is eager to d c home with use of RW (previously delivered to room)  HEP reviewed with patient  Pt safe to d/c home with family support when medically appropriate  Recommendation: Home PT, Home with family support     PT - OK to Discharge: Yes    See flowsheet documentation for full assessment

## 2018-03-31 NOTE — PHYSICAL THERAPY NOTE
PT TREATMENT       03/31/18 1425   Pain Assessment   Pain Assessment 0-10   Pain Score 7   Pain Type Acute pain   Pain Location Leg   Pain Orientation Bilateral   Restrictions/Precautions   Weight Bearing Precautions Per Order No   Other Precautions Fall Risk;Pain;Spinal precautions  (Pt has LSO he wears for comfort)   General   Chart Reviewed Yes   Additional Pertinent History 03/28/18 L2/3 and L3/4 MIS transforaminal lumbar interbody fixation fusion from right sided approach; L3/4 right discectomy    Response to Previous Treatment Patient with no complaints from previous session  Family/Caregiver Present Yes   Cognition   Overall Cognitive Status WFL   Arousal/Participation Alert   Attention Within functional limits   Orientation Level Oriented X4   Memory Within functional limits   Following Commands Follows all commands and directions without difficulty   Comments Pt pleasant and cooperative; Agreeable to particaipte in PT session  Subjective   Subjective Pt expressing frustration with pain   Bed Mobility   Additional Comments OOB in chiar upon arrival    Transfers   Sit to Stand 5  Supervision   Stand to Sit 5  Supervision   Additional Comments Brace donned prior to arrival  Pt aware of proper hand placement during transfers  Pt able to demonstrate understanding of spinal precautions  Ambulation/Elevation   Gait pattern Short stride; Step to;Shuffling   Gait Assistance 5  Supervision   Additional items Verbal cues   Assistive Device Rolling walker   Distance 40'  (limited by pain )   Balance   Static Sitting Good   Dynamic Sitting Fair +   Static Standing Fair +   Dynamic Standing Fair   Ambulatory Fair -   Endurance Deficit   Endurance Deficit Yes   Endurance Deficit Description pain    Activity Tolerance   Activity Tolerance Patient limited by pain   Exercises   Heel Cord Stretch Sitting;10 reps;AROM; Bilateral   Balance training  Extended time spent educating patient in regards to safe d/c planning   Pt and wife able to demosntrate understanding of brace and spinal precuations  Pt educated on log roll technique  Educated on pacing with activity and energy conservation  Pt also expressing tightness in LE in which he was educated on stretchning for hamstrings and gastroc  Educated patient TERT 2 minutes and provided patient with bed sheet to perfrom therex in safe, seated position    Assessment   Prognosis Good   Problem List Decreased strength;Decreased endurance; Impaired balance;Decreased mobility;Orthopedic restrictions;Pain   Assessment Patient particiapted in PT treatment session focussing on safe d/c planning  Spoke with wife and patient at length in regards to any concerns for home  Pt only expressing frustration with pain but is eager to d c home with use of RW (previously delivered to room)  HEP reviewed with patient  Pt safe to d/c home with family support when medically appropriate  Goals   Patient Goals to go home    STG Expiration Date 04/13/18   Treatment Day 2   Plan   Treatment/Interventions Functional transfer training;LE strengthening/ROM; Elevations; Therapeutic exercise; Endurance training;Patient/family training;Equipment eval/education; Bed mobility;Gait training;Spoke to nursing   Progress Improving as expected   PT Frequency 5x/wk   Recommendation   Recommendation Home PT; Home with family support   Equipment Recommended Charity Owens   PT - OK to Discharge Yes     Samuel Price PT

## 2018-03-31 NOTE — OCCUPATIONAL THERAPY NOTE
03/31/18 1350   Restrictions/Precautions   Weight Bearing Precautions Per Order No   Other Precautions Fall Risk;Pain;Spinal precautions   Pain Assessment   Pain Assessment 0-10   Pain Score 7   ADL   Where Assessed Chair   Toileting Assistance  5  Supervision/Setup   Toileting Deficit Use of adaptive equipment   Toileting Comments bladder management   Functional Standing Tolerance   Time 3 min   Activity static standing at sink   Transfers   Sit to Stand 5  Supervision   Stand to Sit 5  Supervision   Stand pivot 5  Supervision   Toilet transfer 5  Supervision   Toilet Transfers   Toilet Transfer From Rolling walker   Toilet Transfer Type To and from   Toilet Transfer to Standard toilet   Toilet Transfer Technique Ambulating   Toilet Transfers Supervision   Cognition   Overall Cognitive Status [facility]   Arousal/Participation Alert   Attention Within functional limits   Orientation Level Oriented X4   Memory Within functional limits   Following Commands Follows multistep commands with increased time or repetition   Activity Tolerance   Activity Tolerance Patient tolerated treatment well   Assessment   Assessment Pt participates in OT sesison with focus on toileting, activity tolerance, and standing tolerance to increase I with adls  Family present during session  Pt supervision toileting for bladder management along with RW support  Pt able to manage toilet hygiene and stood for 3 min duration at the sink to wash hands  Pt c/o tingling/cramping in BLE and nursing is aware  Pt will continue to benefit from activity tolerance, transfers, and adls  Plan   Treatment Interventions ADL retraining;Functional transfer training; Activityengagement   Goal Expiration Date 04/08/18   Treatment Day 2   OT Frequency 3-5x/wk   Recommendation   OT Discharge Recommendation Home with family support

## 2018-03-31 NOTE — PROGRESS NOTES
Progress Note - Loretta Barron 1960, 65 y o  male MRN: [id number]    Unit/Bed#: [facility] 921-01 Encounter: [id number]    Primary Care Provider: Hedy Byrne MD   Date and time admitted to hospital: 3/24/2018  3:33 PM        Sleep apnea   Assessment & Plan    · Continue CPAP qhs        Pain of right thigh   Assessment & Plan    · Patient has definite pains on his right thigh and hip  · This may be referred pain from his post laminectomy syndrome of the lumbar spine  However, we cannot still totally rule out possibility that this is a separate problem  · We will do a right hip x-ray  No acute osseous abnormality  ·  pain a factor and pain management discussed with me today with probable plan to do surgery         Lumbar radiculopathy   Assessment & Plan    · Patient with hx of chronic LBP presented with RLE pain likely consistent with L3-4/lumbar radiculopathy  Now reporting pain distal to right knee, continues to denies saddle anesthesia or paresthesias  · Appreciate neurosurgery input   · S/p MRI lumbar spine   · L3/4 extruded disc herniation causing right lateral recess and foraminal stenosis, right facet arthropathy and ligamentous hypertrophy   · Lumbar spine flexion/extension xrays yesterday   · APS following and patient was started on ketamine infusion 3/26 and rate increased today   · Per neurosx attending attestation, patient will likely need extensive decompression with fixation/fusion, which would be best accomplished after integrated with outpatient pain management and outpatient discussion   · Consider medrol dose charles?  Will reach out to neurosx        Type 2 diabetes mellitus ([facility] [address] )   Assessment & Plan    · Hold amaryl, resume on d/c   · Last HA1C 7 4%   · Started on lantus 10units qHS for better glycemic control inpatient   · Continue SSI, accuchecks, diabetic diet   · Glucose is better controlled        Depression   Assessment & Plan    · Continue zoloft daily and ambien qHS (on sonata at home)         Benign essential hypertension   Assessment & Plan    · Poorly controlled possibly secondary to pain, improving  · SBP ranging 140-170s but also in setting of acute pain   · Home regimen includes, benazepril 40mg daily with HCTZ 25mg daily, clonidine 0 1mg q12, terazosin 5mg daily will restart  · Clonidine was increased to 0 2 mg BID   · PRN antihypertensives for SBP > 180  · Will adjust as needed        Pain syndrome, chronic   Assessment & Plan    · Post-laminectomy syndrome  Previously had spine stimulator that was removed this month  · Home regimen includes Celebrex, fentanyl, oxy IR  Will adjust  · Pain management consult  · Secondary to chronic back pain         * Acute back pain   Assessment & Plan    · Acute on chronic back pain likely due to new herniated disc  · S/p L2/3 and L3/4 MIS transforaminal lumbar interbody fixation fusion from right sided approach, L3/L4 discectomy on 3/28/18,    ·  d/c ketamine drip and started on dilaudid 1 mg q 2 hrs for breakthrough pain  Continue oxycodone, Fentanyl patch, gabapentin, and Robaxin  Continuous pulse ox  · Dilaudid was restarted   · PT/OT              VTE Pharmacologic Prophylaxis:   Pharmacologic: Heparin  Mechanical VTE Prophylaxis in Place: Yes    Patient Centered Rounds: I have performed bedside rounds with nursing staff today  Discussions with Specialists or Other Care Team Provider: neurosurgery, acute pain    Education and Discussions with Family / Patient: patient  Time Spent for Care: 45 minutes  More than 50% of total time spent on counseling and coordination of care as described above  Current Length of Stay: 7 day(s)    Current Patient Status: Inpatient   Certification Statement: The patient will continue to require additional inpatient hospital stay due to pain, spasms    Discharge Plan:      Code Status: Level 1 - Full Code      Subjective:    I am doing well  I am using more dilaudid  I still have some pain       Objective: Vitals:   Temp (24hrs), Av 2 °F (36 8 °C), Min:98 1 °F (36 7 °C), Max:98 3 °F (36 8 °C)    HR:  [60-69] 68  Resp:  [12-20] 20  BP: (142-177)/(77-95) 171/78  SpO2:  [95 %-97 %] 97 %  Body mass index is 34 48 kg/m²  Input and Output Summary (last 24 hours): Intake/Output Summary (Last 24 hours) at 18 1727  Last data filed at 18 1350   Gross per 24 hour   Intake              820 ml   Output             1350 ml   Net             -530 ml       Physical Exam:     Physical Exam   Constitutional: He is oriented to person, place, and time  He appears well-developed and well-nourished  HENT:   Head: Normocephalic and atraumatic  Right Ear: External ear normal    Nose: Nose normal    Mouth/Throat: Oropharynx is clear and moist  No oropharyngeal exudate  Eyes: Conjunctivae and EOM are normal  Pupils are equal, round, and reactive to light  Right eye exhibits no discharge  Left eye exhibits no discharge  No scleral icterus  Neck: Normal range of motion  Neck supple  No JVD present  No tracheal deviation present  No thyromegaly present  Cardiovascular: Normal rate, regular rhythm, normal heart sounds and intact distal pulses  Exam reveals no gallop and no friction rub  No murmur heard  Pulmonary/Chest: Effort normal and breath sounds normal  No stridor  No respiratory distress  He has no wheezes  He has no rales  He exhibits no tenderness  Abdominal: Soft  Bowel sounds are normal  He exhibits no distension and no mass  There is no tenderness  There is no rebound and no guarding  Musculoskeletal: Normal range of motion  He exhibits no edema, tenderness or deformity  Lymphadenopathy:     He has no cervical adenopathy  Neurological: He is alert and oriented to person, place, and time  He displays abnormal reflex  A cranial nerve deficit is present  He exhibits normal muscle tone  Coordination abnormal    Skin: Skin is warm and dry  No rash noted  He is not diaphoretic   No erythema  No pallor  Psychiatric: He has a normal mood and affect  His behavior is normal  Judgment and thought content normal    Nursing note and vitals reviewed  Additional Data:     Labs:      Results from last 7 days  Lab Units 03/31/18  1042   WBC Thousand/uL 10 68*   HEMOGLOBIN g/dL 10 6*   HEMATOCRIT % 30 9*   PLATELETS Thousands/uL 243   NEUTROS PCT % 73   LYMPHS PCT % 19   MONOS PCT % 7   EOS PCT % 1       Results from last 7 days  Lab Units 03/31/18  1042  03/25/18  0500   SODIUM mmol/L 139  < > 139   POTASSIUM mmol/L 3 4*  < > 3 4*   CHLORIDE mmol/L 104  < > 102   CO2 mmol/L 29  < > 32   BUN mg/dL 10  < > 14   CREATININE mg/dL 0 68  < > 0 87   CALCIUM mg/dL 9 1  < > 9 1   TOTAL PROTEIN g/dL  --   --  7 7   BILIRUBIN TOTAL mg/dL  --   --  0 41   ALK PHOS U/L  --   --  76   ALT U/L  --   --  32   AST U/L  --   --  20   GLUCOSE RANDOM mg/dL 202*  < > 139   GLUCOSE, ISTAT   --   < >  --    < > = values in this interval not displayed  Results from last 7 days  Lab Units 03/25/18  0001   INR  0 96       * I Have Reviewed All Lab Data Listed Above  * Additional Pertinent Lab Tests Reviewed:  [address] Admission Reviewed    Imaging:    Imaging Reports Reviewed Today Include:    Imaging Personally Reviewed by Myself Includes:       Recent Cultures (last 7 days):           Last 24 Hours Medication List:     Current Facility-Administered Medications:  acetaminophen 975 mg Oral Q8H [facility] JAMIE Boyce   acetaminophen 975 mg Oral Once Dylon Giraldo MD   baclofen 10 mg Oral TID Zoran Rodriguez MD   benazepril 40 mg Oral Daily Jose E Palumbo MD   bisacodyl 5 mg Oral Daily PRN Zoran Rodriguez MD   bisacodyl 10 mg Rectal Daily PRN Tiffany Kaba MD   cloNIDine 0 2 mg Oral Q12H [facility] JAMIE Boyec   docusate sodium 100 mg Oral BID PRN Tiffany Kaba PA-C   fentaNYL 100 mcg Transdermal Q48H Zoran Rodriguez MD   gabapentin 600 mg Oral TID Ayanna Bauer MD heparin (porcine) 5,000 Units Subcutaneous [facility] Leyla Petersen MD   hydrALAZINE 5 mg Intravenous Q6H PRN Fariba E Held, VIOLETA   HYDROmorphone 1 mg Intravenous Once Fariba E Held, VIOLETA   HYDROmorphone 1 mg Intravenous Q2H PRN Fariba E Held, VIOLETA   hyoscyamine 0 375 mg Oral Daily Jose E Palumbo MD   insulin glargine 10 Units Subcutaneous HS Jose E Palumbo MD   insulin lispro 1-5 Units Subcutaneous TID AC Jose E Palumbo MD   insulin lispro 1-5 Units Subcutaneous HS Jose E Palumbo MD   melatonin 4 5 mg Oral HS Jose E Palumbo MD   multivitamin-minerals 1 tablet Oral Daily Jose E Palumbo MD   Naloxegol Oxalate 12 5 mg Oral Daily Jose E Palumbo MD   ondansetron 4 mg Intravenous Q6H PRN Jose E Palumbo MD   oxyCODONE 20 mg Oral Q4H PRN JAMIE Frias   pantoprazole 40 mg Oral Early Morning Jose E Palumbo MD   polyethylene glycol 17 g Oral Daily Sony Castaneda MD   polyethylene glycol 17 g Oral Daily Alexsandra Bisanti, VIOLETA   pregabalin 150 mg Oral Once Orlando Colunga MD   senna 1 tablet Oral BID Jose E Palumbo MD   sertraline 75 mg Oral Daily Jose E Palumbo MD   terazosin 5 mg Oral HS Jose E Palumbo MD   zolpidem 5 mg Oral HS PRN Aly Ward MD        Today, Patient Was Seen By: Sony Castaneda MD    ** Please Note: Dictation voice to text software may have been used in the creation of this document   **

## 2018-03-31 NOTE — PROGRESS NOTES
Pt c/o increasing pail in b/l Le, made SLIM aware,medicated the pt per order and got aqua k pad,pts pain still not improved, made SLIM aware and will continue to monitor

## 2018-03-31 NOTE — ASSESSMENT & PLAN NOTE
· Acute on chronic back pain likely due to new herniated disc  · S/p L2/3 and L3/4 MIS transforaminal lumbar interbody fixation fusion from right sided approach, L3/L4 discectomy on 3/28/18,    ·  d/c ketamine drip and started on dilaudid 1 mg q 2 hrs for breakthrough pain  Continue oxycodone, Fentanyl patch, gabapentin, and Robaxin   Continuous pulse ox  · Dilaudid was restarted   · PT/OT

## 2018-03-31 NOTE — PROGRESS NOTES
Paged by nursing multiple times throughout night as patient has uncontrolled bilateral leg pain  Initially, pt received and additional 10 mg dose of baclofen, trial of heating pads, and 2mg Dilaudid q3h prn  Patient later began to complain of paresthesias and sharp shooting pain down right leg  Neurosurgery made aware  Stat CT lumbar spine revealed postoperative changes L2-L4 with hardware that appears in tact  As well as, chronic postoperative changes L4-L5 and L5-S1  Consider follow up MRI  Patient continues with severe pain  Spoke with anesthesia at call back number provided by APS, gave no recommendations, states someone from pain mgmt will see pt in AM   Frequency of dilaudid increased to q2h prn, decreased dose to 1mg  Patient to be on continuous pulse ox overnight

## 2018-03-31 NOTE — PROGRESS NOTES
Progress Note - Neurosurgery   Brittany Lewis 65 y o  male MRN: [id number]  Unit/Bed#: [facility] 921-01 Encounter: [id number]    Assessment:  1  POD3 L2/3 and L3/4 MIS transforaminal lumbar interbody fixation and fusion with L3/4 right diskectomy  2  LBP with RLE radiculopathy  3  L3/4 foraminal stenosis secondary to extruded disc, ligamentous hypertrophy and facet arthropathy   4  Spondylolisthesis of the lumbar region  5  Chronic pain syndrome  6  Post laminectomy syndrome  7  HTN  8  Type 2 DM  9  Sleep apnea  10  Numbness with pins/needles sensation in toes both feet - possibly inflammatory response  Plan:  · Exam -- AAOx3  Motor Hip flex right 4+/5 (pain right thigh) and left 5/5; Grt toe DF 4+/5 b/l  Otherwise LEs 5/5  PP diminished plantar aspect left 5th toe and plantar aspect right great toe  · CT L-spine (3/31/18) was reviewed in detail by Dr Colby Fischer who noted no significant stenosis  The surgical instrumentation appears intact and demonstrates appropriate positioning  · Pain control per primary team  APS previously consulted  Baclofen increased  Currently on APAP 975mg q 8 hrs, Baclofen 10mg TID,  Fentanyl patch 100mcg/hr changed q 48 hrs, Gabapentin 600mg TID (same as pre-op home dose),  Oxycodone 20 mg q 4 hrs prn severe pain, Dilaudid 1 mg q 2 hrs prn breakthrough pain  ? Discussed with SLIM Resident (Preez) suggestion for consideration of increasing Gabapentin dose or consideration for Medrol dose pack in setting of reported Numbness with pins/needles sensation in toes both feet  (Pt  Is diabetic although reports he previously tolerated Medrol dose pack in past)  Defer medication management to Primary service or APS  · Mobilize as tolerated  Therapy recommending home PT and home with family support with use of roller walker  · Brace to be worn when OOB  Patient has LSO brace he brought from home and is wearing  · DVT PPX:SCDs (currently off sitting at bed edge)   Heparin  · Aggressive bowel regimen  Patient with history of obstruction following prior fusion surgery  Patient reports he had BM yesterday and tozuri  · Will follow-up for 2wk and 6wk pov  Subjective/Objective   Chief Complaint: Ane Merl horse in legs last night but better today  Subjective: Patient reports he experienced charley horse sensation in legs last night -- aggravated by SCD's and heat  He reports this is better today  Reports his wife massaged his legs which helped some  Patient reported noticing numbness and pins/needles sensation in toes of both feet last night and report this has been stable today  He reported history of such of lateral left foot in past but not toes previously  SLIM sent for CT L-spine early this morning  He reports soreness of his low back / incisional region  Rates LBP as 5-6/10 on pain scale  Reports limitation with ROM right hip flexion since prior to hospitalization  Reports he had a BM yesterday and today  He denies bladder dysfunction  Patient reports he was taking Gabapentin 600mg TID at home prior to hospitalization  Objective: Sitting on bed edge  Wearing LSO brace  Wife and daughter at bedside  I/O       03/29 0701 - 03/30 0700 03/30 0701 - 03/31 0700 03/31 0701 - 04/01 0700    P  O  1880 1060 600    I V  (mL/kg) 1040 (9 5)      IV Piggyback       Total Intake(mL/kg) 2920 (26 8) 1060 (9 7) 600 (5 5)    Urine (mL/kg/hr) 2675 (1) 1750 (0 7) 300 (0 3)    Stool 0 (0) 0 (0) 0 (0)    Blood       Total Output 2675 1750 300    Net +245 -690 +300           Unmeasured Urine Occurrence 3 x 3 x 2 x    Unmeasured Stool Occurrence 2 x 1 x 2 x          Invasive Devices     Peripheral Intravenous Line            Peripheral IV 03/29/18 Right Forearm 2 days                Physical Exam:  Vitals: Blood pressure (!) 171/78, pulse 68, temperature 98 1 °F (36 7 °C), temperature source Oral, resp  rate 20, height 5' 10" (1 778 m), weight 109 kg (240 lb 4 8 oz), SpO2 97 %  ,Body mass index is 34 48 kg/m²  General appearance: alert, appears stated age, cooperative and no distress  Sitting on bededge  Head: Normocephalic, without obvious abnormality, atraumatic  Eyes: conjugate gaze  Back: Lumbar incisions are clean, dry, and intact  Skin edges well aligned  New steri-strips were placed inferior aspect of right sided incision as prior steri-strips came off when dressing removal  New ABD dressing applied  Lungs: non labored breathing  Neurologic:   Mental status: Alert, oriented x 3, thought content appropriate  Cranial nerves: grossly intact (Cranial nerves II-XII)  Sensory: normal to LT of b/l UE, torso, and b/l LE  Pinprick diminished of plantar aspect left 5th toe and plantar aspect right great toe  PP intact otherwise of b/l LE, torso, and b/l UE   JPS intact b/l thumbs and great toes  He indicated mid left foot when tuning fork on left great toe  ID's vibratory sense dorsum left foot  ID's vibratory sense right great toe and b/l thumbs  Motor: Moving b/l UE 5/5 throughout  Hip flexion left 5/5 (LBP) and right 4+/5 (with some limited ROM)  Knee flex/extension 5/5 bilaterally  Ankle DF/PF 5/5 bilaterally  Great toe DF 4+/5 bilaterally  Reflexes: Hyporeflexic b/l biceps, triceps, brachioradialis, patellar, and achilles  No ankle clonus b/l  Lab Results:    Results from last 7 days  Lab Units 03/31/18  1042 03/29/18  0640 03/28/18 2358 03/28/18  1246  03/28/18  0449   WBC Thousand/uL 10 68* 12 03*  --   --   --  8 60   HEMOGLOBIN g/dL 10 6* 10 8*  --   --   --  11 9*   I STAT HEMOGLOBIN g/dl  --   --   --  11 2*  < >  --    HEMATOCRIT % 30 9* 33 1*  --   --   --  35 9*   PLATELETS Thousands/uL 243 256 235  --   --  261   NEUTROS PCT % 73  --   --   --   --   --    MONOS PCT % 7  --   --   --   --   --    < > = values in this interval not displayed      Results from last 7 days  Lab Units 03/31/18  1042 03/28/18 2358 03/28/18  1246  03/28/18  0449  03/25/18  0500   SODIUM mmol/L 139 142  --   --  142  < > 139   POTASSIUM mmol/L 3 4* 3 7  --   --  3 4*  < > 3 4*   CHLORIDE mmol/L 104 107  --   --  105  < > 102   CO2 mmol/L 29 30  --   --  31  < > 32   BUN mg/dL 10 12  --   --  19  < > 14   CREATININE mg/dL 0 68 0 73  --   --  0 85  < > 0 87   CALCIUM mg/dL 9 1 8 7  --   --  8 9  < > 9 1   TOTAL PROTEIN g/dL  --   --   --   --   --   --  7 7   BILIRUBIN TOTAL mg/dL  --   --   --   --   --   --  0 41   ALK PHOS U/L  --   --   --   --   --   --  76   ALT U/L  --   --   --   --   --   --  32   AST U/L  --   --   --   --   --   --  20   GLUCOSE RANDOM mg/dL 202* 108  --   --  155*  < > 139   GLUCOSE, ISTAT mg/dl  --   --  175*  < >  --   --   --    < > = values in this interval not displayed  Results from last 7 days  Lab Units 03/25/18  0001   INR  0 96   PTT seconds 33     No results found for: TROPONINT  ABG:No results found for: PHART, PJH7EFZ, PO2ART, YAD6BZJ, M8CDBJIT, BEART, SOURCE    Imaging Studies: I have personally reviewed pertinent reports     and I have personally reviewed pertinent films in PACS      VTE Pharmacologic Prophylaxis: Heparin    VTE Mechanical Prophylaxis: sequential compression device

## 2018-03-31 NOTE — PROGRESS NOTES
Pt called out roughly around 0030 with new onset of numbness in all toes on R foot and last 2 toes on L lateral side  Went in to assess pt +2 pulses bilaterally  Pt was complaining of intermittent sharp shooting pain down R leg  Currently nothing is working for his pain  Paged SLIM spoke with [facility] PA she ordered a STAT CT of lumbar spine  Also requested that I get in contact with someone with neurosurgery  I spoke with Bernard HERNANDEZ and made her aware  Will continue to monitor pt

## 2018-03-31 NOTE — PLAN OF CARE
Problem: OCCUPATIONAL THERAPY ADULT  Goal: Performs self-care activities at highest level of function for planned discharge setting  See evaluation for individualized goals  Treatment Interventions: ADL retraining, Functional transfer training, Endurance training, Patient/family training, Equipment evaluation/education, Compensatory technique education, Energy conservation  Equipment Recommended: Tub seat with back, Bedside commode (RW)       See flowsheet documentation for full assessment, interventions and recommendations  Outcome: Progressing  Limitation: Decreased ADL status, Decreased Safe judgement during ADL, Decreased endurance, Decreased self-care trans, Decreased high-level ADLs  Prognosis: Good  Assessment: Pt participates in OT sesison with focus on toileting, activity tolerance, and standing tolerance to increase I with adls  Family present during session  Pt supervision toileting for bladder management along with RW support  Pt able to manage toilet hygiene and stood for 3 min duration at the sink to wash hands  Pt c/o tingling/cramping in BLE and nursing is aware  Pt will continue to benefit from activity tolerance, transfers, and adls       OT Discharge Recommendation: Home with family support  OT - OK to Discharge: Yes ([address] [place] [address] Osteopathy )

## 2018-04-01 LAB
ANION GAP SERPL CALCULATED.3IONS-SCNC: 4 MMOL/L (ref 4–13)
BASOPHILS # BLD AUTO: 0.02 THOUSANDS/ΜL (ref 0–0.1)
BASOPHILS NFR BLD AUTO: 0 % (ref 0–1)
BUN SERPL-MCNC: 12 MG/DL (ref 5–25)
CALCIUM SERPL-MCNC: 9.4 MG/DL (ref 8.3–10.1)
CHLORIDE SERPL-SCNC: 104 MMOL/L (ref 100–108)
CO2 SERPL-SCNC: 30 MMOL/L (ref 21–32)
CREAT SERPL-MCNC: 0.71 MG/DL (ref 0.6–1.3)
EOSINOPHIL # BLD AUTO: 0.16 THOUSAND/ΜL (ref 0–0.61)
EOSINOPHIL NFR BLD AUTO: 2 % (ref 0–6)
ERYTHROCYTE [DISTWIDTH] IN BLOOD BY AUTOMATED COUNT: 13 % (ref 11.6–15.1)
GFR SERPL CREATININE-BSD FRML MDRD: 105 ML/MIN/1.73SQ M
GLUCOSE SERPL-MCNC: 127 MG/DL (ref 65–140)
GLUCOSE SERPL-MCNC: 131 MG/DL (ref 65–140)
GLUCOSE SERPL-MCNC: 131 MG/DL (ref 65–140)
GLUCOSE SERPL-MCNC: 147 MG/DL (ref 65–140)
GLUCOSE SERPL-MCNC: 240 MG/DL (ref 65–140)
HCT VFR BLD AUTO: 31.4 % (ref 36.5–49.3)
HGB BLD-MCNC: 10.5 G/DL (ref 12–17)
LYMPHOCYTES # BLD AUTO: 2.77 THOUSANDS/ΜL (ref 0.6–4.47)
LYMPHOCYTES NFR BLD AUTO: 28 % (ref 14–44)
MCH RBC QN AUTO: 27.1 PG (ref 26.8–34.3)
MCHC RBC AUTO-ENTMCNC: 33.4 G/DL (ref 31.4–37.4)
MCV RBC AUTO: 81 FL (ref 82–98)
MONOCYTES # BLD AUTO: 0.65 THOUSAND/ΜL (ref 0.17–1.22)
MONOCYTES NFR BLD AUTO: 7 % (ref 4–12)
NEUTROPHILS # BLD AUTO: 6.28 THOUSANDS/ΜL (ref 1.85–7.62)
NEUTS SEG NFR BLD AUTO: 63 % (ref 43–75)
NRBC BLD AUTO-RTO: 0 /100 WBCS
PLATELET # BLD AUTO: 266 THOUSANDS/UL (ref 149–390)
PMV BLD AUTO: 9.7 FL (ref 8.9–12.7)
POTASSIUM SERPL-SCNC: 3.7 MMOL/L (ref 3.5–5.3)
RBC # BLD AUTO: 3.88 MILLION/UL (ref 3.88–5.62)
SODIUM SERPL-SCNC: 138 MMOL/L (ref 136–145)
WBC # BLD AUTO: 9.95 THOUSAND/UL (ref 4.31–10.16)

## 2018-04-01 PROCEDURE — 82948 REAGENT STRIP/BLOOD GLUCOSE: CPT

## 2018-04-01 PROCEDURE — 94760 N-INVAS EAR/PLS OXIMETRY 1: CPT

## 2018-04-01 PROCEDURE — 85025 COMPLETE CBC W/AUTO DIFF WBC: CPT | Performed by: INTERNAL MEDICINE

## 2018-04-01 PROCEDURE — 80048 BASIC METABOLIC PNL TOTAL CA: CPT | Performed by: INTERNAL MEDICINE

## 2018-04-01 PROCEDURE — 99233 SBSQ HOSP IP/OBS HIGH 50: CPT | Performed by: INTERNAL MEDICINE

## 2018-04-01 RX ADMIN — TERAZOSIN HYDROCHLORIDE 5 MG: 5 CAPSULE ORAL at 21:22

## 2018-04-01 RX ADMIN — ACETAMINOPHEN 975 MG: 325 TABLET, FILM COATED ORAL at 05:42

## 2018-04-01 RX ADMIN — GABAPENTIN 600 MG: 300 CAPSULE ORAL at 08:00

## 2018-04-01 RX ADMIN — HYDROMORPHONE HYDROCHLORIDE 1 MG: 1 INJECTION, SOLUTION INTRAMUSCULAR; INTRAVENOUS; SUBCUTANEOUS at 00:07

## 2018-04-01 RX ADMIN — ACETAMINOPHEN 975 MG: 325 TABLET, FILM COATED ORAL at 13:18

## 2018-04-01 RX ADMIN — OXYCODONE HYDROCHLORIDE 20 MG: 10 TABLET ORAL at 02:24

## 2018-04-01 RX ADMIN — MELATONIN TAB 3 MG 4.5 MG: 3 TAB at 20:59

## 2018-04-01 RX ADMIN — BACLOFEN 10 MG: 10 TABLET ORAL at 17:03

## 2018-04-01 RX ADMIN — HYDROMORPHONE HYDROCHLORIDE 1 MG: 1 INJECTION, SOLUTION INTRAMUSCULAR; INTRAVENOUS; SUBCUTANEOUS at 05:42

## 2018-04-01 RX ADMIN — HYDROMORPHONE HYDROCHLORIDE 1 MG: 1 INJECTION, SOLUTION INTRAMUSCULAR; INTRAVENOUS; SUBCUTANEOUS at 21:00

## 2018-04-01 RX ADMIN — OXYCODONE HYDROCHLORIDE 20 MG: 10 TABLET ORAL at 18:39

## 2018-04-01 RX ADMIN — BACLOFEN 10 MG: 10 TABLET ORAL at 21:00

## 2018-04-01 RX ADMIN — Medication 1 TABLET: at 08:01

## 2018-04-01 RX ADMIN — INSULIN LISPRO 2 UNITS: 100 INJECTION, SOLUTION INTRAVENOUS; SUBCUTANEOUS at 12:10

## 2018-04-01 RX ADMIN — SENNOSIDES 8.6 MG: 8.6 TABLET, FILM COATED ORAL at 08:01

## 2018-04-01 RX ADMIN — INSULIN GLARGINE 10 UNITS: 100 INJECTION, SOLUTION SUBCUTANEOUS at 22:33

## 2018-04-01 RX ADMIN — METHYLPREDNISOLONE 24 MG: 16 TABLET ORAL at 08:02

## 2018-04-01 RX ADMIN — GABAPENTIN 600 MG: 300 CAPSULE ORAL at 20:59

## 2018-04-01 RX ADMIN — ACETAMINOPHEN 975 MG: 325 TABLET, FILM COATED ORAL at 21:00

## 2018-04-01 RX ADMIN — GABAPENTIN 600 MG: 300 CAPSULE ORAL at 17:03

## 2018-04-01 RX ADMIN — OXYCODONE HYDROCHLORIDE 20 MG: 10 TABLET ORAL at 22:32

## 2018-04-01 RX ADMIN — HEPARIN SODIUM 5000 UNITS: 5000 INJECTION, SOLUTION INTRAVENOUS; SUBCUTANEOUS at 05:42

## 2018-04-01 RX ADMIN — CLONIDINE HYDROCHLORIDE 0.2 MG: 0.2 TABLET ORAL at 08:00

## 2018-04-01 RX ADMIN — OXYCODONE HYDROCHLORIDE 20 MG: 10 TABLET ORAL at 08:01

## 2018-04-01 RX ADMIN — CLONIDINE HYDROCHLORIDE 0.2 MG: 0.2 TABLET ORAL at 21:00

## 2018-04-01 RX ADMIN — SENNOSIDES 8.6 MG: 8.6 TABLET, FILM COATED ORAL at 20:59

## 2018-04-01 RX ADMIN — HEPARIN SODIUM 5000 UNITS: 5000 INJECTION, SOLUTION INTRAVENOUS; SUBCUTANEOUS at 21:00

## 2018-04-01 RX ADMIN — POLYETHYLENE GLYCOL 3350 17 G: 17 POWDER, FOR SOLUTION ORAL at 08:01

## 2018-04-01 RX ADMIN — BENAZEPRIL HYDROCHLORIDE 40 MG: 10 TABLET ORAL at 08:00

## 2018-04-01 RX ADMIN — BACLOFEN 10 MG: 10 TABLET ORAL at 08:00

## 2018-04-01 RX ADMIN — SERTRALINE HYDROCHLORIDE 75 MG: 50 TABLET ORAL at 08:00

## 2018-04-01 RX ADMIN — FENTANYL 100 MCG: 100 PATCH, EXTENDED RELEASE TRANSDERMAL at 10:24

## 2018-04-01 RX ADMIN — PANTOPRAZOLE SODIUM 40 MG: 40 TABLET, DELAYED RELEASE ORAL at 05:42

## 2018-04-01 RX ADMIN — HYOSCYAMINE SULFATE 0.38 MG: 0.38 TABLET, EXTENDED RELEASE ORAL at 08:02

## 2018-04-01 RX ADMIN — HEPARIN SODIUM 5000 UNITS: 5000 INJECTION, SOLUTION INTRAVENOUS; SUBCUTANEOUS at 13:18

## 2018-04-01 NOTE — PROGRESS NOTES
Progress Note - David Scanlon 1960, 65 y o  male MRN: [id number]    Unit/Bed#: [facility] 921-01 Encounter: [id number]    Primary Care Provider: Jeffrey Doshi MD   Date and time admitted to hospital: 3/24/2018  3:33 PM        Sleep apnea   Assessment & Plan    · Continue CPAP qhs        Pain of right thigh   Assessment & Plan    · Patient has definite pains on his right thigh and hip  · This may be referred pain from his post laminectomy syndrome of the lumbar spine  However, we cannot still totally rule out possibility that this is a separate problem  · We will do a right hip x-ray  No acute osseous abnormality  ·  pain a factor and pain management discussed with me today with probable plan to do surgery         Lumbar radiculopathy   Assessment & Plan    · Patient with hx of chronic LBP presented with RLE pain likely consistent with L3-4/lumbar radiculopathy  Now reporting pain distal to right knee, continues to denies saddle anesthesia or paresthesias      · Appreciate neurosurgery input   · S/p MRI lumbar spine   · L3/4 extruded disc herniation causing right lateral recess and foraminal stenosis, right facet arthropathy and ligamentous hypertrophy   · Lumbar spine flexion/extension xrays yesterday   · APS following and patient was started on ketamine infusion 3/26 and rate increased today   · Per neurosx attending attestation, patient will likely need extensive decompression with fixation/fusion, which would be best accomplished after integrated with outpatient pain management and outpatient discussion   · Consider medrol dose pack started due to inflammation  · No acute pain service over the weekend, will contact them in the morning        Type 2 diabetes mellitus ([facility] [address] )   Assessment & Plan    · Hold amaryl, resume on d/c   · Last HA1C 7 4%   · Started on lantus 10units qHS for better glycemic control inpatient   · Continue SSI, accuchecks, diabetic diet   · Glucose is better controlled        Pain syndrome, chronic   Assessment & Plan    · Post-laminectomy syndrome  Previously had spine stimulator that was removed this month  · Home regimen includes Celebrex, fentanyl, oxy IR  Will adjust  · Pain management consult  · Secondary to chronic back pain         * Acute back pain   Assessment & Plan    · Acute on chronic back pain likely due to new herniated disc  · S/p L2/3 and L3/4 MIS transforaminal lumbar interbody fixation fusion from right sided approach, L3/L4 discectomy on 3/28/18,    ·  d/c ketamine drip and started on dilaudid 1 mg q 2 hrs for breakthrough pain  Continue oxycodone, Fentanyl patch, gabapentin, and Robaxin  Continuous pulse ox  · Dilaudid was restarted   · PT/OT              VTE Pharmacologic Prophylaxis:   Pharmacologic: Heparin  Mechanical VTE Prophylaxis in Place: Yes    Patient Centered Rounds: I have performed bedside rounds with nursing staff today  Discussions with Specialists or Other Care Team Provider:      Education and Discussions with Family / Patient: patient    Time Spent for Care: 45 minutes  More than 50% of total time spent on counseling and coordination of care as described above  Current Length of Stay: 8 day(s)    Current Patient Status: Inpatient   Certification Statement: The patient will continue to require additional inpatient hospital stay due to pain and tingling sensation and cramps in lower extremities    Discharge Plan: tbd    Code Status: Level 1 - Full Code      Subjective:   I am still having cramps and pain in my legs    Objective:     Vitals:   Temp (24hrs), Av 5 °F (36 9 °C), Min:98 1 °F (36 7 °C), Max:98 7 °F (37 1 °C)    HR:  [60-68] 68  Resp:  [18-20] 18  BP: (139-171)/(72-89) 142/89  SpO2:  [93 %-97 %] 93 %  Body mass index is 34 48 kg/m²  Input and Output Summary (last 24 hours):        Intake/Output Summary (Last 24 hours) at 18 1321  Last data filed at 18 1320   Gross per 24 hour   Intake             1620 ml   Output 700 ml   Net              920 ml       Physical Exam:     Physical Exam   Constitutional: He is oriented to person, place, and time  He appears well-developed and well-nourished  No distress  HENT:   Head: Normocephalic and atraumatic  Right Ear: External ear normal    Left Ear: External ear normal    Nose: Nose normal    Mouth/Throat: Oropharynx is clear and moist  No oropharyngeal exudate  Eyes: Conjunctivae and EOM are normal  Pupils are equal, round, and reactive to light  Right eye exhibits no discharge  Left eye exhibits no discharge  No scleral icterus  Neck: Normal range of motion  Neck supple  No JVD present  No tracheal deviation present  No thyromegaly present  Cardiovascular: Normal rate, regular rhythm, normal heart sounds and intact distal pulses  Exam reveals no gallop and no friction rub  No murmur heard  Pulmonary/Chest: Effort normal and breath sounds normal  No stridor  No respiratory distress  He has no wheezes  He has no rales  He exhibits no tenderness  Abdominal: Soft  Bowel sounds are normal  He exhibits no mass  There is no guarding  Musculoskeletal: Normal range of motion  He exhibits no edema, tenderness or deformity  Lymphadenopathy:     He has no cervical adenopathy  Neurological: He is alert and oriented to person, place, and time  He has normal reflexes  He displays normal reflexes  No cranial nerve deficit  He exhibits normal muscle tone  Coordination abnormal    Skin: Skin is warm and dry  No rash noted  He is not diaphoretic  No erythema  No pallor  Psychiatric: He has a normal mood and affect  His behavior is normal  Judgment and thought content normal    Nursing note and vitals reviewed        Additional Data:     Labs:      Results from last 7 days  Lab Units 04/01/18  0456   WBC Thousand/uL 9 95   HEMOGLOBIN g/dL 10 5*   HEMATOCRIT % 31 4*   PLATELETS Thousands/uL 266   NEUTROS PCT % 63   LYMPHS PCT % 28   MONOS PCT % 7   EOS PCT % 2       Results from last 7 days  Lab Units 04/01/18  0456   SODIUM mmol/L 138   POTASSIUM mmol/L 3 7   CHLORIDE mmol/L 104   CO2 mmol/L 30   BUN mg/dL 12   CREATININE mg/dL 0 71   CALCIUM mg/dL 9 4   GLUCOSE RANDOM mg/dL 131           * I Have Reviewed All Lab Data Listed Above  * Additional Pertinent Lab Tests Reviewed:  Amirah Carter Admission Reviewed    Imaging:    Imaging Reports Reviewed Today Include:    Imaging Personally Reviewed by Myself Includes:       Recent Cultures (last 7 days):           Last 24 Hours Medication List:     Current Facility-Administered Medications:  acetaminophen 975 mg Oral Q8H [facility] JAMIE Boyce   acetaminophen 975 mg Oral Once Jl Dorsey MD   baclofen 10 mg Oral TID Yamini Rowan MD   benazepril 40 mg Oral Daily Jose E Palumbo MD   bisacodyl 5 mg Oral Daily PRN Yamini Rowan MD   bisacodyl 10 mg Rectal Daily PRN Henri Alvarez MD   cloNIDine 0 2 mg Oral Q12H [facility] JAMIE Pagan   docusate sodium 100 mg Oral BID PRN Henri Alvarez PA-C   fentaNYL 100 mcg Transdermal Q48H Yamini Rowan MD   gabapentin 600 mg Oral TID Jose E Palumbo MD   heparin (porcine) 5,000 Units Subcutaneous [facility] Henri Alvarez MD   hydrALAZINE 5 mg Intravenous Q6H PRN Fariba E Held, PA-C   HYDROmorphone 1 mg Intravenous Once Fariba E Held, PA-C   HYDROmorphone 1 mg Intravenous Q2H PRN Fariba E Held, PA-C   hyoscyamine 0 375 mg Oral Daily Jose E Palumbo MD   insulin glargine 10 Units Subcutaneous HS Jose E Palumbo MD   insulin lispro 1-5 Units Subcutaneous TID AC Jose E Palumbo MD   insulin lispro 1-5 Units Subcutaneous HS Jose E Palumbo MD   melatonin 4 5 mg Oral HS Jose E Palumbo MD   [START ON 4/2/2018] methylPREDNISolone 20 mg Oral Daily Rosezena VIOLETA Kwan   Followed by       Cherrie Body ON 4/3/2018] methylPREDNISolone 16 mg Oral Daily Rosezena VIOLETA Kwan   Followed by       Cherrie Body ON 4/4/2018] methylPREDNISolone 12 mg Oral Daily Rosezena Snooks, VIOLETA   Followed by       Claudia Aragon ON 4/5/2018] methylPREDNISolone 8 mg Oral Daily Carol Smart PA-C   Followed by       Claudia Aragon ON 4/6/2018] methylPREDNISolone 4 mg Oral Daily Carol Smart PA-C   multivitamin-minerals 1 tablet Oral Daily Jose E Palumbo MD   Naloxegol Oxalate 12 5 mg Oral Daily Jose E Palumbo MD   ondansetron 4 mg Intravenous Q6H PRN Jose E Palumbo MD   oxyCODONE 20 mg Oral Q4H PRN JAMIE Arellano   pantoprazole 40 mg Oral Early Morning Jose E Palumbo MD   polyethylene glycol 17 g Oral Daily Katiana Allison MD   pregabalin 150 mg Oral Once Andrew Hernadez MD   senna 1 tablet Oral BID Jose E Palumbo MD   sertraline 75 mg Oral Daily Jose E Palumbo MD   terazosin 5 mg Oral HS Jose E Palumbo MD   zolpidem 5 mg Oral HS PRN Darian Koroma MD        Today, Patient Was Seen By: Katiana Allison MD    ** Please Note: Dictation voice to text software may have been used in the creation of this document   **

## 2018-04-01 NOTE — ASSESSMENT & PLAN NOTE
· Patient with hx of chronic LBP presented with RLE pain likely consistent with L3-4/lumbar radiculopathy  Now reporting pain distal to right knee, continues to denies saddle anesthesia or paresthesias      · Appreciate neurosurgery input   · S/p MRI lumbar spine   · L3/4 extruded disc herniation causing right lateral recess and foraminal stenosis, right facet arthropathy and ligamentous hypertrophy   · Lumbar spine flexion/extension xrays yesterday   · APS following and patient was started on ketamine infusion 3/26 and rate increased today   · Per neurosx attending attestation, patient will likely need extensive decompression with fixation/fusion, which would be best accomplished after integrated with outpatient pain management and outpatient discussion   · Consider medrol dose pack started due to inflammation  · No acute pain service over the weekend, will contact them in the morning

## 2018-04-02 ENCOUNTER — APPOINTMENT (INPATIENT)
Dept: RADIOLOGY | Facility: HOSPITAL | Age: 58
DRG: 214 | End: 2018-04-02
Payer: OTHER MISCELLANEOUS

## 2018-04-02 LAB
ANION GAP SERPL CALCULATED.3IONS-SCNC: 7 MMOL/L (ref 4–13)
BASOPHILS # BLD AUTO: 0.03 THOUSANDS/ΜL (ref 0–0.1)
BASOPHILS NFR BLD AUTO: 0 % (ref 0–1)
BUN SERPL-MCNC: 18 MG/DL (ref 5–25)
CALCIUM SERPL-MCNC: 9.9 MG/DL (ref 8.3–10.1)
CHLORIDE SERPL-SCNC: 102 MMOL/L (ref 100–108)
CO2 SERPL-SCNC: 29 MMOL/L (ref 21–32)
CREAT SERPL-MCNC: 0.93 MG/DL (ref 0.6–1.3)
EOSINOPHIL # BLD AUTO: 0.13 THOUSAND/ΜL (ref 0–0.61)
EOSINOPHIL NFR BLD AUTO: 1 % (ref 0–6)
ERYTHROCYTE [DISTWIDTH] IN BLOOD BY AUTOMATED COUNT: 13 % (ref 11.6–15.1)
GFR SERPL CREATININE-BSD FRML MDRD: 91 ML/MIN/1.73SQ M
GLUCOSE SERPL-MCNC: 135 MG/DL (ref 65–140)
GLUCOSE SERPL-MCNC: 144 MG/DL (ref 65–140)
GLUCOSE SERPL-MCNC: 155 MG/DL (ref 65–140)
GLUCOSE SERPL-MCNC: 171 MG/DL (ref 65–140)
GLUCOSE SERPL-MCNC: 192 MG/DL (ref 65–140)
GLUCOSE SERPL-MCNC: 201 MG/DL (ref 65–140)
HCT VFR BLD AUTO: 33.2 % (ref 36.5–49.3)
HGB BLD-MCNC: 10.8 G/DL (ref 12–17)
LYMPHOCYTES # BLD AUTO: 2.7 THOUSANDS/ΜL (ref 0.6–4.47)
LYMPHOCYTES NFR BLD AUTO: 25 % (ref 14–44)
MCH RBC QN AUTO: 27.3 PG (ref 26.8–34.3)
MCHC RBC AUTO-ENTMCNC: 32.5 G/DL (ref 31.4–37.4)
MCV RBC AUTO: 84 FL (ref 82–98)
MONOCYTES # BLD AUTO: 0.89 THOUSAND/ΜL (ref 0.17–1.22)
MONOCYTES NFR BLD AUTO: 8 % (ref 4–12)
NEUTROPHILS # BLD AUTO: 6.75 THOUSANDS/ΜL (ref 1.85–7.62)
NEUTS SEG NFR BLD AUTO: 66 % (ref 43–75)
NRBC BLD AUTO-RTO: 0 /100 WBCS
PLATELET # BLD AUTO: 283 THOUSANDS/UL (ref 149–390)
PMV BLD AUTO: 9.2 FL (ref 8.9–12.7)
POTASSIUM SERPL-SCNC: 3.7 MMOL/L (ref 3.5–5.3)
RBC # BLD AUTO: 3.96 MILLION/UL (ref 3.88–5.62)
SODIUM SERPL-SCNC: 138 MMOL/L (ref 136–145)
WBC # BLD AUTO: 10.61 THOUSAND/UL (ref 4.31–10.16)

## 2018-04-02 PROCEDURE — 82948 REAGENT STRIP/BLOOD GLUCOSE: CPT

## 2018-04-02 PROCEDURE — 99232 SBSQ HOSP IP/OBS MODERATE 35: CPT | Performed by: INTERNAL MEDICINE

## 2018-04-02 PROCEDURE — 85025 COMPLETE CBC W/AUTO DIFF WBC: CPT | Performed by: INTERNAL MEDICINE

## 2018-04-02 PROCEDURE — 72131 CT LUMBAR SPINE W/O DYE: CPT

## 2018-04-02 PROCEDURE — 94760 N-INVAS EAR/PLS OXIMETRY 1: CPT

## 2018-04-02 PROCEDURE — 80048 BASIC METABOLIC PNL TOTAL CA: CPT | Performed by: INTERNAL MEDICINE

## 2018-04-02 PROCEDURE — 94660 CPAP INITIATION&MGMT: CPT

## 2018-04-02 RX ORDER — OXYCODONE HYDROCHLORIDE 10 MG/1
30 TABLET ORAL EVERY 6 HOURS PRN
Status: DISCONTINUED | OUTPATIENT
Start: 2018-04-02 | End: 2018-04-04 | Stop reason: HOSPADM

## 2018-04-02 RX ORDER — DIAZEPAM 5 MG/1
5 TABLET ORAL EVERY 6 HOURS PRN
Status: DISCONTINUED | OUTPATIENT
Start: 2018-04-02 | End: 2018-04-02

## 2018-04-02 RX ORDER — OXYCODONE HYDROCHLORIDE 10 MG/1
10 TABLET ORAL ONCE
Status: COMPLETED | OUTPATIENT
Start: 2018-04-02 | End: 2018-04-02

## 2018-04-02 RX ORDER — DIAZEPAM 5 MG/1
5 TABLET ORAL EVERY 6 HOURS PRN
Status: DISCONTINUED | OUTPATIENT
Start: 2018-04-02 | End: 2018-04-04 | Stop reason: HOSPADM

## 2018-04-02 RX ADMIN — BACLOFEN 10 MG: 10 TABLET ORAL at 09:21

## 2018-04-02 RX ADMIN — DIAZEPAM 5 MG: 5 TABLET ORAL at 23:13

## 2018-04-02 RX ADMIN — HEPARIN SODIUM 5000 UNITS: 5000 INJECTION, SOLUTION INTRAVENOUS; SUBCUTANEOUS at 23:14

## 2018-04-02 RX ADMIN — GABAPENTIN 600 MG: 300 CAPSULE ORAL at 15:58

## 2018-04-02 RX ADMIN — OXYCODONE HYDROCHLORIDE 10 MG: 10 TABLET ORAL at 10:57

## 2018-04-02 RX ADMIN — GABAPENTIN 600 MG: 300 CAPSULE ORAL at 21:13

## 2018-04-02 RX ADMIN — Medication 1 TABLET: at 09:20

## 2018-04-02 RX ADMIN — ZOLPIDEM TARTRATE 5 MG: 5 TABLET ORAL at 00:14

## 2018-04-02 RX ADMIN — OXYCODONE HYDROCHLORIDE 30 MG: 10 TABLET ORAL at 17:55

## 2018-04-02 RX ADMIN — DIAZEPAM 5 MG: 5 TABLET ORAL at 15:49

## 2018-04-02 RX ADMIN — CLONIDINE HYDROCHLORIDE 0.2 MG: 0.2 TABLET ORAL at 21:13

## 2018-04-02 RX ADMIN — ACETAMINOPHEN 975 MG: 325 TABLET, FILM COATED ORAL at 21:13

## 2018-04-02 RX ADMIN — METHYLPREDNISOLONE 20 MG: 16 TABLET ORAL at 09:22

## 2018-04-02 RX ADMIN — BACLOFEN 10 MG: 10 TABLET ORAL at 21:13

## 2018-04-02 RX ADMIN — SENNOSIDES 8.6 MG: 8.6 TABLET, FILM COATED ORAL at 17:55

## 2018-04-02 RX ADMIN — PANTOPRAZOLE SODIUM 40 MG: 40 TABLET, DELAYED RELEASE ORAL at 05:44

## 2018-04-02 RX ADMIN — OXYCODONE HYDROCHLORIDE 20 MG: 10 TABLET ORAL at 10:37

## 2018-04-02 RX ADMIN — SERTRALINE HYDROCHLORIDE 75 MG: 50 TABLET ORAL at 09:20

## 2018-04-02 RX ADMIN — INSULIN GLARGINE 10 UNITS: 100 INJECTION, SOLUTION SUBCUTANEOUS at 21:13

## 2018-04-02 RX ADMIN — HEPARIN SODIUM 5000 UNITS: 5000 INJECTION, SOLUTION INTRAVENOUS; SUBCUTANEOUS at 05:12

## 2018-04-02 RX ADMIN — HEPARIN SODIUM 5000 UNITS: 5000 INJECTION, SOLUTION INTRAVENOUS; SUBCUTANEOUS at 13:15

## 2018-04-02 RX ADMIN — DIAZEPAM 5 MG: 5 TABLET ORAL at 09:23

## 2018-04-02 RX ADMIN — OXYCODONE HYDROCHLORIDE 20 MG: 10 TABLET ORAL at 05:12

## 2018-04-02 RX ADMIN — INSULIN LISPRO 1 UNITS: 100 INJECTION, SOLUTION INTRAVENOUS; SUBCUTANEOUS at 18:02

## 2018-04-02 RX ADMIN — ACETAMINOPHEN 975 MG: 325 TABLET, FILM COATED ORAL at 05:11

## 2018-04-02 RX ADMIN — BACLOFEN 10 MG: 10 TABLET ORAL at 15:58

## 2018-04-02 RX ADMIN — SENNOSIDES 8.6 MG: 8.6 TABLET, FILM COATED ORAL at 09:21

## 2018-04-02 RX ADMIN — MELATONIN TAB 3 MG 4.5 MG: 3 TAB at 23:14

## 2018-04-02 RX ADMIN — TERAZOSIN HYDROCHLORIDE 5 MG: 5 CAPSULE ORAL at 23:13

## 2018-04-02 RX ADMIN — GABAPENTIN 600 MG: 300 CAPSULE ORAL at 09:21

## 2018-04-02 RX ADMIN — CLONIDINE HYDROCHLORIDE 0.2 MG: 0.2 TABLET ORAL at 09:21

## 2018-04-02 RX ADMIN — HYOSCYAMINE SULFATE 0.38 MG: 0.38 TABLET, EXTENDED RELEASE ORAL at 09:20

## 2018-04-02 RX ADMIN — BENAZEPRIL HYDROCHLORIDE 40 MG: 10 TABLET ORAL at 09:21

## 2018-04-02 RX ADMIN — HYDROMORPHONE HYDROCHLORIDE 1 MG: 1 INJECTION, SOLUTION INTRAMUSCULAR; INTRAVENOUS; SUBCUTANEOUS at 00:05

## 2018-04-02 RX ADMIN — INSULIN LISPRO 1 UNITS: 100 INJECTION, SOLUTION INTRAVENOUS; SUBCUTANEOUS at 21:17

## 2018-04-02 RX ADMIN — ACETAMINOPHEN 975 MG: 325 TABLET, FILM COATED ORAL at 13:07

## 2018-04-02 NOTE — ASSESSMENT & PLAN NOTE
He was noted with this during his initial exam    · Now he is s/p L3/4 extruded disc herniation causing right lateral recess and foraminal stenosis, right facet arthropathy and ligamentous hypertrophy

## 2018-04-02 NOTE — ASSESSMENT & PLAN NOTE
· Hold amaryl, resume on d/c   · Last HA1C 7 4%   · Started on lantus 10units qHS for better glycemic control inpatient   · Continue SSI, accuchecks, diabetic diet   · Glucose is better controlled, might go up with steroids

## 2018-04-02 NOTE — PROGRESS NOTES
Progress Note - Rufina Mercado 1960, 65 y o  male MRN: [id number]    Unit/Bed#: [facility] 921-01 Encounter: [id number]    Primary Care Provider: Ade Mckenzie MD   Date and time admitted to hospital: 3/24/2018  3:33 PM        Radiculopathy due to lumbar intervertebral disc disorder   Assessment & Plan    He was noted with this during his initial exam    · Now he is s/p L3/4 extruded disc herniation causing right lateral recess and foraminal stenosis, right facet arthropathy and ligamentous hypertrophy           Sleep apnea   Assessment & Plan    · Continue CPAP qhs        Lumbar radiculopathy   Assessment & Plan    · Patient with hx of chronic LBP presented with RLE pain likely consistent with L3-4/lumbar radiculopathy  Now reporting pain distal to right knee, continues to denies saddle anesthesia or paresthesias      · Appreciate neurosurgery input   · S/p MRI lumbar spine   · L3/4 extruded disc herniation causing right lateral recess and foraminal stenosis, right facet arthropathy and ligamentous hypertrophy   · Lumbar spine flexion/extension xrays yesterday   · APS following and patient was started on ketamine infusion 3/26 and rate increased today   · Per neurosx attending attestation, patient will likely need extensive decompression with fixation/fusion, which would be best accomplished after integrated with outpatient pain management and outpatient discussion   · Consider medrol dose pack started due to inflammation  · No acute pain service over the weekend, will contact them in the morning        Type 2 diabetes mellitus ([facility] [address] )   Assessment & Plan    · Hold amaryl, resume on d/c   · Last HA1C 7 4%   · Started on lantus 10units qHS for better glycemic control inpatient   · Continue SSI, accuchecks, diabetic diet   · Glucose is better controlled, might go up with steroids          Depression   Assessment & Plan    · Continue zoloft daily and ambien qHS (on sonata at home)         Benign essential hypertension Assessment & Plan    · Poorly controlled possibly secondary to pain, improving  · SBP ranging 140-170s but also in setting of acute pain   · Home regimen includes, benazepril 40mg daily with HCTZ 25mg daily, clonidine 0 1mg q12, terazosin 5mg daily will restart  · Clonidine was increased to 0 2 mg BID   · PRN antihypertensives for SBP > 180  · Will adjust as needed        Pain syndrome, chronic   Assessment & Plan    · Post-laminectomy syndrome  Previously had spine stimulator that was removed this month  · Home regimen includes Celebrex, fentanyl, oxy IR  Will adjust  · Pain management consult  · Secondary to chronic back pain         * Acute back pain   Assessment & Plan    · Acute on chronic back pain likely due to new herniated disc  · S/p L2/3 and L3/4 MIS transforaminal lumbar interbody fixation fusion from right sided approach, L3/L4 discectomy on 3/28/18,    ·  d/c ketamine drip and started on dilaudid 1 mg q 2 hrs for breakthrough pain  Continue oxycodone, Fentanyl patch, gabapentin, and Robaxin  Continuous pulse ox  · Dilaudid was restarted   · PT/OT              VTE Pharmacologic Prophylaxis:   Pharmacologic: Heparin  Mechanical VTE Prophylaxis in Place: Yes    Patient Centered Rounds: I have performed bedside rounds with nursing staff today  Discussions with Specialists or Other Care Team Provider:      Education and Discussions with Family / Patient: patient    Time Spent for Care: 45 minutes  More than 50% of total time spent on counseling and coordination of care as described above      Current Length of Stay: 9 day(s)    Current Patient Status: Inpatient   Certification Statement: The patient will continue to require additional inpatient hospital stay due to pain    Discharge Plan: pain management and d/c    Code Status: Level 1 - Full Code      Subjective:   I still have swelling in the left lower extremity  Objective:     Vitals:   Temp (24hrs), Av 1 °F (36 7 °C), Min:97 8 °F (36 6 °C), Max:98 2 °F (36 8 °C)    HR:  [63-69] 67  Resp:  [16-19] 16  BP: (121-154)/(72-82) 121/72  SpO2:  [95 %-97 %] 97 %  Body mass index is 34 48 kg/m²  Input and Output Summary (last 24 hours): Intake/Output Summary (Last 24 hours) at 04/02/18 1344  Last data filed at 04/02/18 0900   Gross per 24 hour   Intake             1440 ml   Output               50 ml   Net             1390 ml       Physical Exam:     Physical Exam   Constitutional: He is oriented to person, place, and time  He appears well-developed and well-nourished  No distress  HENT:   Head: Normocephalic and atraumatic  Right Ear: External ear normal    Left Ear: External ear normal    Nose: Nose normal    Mouth/Throat: Oropharynx is clear and moist  No oropharyngeal exudate  Eyes: Conjunctivae and EOM are normal  Pupils are equal, round, and reactive to light  Right eye exhibits no discharge  Left eye exhibits no discharge  No scleral icterus  Neck: Normal range of motion  Neck supple  No JVD present  No tracheal deviation present  No thyromegaly present  Cardiovascular: Normal rate, regular rhythm, normal heart sounds and intact distal pulses  Exam reveals no gallop and no friction rub  No murmur heard  Pulmonary/Chest: Effort normal and breath sounds normal  No stridor  No respiratory distress  He has no wheezes  He has no rales  He exhibits no tenderness  Abdominal: Soft  Bowel sounds are normal  He exhibits no distension and no mass  There is no tenderness  There is no rebound and no guarding  Musculoskeletal: He exhibits deformity  He exhibits no edema or tenderness  Lymphadenopathy:     He has no cervical adenopathy  Neurological: He is alert and oriented to person, place, and time  He has normal reflexes  He displays normal reflexes  No cranial nerve deficit  He exhibits abnormal muscle tone  Coordination abnormal    Skin: Skin is warm and dry  No rash noted  He is not diaphoretic  No erythema  No pallor  Psychiatric: He has a normal mood and affect  His behavior is normal  Judgment and thought content normal    Nursing note and vitals reviewed  Additional Data:     Labs:      Results from last 7 days  Lab Units 04/02/18  0504   WBC Thousand/uL 10 61*   HEMOGLOBIN g/dL 10 8*   HEMATOCRIT % 33 2*   PLATELETS Thousands/uL 283   NEUTROS PCT % 66   LYMPHS PCT % 25   MONOS PCT % 8   EOS PCT % 1       Results from last 7 days  Lab Units 04/02/18  0504   SODIUM mmol/L 138   POTASSIUM mmol/L 3 7   CHLORIDE mmol/L 102   CO2 mmol/L 29   BUN mg/dL 18   CREATININE mg/dL 0 93   CALCIUM mg/dL 9 9   GLUCOSE RANDOM mg/dL 155*           * I Have Reviewed All Lab Data Listed Above  * Additional Pertinent Lab Tests Reviewed:  [address] Admission Reviewed    Imaging:    Imaging Reports Reviewed Today Include:    Imaging Personally Reviewed by Myself Includes:      Recent Cultures (last 7 days):           Last 24 Hours Medication List:     Current Facility-Administered Medications:  acetaminophen 975 mg Oral Q8H [address] JAMIE Boyce   acetaminophen 975 mg Oral Once Balaji Valentin MD   baclofen 10 mg Oral TID Segundo Turk MD   benazepril 40 mg Oral Daily Jose E Palumbo MD   bisacodyl 5 mg Oral Daily PRN Segundo Turk MD   bisacodyl 10 mg Rectal Daily PRN Maurizio Glae MD   cloNIDine 0 2 mg Oral Q12H [address] JAMIE Mojica   diazepam 5 mg Oral Q6H PRN Segundo Turk MD   docusate sodium 100 mg Oral BID PRN Maurizio Gale PA-C   fentaNYL 100 mcg Transdermal Q48H Segundo Turk MD   gabapentin 600 mg Oral TID Jose E Palumbo MD   heparin (porcine) 5,000 Units Subcutaneous [facility] Maurizio Gale MD   hydrALAZINE 5 mg Intravenous Q6H PRN Fariba Gregorio PA-C   hyoscyamine 0 375 mg Oral Daily Jose E Palumbo MD   insulin glargine 10 Units Subcutaneous HS Jose E Palumbo MD   insulin lispro 1-5 Units Subcutaneous TID AC Jose E Palumbo MD   insulin lispro 1-5 Units Subcutaneous HS Jose E Palumbo MD   melatonin 4 5 mg Oral HS Jose E Palumbo MD   [START ON 4/3/2018] methylPREDNISolone 16 mg Oral Daily Joan Onofre PA-C   Followed by       Molly Reveles ON 4/4/2018] methylPREDNISolone 12 mg Oral Daily Joan Onofre PA-C   Followed by       Molly Reveles ON 4/5/2018] methylPREDNISolone 8 mg Oral Daily Joan Onofre PA-C   Followed by       Molly Reveles ON 4/6/2018] methylPREDNISolone 4 mg Oral Daily Joan Onofre PA-C   multivitamin-minerals 1 tablet Oral Daily Jose E Palumbo MD   Naloxegol Oxalate 12 5 mg Oral Daily Jose E Palumbo MD   ondansetron 4 mg Intravenous Q6H PRN Jose E Palumbo MD   oxyCODONE 30 mg Oral Q6H PRN Morena De Los Santos MD   pantoprazole 40 mg Oral Early Morning Jose E Palumbo MD   polyethylene glycol 17 g Oral Daily Morena De Los Santos MD   pregabalin 150 mg Oral Once Malgorzata Guerrero MD   senna 1 tablet Oral BID Jose E Palumbo MD   sertraline 75 mg Oral Daily Jose E Palumbo MD   terazosin 5 mg Oral HS Jose E Palumbo MD   zolpidem 5 mg Oral HS PRN Israel Mendez MD        Today, Patient Was Seen By: Morena De Los Santos MD    ** Please Note: Dictation voice to text software may have been used in the creation of this document   **

## 2018-04-02 NOTE — PROGRESS NOTES
Progress Note - Inpatient Pain Management    Miguel Angel Gonzalez 65 y o  male MRN: [id number]  Unit/Bed#: [facility] 921-01 Encounter: [id number]      Assessment:   Principal Problem:    Acute back pain  Active Problems:    Pain syndrome, chronic    Benign essential hypertension    Depression    Type 2 diabetes mellitus (HCC)    Lumbar radiculopathy    Pain of right thigh    Sleep apnea    Spondylolisthesis of lumbar region    Facet arthropathy, lumbar    Herniated lumbar intervertebral disc    Radiculopathy due to lumbar intervertebral disc disorder    Radicular pain of right lower extremity      Plan/Recommendations:   Acute on chronic pain S/P L2/3 and L3/4 MIS transforaminal lumbar interbody fusion, L3/4 right discectomy  · Acetaminophen 975mg Q8 hours  · Baclofen 10mg TID for ongoing spasms   · Trial Valium 5mg PO Q6 hours PRN for breakthrough spasms  · Fentanyl patch 100mcg Q48 hours (home medication)  · Gabapentin 600mg TID  · Medrol dose pack started over the weekend  · Discontinue IV Dilaudid   · Change Oxycodone to 30mg Q6 hours PRN breakthrough pain;  · Give an additional 10mg dose of Oxycodone now to equal 30mg dose  · Patient takes Oxycodone 30mg tablets at home    Reviewed with SLIM and neuro-surg    Pain History  Current pain location(s): lower back   Pain Scale:   6-10  Severity:  Severe at times  Quality: sharp, intermittent spasms, numbness and tingling in his toes   24 hour history: Patient sitting in chair, appears comfortable  Continues to report lower back pain and spasms  Over the weekend reported severe increase in pain and new numbness and tingling in his toes  Ct scan was done and medrol dose pack was started       Current Analgesic regimen:  Acetaminophen 975mg Q8 hours, Baclofen 10mg TID, Fentanyl 100mcg patch, Gabapentin 600mg TID, Dilaudid 1mg IV PRN (2mg), Oxycodone 20mg Q4 hours PRN (3 doses)  Bowel Regimen: Senna BID, Colace BID PRN, Dulcolax PRN, Miralax daily, naloxegol oxalate    Meds/Allergies   all current active meds have been reviewed and current meds:   Current Facility-Administered Medications   Medication Dose Route Frequency    acetaminophen (TYLENOL) tablet 975 mg  975 mg Oral Q8H [address]    acetaminophen (TYLENOL) tablet 975 mg  975 mg Oral Once    baclofen tablet 10 mg  10 mg Oral TID    benazepril (LOTENSIN) tablet 40 mg  40 mg Oral Daily    bisacodyl (DULCOLAX) EC tablet 5 mg  5 mg Oral Daily PRN    bisacodyl (DULCOLAX) rectal suppository 10 mg  10 mg Rectal Daily PRN    cloNIDine (CATAPRES) tablet 0 2 mg  0 2 mg Oral Q12H [address]    diazepam (VALIUM) tablet 5 mg  5 mg Oral Q6H PRN    docusate sodium (COLACE) capsule 100 mg  100 mg Oral BID PRN    fentaNYL (DURAGESIC) 100 mcg/hr TD 72 hr patch 100 mcg  100 mcg Transdermal Q48H    gabapentin (NEURONTIN) capsule 600 mg  600 mg Oral TID    heparin (porcine) subcutaneous injection 5,000 Units  5,000 Units Subcutaneous Q8H [address]    hydrALAZINE (APRESOLINE) injection 5 mg  5 mg Intravenous Q6H PRN    hyoscyamine (LEVBID) 12 hr tablet 0 375 mg  0 375 mg Oral Daily    insulin glargine (LANTUS) subcutaneous injection 10 Units  10 Units Subcutaneous HS    insulin lispro (HumaLOG) 100 units/mL subcutaneous injection 1-5 Units  1-5 Units Subcutaneous TID AC    insulin lispro (HumaLOG) 100 units/mL subcutaneous injection 1-5 Units  1-5 Units Subcutaneous HS    melatonin tablet 4 5 mg  4 5 mg Oral HS    [START ON 4/3/2018] methylPREDNISolone (MEDROL) tablet 16 mg  16 mg Oral Daily    Followed by   Honey Torres ON 4/4/2018] methylprednisolone (MEDROL) tablet 12 mg  12 mg Oral Daily    Followed by   Honey Torres ON 4/5/2018] methylprednisolone (MEDROL) tablet 8 mg  8 mg Oral Daily    Followed by   Honey Torres ON 4/6/2018] methylprednisolone (MEDROL) tablet 4 mg  4 mg Oral Daily    multivitamin-minerals (CENTRUM) tablet 1 tablet  1 tablet Oral Daily    Naloxegol Oxalate TABS 12 5 mg  12 5 mg Oral Daily    ondansetron (ZOFRAN) injection 4 mg  4 mg Intravenous Q6H PRN    oxyCODONE (ROXICODONE) immediate release tablet 30 mg  30 mg Oral Q6H PRN    pantoprazole (PROTONIX) EC tablet 40 mg  40 mg Oral Early Morning    polyethylene glycol (MIRALAX) packet 17 g  17 g Oral Daily    pregabalin (LYRICA) capsule 150 mg  150 mg Oral Once    senna (SENOKOT) tablet 8 6 mg  1 tablet Oral BID    sertraline (ZOLOFT) tablet 75 mg  75 mg Oral Daily    terazosin (HYTRIN) capsule 5 mg  5 mg Oral HS    zolpidem (AMBIEN) tablet 5 mg  5 mg Oral HS PRN       Allergies   Allergen Reactions    Propulsid [Cisapride] Tongue Swelling    Cymbalta [Duloxetine Hcl] Headache    Vancomycin Rash     Red man syndrome       Objective     Temp:  [97 8 °F (36 6 °C)-98 2 °F (36 8 °C)] 98 2 °F (36 8 °C)  HR:  [63-69] 67  Resp:  [16-19] 16  BP: (121-154)/(72-82) 121/72      Intake/Output Summary (Last 24 hours) at 04/02/18 1234  Last data filed at 04/02/18 0900   Gross per 24 hour   Intake             1640 ml   Output              450 ml   Net             1190 ml               Physical Exam: /72 (BP Location: Right arm)   Pulse 67   Temp 98 2 °F (36 8 °C) (Oral)   Resp 16   Ht 5' 10" (1 778 m)   Wt 109 kg (240 lb 4 8 oz)   SpO2 97%   BMI 34 48 kg/m²   General Appearance:    Alert, cooperative, no distress, appears stated age   Neurological:   Oriented to person, place, and time   Head:    Normocephalic, without obvious abnormality, atraumatic   Eyes:    EOM's intact   Lungs:     Respirations unlabored   Chest Wall:    No deformity   Abdomen:        Large, round   Skin:   Skin no rashes or lesions     Lab Results:     Results from last 7 days  Lab Units 04/02/18  0504   WBC Thousand/uL 10 61*   HEMOGLOBIN g/dL 10 8*   HEMATOCRIT % 33 2*   PLATELETS Thousands/uL 283        Results from last 7 days  Lab Units 04/02/18  0504   SODIUM mmol/L 138   POTASSIUM mmol/L 3 7   CHLORIDE mmol/L 102   CO2 mmol/L 29   BUN mg/dL 18   CREATININE mg/dL 0 93   CALCIUM mg/dL 9 9   GLUCOSE RANDOM mg/dL 155*       Imaging Studies: I have personally reviewed pertinent reports  Counseling / Coordination of Care  Total floor / unit time spent today 15 minutes  Greater than 50% of total time was spent with the patient and / or family counseling and / or coordination of care   A description of the counseling / coordination of care: Reviewed plan of care and medications with patient, RN staff, neuro-surg and primary care team     Janis Rasmussen MS, RN-BC  Acute Pain

## 2018-04-02 NOTE — CASE MANAGEMENT
Continued Stay Review    Date: 4/2    Vital Signs: /72 (BP Location: Right arm)   Pulse 67   Temp 98 2 °F (36 8 °C) (Oral)   Resp 16   Ht 5' 10" (1 778 m)   Wt 109 kg (240 lb 4 8 oz)   SpO2 97%   BMI 34 48 kg/m²     Medications:   Scheduled Meds:   Current Facility-Administered Medications:  acetaminophen 975 mg Oral Q8H [facility]   acetaminophen 975 mg Oral Once   baclofen 10 mg Oral TID   benazepril 40 mg Oral Daily   cloNIDine 0 2 mg Oral Q12H [facility]   fentaNYL 100 mcg Transdermal Q48H   gabapentin 600 mg Oral TID   heparin (porcine) 5,000 Units Subcutaneous Q8H [facility]   hyoscyamine 0 375 mg Oral Daily   insulin glargine 10 Units Subcutaneous HS   insulin lispro 1-5 Units Subcutaneous TID AC   insulin lispro 1-5 Units Subcutaneous HS   melatonin 4 5 mg Oral HS   [START ON 4/3/2018] methylPREDNISolone 16 mg Oral Daily   Followed by      Lizzy Cuff ON 4/4/2018] methylPREDNISolone 12 mg Oral Daily   Followed by      Lizzy Cuff ON 4/5/2018] methylPREDNISolone 8 mg Oral Daily   Followed by      Lizzy Cuff ON 4/6/2018] methylPREDNISolone 4 mg Oral Daily   multivitamin-minerals 1 tablet Oral Daily   Naloxegol Oxalate 12 5 mg Oral Daily   pantoprazole 40 mg Oral Early Morning   polyethylene glycol 17 g Oral Daily   pregabalin 150 mg Oral Once   senna 1 tablet Oral BID   sertraline 75 mg Oral Daily   terazosin 5 mg Oral HS     Continuous Infusions:    PRN Meds: bisacodyl    bisacodyl    diazepam    docusate sodium    hydrALAZINE    ondansetron    oxyCODONE    zolpidem    Abnormal Labs/Diagnostic Results:    04/02/18 0504    WBC 4 31 - 10 16 Thousand/uL 10 61     RBC 3 88 - 5 62 Million/uL 3 96    Hemoglobin 12 0 - 17 0 g/dL 10 8     Hematocrit 36 5 - 49 3 % 33 2       Age/Sex: 65 y o  male     Assessment/Plan:   Radiculopathy due to lumbar intervertebral disc disorder   Assessment & Plan     He was noted with this during his initial exam    ?  Now he is s/p L3/4 extruded disc herniation causing right lateral recess and foraminal stenosis, right facet arthropathy and ligamentous hypertrophy              Sleep apnea   Assessment & Plan     · Continue CPAP qhs          Lumbar radiculopathy   Assessment & Plan     · Patient with hx of chronic LBP presented with RLE pain likely consistent with L3-4/lumbar radiculopathy  Now reporting pain distal to right knee, continues to denies saddle anesthesia or paresthesias  · Appreciate neurosurgery input   · S/p MRI lumbar spine   ?  L3/4 extruded disc herniation causing right lateral recess and foraminal stenosis, right facet arthropathy and ligamentous hypertrophy   · Lumbar spine flexion/extension xrays yesterday   · APS following and patient was started on ketamine infusion 3/26 and rate increased today   · Per neurosx attending attestation, patient will likely need extensive decompression with fixation/fusion, which would be best accomplished after integrated with outpatient pain management and outpatient discussion   · Consider medrol dose pack started due to inflammation  · No acute pain service over the weekend, will contact them in the morning          Type 2 diabetes mellitus ([facility] [address] )   Assessment & Plan     · Hold amaryl, resume on d/c   · Last HA1C 7 4%   · Started on lantus 10units qHS for better glycemic control inpatient   · Continue SSI, accuchecks, diabetic diet   · Glucose is better controlled, might go up with steroids             Depression   Assessment & Plan     · Continue zoloft daily and ambien qHS (on sonata at home)           Benign essential hypertension   Assessment & Plan     · Poorly controlled possibly secondary to pain, improving  · SBP ranging 140-170s but also in setting of acute pain   · Home regimen includes, benazepril 40mg daily with HCTZ 25mg daily, clonidine 0 1mg q12, terazosin 5mg daily will restart  · Clonidine was increased to 0 2 mg BID   · PRN antihypertensives for SBP > 180  · Will adjust as needed          Pain syndrome, chronic   Assessment & Plan   · Post-laminectomy syndrome  Previously had spine stimulator that was removed this month  · Home regimen includes Celebrex, fentanyl, oxy IR  Will adjust  · Pain management consult  · Secondary to chronic back pain           * Acute back pain   Assessment & Plan     · Acute on chronic back pain likely due to new herniated disc  · S/p L2/3 and L3/4 MIS transforaminal lumbar interbody fixation fusion from right sided approach, L3/L4 discectomy on 3/28/18,    ·  d/c ketamine drip and started on dilaudid 1 mg q 2 hrs for breakthrough pain  Continue oxycodone, Fentanyl patch, gabapentin, and Robaxin  Continuous pulse ox  · Dilaudid was restarted   · PT/OT              VTE Pharmacologic Prophylaxis:   Pharmacologic: Heparin  Mechanical VTE Prophylaxis in Place: Yes     Current Length of Stay: 9 day(s)     Current Patient Status: Inpatient   Certification Statement: The patient will continue to require additional inpatient hospital stay due to pain     Discharge Plan: pain management and d/c   Home when medically cleared

## 2018-04-02 NOTE — OCCUPATIONAL THERAPY NOTE
Occupational Therapy Treatment Note:       03/30/18 1155   Restrictions/Precautions   Other Precautions (lso brace)   Pain Assessment   Pain Assessment 0-10   Pain Score 7   Pain Type Acute pain   Pain Location Leg   Pain Orientation Bilateral   ADL   Where Assessed Edge of bed   LB Dressing Assistance 5  Supervision/Setup   LB Dressing Deficit Use of adaptive equipment   LB Dressing Comments seated eob, pt was able to use rigid sock aid and reacher to param doff socks and pants with supervision s/p initial instruction  pt owns shoe horn and reacher but reports not having sock aid  pt reports wife could also assist if necessary   Bed Mobility   Supine to Sit 7  Independent   Sit to Supine 7  Independent   Transfers   Sit to Stand 6  Modified independent   Stand to Sit 6  Modified independent   Stand pivot 6  Modified independent   Functional Mobility   Functional Mobility 6  Modified independent   Additional Comments rw in hosp room during session  pt also noted to ambulate halls with rw  Additional items Rolling walker   Activity Tolerance   Activity Tolerance Patient tolerated treatment well  (reports being limited by pain)   Assessment   Assessment pt participated in am ot session and was seen focusing on lb adls and instruction on sock aide and reacher to assist pt with lb dressing if necessary  pt was able to perform lb adls with s s/p initial instruction  pt denied other dme needs at this time  pt reports being limited by pain in legs  pt is modified independent for functional mobility in room and segura ways  pt perfomred bed mobility independently this session supine to sit     Plan   Treatment Interventions ADL retraining   Goal Expiration Date 04/08/18   Treatment Day 2   OT Frequency 3-5x/wk   April A CEDRIC Weiss

## 2018-04-03 ENCOUNTER — APPOINTMENT (INPATIENT)
Dept: NON INVASIVE DIAGNOSTICS | Facility: HOSPITAL | Age: 58
DRG: 214 | End: 2018-04-03
Payer: OTHER MISCELLANEOUS

## 2018-04-03 PROBLEM — D72.829 LEUKOCYTOSIS: Status: ACTIVE | Noted: 2018-04-03

## 2018-04-03 PROBLEM — M21.372 LEFT FOOT DROP: Status: ACTIVE | Noted: 2018-04-03

## 2018-04-03 LAB
ANION GAP SERPL CALCULATED.3IONS-SCNC: 7 MMOL/L (ref 4–13)
BASOPHILS # BLD AUTO: 0.03 THOUSANDS/ΜL (ref 0–0.1)
BASOPHILS NFR BLD AUTO: 0 % (ref 0–1)
BUN SERPL-MCNC: 16 MG/DL (ref 5–25)
CALCIUM SERPL-MCNC: 9.7 MG/DL (ref 8.3–10.1)
CHLORIDE SERPL-SCNC: 102 MMOL/L (ref 100–108)
CO2 SERPL-SCNC: 30 MMOL/L (ref 21–32)
CREAT SERPL-MCNC: 0.76 MG/DL (ref 0.6–1.3)
EOSINOPHIL # BLD AUTO: 0.13 THOUSAND/ΜL (ref 0–0.61)
EOSINOPHIL NFR BLD AUTO: 1 % (ref 0–6)
ERYTHROCYTE [DISTWIDTH] IN BLOOD BY AUTOMATED COUNT: 13.1 % (ref 11.6–15.1)
GFR SERPL CREATININE-BSD FRML MDRD: 102 ML/MIN/1.73SQ M
GLUCOSE SERPL-MCNC: 121 MG/DL (ref 65–140)
GLUCOSE SERPL-MCNC: 153 MG/DL (ref 65–140)
GLUCOSE SERPL-MCNC: 159 MG/DL (ref 65–140)
GLUCOSE SERPL-MCNC: 166 MG/DL (ref 65–140)
GLUCOSE SERPL-MCNC: 201 MG/DL (ref 65–140)
HCT VFR BLD AUTO: 34.2 % (ref 36.5–49.3)
HGB BLD-MCNC: 11.1 G/DL (ref 12–17)
LYMPHOCYTES # BLD AUTO: 3.58 THOUSANDS/ΜL (ref 0.6–4.47)
LYMPHOCYTES NFR BLD AUTO: 38 % (ref 14–44)
MCH RBC QN AUTO: 27.1 PG (ref 26.8–34.3)
MCHC RBC AUTO-ENTMCNC: 32.5 G/DL (ref 31.4–37.4)
MCV RBC AUTO: 84 FL (ref 82–98)
MONOCYTES # BLD AUTO: 0.63 THOUSAND/ΜL (ref 0.17–1.22)
MONOCYTES NFR BLD AUTO: 7 % (ref 4–12)
NEUTROPHILS # BLD AUTO: 4.9 THOUSANDS/ΜL (ref 1.85–7.62)
NEUTS SEG NFR BLD AUTO: 54 % (ref 43–75)
NRBC BLD AUTO-RTO: 0 /100 WBCS
PLATELET # BLD AUTO: 315 THOUSANDS/UL (ref 149–390)
PMV BLD AUTO: 9.2 FL (ref 8.9–12.7)
POTASSIUM SERPL-SCNC: 3.6 MMOL/L (ref 3.5–5.3)
RBC # BLD AUTO: 4.09 MILLION/UL (ref 3.88–5.62)
SODIUM SERPL-SCNC: 139 MMOL/L (ref 136–145)
WBC # BLD AUTO: 9.36 THOUSAND/UL (ref 4.31–10.16)

## 2018-04-03 PROCEDURE — 99233 SBSQ HOSP IP/OBS HIGH 50: CPT | Performed by: INTERNAL MEDICINE

## 2018-04-03 PROCEDURE — 94760 N-INVAS EAR/PLS OXIMETRY 1: CPT

## 2018-04-03 PROCEDURE — 85025 COMPLETE CBC W/AUTO DIFF WBC: CPT | Performed by: INTERNAL MEDICINE

## 2018-04-03 PROCEDURE — 93970 EXTREMITY STUDY: CPT | Performed by: SURGERY

## 2018-04-03 PROCEDURE — 93970 EXTREMITY STUDY: CPT

## 2018-04-03 PROCEDURE — 94660 CPAP INITIATION&MGMT: CPT

## 2018-04-03 PROCEDURE — 80048 BASIC METABOLIC PNL TOTAL CA: CPT | Performed by: INTERNAL MEDICINE

## 2018-04-03 PROCEDURE — 82948 REAGENT STRIP/BLOOD GLUCOSE: CPT

## 2018-04-03 RX ADMIN — BACLOFEN 10 MG: 10 TABLET ORAL at 20:13

## 2018-04-03 RX ADMIN — SERTRALINE HYDROCHLORIDE 75 MG: 50 TABLET ORAL at 08:11

## 2018-04-03 RX ADMIN — CLONIDINE HYDROCHLORIDE 0.2 MG: 0.2 TABLET ORAL at 20:13

## 2018-04-03 RX ADMIN — ACETAMINOPHEN 975 MG: 325 TABLET, FILM COATED ORAL at 14:15

## 2018-04-03 RX ADMIN — INSULIN GLARGINE 10 UNITS: 100 INJECTION, SOLUTION SUBCUTANEOUS at 21:33

## 2018-04-03 RX ADMIN — PANTOPRAZOLE SODIUM 40 MG: 40 TABLET, DELAYED RELEASE ORAL at 05:16

## 2018-04-03 RX ADMIN — INSULIN LISPRO 1 UNITS: 100 INJECTION, SOLUTION INTRAVENOUS; SUBCUTANEOUS at 09:49

## 2018-04-03 RX ADMIN — HEPARIN SODIUM 5000 UNITS: 5000 INJECTION, SOLUTION INTRAVENOUS; SUBCUTANEOUS at 14:15

## 2018-04-03 RX ADMIN — OXYCODONE HYDROCHLORIDE 30 MG: 10 TABLET ORAL at 07:20

## 2018-04-03 RX ADMIN — GABAPENTIN 600 MG: 300 CAPSULE ORAL at 08:11

## 2018-04-03 RX ADMIN — ACETAMINOPHEN 975 MG: 325 TABLET, FILM COATED ORAL at 05:16

## 2018-04-03 RX ADMIN — CLONIDINE HYDROCHLORIDE 0.2 MG: 0.2 TABLET ORAL at 08:11

## 2018-04-03 RX ADMIN — TERAZOSIN HYDROCHLORIDE 5 MG: 5 CAPSULE ORAL at 21:33

## 2018-04-03 RX ADMIN — BENAZEPRIL HYDROCHLORIDE 40 MG: 10 TABLET ORAL at 08:11

## 2018-04-03 RX ADMIN — ACETAMINOPHEN 975 MG: 325 TABLET, FILM COATED ORAL at 21:30

## 2018-04-03 RX ADMIN — HEPARIN SODIUM 5000 UNITS: 5000 INJECTION, SOLUTION INTRAVENOUS; SUBCUTANEOUS at 21:29

## 2018-04-03 RX ADMIN — INSULIN LISPRO 1 UNITS: 100 INJECTION, SOLUTION INTRAVENOUS; SUBCUTANEOUS at 21:32

## 2018-04-03 RX ADMIN — OXYCODONE HYDROCHLORIDE 30 MG: 10 TABLET ORAL at 14:15

## 2018-04-03 RX ADMIN — POLYETHYLENE GLYCOL 3350 17 G: 17 POWDER, FOR SOLUTION ORAL at 15:52

## 2018-04-03 RX ADMIN — Medication 1 TABLET: at 08:11

## 2018-04-03 RX ADMIN — SENNOSIDES 8.6 MG: 8.6 TABLET, FILM COATED ORAL at 08:11

## 2018-04-03 RX ADMIN — INSULIN LISPRO 1 UNITS: 100 INJECTION, SOLUTION INTRAVENOUS; SUBCUTANEOUS at 17:52

## 2018-04-03 RX ADMIN — OXYCODONE HYDROCHLORIDE 30 MG: 10 TABLET ORAL at 20:16

## 2018-04-03 RX ADMIN — SENNOSIDES 8.6 MG: 8.6 TABLET, FILM COATED ORAL at 17:55

## 2018-04-03 RX ADMIN — HEPARIN SODIUM 5000 UNITS: 5000 INJECTION, SOLUTION INTRAVENOUS; SUBCUTANEOUS at 05:16

## 2018-04-03 RX ADMIN — DIAZEPAM 5 MG: 5 TABLET ORAL at 21:39

## 2018-04-03 RX ADMIN — BACLOFEN 10 MG: 10 TABLET ORAL at 08:11

## 2018-04-03 RX ADMIN — MELATONIN TAB 3 MG 4.5 MG: 3 TAB at 21:30

## 2018-04-03 RX ADMIN — INSULIN LISPRO 1 UNITS: 100 INJECTION, SOLUTION INTRAVENOUS; SUBCUTANEOUS at 12:17

## 2018-04-03 RX ADMIN — FENTANYL 100 MCG: 100 PATCH, EXTENDED RELEASE TRANSDERMAL at 09:47

## 2018-04-03 RX ADMIN — HYOSCYAMINE SULFATE 0.38 MG: 0.38 TABLET, EXTENDED RELEASE ORAL at 09:49

## 2018-04-03 RX ADMIN — BACLOFEN 10 MG: 10 TABLET ORAL at 15:48

## 2018-04-03 RX ADMIN — OXYCODONE HYDROCHLORIDE 30 MG: 10 TABLET ORAL at 01:22

## 2018-04-03 RX ADMIN — GABAPENTIN 600 MG: 300 CAPSULE ORAL at 15:48

## 2018-04-03 RX ADMIN — METHYLPREDNISOLONE 16 MG: 16 TABLET ORAL at 08:13

## 2018-04-03 RX ADMIN — GABAPENTIN 600 MG: 300 CAPSULE ORAL at 20:13

## 2018-04-03 RX ADMIN — DIAZEPAM 5 MG: 5 TABLET ORAL at 05:17

## 2018-04-03 NOTE — PROGRESS NOTES
Progress Note - Inpatient Pain Management    Brittany Lewis 65 y o  male MRN: [id number]  Unit/Bed#: [facility] 921-01 Encounter: [id number]      Assessment:   Principal Problem:    Acute back pain  Active Problems:    Pain syndrome, chronic    Benign essential hypertension    Depression    Type 2 diabetes mellitus (HCC)    Lumbar radiculopathy    Pain of right thigh    Sleep apnea    Spondylolisthesis of lumbar region    Facet arthropathy, lumbar    Herniated lumbar intervertebral disc    Radiculopathy due to lumbar intervertebral disc disorder    Radicular pain of right lower extremity      Plan/Recommendations:   Acute on chronic pain S/P L2/3 and L3/4 MIS transforaminal lumbar interbody fusion, L3/4 right discectomy  · Acetaminophen 975mg Q8 hours  · Baclofen 10mg TID for ongoing spasms   · Valium 5mg PO Q6 hours PRN for breakthrough spasms  · Fentanyl patch 100mcg Q48 hours (home medication)  · Gabapentin 600mg TID  · Medrol dose pack started over the weekend  · Oxycodone to 30mg Q6 hours PRN breakthrough pain    Awaiting call back from [facility] to review medication regimen and outpatient pain plan/follow-up  (left 2 messages)    Reviewed with [facility] and neuro-surg    Pain History  Current pain location(s): legs  Pain Scale:   6-10  Severity:  Severe at times  Quality: sharp, intermittent cramping, "feels like a charley horse"  24 hour history: Patient sitting in chair, appears comfortable  Reporting cramping pain in his legs that is persistent  Cramping pain becomes severe at times and is intolerable  Numbness and weakness in left foot is ongoing, no changes in last 24 hours       Current Analgesic regimen:  Acetaminophen 975mg Q8 hours, Baclofen 10mg TID, Fentanyl 100mcg patch, Gabapentin 600mg TID, Oxycodone 30mg Q6 hours PRN (3 doses), Valium 5mg PRN (3 doses)  Bowel Regimen: Senna BID, Colace BID PRN, Dulcolax PRN, Miralax daily, naloxegol oxalate    Meds/Allergies   all current active meds have been reviewed and current meds:   Current Facility-Administered Medications   Medication Dose Route Frequency    acetaminophen (TYLENOL) tablet 975 mg  975 mg Oral Q8H [facility]    acetaminophen (TYLENOL) tablet 975 mg  975 mg Oral Once    baclofen tablet 10 mg  10 mg Oral TID    benazepril (LOTENSIN) tablet 40 mg  40 mg Oral Daily    bisacodyl (DULCOLAX) EC tablet 5 mg  5 mg Oral Daily PRN    bisacodyl (DULCOLAX) rectal suppository 10 mg  10 mg Rectal Daily PRN    cloNIDine (CATAPRES) tablet 0 2 mg  0 2 mg Oral Q12H [facility]    diazepam (VALIUM) tablet 5 mg  5 mg Oral Q6H PRN    docusate sodium (COLACE) capsule 100 mg  100 mg Oral BID PRN    fentaNYL (DURAGESIC) 100 mcg/hr TD 72 hr patch 100 mcg  100 mcg Transdermal Q48H    gabapentin (NEURONTIN) capsule 600 mg  600 mg Oral TID    heparin (porcine) subcutaneous injection 5,000 Units  5,000 Units Subcutaneous Q8H [facility]    hydrALAZINE (APRESOLINE) injection 5 mg  5 mg Intravenous Q6H PRN    hyoscyamine (LEVBID) 12 hr tablet 0 375 mg  0 375 mg Oral Daily    insulin glargine (LANTUS) subcutaneous injection 10 Units  10 Units Subcutaneous HS    insulin lispro (HumaLOG) 100 units/mL subcutaneous injection 1-5 Units  1-5 Units Subcutaneous TID AC    insulin lispro (HumaLOG) 100 units/mL subcutaneous injection 1-5 Units  1-5 Units Subcutaneous HS    melatonin tablet 4 5 mg  4 5 mg Oral HS    [START ON 4/4/2018] methylprednisolone (MEDROL) tablet 12 mg  12 mg Oral Daily    Followed by   Gage Bonilla ON 4/5/2018] methylprednisolone (MEDROL) tablet 8 mg  8 mg Oral Daily    Followed by   Gage Bonilla ON 4/6/2018] methylprednisolone (MEDROL) tablet 4 mg  4 mg Oral Daily    multivitamin-minerals (CENTRUM) tablet 1 tablet  1 tablet Oral Daily    Naloxegol Oxalate TABS 12 5 mg  12 5 mg Oral Daily    ondansetron (ZOFRAN) injection 4 mg  4 mg Intravenous Q6H PRN    oxyCODONE (ROXICODONE) immediate release tablet 30 mg  30 mg Oral Q6H PRN    pantoprazole (PROTONIX) EC tablet 40 mg  40 mg Oral Early Morning    polyethylene glycol (MIRALAX) packet 17 g  17 g Oral Daily    pregabalin (LYRICA) capsule 150 mg  150 mg Oral Once    senna (SENOKOT) tablet 8 6 mg  1 tablet Oral BID    sertraline (ZOLOFT) tablet 75 mg  75 mg Oral Daily    terazosin (HYTRIN) capsule 5 mg  5 mg Oral HS    zolpidem (AMBIEN) tablet 5 mg  5 mg Oral HS PRN       Allergies   Allergen Reactions    Propulsid [Cisapride] Tongue Swelling    Cymbalta [Duloxetine Hcl] Headache    Vancomycin Rash     Red man syndrome       Objective     Temp:  [97 6 °F (36 4 °C)-97 9 °F (36 6 °C)] 97 9 °F (36 6 °C)  HR:  [60-74] 70  Resp:  [16-18] 18  BP: (153-186)/(80-98) 164/98      Intake/Output Summary (Last 24 hours) at 04/03/18 1335  Last data filed at 04/03/18 0900   Gross per 24 hour   Intake             1090 ml   Output                0 ml   Net             1090 ml               Physical Exam: /98 (BP Location: Left arm)   Pulse 70   Temp 97 9 °F (36 6 °C)   Resp 18   Ht 5' 10" (1 778 m)   Wt 109 kg (240 lb 4 8 oz)   SpO2 98%   BMI 34 48 kg/m²   General Appearance:    Alert, cooperative, no distress, appears stated age   Neurological:   Oriented to person, place, and time   Head:    Normocephalic, without obvious abnormality, atraumatic   Eyes:    EOM's intact   Lungs:     Respirations unlabored   Chest Wall:    No deformity   Abdomen:        Large, round   Skin:   Skin no rashes or lesions     Lab Results:     Results from last 7 days  Lab Units 04/03/18  0515   WBC Thousand/uL 9 36   HEMOGLOBIN g/dL 11 1*   HEMATOCRIT % 34 2*   PLATELETS Thousands/uL 315        Results from last 7 days  Lab Units 04/03/18  0515   SODIUM mmol/L 139   POTASSIUM mmol/L 3 6   CHLORIDE mmol/L 102   CO2 mmol/L 30   BUN mg/dL 16   CREATININE mg/dL 0 76   CALCIUM mg/dL 9 7   GLUCOSE RANDOM mg/dL 121       Imaging Studies: VAS LE venous duplex pending    Counseling / Coordination of Care  Total floor / unit time spent today 15 minutes   Greater than 50% of total time was spent with the patient and / or family counseling and / or coordination of care   A description of the counseling / coordination of care: Reviewed plan of care and medications with patient, neuro-surg and primary care team     Reyes Cordova MS, RN-BC  Acute Pain

## 2018-04-03 NOTE — ORTHOTIC NOTE
Orthotic Note            Date: 4/3/2018      Patient Name: Miguel Angel Gonzalez        Time: 12:35pm    Reason for Consult:  Patient Active Problem List   Diagnosis    Pain syndrome, chronic    Acute back pain    Benign essential hypertension    Constipation    Depression    Hearing loss    Non-toxic uninodular goiter    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    Type 2 diabetes mellitus ([facility] [address] )    Lumbar radiculopathy    Preoperative evaluation of a medical condition to rule out surgical contraindications (TAR required)    Pain of right thigh    Sleep apnea    Spondylolisthesis of lumbar region    Facet arthropathy, lumbar    Herniated lumbar intervertebral disc    Radiculopathy due to lumbar intervertebral disc disorder    Radicular pain of right lower extremity   [place] [facility]   Per Neurosurgical    I measured, fit, and donned patient in [employer] LSO while patient was sitting up in at bedside  Pt tolerated well without complaint and instructions/adjustments reviewed at this time  Patient currently has similar brace but said brace is causing a great amount of discomfort due to rigid plastic impinging on center of spine  I will continue daily follow up  RN aware and my contact information at bedside  Recommendations:  Please call Mobility Coordinator at ext  [phone] in regards to bracing instruction and/or adjustment  Rosalio Washington Mobility Coordinator LCFo, LCOF, ASOP R  O T, O B T

## 2018-04-03 NOTE — PROGRESS NOTES
Progress Note - Neurosurgery   Karin Hung 65 y o  male MRN: [id number]  Unit/Bed#: [facility] 921-01 Encounter: [id number]    Assessment/Plan:    · POD #5 from Procedure(s): L2/3 and L3/4 MIS transforaminal lumbar interbody fixation fusion from right sided approach; L3/4 right discectomy  · New CT LSPine reviewed, report pending  No clear hardware failure, hematoma  Will follow exam    · Also consider LE U/S, r/o VTE  Objective:     Vitals: Blood pressure 156/82, pulse 73, temperature 97 6 °F (36 4 °C), temperature source Oral, resp  rate 16, height 5' 10" (1 778 m), weight 109 kg (240 lb 4 8 oz), SpO2 98 %  ,Body mass index is 34 48 kg/m²  Data:    CT LSpine 4/2/18 personally reviewed, report pending

## 2018-04-03 NOTE — PROGRESS NOTES
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

## 2018-04-03 NOTE — ASSESSMENT & PLAN NOTE
- initially likely reactive/physiologic secondary to stress from radiculopathy now coupled with Medrol administration  - remains afebrile - monitor WBC count

## 2018-04-03 NOTE — POST OP PROGRESS NOTES
Progress Note - Neurosurgery   Germain Guardado 65 y o  male MRN: [id number]  Unit/Bed#: [facility] 921-01 Encounter: [id number]    Assessment:  1  POD6 L2/3 and L3/4 MIS transforaminal lumbar interbody fixation and fusion with L3/4 right diskectomy (3/28/18)  2  Left foot drop - possibly nerve irritation  3  LBP with RLE radiculopathy - improved  4  L3/4 foraminal stenosis secondary to extruded disc, ligamentous hypertrophy and facet arthropathy   5  Spondylolisthesis of the lumbar region  6  Chronic pain syndrome  7  Post laminectomy syndrome  8  HTN  9  Type 2 DM  10  Sleep apnea     Plan:  · Exam reveals left DF 3/5 and KF 4/5 weakness  Numbness left lateral foot  · Dressing small area of drainage  Steri-strip off right inferior incision  New sterile dressing placed  · Imaging reviewed personally and by attending  Final results as below  ? CT lumbar spine wo 4/2/18: expected postoperative changes without evidence of hematoma or etiology for new left leg weakness  ? Upright lumbar spine x-rays March 29, 2018:  Satisfactory alignment and hardware placement  · BLE dopplers completed 4/3/18 - results pending  · Pain control per primary team  APS consult appreciated  Patient on multimodal regimen  ? Continue medrol taper  · Mobilize as tolerated  Using RW  Therapy recommending home PT and home with family support with use of roller walker  ? Brace to be worn when OOB  Patient states prior braces causes increased pain at surgical site  Poor padding on [place] LSO  Ordered [place] LSO  · DVT PPX:SCDs  Heparin  · Continue bowel regimen  · Will follow-up for 2wk and 6wk pov  · Call with questions/concerns  · No indications for additional surgical intervention at this juncture  Subjective/Objective   Chief Complaint: "What is going on?"/postopertive follow-up    Subjective: Patient complained of intermittent posterior thigh throbbing/spams pain on Friday   He states since the pain has become constant and more severe now described as a lula horse into his bilateral calves  He states numbness of his bilateral toes on 3/31/18 for which a CT lumbar spine was completed and found to be unremarkable  He states that evening, he developed difficulty moving his left leg especial bending his foot up  He feels the toes drag when he walks  Notes pain with downward pressure on his toe  Denied bowel/bladder dysfunction  Constipation resolved  Voiding well  Preoperative right L3 radiculopathy resolved  Objective: Sitting up in chair  NAD  I/O       04/01 0701 - 04/02 0700 04/02 0701 - 04/03 0700 04/03 0701 - 04/04 0700    P  O  1460 1550     Total Intake(mL/kg) 1460 (13 4) 1550 (14 2)     Urine (mL/kg/hr) 400 (0 2) 50 (0)     Stool       Total Output 400 50      Net +1060 +1500             Unmeasured Urine Occurrence 6 x 4 x           Invasive Devices     Peripheral Intravenous Line            Peripheral IV 04/02/18 Left Forearm 1 day                Physical Exam:  Vitals: Blood pressure 164/98, pulse 70, temperature 97 9 °F (36 6 °C), resp  rate 18, height 5' 10" (1 778 m), weight 109 kg (240 lb 4 8 oz), SpO2 98 %  ,Body mass index is 34 48 kg/m²  General appearance: alert, appears stated age, cooperative and no distress  Head: Normocephalic, without obvious abnormality, atraumatic  Eyes: EOMI  Neck: supple, symmetrical, trachea midline   Back: incisions CDI  Steri strips intact with the exception of right inferior incision  Lungs: non labored breathing  Heart: regular heart rate  Neurologic:   Mental status: Alert, oriented, thought content appropriate  Sensory: decreased balls of feet and left lateral foot  Motor: moving all extremities   Right EHL 4/5, left EHl/DF 3/5, left KF 4/5    Lab Results:    Results from last 7 days  Lab Units 04/03/18  0515 04/02/18  0504 04/01/18  0456   WBC Thousand/uL 9 36 10 61* 9 95   HEMOGLOBIN g/dL 11 1* 10 8* 10 5*   HEMATOCRIT % 34 2* 33 2* 31 4*   PLATELETS Thousands/uL 315 283 266   NEUTROS PCT % 54 66 63   MONOS PCT % 7 8 7       Results from last 7 days  Lab Units 04/03/18  0515 04/02/18  0504 04/01/18  0456   SODIUM mmol/L 139 138 138   POTASSIUM mmol/L 3 6 3 7 3 7   CHLORIDE mmol/L 102 102 104   CO2 mmol/L 30 29 30   BUN mg/dL 16 18 12   CREATININE mg/dL 0 76 0 93 0 71   CALCIUM mg/dL 9 7 9 9 9 4   GLUCOSE RANDOM mg/dL 121 155* 131                 No results found for: TROPONINT  ABG:No results found for: PHART, DZB0XPM, PO2ART, JSR7MEP, K6LJBSFG, BEART, SOURCE    Imaging Studies: I have personally reviewed pertinent reports  and I have personally reviewed pertinent films in PACS    CT spine lumbar wo contrast   Final Result by Melinda Mata MD (04/02 2310)      Stable postoperative and spondylotic changes         Workstation performed: [id number]         CT spine lumbar wo contrast   Final Result by Richard Nickerson DO (03/31 7122)   1  Suboptimal examination due to beam hardening artifact from the patient's surgical hardware as well as lack of intravenous contrast material    2   Postoperative changes L2-L4  Hardware appears intact  Limited evaluation of the spinal canal due to beam hardening artifact  3   Chronic postoperative changes L4-L5 and L5-S1  Consider follow-up MRI of the lumbar spine with gadolinium, if there is concern for spinal stenosis/epidural abscess  Workstation performed: [id number]         XR lumbar spine 2 or 3 views   Final Result by Richard Nickerson DO (03/29 1669)   Degenerative and postoperative changes lumbar spine  Workstation performed: [id number]         XR spine lumbar 2 or 3 views injury   Final Result by Melinda Mata MD (03/29 3548)      Fluoroscopic guidance provided for surgical procedure  Please refer to the separate procedure notes for additional details                Workstation performed: [id number]         XR spine lumbar complete w bending minimum 6 views   Final Result by Iris Alfaro MD (03/27 3476)      Stable degenerative and postoperative changes  Workstation performed: [id number]         MRI lumbar spine wo contrast   Final Result by Estrella Russo MD (03/25 1525)   There is epidural soft tissue which extends into the right foramen at the level of the L3-4 and also extends cranially in relation to the mid body of the L3 vertebra with resultant narrowing of the right lateral recess of the L4 and severe    right foraminal narrowing likely due to an extruded/sequestered disc fragment  Evaluate for right L3 and L4 radiculopathy  Moderate central canal narrowing at L3 level       Adhesion of the cauda equina nerve roots at the level of the L4-L5 vertebra with the posterior aspect of the thecal sac, suggest arachnoiditis      Mild to moderate central canal narrowing at L2-3 with the small central protrusion   The study was marked in EPIC for significant notification  Workstation performed: [id number]         XR orbits for foreign body   Final Result by Aaron Graves DO (03/25 1139)      No radiopaque orbital foreign body  Workstation performed: [id number]         XR spine thoracic 3 vw   Final Result by Aaron Graves DO (03/25 1139)      Unremarkable thoracic spine  No radiopaque foreign body            Workstation performed: [id number]         XR hip/pelv 2-3 vws right if performed   Final Result by Anirudh Valadez MD (03/25 1026)      No acute osseous abnormality  Workstation performed: [id number]         XR spine lumbar 2 or 3 views injury    (Results Pending)   VAS lower limb venous duplex study, complete bilateral    (Results Pending)       EKG, Pathology, and Other Studies: I have personally reviewed pertinent reports        VTE Pharmacologic Prophylaxis: Heparin    VTE Mechanical Prophylaxis: sequential compression device

## 2018-04-03 NOTE — ASSESSMENT & PLAN NOTE
- status post lumbar fixation/fusion with discectomy (POD# 6) - neurosurgery following  - PRN pain control - appreciate pain management evaluation/input - currently on a regimen of Oxycodone/Fentanyl patch/Gabapentin/Baclofen/Medrol taper - await contact with outpatient pain management clinic prior to discharge for transition of care of pain regimen   - repeat CT imaging of the lumbar spine yesterday reveals stable postoperative multilevel lumbar spondylitic changes  - on discharge, PT/OT recommends disposition of home with family support

## 2018-04-03 NOTE — PROGRESS NOTES
Pt with c/o increasing paresthesias to lateral aspect of Lt foot and all 5 digits of Lt foot  Currently post op day 5 L3/L4 lumbar fixation and fusion with L3/4 diskectomy  Per notes paresthesia have been present since post op day 1 and repeat CT L spine showed proper alignment and hardware intact  Pt now with new c/o inability to dorsiflex Lt foot  On exam skin warm to touch  Cap refill <3sec BLE  Strength 5/5 on plantar flexion, but shows inability to dorsiflex  Denies that pain is the limiting factor  Will repeat stat CT L spine and have nursing update neurosurgery  Continue to monitor and f/u on CT scan results

## 2018-04-03 NOTE — ASSESSMENT & PLAN NOTE
- per neurosurgery, likely due to a nerve irritation from procedure  - PRN pain control and supportive care

## 2018-04-03 NOTE — PROGRESS NOTES
[address] Hospitalist Service - Internal Medicine Progress Note       PATIENT INFORMATION      Patient: Jeffrey Pierre 65 y o  male   MRN: [id number]  PCP: Tiffany Booker MD  Unit/Bed#: [facility] 921-01 Encounter: [id number]  Date Of Visit: 04/03/18       ASSESSMENTS & PLAN     Lumbar radiculopathy - Herniated lumbar disc   Assessment & Plan    - status post lumbar fixation/fusion with discectomy (POD# 6) - neurosurgery following  - PRN pain control - appreciate pain management evaluation/input - currently on a regimen of Oxycodone/Fentanyl patch/Gabapentin/Baclofen/Medrol taper - await contact with outpatient pain management clinic prior to discharge for transition of care of pain regimen   - repeat CT imaging of the lumbar spine yesterday reveals stable postoperative multilevel lumbar spondylitic changes  - on discharge, PT/OT recommends disposition of home with family support      Left foot drop   Assessment & Plan    - per neurosurgery, likely due to a nerve irritation from procedure  - PRN pain control and supportive care        Insulin-dependent diabetes mellitus - Diabetic neuropathy   Assessment & Plan    - HgA1c of 7 4 on 2/18  - continue basal insulin with additional SSI coverage per Accu-Cheks      Essential hypertension   Assessment & Plan    - low-sodium diet encouraged  - continue Catapres/Lotensin/Hytrin regimen   - optimize pain control      Leukocytosis   Assessment & Plan    - initially likely reactive/physiologic secondary to stress from radiculopathy now coupled with Medrol administration  - remains afebrile - monitor WBC count      Depression   Assessment & Plan    - continue home Zoloft regimen        VTE Prophylaxis:  Heparin SC      SUBJECTIVE     Patient noted intermittent left foot drop overnight which was evaluated by Neurosurgery earlier today and deemed due to nerve irritation    He is still having intermittent pain but is starting to understand that he will will not be continued on intravenous pain medications  Attempts were made by the inpatient pain management team today to reach out to his outpatient pain management clinic which was unsuccessful  He denies any fever/chills or other constitutional symptoms at this time  OBJECTIVE     Vitals:   Temp (24hrs), Av 8 °F (36 6 °C), Min:97 6 °F (36 4 °C), Max:98 °F (36 7 °C)    HR:  [60-73] 68  Resp:  [16-20] 20  BP: (139-186)/(69-98) 139/69  SpO2:  [97 %-99 %] 99 %  Body mass index is 34 48 kg/m²  Input and Output Summary (last 24 hours):        Intake/Output Summary (Last 24 hours) at 18 1753  Last data filed at 18 1603   Gross per 24 hour   Intake             1930 ml   Output                0 ml   Net             1930 ml       Physical Exam:     GENERAL:  Well-developed/nourished - intermittent distress due to pain  HEAD:  Normocephalic - atraumatic  EYES: PERRL - EOMI   MOUTH:  Mucosa moist  NECK:  Supple - full range of motion  CARDIAC:  Regular rate/rhythm - S1/S2 positive  PULMONARY:  Clear breath sounds bilaterally - nonlabored respirations  ABDOMEN:  Soft - nontender/nondistended - active bowel sounds  MUSCULOSKELETAL:  Motor strength/range of motion intact - mild paraspinal tenderness  NEUROLOGIC:  Alert/oriented x3 - left foot drop noted   SKIN:  Chronic wrinkles/blemishes - incision sites intact  PSYCHIATRIC:  Mood/affect age-appropriate - no HI/SI       ADDITIONAL DATA       Labs & Recent Cultures:       Results from last 7 days  Lab Units 18  0515   WBC Thousand/uL 9 36   HEMOGLOBIN g/dL 11 1*   HEMATOCRIT % 34 2*   PLATELETS Thousands/uL 315   NEUTROS PCT % 54   LYMPHS PCT % 38   MONOS PCT % 7   EOS PCT % 1       Results from last 7 days  Lab Units 18  0515   SODIUM mmol/L 139   POTASSIUM mmol/L 3 6   CHLORIDE mmol/L 102   CO2 mmol/L 30   BUN mg/dL 16   CREATININE mg/dL 0 76   CALCIUM mg/dL 9 7   GLUCOSE RANDOM mg/dL 121         Last 24 Hours Medication List:     Current Facility-Administered Medications:  acetaminophen 975 mg Oral Q8H [address] Sofia JAMIE Lewis   acetaminophen 975 mg Oral Once Kiko Daly MD   baclofen 10 mg Oral TID Janie Delarosa MD   benazepril 40 mg Oral Daily Jose E Palumbo MD   bisacodyl 5 mg Oral Daily PRN Janie Delarosa MD   bisacodyl 10 mg Rectal Daily PRN Gisele Richards MD   cloNIDine 0 2 mg Oral Q12H [address] Karol Duane, CRNP   diazepam 5 mg Oral Q6H PRN Blaze Munguia PA-C   docusate sodium 100 mg Oral BID PRN Gisele Richards PA-C   fentaNYL 100 mcg Transdermal Q48H Janei Delarosa MD   gabapentin 600 mg Oral TID Jose E Palumbo MD   heparin (porcine) 5,000 Units Subcutaneous [facility] Gisele Richards MD   hydrALAZINE 5 mg Intravenous Q6H PRN Fariba Gregorio PA-C   hyoscyamine 0 375 mg Oral Daily Jose E Palumbo MD   insulin glargine 10 Units Subcutaneous HS Jose E Palumbo MD   insulin lispro 1-5 Units Subcutaneous TID AC Jose E Palumbo MD   insulin lispro 1-5 Units Subcutaneous HS Jose E Palumbo MD   melatonin 4 5 mg Oral HS Jose E Palumbo MD   [START ON 4/4/2018] methylPREDNISolone 12 mg Oral Daily Blessing Phan PA-C   Followed by       Maria Guadalupe Singh ON 4/5/2018] methylPREDNISolone 8 mg Oral Daily Blessing Phan PA-C   Followed by       Maria Guadalupe Singh ON 4/6/2018] methylPREDNISolone 4 mg Oral Daily Blessing Phan PA-C   multivitamin-minerals 1 tablet Oral Daily Jose E Palumbo MD   Naloxegol Oxalate 12 5 mg Oral Daily Jose E Palumbo MD   ondansetron 4 mg Intravenous Q6H PRN Jose E Palumbo MD   oxyCODONE 30 mg Oral Q6H PRN Janie Delarosa MD   pantoprazole 40 mg Oral Early Morning Jose E Palumbo MD   polyethylene glycol 17 g Oral Daily Janie Delarosa MD   pregabalin 150 mg Oral Once Kiko Daly MD   senna 1 tablet Oral BID Jose E Palumbo MD   sertraline 75 mg Oral Daily Jose E Palumbo MD   terazosin 5 mg Oral HS Jose E Palumbo MD   zolpidem 5 mg Oral HS PRN Jose E Palumbo MD          Time Spent for Care: 36 minutes  More than 50% of total time spent on counseling and coordination of care as described above  Current Length of Stay: 10 day(s)      Code Status: Level 1 - Full Code         ** Please Note: This note is constructed using a voice recognition dictation system   **

## 2018-04-04 ENCOUNTER — DOCUMENTATION (OUTPATIENT)
Dept: NEUROSURGERY | Facility: CLINIC | Age: 58
End: 2018-04-04

## 2018-04-04 VITALS
SYSTOLIC BLOOD PRESSURE: 142 MMHG | RESPIRATION RATE: 18 BRPM | HEIGHT: 70 IN | BODY MASS INDEX: 34.4 KG/M2 | TEMPERATURE: 98.2 F | WEIGHT: 240.3 LBS | OXYGEN SATURATION: 97 % | DIASTOLIC BLOOD PRESSURE: 83 MMHG | HEART RATE: 64 BPM

## 2018-04-04 PROBLEM — D72.829 LEUKOCYTOSIS: Status: RESOLVED | Noted: 2018-04-03 | Resolved: 2018-04-04

## 2018-04-04 LAB
BASOPHILS # BLD AUTO: 0.05 THOUSANDS/ΜL (ref 0–0.1)
BASOPHILS NFR BLD AUTO: 1 % (ref 0–1)
EOSINOPHIL # BLD AUTO: 0.18 THOUSAND/ΜL (ref 0–0.61)
EOSINOPHIL NFR BLD AUTO: 2 % (ref 0–6)
ERYTHROCYTE [DISTWIDTH] IN BLOOD BY AUTOMATED COUNT: 13.2 % (ref 11.6–15.1)
GLUCOSE SERPL-MCNC: 170 MG/DL (ref 65–140)
GLUCOSE SERPL-MCNC: 214 MG/DL (ref 65–140)
HCT VFR BLD AUTO: 35.3 % (ref 36.5–49.3)
HGB BLD-MCNC: 11.3 G/DL (ref 12–17)
LYMPHOCYTES # BLD AUTO: 3.44 THOUSANDS/ΜL (ref 0.6–4.47)
LYMPHOCYTES NFR BLD AUTO: 35 % (ref 14–44)
MCH RBC QN AUTO: 27 PG (ref 26.8–34.3)
MCHC RBC AUTO-ENTMCNC: 32 G/DL (ref 31.4–37.4)
MCV RBC AUTO: 84 FL (ref 82–98)
MONOCYTES # BLD AUTO: 0.81 THOUSAND/ΜL (ref 0.17–1.22)
MONOCYTES NFR BLD AUTO: 8 % (ref 4–12)
NEUTROPHILS # BLD AUTO: 5.3 THOUSANDS/ΜL (ref 1.85–7.62)
NEUTS SEG NFR BLD AUTO: 54 % (ref 43–75)
NRBC BLD AUTO-RTO: 0 /100 WBCS
PLATELET # BLD AUTO: 318 THOUSANDS/UL (ref 149–390)
PMV BLD AUTO: 9 FL (ref 8.9–12.7)
RBC # BLD AUTO: 4.19 MILLION/UL (ref 3.88–5.62)
WBC # BLD AUTO: 9.9 THOUSAND/UL (ref 4.31–10.16)

## 2018-04-04 PROCEDURE — 99239 HOSP IP/OBS DSCHRG MGMT >30: CPT | Performed by: INTERNAL MEDICINE

## 2018-04-04 PROCEDURE — 85025 COMPLETE CBC W/AUTO DIFF WBC: CPT | Performed by: INTERNAL MEDICINE

## 2018-04-04 PROCEDURE — 82948 REAGENT STRIP/BLOOD GLUCOSE: CPT

## 2018-04-04 RX ORDER — BENAZEPRIL HYDROCHLORIDE 40 MG/1
40 TABLET, FILM COATED ORAL DAILY
Qty: 30 TABLET | Refills: 0 | Status: SHIPPED | OUTPATIENT
Start: 2018-04-05 | End: 2018-04-16 | Stop reason: ALTCHOICE

## 2018-04-04 RX ORDER — CLONIDINE HYDROCHLORIDE 0.2 MG/1
0.2 TABLET ORAL EVERY 12 HOURS SCHEDULED
Qty: 60 TABLET | Refills: 0 | Status: SHIPPED | OUTPATIENT
Start: 2018-04-04 | End: 2018-04-16 | Stop reason: SDUPTHER

## 2018-04-04 RX ORDER — DIAZEPAM 5 MG/1
5 TABLET ORAL EVERY 6 HOURS PRN
Qty: 10 TABLET | Refills: 0 | Status: SHIPPED | OUTPATIENT
Start: 2018-04-04 | End: 2018-04-16

## 2018-04-04 RX ORDER — METHYLPREDNISOLONE 8 MG/1
8 TABLET ORAL DAILY
Qty: 1 TABLET | Refills: 0 | Status: SHIPPED | OUTPATIENT
Start: 2018-04-05 | End: 2018-04-06

## 2018-04-04 RX ORDER — METHYLPREDNISOLONE 4 MG/1
4 TABLET ORAL DAILY
Qty: 1 TABLET | Refills: 0 | Status: SHIPPED | OUTPATIENT
Start: 2018-04-06 | End: 2018-04-07 | Stop reason: ALTCHOICE

## 2018-04-04 RX ADMIN — ACETAMINOPHEN 975 MG: 325 TABLET, FILM COATED ORAL at 05:15

## 2018-04-04 RX ADMIN — INSULIN LISPRO 1 UNITS: 100 INJECTION, SOLUTION INTRAVENOUS; SUBCUTANEOUS at 08:09

## 2018-04-04 RX ADMIN — BACLOFEN 10 MG: 10 TABLET ORAL at 08:03

## 2018-04-04 RX ADMIN — OXYCODONE HYDROCHLORIDE 30 MG: 10 TABLET ORAL at 01:57

## 2018-04-04 RX ADMIN — HEPARIN SODIUM 5000 UNITS: 5000 INJECTION, SOLUTION INTRAVENOUS; SUBCUTANEOUS at 05:15

## 2018-04-04 RX ADMIN — BENAZEPRIL HYDROCHLORIDE 40 MG: 10 TABLET ORAL at 08:02

## 2018-04-04 RX ADMIN — GABAPENTIN 600 MG: 300 CAPSULE ORAL at 08:03

## 2018-04-04 RX ADMIN — HYOSCYAMINE SULFATE 0.38 MG: 0.38 TABLET, EXTENDED RELEASE ORAL at 08:06

## 2018-04-04 RX ADMIN — METHYLPREDNISOLONE 12 MG: 4 TABLET ORAL at 08:07

## 2018-04-04 RX ADMIN — INSULIN LISPRO 2 UNITS: 100 INJECTION, SOLUTION INTRAVENOUS; SUBCUTANEOUS at 11:36

## 2018-04-04 RX ADMIN — CLONIDINE HYDROCHLORIDE 0.2 MG: 0.2 TABLET ORAL at 08:03

## 2018-04-04 RX ADMIN — ACETAMINOPHEN 975 MG: 325 TABLET, FILM COATED ORAL at 14:20

## 2018-04-04 RX ADMIN — POLYETHYLENE GLYCOL 3350 17 G: 17 POWDER, FOR SOLUTION ORAL at 08:03

## 2018-04-04 RX ADMIN — OXYCODONE HYDROCHLORIDE 30 MG: 10 TABLET ORAL at 08:05

## 2018-04-04 RX ADMIN — PANTOPRAZOLE SODIUM 40 MG: 40 TABLET, DELAYED RELEASE ORAL at 05:16

## 2018-04-04 RX ADMIN — SERTRALINE HYDROCHLORIDE 75 MG: 50 TABLET ORAL at 08:03

## 2018-04-04 RX ADMIN — Medication 1 TABLET: at 08:03

## 2018-04-04 NOTE — PLAN OF CARE
Problem: OCCUPATIONAL THERAPY ADULT  Goal: Performs self-care activities at highest level of function for planned discharge setting  See evaluation for individualized goals  Treatment Interventions: ADL retraining, Functional transfer training, Endurance training, Patient/family training, Equipment evaluation/education, Compensatory technique education, Energy conservation  Equipment Recommended: Tub seat with back, Bedside commode (RW)       See flowsheet documentation for full assessment, interventions and recommendations  Outcome: Adequate for Discharge  Limitation: Decreased ADL status, Decreased Safe judgement during ADL, Decreased endurance, Decreased self-care trans, Decreased high-level ADLs  Prognosis: Good  Assessment: Pt participates in OT sesison with focus on toileting, activity tolerance, and standing tolerance to increase I with adls  Family present during session  Pt supervision toileting for bladder management along with RW support  Pt able to manage toilet hygiene and stood for 3 min duration at the sink to wash hands  Pt c/o tingling/cramping in BLE and nursing is aware  Pt will continue to benefit from activity tolerance, transfers, and adls       OT Discharge Recommendation: Home with family support  OT - OK to Discharge: Yes ([address] [place] [address] Osteopathy )  April A CEDRIC Weiss

## 2018-04-04 NOTE — OCCUPATIONAL THERAPY NOTE
Occupational Therapy Treatment Note:    Pt and ot engaged in conversation, while pt walking alone in halls  Pt reports wife got a sock aid for him to use at home and a shower seat  Pt denied furhter questions for ot at this time  Plan to d/c acute ot services at this time

## 2018-04-04 NOTE — POST OP PROGRESS NOTES
Progress Note - Neurosurgery   Jaimee House 65 y o  male MRN: [id number]  Unit/Bed#: [facility] 921-01 Encounter: [id number]    Assessment:  1  POD7 L2/3 and L3/4 MIS transforaminal lumbar interbody fixation and fusion with L3/4 right diskectomy (3/28/18)  2  Left foot drop - possibly peroneal nerve irritation - improving  3  LBP with RLE radiculopathy - improved  4  L3/4 foraminal stenosis secondary to extruded disc, ligamentous hypertrophy and facet arthropathy   5  Spondylolisthesis of the lumbar region  6  Chronic pain syndrome  7  Post laminectomy syndrome  8  HTN  9  Type 2 DM  10  Sleep apnea     Plan:  · Exam reveals left DF/EHL 3+/5 and KF 5-/5 weakness  Mild sensation changes left lateral foot  · Dressing small area of drainage  Steri-strip replaced right inferior incision  New sterile dressing placed  ? Patient may shower with occlusive dressing  Recommend incision stay dry until no further drainage noted  · Imaging reviewed personally and by attending  Final results as below  ? CT lumbar spine wo 4/2/18: expected postoperative changes without evidence of hematoma or etiology for new left leg weakness  ? Upright lumbar spine x-rays March 29, 2018:  Satisfactory alignment and hardware placement  · BLE dopplers completed 4/3/18 - negative  · Pain control per primary team  APS consult appreciated  Patient on multimodal regimen  ? Continue medrol taper  ? Patient request transition to home muscle relaxer, Zanaflex  · Mobilize as tolerated  Using RW  Therapy recommending home PT and home with family support with use of roller walker  ? Brace to be worn when OOB  States improved comfort with VISTA LSO  · DVT PPX:SCDs  Heparin  · Continue bowel regimen  · Will follow-up for 2wk and 6wk pov  · Call with questions/concerns  · No indications for additional surgical intervention at this juncture  Cleared for discharge from neurosurgical standpoint       Subjective/Objective   Chief Complaint: "I took a stumble last night"/postoperative follow-up    Subjective:  Patient states around 3:00 a m  he stood to walk to bathroom placing one hand on the bedside table and the other on the roller walker  Unfortunately, the table rolled causing the patient to fall backwards striking his left knee on the bed  Denies back trauma  States he caught himself and denies fall  Denies any new back pain or any radicular complaints  States improvement of his left dorsiflexion  Continues with pulling/spasm posterior bilaterally legs worse around 10 to 10:30 p m  Voiding well  +bowel movement  Tolerating oral intake  Denies fevers or chills  Denies chest pain or shortness of breath  Objective: Sitting up in chair  NAD  I/O       04/02 0701 - 04/03 0700 04/03 0701 - 04/04 0700 04/04 0701 - 04/05 0700    P  O  1550 2680     Total Intake(mL/kg) 1550 (14 2) 2680 (24 6)     Urine (mL/kg/hr) 50 (0) 150 (0 1)     Total Output 50 150      Net +1500 +2530             Unmeasured Urine Occurrence 4 x 6 x           Invasive Devices     Peripheral Intravenous Line            Peripheral IV 04/02/18 Left Forearm 2 days                Physical Exam:  Vitals: Blood pressure 166/89, pulse 64, temperature 98 2 °F (36 8 °C), temperature source Oral, resp  rate 18, height 5' 10" (1 778 m), weight 109 kg (240 lb 4 8 oz), SpO2 97 %  ,Body mass index is 34 48 kg/m²  Temp:  [98 °F (36 7 °C)-98 3 °F (36 8 °C)] 98 2 °F (36 8 °C)  HR:  [59-68] 64  Resp:  [18-20] 18  BP: (120-166)/(69-89) 166/89    General appearance: alert, appears stated age, cooperative and no distress  Head: Normocephalic, without obvious abnormality, atraumatic  Eyes: EOMI  Neck: supple, symmetrical, trachea midline   Back:  Incisions clean dry intact without erythema, edema or purulent drainage  Steri-Strips poorly adherent on right  Small area of serosanguineous drainage on dressing from right inferior incision  No active drainage with applied pressure  Nontender  No fluctuance  Lungs: non labored breathing  Heart: regular heart rate  Neurologic:   Mental status: Alert, oriented, thought content appropriate  Sensory:  Altered left lateral foot and ball of foot  Motor: moving all extremities with left DF/EHL 3+/5 and left KF 4+/5    Lab Results:    Results from last 7 days  Lab Units 04/04/18  0502 04/03/18  0515 04/02/18  0504   WBC Thousand/uL 9 90 9 36 10 61*   HEMOGLOBIN g/dL 11 3* 11 1* 10 8*   HEMATOCRIT % 35 3* 34 2* 33 2*   PLATELETS Thousands/uL 318 315 283   NEUTROS PCT % 54 54 66   MONOS PCT % 8 7 8       Results from last 7 days  Lab Units 04/03/18  0515 04/02/18  0504 04/01/18  0456   SODIUM mmol/L 139 138 138   POTASSIUM mmol/L 3 6 3 7 3 7   CHLORIDE mmol/L 102 102 104   CO2 mmol/L 30 29 30   BUN mg/dL 16 18 12   CREATININE mg/dL 0 76 0 93 0 71   CALCIUM mg/dL 9 7 9 9 9 4   GLUCOSE RANDOM mg/dL 121 155* 131                 No results found for: TROPONINT  ABG:No results found for: PHART, DCC9UPL, PO2ART, SUF4IYR, O6RAKGFD, BEART, SOURCE    Imaging Studies: I have personally reviewed pertinent reports  and I have personally reviewed pertinent films in PACS    VAS lower limb venous duplex study, complete bilateral   Final Result by DO Con (04/03 2258)      CT spine lumbar wo contrast   Final Result by Aaron Nava MD (04/02 2310)      Stable postoperative and spondylotic changes         Workstation performed: [id number]         CT spine lumbar wo contrast   Final Result by Piotr Vazquez DO (03/31 1862)   1  Suboptimal examination due to beam hardening artifact from the patient's surgical hardware as well as lack of intravenous contrast material    2   Postoperative changes L2-L4  Hardware appears intact  Limited evaluation of the spinal canal due to beam hardening artifact  3   Chronic postoperative changes L4-L5 and L5-S1        Consider follow-up MRI of the lumbar spine with gadolinium, if there is concern for spinal stenosis/epidural abscess  Workstation performed: [id number]         XR lumbar spine 2 or 3 views   Final Result by Atul Nichole DO (03/29 1101)   Degenerative and postoperative changes lumbar spine  Workstation performed: [id number]         XR spine lumbar 2 or 3 views injury   Final Result by Vinay Kramer MD (03/29 5979)      Fluoroscopic guidance provided for surgical procedure  Please refer to the separate procedure notes for additional details  Workstation performed: [id number]         XR spine lumbar complete w bending minimum 6 views   Final Result by Marcella Cruz MD (03/27 8589)      Stable degenerative and postoperative changes  Workstation performed: [id number]         MRI lumbar spine wo contrast   Final Result by Abdulaziz Ascencio MD (03/25 9398)   There is epidural soft tissue which extends into the right foramen at the level of the L3-4 and also extends cranially in relation to the mid body of the L3 vertebra with resultant narrowing of the right lateral recess of the L4 and severe    right foraminal narrowing likely due to an extruded/sequestered disc fragment  Evaluate for right L3 and L4 radiculopathy  Moderate central canal narrowing at L3 level       Adhesion of the cauda equina nerve roots at the level of the L4-L5 vertebra with the posterior aspect of the thecal sac, suggest arachnoiditis      Mild to moderate central canal narrowing at L2-3 with the small central protrusion   The study was marked in EPIC for significant notification  Workstation performed: [id number]         XR orbits for foreign body   Final Result by Nohemy Estrella DO (03/25 1139)      No radiopaque orbital foreign body  Workstation performed: [id number]         XR spine thoracic 3 vw   Final Result by Nohemy Estrella DO (03/25 1139)      Unremarkable thoracic spine  No radiopaque foreign body            Workstation performed: [id number]         XR hip/pelv 2-3 vws right if performed   Final Result by Olman Gasca MD (03/25 1026)      No acute osseous abnormality  Workstation performed: [id number]         XR spine lumbar 2 or 3 views injury    (Results Pending)       EKG, Pathology, and Other Studies: I have personally reviewed pertinent reports        VTE Pharmacologic Prophylaxis: Heparin    VTE Mechanical Prophylaxis: sequential compression device

## 2018-04-04 NOTE — DISCHARGE SUMMARY
Discharge Summary - [address] Hospitalist Service - Internal Medicine      Patient Information: Mike Noonan 65 y o  male MRN: [id number]  Unit/Bed#: [facility] 921-01 Encounter: [id number]    Discharging Physician / Practitioner: Jasmin Nation MD  PCP: Ale Lynch MD  Admission Date: 3/24/2018  Discharge Date: 04/04/18      Reason for Admission:   Intractable back pain      Discharge Diagnoses:     Principal Problem:    Lumbar radiculopathy - Herniated lumbar disc      Chronic Problems:    Insulin-dependent diabetes mellitus - Diabetic neuropathy    Essential hypertension    Depression    Resolved Problems:    Leukocytosis    Improving Problems:     Left foot drop       Consultations During Hospital Stay:  · Pain management - Anesthesiology  · Neurosurgery      Hospital Course:     Mike Noonan is a 65 y o  male patient who originally presented to the hospital on 3/24/2018 due to progressively worsening lower back pain  He has a prior history of a laminectomy status post placement of a spinal stimulator that was removed recently  An MRI of the lumbar spine revealed multilevel lumbar foraminal narrowing and possibility of L4-L5 arachnoiditis  The patient was seen by Neurosurgery and underwent an L2-L3 and L3-L4 lumbar interbody fixation/fusion with an L3-L4 right discectomy on 3/28  Afterwards he did complain of intractable and intermittently worsening pain  He was seen by pain management and after review of his chronic pain medication regimen, he was continued on an Oxycodone regimen for breakthrough pain along with Baclofen for muscle spasms (takes Tizanidine at home), a fentanyl patch, and Medrol tapering  Furthermore, PRN Valium was added for anxiety coupled with his spasms  He was continued on his Neurontin regimen for neuropathy with his underlying insulin-dependent diabetes mellitus    He did developed a mild left foot drop which was deemed due to peroneal nerve irritation per neurosurgery and on day of discharge symptomatology from his foot drop seem to start improving as the patient was visibly seen to dorsiflex his left foot  He had mild hypokalemia which normalized status post repletion  His leukocytosis also normalized prior to discharge  In regards to the aforementioned diabetes mellitus, he had a recent HgA1c of 7 4 last month  He will finish off his Medrol tapering regimen post discharge at home  He will continue use of his cane for ambulation  He will follow up with his outpatient pain management clinic in one weeks time  He will also follow up with his PCP as instructed along with neurosurgery  The patient understands and agrees with plan  Condition at Discharge: good       Discharge Day Visit / Exam:     Vitals: Blood Pressure: 142/83 (04/04/18 1147)  Pulse: 64 (04/04/18 0700)  Temperature: 98 2 °F (36 8 °C) (04/04/18 0700)  Temp Source: Oral (04/04/18 0700)  Respirations: 18 (04/04/18 0700)  Height: 5' 10" (177 8 cm) (03/24/18 1527)  Weight - Scale: 109 kg (240 lb 4 8 oz) (03/29/18 0600)  SpO2: 97 % (04/04/18 0700)      Physical exam - I had a face-to-face encounter with the patient on day of discharge  Discussion with Patient and/or Family:  The patient has been advised to return to the ER immediately if any symptoms recur or worsen  Discharge instructions/Information to Patient and/or Family:   See after visit summary for information provided to patient and/or family  Provisions for Follow-Up Care:  See after visit summary for information related to follow-up care and any pertinent home health orders  Disposition:   Home       Discharge Medications:  See after visit summary for reconciled discharge medications provided to patient and/or family  Discharge Statement:  I spent 38 minutes discharging the patient  This time was spent on the day of discharge  I had direct contact with the patient on the day of discharge   Greater than 50% of the total time was spent examining patient, answering all patient questions, arranging and discussing plan of care with patient as well as directly providing post-discharge instructions  Additional time then spent on discharge activities     ** Please Note: This note is constructed using a voice recognition dictation system   **

## 2018-04-04 NOTE — PROGRESS NOTES
2:00am Per pt he tried to get up from bed with one hand on the table and the other on the walker  Table slid out from under his hand, hit the wall and the walker tipped over  Knee bumped the bed  Pt adamant that he didn't fall  Event was not witnessed  Pt is alert and oriented, has no bruises or pain  Pt range of motion unaffected with mobility intact  Pati Velasco with [employer] notified  Will monitor pt

## 2018-04-04 NOTE — PLAN OF CARE
Problem: Potential for Falls  Goal: Patient will remain free of falls  INTERVENTIONS:  - Assess patient frequently for physical needs  -  Identify cognitive and physical deficits and behaviors that affect risk of falls    -  [place] fall precautions as indicated by assessment   - Educate patient/family on patient safety including physical limitations  - Instruct patient to call for assistance with activity based on assessment  - Modify environment to reduce risk of injury  - Consider OT/PT consult to assist with strengthening/mobility   Outcome: Progressing      Problem: PAIN - ADULT  Goal: Verbalizes/displays adequate comfort level or baseline comfort level  Interventions:  - Encourage patient to monitor pain and request assistance  - Assess pain using appropriate pain scale  - Administer analgesics based on type and severity of pain and evaluate response  - Implement non-pharmacological measures as appropriate and evaluate response  - Consider cultural and social influences on pain and pain management  - Notify physician/advanced practitioner if interventions unsuccessful or patient reports new pain   Outcome: Progressing      Problem: INFECTION - ADULT  Goal: Absence or prevention of progression during hospitalization  INTERVENTIONS:  - Assess and monitor for signs and symptoms of infection  - Monitor lab/diagnostic results  - Monitor all insertion sites, i e  indwelling lines, tubes, and drains  - Monitor endotracheal (as able) and nasal secretions for changes in amount and color  - [place] appropriate cooling/warming therapies per order  - Administer medications as ordered  - Instruct and encourage patient and family to use good hand hygiene technique  - Identify and instruct in appropriate isolation precautions for identified infection/condition   Outcome: Progressing    Goal: Absence of fever/infection during neutropenic period  INTERVENTIONS:  - Monitor WBC  - Implement neutropenic guidelines   Outcome: Progressing      Problem: SAFETY ADULT  Goal: Maintain or return to baseline ADL function  INTERVENTIONS:  -  Assess patient's ability to carry out ADLs; assess patient's baseline for ADL function and identify physical deficits which impact ability to perform ADLs (bathing, care of mouth/teeth, toileting, grooming, dressing, etc )  - Assess/evaluate cause of self-care deficits   - Assess range of motion  - Assess patient's mobility; develop plan if impaired  - Assess patient's need for assistive devices and provide as appropriate  - Encourage maximum independence but intervene and supervise when necessary  ¯ Involve family in performance of ADLs  ¯ Assess for home care needs following discharge   ¯ Request OT consult to assist with ADL evaluation and planning for discharge  ¯ Provide patient education as appropriate   Outcome: Progressing    Goal: Maintain or return mobility status to optimal level  INTERVENTIONS:  - Assess patient's baseline mobility status (ambulation, transfers, stairs, etc )    - Identify cognitive and physical deficits and behaviors that affect mobility  - Identify mobility aids required to assist with transfers and/or ambulation (gait belt, sit-to-stand, lift, walker, cane, etc )  - [place] fall precautions as indicated by assessment  - Record patient progress and toleration of activity level on Mobility SBAR; progress patient to next Phase/Stage  - Instruct patient to call for assistance with activity based on assessment  - Request Rehabilitation consult to assist with strengthening/weightbearing, etc    Outcome: Progressing      Problem: DISCHARGE PLANNING  Goal: Discharge to home or other facility with appropriate resources  INTERVENTIONS:  - Identify barriers to discharge w/patient and caregiver  - Arrange for needed discharge resources and transportation as appropriate  - Identify discharge learning needs (meds, wound care, etc )  - Arrange for interpretive services to assist at discharge as needed  - Refer to Case Management Department for coordinating discharge planning if the patient needs post-hospital services based on physician/advanced practitioner order or complex needs related to functional status, cognitive ability, or social support system   Outcome: Progressing      Problem: Knowledge Deficit  Goal: Patient/family/caregiver demonstrates understanding of disease process, treatment plan, medications, and discharge instructions  Complete learning assessment and assess knowledge base  Interventions:  - Provide teaching at level of understanding  - Provide teaching via preferred learning methods   Outcome: Progressing      Problem: DISCHARGE PLANNING - CARE MANAGEMENT  Goal: Discharge to post-acute care or home with appropriate resources  INTERVENTIONS:  - Conduct assessment to determine patient/family and health care team treatment goals, and need for post-acute services based on payer coverage, community resources, and patient preferences, and barriers to discharge  - Address psychosocial, clinical, and financial barriers to discharge as identified in assessment in conjunction with the patient/family and health care team  - Arrange appropriate level of post-acute services according to patient's   needs and preference and payer coverage in collaboration with the physician and health care team  - Communicate with and update the patient/family, physician, and health care team regarding progress on the discharge plan  - Arrange appropriate transportation to post-acute venues  -Assist patient with making referrals for [facility], [facility], inpatient rehab, follow up care     Outcome: Progressing      Problem: Nutrition/Hydration-ADULT  Goal: Nutrient/Hydration intake appropriate for improving, restoring or maintaining nutritional needs  Monitor and assess patient's nutrition/hydration status for malnutrition (ex- brittle hair, bruises, dry skin, pale skin and conjunctiva, muscle wasting, smooth red tongue, and disorientation)  Collaborate with interdisciplinary team and initiate plan and interventions as ordered  Monitor patient's weight and dietary intake as ordered or per policy  Utilize nutrition screening tool and intervene per policy  Determine patient's food preferences and provide high-protein, high-caloric foods as appropriate       INTERVENTIONS:  - Monitor oral intake, urinary output, labs, and treatment plans  - Assess nutrition and hydration status and recommend course of action  - Evaluate amount of meals eaten  - Assist patient with eating if necessary   - Allow adequate time for meals  - Recommend/ encourage appropriate diets, oral nutritional supplements, and vitamin/mineral supplements  - Order, calculate, and assess calorie counts as needed  - Recommend, monitor, and adjust tube feedings and TPN/PPN based on assessed needs  - Assess need for intravenous fluids  - Provide specific nutrition/hydration education as appropriate  - Include patient/family/caregiver in decisions related to nutrition   Outcome: Progressing

## 2018-04-04 NOTE — PROGRESS NOTES
Progress Note - Inpatient Pain Management    Tamar Alfaro 65 y o  male MRN: [id number]  Unit/Bed#: [facility] 921-01 Encounter: [id number]      Assessment:   Principal Problem:    Lumbar radiculopathy - Herniated lumbar disc  Active Problems:    Essential hypertension    Depression    Insulin-dependent diabetes mellitus - Diabetic neuropathy    Left foot drop    Leukocytosis      Plan/Recommendations:   Acute on chronic pain S/P L2/3 and L3/4 MIS transforaminal lumbar interbody fusion, L3/4 right discectomy  · Acetaminophen 975mg Q8 hours  · Baclofen 10mg TID for ongoing spasms   · Discontinue Baclofen at time of discharge and resume home dose of Zanaflex  · Valium 5mg PO Q6 hours PRN for breakthrough spasms  · Fentanyl patch 100mcg Q48 hours (home medication)  · Gabapentin 600mg TID  · Medrol dose pack started over the weekend  · Oxycodone to 30mg Q6 hours PRN breakthrough pain    · Personally spoke with [facility] to review medication regimen and outpatient pain plan/follow-up  They are ok with the above plan and will see patient within a week of discharge  Hunt Both for discharge from a pain standpoint  Will sign off  Please call with any questions  Reviewed with SLIM and neuro-surg    Pain History  Current pain location(s): legs  Pain Scale:   0-8  Severity:  Severe at times  24 hour history: Patient sitting in chair, appears comfortable  No new pain complaints today  Diana Curryuquet for discharge home      Current Analgesic regimen:  Acetaminophen 975mg Q8 hours, Baclofen 10mg TID, Fentanyl 100mcg patch, Gabapentin 600mg TID, Oxycodone 30mg Q6 hours PRN (4 doses), Valium 5mg PRN (1 dose)  Bowel Regimen: Senna BID, Colace BID PRN, Dulcolax PRN, Miralax daily, naloxegol oxalate    Meds/Allergies   all current active meds have been reviewed and current meds:   Current Facility-Administered Medications   Medication Dose Route Frequency    acetaminophen (TYLENOL) tablet 975 mg  975 mg Oral Q8H [address]    acetaminophen (TYLENOL) tablet 975 mg  975 mg Oral Once    baclofen tablet 10 mg  10 mg Oral TID    benazepril (LOTENSIN) tablet 40 mg  40 mg Oral Daily    bisacodyl (DULCOLAX) EC tablet 5 mg  5 mg Oral Daily PRN    bisacodyl (DULCOLAX) rectal suppository 10 mg  10 mg Rectal Daily PRN    cloNIDine (CATAPRES) tablet 0 2 mg  0 2 mg Oral Q12H [address]    diazepam (VALIUM) tablet 5 mg  5 mg Oral Q6H PRN    docusate sodium (COLACE) capsule 100 mg  100 mg Oral BID PRN    fentaNYL (DURAGESIC) 100 mcg/hr TD 72 hr patch 100 mcg  100 mcg Transdermal Q48H    gabapentin (NEURONTIN) capsule 600 mg  600 mg Oral TID    heparin (porcine) subcutaneous injection 5,000 Units  5,000 Units Subcutaneous Q8H [address]    hydrALAZINE (APRESOLINE) injection 5 mg  5 mg Intravenous Q6H PRN    hyoscyamine (LEVBID) 12 hr tablet 0 375 mg  0 375 mg Oral Daily    insulin glargine (LANTUS) subcutaneous injection 10 Units  10 Units Subcutaneous HS    insulin lispro (HumaLOG) 100 units/mL subcutaneous injection 1-5 Units  1-5 Units Subcutaneous TID AC    insulin lispro (HumaLOG) 100 units/mL subcutaneous injection 1-5 Units  1-5 Units Subcutaneous HS    melatonin tablet 4 5 mg  4 5 mg Oral HS    [START ON 4/5/2018] methylprednisolone (MEDROL) tablet 8 mg  8 mg Oral Daily    Followed by   Pang Melena ON 4/6/2018] methylprednisolone (MEDROL) tablet 4 mg  4 mg Oral Daily    multivitamin-minerals (CENTRUM) tablet 1 tablet  1 tablet Oral Daily    Naloxegol Oxalate TABS 12 5 mg  12 5 mg Oral Daily    ondansetron (ZOFRAN) injection 4 mg  4 mg Intravenous Q6H PRN    oxyCODONE (ROXICODONE) immediate release tablet 30 mg  30 mg Oral Q6H PRN    pantoprazole (PROTONIX) EC tablet 40 mg  40 mg Oral Early Morning    polyethylene glycol (MIRALAX) packet 17 g  17 g Oral Daily    pregabalin (LYRICA) capsule 150 mg  150 mg Oral Once    senna (SENOKOT) tablet 8 6 mg  1 tablet Oral BID    sertraline (ZOLOFT) tablet 75 mg  75 mg Oral Daily    terazosin (HYTRIN) capsule 5 mg  5 mg Oral HS    zolpidem (AMBIEN) tablet 5 mg  5 mg Oral HS PRN       Allergies   Allergen Reactions    Propulsid [Cisapride] Tongue Swelling    Cymbalta [Duloxetine Hcl] Headache    Vancomycin Rash     Red man syndrome       Objective     Temp:  [98 °F (36 7 °C)-98 3 °F (36 8 °C)] 98 2 °F (36 8 °C)  HR:  [59-68] 64  Resp:  [18-20] 18  BP: (120-166)/(69-89) 142/83      Intake/Output Summary (Last 24 hours) at 04/04/18 1322  Last data filed at 04/04/18 1253   Gross per 24 hour   Intake             3160 ml   Output              150 ml   Net             3010 ml               Physical Exam: /83   Pulse 64   Temp 98 2 °F (36 8 °C) (Oral)   Resp 18   Ht 5' 10" (1 778 m)   Wt 109 kg (240 lb 4 8 oz)   SpO2 97%   BMI 34 48 kg/m²   General Appearance:    Alert, cooperative, no distress, appears stated age   Neurological:   Oriented to person, place, and time   Head:    Normocephalic, without obvious abnormality, atraumatic   Eyes:    EOM's intact   Lungs:     Respirations unlabored   Chest Wall:    No deformity   Abdomen:        Large, round   Skin:   Skin no rashes or lesions     Lab Results:     Results from last 7 days  Lab Units 04/04/18  0502   WBC Thousand/uL 9 90   HEMOGLOBIN g/dL 11 3*   HEMATOCRIT % 35 3*   PLATELETS Thousands/uL 318        Results from last 7 days  Lab Units 04/03/18  0515   SODIUM mmol/L 139   POTASSIUM mmol/L 3 6   CHLORIDE mmol/L 102   CO2 mmol/L 30   BUN mg/dL 16   CREATININE mg/dL 0 76   CALCIUM mg/dL 9 7   GLUCOSE RANDOM mg/dL 121       Imaging Studies: I have personally reviewed pertinent reports  Counseling / Coordination of Care  Total floor / unit time spent today 15 minutes  Greater than 50% of total time was spent with the patient and / or family counseling and / or coordination of care   A description of the counseling / coordination of care: Reviewed plan of care and medications with patient, neuro-surg and primary care team     Vesna Echevarria, MS, RN-BC  Acute Pain

## 2018-04-04 NOTE — PLAN OF CARE
Problem: Potential for Falls  Goal: Patient will remain free of falls  INTERVENTIONS:  - Assess patient frequently for physical needs  -  Identify cognitive and physical deficits and behaviors that affect risk of falls    -  [place] fall precautions as indicated by assessment   - Educate patient/family on patient safety including physical limitations  - Instruct patient to call for assistance with activity based on assessment  - Modify environment to reduce risk of injury  - Consider OT/PT consult to assist with strengthening/mobility   Outcome: Adequate for Discharge      Problem: PAIN - ADULT  Goal: Verbalizes/displays adequate comfort level or baseline comfort level  Interventions:  - Encourage patient to monitor pain and request assistance  - Assess pain using appropriate pain scale  - Administer analgesics based on type and severity of pain and evaluate response  - Implement non-pharmacological measures as appropriate and evaluate response  - Consider cultural and social influences on pain and pain management  - Notify physician/advanced practitioner if interventions unsuccessful or patient reports new pain   Outcome: Adequate for Discharge      Problem: INFECTION - ADULT  Goal: Absence or prevention of progression during hospitalization  INTERVENTIONS:  - Assess and monitor for signs and symptoms of infection  - Monitor lab/diagnostic results  - Monitor all insertion sites, i e  indwelling lines, tubes, and drains  - Monitor endotracheal (as able) and nasal secretions for changes in amount and color  - [place] appropriate cooling/warming therapies per order  - Administer medications as ordered  - Instruct and encourage patient and family to use good hand hygiene technique  - Identify and instruct in appropriate isolation precautions for identified infection/condition   Outcome: Adequate for Discharge    Goal: Absence of fever/infection during neutropenic period  INTERVENTIONS:  - Monitor WBC  - Implement neutropenic guidelines   Outcome: Adequate for Discharge      Problem: SAFETY ADULT  Goal: Maintain or return to baseline ADL function  INTERVENTIONS:  -  Assess patient's ability to carry out ADLs; assess patient's baseline for ADL function and identify physical deficits which impact ability to perform ADLs (bathing, care of mouth/teeth, toileting, grooming, dressing, etc )  - Assess/evaluate cause of self-care deficits   - Assess range of motion  - Assess patient's mobility; develop plan if impaired  - Assess patient's need for assistive devices and provide as appropriate  - Encourage maximum independence but intervene and supervise when necessary  ¯ Involve family in performance of ADLs  ¯ Assess for home care needs following discharge   ¯ Request OT consult to assist with ADL evaluation and planning for discharge  ¯ Provide patient education as appropriate   Outcome: Adequate for Discharge    Goal: Maintain or return mobility status to optimal level  INTERVENTIONS:  - Assess patient's baseline mobility status (ambulation, transfers, stairs, etc )    - Identify cognitive and physical deficits and behaviors that affect mobility  - Identify mobility aids required to assist with transfers and/or ambulation (gait belt, sit-to-stand, lift, walker, cane, etc )  - [place] fall precautions as indicated by assessment  - Record patient progress and toleration of activity level on Mobility SBAR; progress patient to next Phase/Stage  - Instruct patient to call for assistance with activity based on assessment  - Request Rehabilitation consult to assist with strengthening/weightbearing, etc    Outcome: Adequate for Discharge      Problem: DISCHARGE PLANNING  Goal: Discharge to home or other facility with appropriate resources  INTERVENTIONS:  - Identify barriers to discharge w/patient and caregiver  - Arrange for needed discharge resources and transportation as appropriate  - Identify discharge learning needs (meds, wound care, etc )  - Arrange for interpretive services to assist at discharge as needed  - Refer to Case Management Department for coordinating discharge planning if the patient needs post-hospital services based on physician/advanced practitioner order or complex needs related to functional status, cognitive ability, or social support system   Outcome: Adequate for Discharge      Problem: Knowledge Deficit  Goal: Patient/family/caregiver demonstrates understanding of disease process, treatment plan, medications, and discharge instructions  Complete learning assessment and assess knowledge base  Interventions:  - Provide teaching at level of understanding  - Provide teaching via preferred learning methods   Outcome: Adequate for Discharge      Problem: DISCHARGE PLANNING - CARE MANAGEMENT  Goal: Discharge to post-acute care or home with appropriate resources  INTERVENTIONS:  - Conduct assessment to determine patient/family and health care team treatment goals, and need for post-acute services based on payer coverage, community resources, and patient preferences, and barriers to discharge  - Address psychosocial, clinical, and financial barriers to discharge as identified in assessment in conjunction with the patient/family and health care team  - Arrange appropriate level of post-acute services according to patient's   needs and preference and payer coverage in collaboration with the physician and health care team  - Communicate with and update the patient/family, physician, and health care team regarding progress on the discharge plan  - Arrange appropriate transportation to post-acute venues  -Assist patient with making referrals for [facility], inpatient rehab, follow up care     Outcome: Adequate for Discharge      Problem: Nutrition/Hydration-ADULT  Goal: Nutrient/Hydration intake appropriate for improving, restoring or maintaining nutritional needs  Monitor and assess patient's nutrition/hydration status for malnutrition (ex- brittle hair, bruises, dry skin, pale skin and conjunctiva, muscle wasting, smooth red tongue, and disorientation)  Collaborate with interdisciplinary team and initiate plan and interventions as ordered  Monitor patient's weight and dietary intake as ordered or per policy  Utilize nutrition screening tool and intervene per policy  Determine patient's food preferences and provide high-protein, high-caloric foods as appropriate       INTERVENTIONS:  - Monitor oral intake, urinary output, labs, and treatment plans  - Assess nutrition and hydration status and recommend course of action  - Evaluate amount of meals eaten  - Assist patient with eating if necessary   - Allow adequate time for meals  - Recommend/ encourage appropriate diets, oral nutritional supplements, and vitamin/mineral supplements  - Order, calculate, and assess calorie counts as needed  - Recommend, monitor, and adjust tube feedings and TPN/PPN based on assessed needs  - Assess need for intravenous fluids  - Provide specific nutrition/hydration education as appropriate  - Include patient/family/caregiver in decisions related to nutrition   Outcome: Adequate for Discharge

## 2018-04-05 ENCOUNTER — TRANSITIONAL CARE MANAGEMENT (OUTPATIENT)
Dept: FAMILY MEDICINE CLINIC | Facility: CLINIC | Age: 58
End: 2018-04-05

## 2018-04-06 NOTE — PROGRESS NOTES
04/11/2018-AMANDA CALLED BACK AND ADVISED    IS [facility] [employer]- PHONE#[phone]  W/C CLAIM IS "OPEN"  BILLING TO Charla Gallardo, [address]    04/11/2018-PT CAME TO APPT TODAY AND GAVE ME  SILVIA CORDERO @ [facility]#[phone]  CALLED SILVIA, WENT STRAIGHT TO MUSIC PLAYING, NO ANSWER, AND NO MACHINE TO LEAVE A MESSAGE ON     04/11/2018-CALLED W/C  AGAIN AND LEFT MESSAGE ON MACHINE TO VERIFY CLAIM     04/06/2018-CALLED W/C  NICOLE AT [facility]#[phone] AND LEFT MESSAGE ON MACHINE TO VERIFY CLAIM AND OBTAIN BILLING ADDRESS  AMANDA INSURANCE  CLAIM#[id number]  DOI:03/29/1993 04/06/2018-CALLED PT, LEFT MESSAGE CONFIRMING 2 WK POV (4/11/2018) AND 6 WK POV (05/08/2018) WITH XRAY APPT'S ON MACHINE

## 2018-04-07 ENCOUNTER — OFFICE VISIT (OUTPATIENT)
Dept: FAMILY MEDICINE CLINIC | Facility: CLINIC | Age: 58
End: 2018-04-07
Payer: OTHER MISCELLANEOUS

## 2018-04-07 VITALS
RESPIRATION RATE: 20 BRPM | TEMPERATURE: 97.1 F | HEIGHT: 70 IN | HEART RATE: 68 BPM | WEIGHT: 243.6 LBS | SYSTOLIC BLOOD PRESSURE: 110 MMHG | BODY MASS INDEX: 34.88 KG/M2 | DIASTOLIC BLOOD PRESSURE: 70 MMHG

## 2018-04-07 DIAGNOSIS — E11.9 TYPE 2 DIABETES MELLITUS WITHOUT COMPLICATION, WITHOUT LONG-TERM CURRENT USE OF INSULIN (HCC): ICD-10-CM

## 2018-04-07 DIAGNOSIS — M54.16 LUMBAR RADICULOPATHY: Primary | ICD-10-CM

## 2018-04-07 DIAGNOSIS — M21.372 LEFT FOOT DROP: ICD-10-CM

## 2018-04-07 DIAGNOSIS — I10 BENIGN ESSENTIAL HYPERTENSION: ICD-10-CM

## 2018-04-07 PROCEDURE — 99495 TRANSJ CARE MGMT MOD F2F 14D: CPT | Performed by: FAMILY MEDICINE

## 2018-04-07 RX ORDER — ROPINIROLE 1 MG/1
1 TABLET, FILM COATED ORAL DAILY PRN
Refills: 0 | COMMUNITY
Start: 2018-04-06 | End: 2018-04-07 | Stop reason: ALTCHOICE

## 2018-04-07 RX ORDER — GABAPENTIN 800 MG/1
1 TABLET ORAL 3 TIMES DAILY
Refills: 0 | COMMUNITY
Start: 2018-04-06

## 2018-04-07 RX ORDER — TIZANIDINE 4 MG/1
1 TABLET ORAL 3 TIMES DAILY
Refills: 2 | COMMUNITY
Start: 2018-03-22 | End: 2018-04-07 | Stop reason: ALTCHOICE

## 2018-04-07 NOTE — ASSESSMENT & PLAN NOTE
Lumbar radiculopathy  Patient is slowly improving from his previous discomfort of his lower back status post surgery  He will follow up with his neuro surgery office in 1 week

## 2018-04-07 NOTE — ASSESSMENT & PLAN NOTE
Left footdrop  Patient appears to be making some slow improvement  He was encouraged to continue using the foot in hopes that muscle strength will return in the near future    Patient will call if he notices any worsening of his symptoms

## 2018-04-07 NOTE — PROGRESS NOTES
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 Value Ref Range    POC Glucose 153 (H) 65 - 140 mg/dl   Fingerstick Glucose (POCT)   Result Value Ref Range    POC Glucose 170 (H) 65 - 140 mg/dl   Fingerstick Glucose (POCT)   Result Value Ref Range    POC Glucose 214 (H) 65 - 140 mg/dl       No orders of the defined types were placed in this encounter        ALLERGIES:  Allergies   Allergen Reactions    Propulsid [Cisapride] Tongue Swelling    Cymbalta [Duloxetine Hcl] Headache    Vancomycin Rash     Red man syndrome       Current Medications     Current Outpatient Prescriptions   Medication Sig Dispense Refill    benazepril (LOTENSIN) 40 MG tablet Take 1 tablet (40 mg total) by mouth daily 30 tablet 0    cloNIDine (CATAPRES) 0 2 mg tablet Take 1 tablet (0 2 mg total) by mouth every 12 (twelve) hours 60 tablet 0    diazepam (VALIUM) 5 mg tablet Take 1 tablet (5 mg total) by mouth every 6 (six) hours as needed for anxiety or muscle spasms 10 tablet 0    fentaNYL (DURAGESIC) 100 mcg/hr TD 72 hr patch Place 1 patch on the skin every other day        gabapentin (NEURONTIN) 800 mg tablet Take 1 tablet by mouth 3 (three) times a day  0    glimepiride (AMARYL) 1 mg tablet TAKE 1 TABLET BY MOUTH TWICE A DAY 60 tablet 3    glucose blood (ONE TOUCH ULTRA TEST) test strip Use once daily as directed 100 each 5    hyoscyamine (LEVBID) 0 375 mg 12 hr tablet Take 0 375 mg by mouth daily   lansoprazole (PREVACID) 30 mg capsule Take 30 mg by mouth daily   Melatonin 5 MG TABS Take 5 mg by mouth daily at bedtime        MOVANTIK 12 5 MG TABS Take 1 tab qhs  2    multivitamin (THERAGRAN) TABS Take 1 tablet by mouth daily      ONETOUCH DELICA LANCETS 47Q MISC Use once daily as directed 100 each 5    oxyCODONE (ROXICODONE) 30 MG immediate release tablet takes 3 times/day  0    Red Yeast Rice 600 MG CAPS Take 600 mg by mouth daily        sertraline (ZOLOFT) 50 mg tablet Take 75 mg by mouth daily   terazosin (HYTRIN) 5 mg capsule Take 5 mg by mouth daily at bedtime      TiZANidine (ZANAFLEX) 4 MG capsule Take 4 mg by mouth 3 (three) times a day   zaleplon (SONATA) 5 MG capsule TAKE ONE CAPSULE BY MOUTH AT BEDTIME FOR INSOMNIA  2    celecoxib (CeleBREX) 200 mg capsule Take 200 mg by mouth daily       No current facility-administered medications for this visit            Health Maintenance     Health Maintenance   Topic Date Due    HIV SCREENING  1960    Hepatitis C Screening  1960    SLP PLAN OF CARE  1960    Diabetic Foot Exam  06/25/1971    URINE MICROALBUMIN  06/25/1971    Depression Screening PHQ-9  06/25/1973    OPHTHALMOLOGY EXAM  06/29/2018    DTaP,Tdap,and Td Vaccines (3 - Td) 01/18/2026    COLONOSCOPY  08/10/2026    INFLUENZA VACCINE  Completed    PNEUMOCOCCAL POLYSACCHARIDE VACCINE AGE 2-64 HIGH RISK  Completed     Immunization History   Administered Date(s) Administered    Influenza 09/20/2012    Influenza Quadrivalent Preservative Free 3 years and older IM 09/25/2015, 09/19/2016    Influenza TIV (IM) 10/05/2009, 10/05/2010, 10/07/2013, 09/26/2014, 10/02/2017    Pneumococcal Polysaccharide PPV23 11/04/2003    Tdap 06/18/2004, 01/18/2016    Zoster 47/43/1109       Christina Boswell MD

## 2018-04-07 NOTE — ASSESSMENT & PLAN NOTE
Hypertension    Patient blood pressure is stable at this time he will continue with current regimen of medications

## 2018-04-07 NOTE — ASSESSMENT & PLAN NOTE
Diabetes  Patient's blood sugars recently have climb 2 fasting of about 200 due to recent steroid tapering dose  Patient will continue monitoring and call if he does not see a slow decline with with his symptoms    Will have follow-up blood work prior to next office visit in June 2018

## 2018-04-11 ENCOUNTER — OFFICE VISIT (OUTPATIENT)
Dept: NEUROSURGERY | Facility: CLINIC | Age: 58
End: 2018-04-11

## 2018-04-11 VITALS
TEMPERATURE: 97.6 F | HEIGHT: 70 IN | DIASTOLIC BLOOD PRESSURE: 71 MMHG | SYSTOLIC BLOOD PRESSURE: 125 MMHG | BODY MASS INDEX: 35.5 KG/M2 | RESPIRATION RATE: 16 BRPM | WEIGHT: 248 LBS | HEART RATE: 71 BPM

## 2018-04-11 DIAGNOSIS — Z48.89 AFTERCARE FOLLOWING SURGERY FOR INJURY AND TRAUMA: Primary | ICD-10-CM

## 2018-04-11 PROCEDURE — 99024 POSTOP FOLLOW-UP VISIT: CPT | Performed by: PHYSICIAN ASSISTANT

## 2018-04-11 NOTE — PATIENT INSTRUCTIONS
Continue with restricted activities, lifting no greater than 10 lb, avoid bending, ambulation as tolerated  Continue to wear LSO brace at all times when out of bed till follow-up with Dr Yoan Ivan in approximately 4 weeks  Wound care, observe for redness, drainage, increased pain, fever chills should these occur call immediately for reassessment  Further follow-up is planned with Dr Yoan Ivan in approximately 4 weeks,    Plain films of the the lumbar spine 2-3 days prior to visit, request has been placed

## 2018-04-11 NOTE — PROGRESS NOTES
Assessment/Plan:    Very pleasant 65-year-old male, returns for two-week postoperative follow-up, status post "L2-L3 and L3-L4 MIS transforaminal lumbar interbody fixation fusion from right-sided approach; L3-L4 right diskectomy"  He reports overall he is very pleased with surgical outcome, he reports his right thigh pain and weakness is essentially resolved, and his left lower leg weakness and inability to flex his left ankle is also resolved  Wound care was reviewed with the patient, specifically he is to observe for redness, swelling, increased pain, drainage or fever, should these be observed he understands he is too call and/or return immediately for reassessment  Additionally the patient understands he may shower, wash with soap and water, pat wound dry, he also understands he is to avoid immersion in water such as swimming, hot tub use or tub bath, and may resume immersion in water in approximately 2 weeks  Activity levels were also reviewed with the patient in detail, he is to lift no greater than 10 pounds, he is advised he may occasionally bend, ambulation is encouraged as tolerated  He also understands he has a follow-up in approximately 4 weeks with Dr Ashely Almaraz, he also understands he is to have plain films of his lumbar spine a few days prior to his visit with Dr Ashely Almaraz He also understands should there be any significant change in his neurologic status; he is to call and/or return immediately for reassessment  These findings, impressions and recommendations are reviewed in great detail with the patient, he expressed understanding and agreement, his questions were answered completely and to his satisfaction  Follow up has been scheduled  Diagnoses and all orders for this visit:    Aftercare following surgery for injury and trauma          Subjective:      Patient ID: Rose Leavitt is a 65 y o  male      Very pleasant 45-year-old male, returns for 2 week postoperative follow-up, status lumbar surgery  He presents today with his LSO brace appropriately applied in place and when questioned reports he wears it all times as directed  He reports he is doing very well, denies bowel or bladder incontinence, motor sensory difficulties in the lower extremity and overall improvement of his symptoms  He is able to ambulate with the assistance of a wheeled walker with progressively increasing distances  The following portions of the patient's history were reviewed and updated as appropriate: allergies, current medications, past family history, past medical history, past social history and past surgical history  Review of Systems   HENT: Negative  Eyes: Negative  Respiratory: Negative  Cardiovascular: Negative  Gastrointestinal: Positive for abdominal pain  Negative for abdominal distention, anal bleeding, blood in stool, constipation, diarrhea, nausea, rectal pain and vomiting  Endocrine: Negative  Genitourinary: Negative  Musculoskeletal: Positive for back pain (mid to lower back, into hips), gait problem (uses walker) and myalgias (soreness in legs)  Negative for arthralgias, joint swelling, neck pain and neck stiffness  Skin: Negative  Allergic/Immunologic: Negative  Neurological: Positive for weakness (left leg) and numbness (in toes)  Negative for dizziness, tremors, seizures, syncope, facial asymmetry, speech difficulty, light-headedness and headaches  Hematological: Negative  Psychiatric/Behavioral: Negative  Objective:    Physical Exam   Constitutional: He is oriented to person, place, and time  He appears well-developed and well-nourished  HENT:   Head: Normocephalic and atraumatic  Cardiovascular: Normal rate, regular rhythm and normal heart sounds  Pulmonary/Chest: Effort normal and breath sounds normal    Neurological: He is alert and oriented to person, place, and time     Skin:   Surgical wound site;    Midline lumbar spine     Two parallel incisions approximately 2 5-3 cm in length are healing very nicely the surrounding skin is normal, the wounds are clean and dry without erythema or edema  The wounds are nontender to palpation       Neurologic Exam     Mental Status   Oriented to person, place, and time

## 2018-04-15 ENCOUNTER — TELEPHONE (OUTPATIENT)
Dept: FAMILY MEDICINE CLINIC | Facility: CLINIC | Age: 58
End: 2018-04-15

## 2018-04-16 ENCOUNTER — OFFICE VISIT (OUTPATIENT)
Dept: FAMILY MEDICINE CLINIC | Facility: CLINIC | Age: 58
End: 2018-04-16
Payer: MEDICARE

## 2018-04-16 VITALS
BODY MASS INDEX: 35.82 KG/M2 | DIASTOLIC BLOOD PRESSURE: 94 MMHG | HEART RATE: 72 BPM | SYSTOLIC BLOOD PRESSURE: 140 MMHG | TEMPERATURE: 98.2 F | WEIGHT: 250.2 LBS | HEIGHT: 70 IN | RESPIRATION RATE: 18 BRPM

## 2018-04-16 DIAGNOSIS — I10 BENIGN ESSENTIAL HYPERTENSION: ICD-10-CM

## 2018-04-16 PROCEDURE — 99213 OFFICE O/P EST LOW 20 MIN: CPT | Performed by: FAMILY MEDICINE

## 2018-04-16 RX ORDER — CLONIDINE HYDROCHLORIDE 0.2 MG/1
TABLET ORAL
Qty: 60 TABLET | Refills: 0
Start: 2018-04-16 | End: 2018-06-11 | Stop reason: ALTCHOICE

## 2018-04-16 RX ORDER — LISINOPRIL AND HYDROCHLOROTHIAZIDE 20; 12.5 MG/1; MG/1
TABLET ORAL
Qty: 60 TABLET | Refills: 5
Start: 2018-04-16 | End: 2018-04-17 | Stop reason: SDUPTHER
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
## 2018-04-16 NOTE — ASSESSMENT & PLAN NOTE
Hypertension  The patient was instructed to return to his lisinopril HCTZ 20/12 5 mg and take 2 tablets in the morning  He will discontinue his Benicar  He will also lower his clonidine dosing to 1 tablet at bedtime only  He was instructed to check blood pressures on his home device over the next 2 weeks and call if blood pressures rise above 140/90  He will also monitor his weight and report any increases over the next 2 weeks

## 2018-04-16 NOTE — TELEPHONE ENCOUNTER
Patient paged me regarding concern of bilateral ankle swelling after recent hospitalization for low back pain  Reportedly, his blood pressure medications were changed during recent hospitalization and HCTZ was discontinued  He denies calf  pain or erythema, patient also denies any shortness of breath  Reportedly, venous duplex of lower extremities were performed during recent hospitalizations and were negative  Patient is reluctant to be evaluated in emergency room tonight and prefers to be seen in the office tomorrow  I advised him to elevate legs, he will be scheduled for a follow-up with Dr Ghulam Harris tomorrow morning

## 2018-04-17 DIAGNOSIS — I10 BENIGN ESSENTIAL HYPERTENSION: ICD-10-CM

## 2018-04-17 RX ORDER — LISINOPRIL AND HYDROCHLOROTHIAZIDE 20; 12.5 MG/1; MG/1
TABLET ORAL
Qty: 60 TABLET | Refills: 5 | Status: SHIPPED | OUTPATIENT
Start: 2018-04-17 | End: 2018-11-13 | Stop reason: SDUPTHER

## 2018-05-01 ENCOUNTER — HOSPITAL ENCOUNTER (OUTPATIENT)
Dept: RADIOLOGY | Facility: HOSPITAL | Age: 58
Discharge: HOME | End: 2018-05-01
Payer: OTHER MISCELLANEOUS

## 2018-05-01 ENCOUNTER — TRANSCRIBE ORDERS (OUTPATIENT)
Dept: ADMINISTRATIVE | Facility: HOSPITAL | Age: 58
End: 2018-05-01

## 2018-05-01 DIAGNOSIS — M43.16 SPONDYLOLISTHESIS OF LUMBAR REGION: ICD-10-CM

## 2018-05-01 PROCEDURE — 72100 X-RAY EXAM L-S SPINE 2/3 VWS: CPT

## 2018-05-08 ENCOUNTER — OFFICE VISIT (OUTPATIENT)
Dept: NEUROSURGERY | Facility: CLINIC | Age: 58
End: 2018-05-08

## 2018-05-08 ENCOUNTER — TELEPHONE (OUTPATIENT)
Dept: NEUROSURGERY | Facility: CLINIC | Age: 58
End: 2018-05-08

## 2018-05-08 VITALS
WEIGHT: 243 LBS | DIASTOLIC BLOOD PRESSURE: 78 MMHG | RESPIRATION RATE: 16 BRPM | HEART RATE: 78 BPM | SYSTOLIC BLOOD PRESSURE: 121 MMHG | BODY MASS INDEX: 34.79 KG/M2 | TEMPERATURE: 97.6 F | HEIGHT: 70 IN

## 2018-05-08 DIAGNOSIS — Z09 POSTOPERATIVE EXAMINATION: Primary | ICD-10-CM

## 2018-05-08 PROCEDURE — 99024 POSTOP FOLLOW-UP VISIT: CPT | Performed by: NEUROLOGICAL SURGERY
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
## 2018-05-08 NOTE — TELEPHONE ENCOUNTER
Pt inquired about his wt lifting status   He is currently restricted to 10lb  Seen in office today and referred to therapy  Suggested that since therapy is to conentrate on core strengthening mayela since he has had a prolonged time in the brace, that he let them eval his abitlity and we can determine from there  He also asked about walking with 2 canes insetad of a walker  Discussed that 2 canes seems to be a less safe way to ambulate at this time and encouraged continued use of walker until seen by therapy for evaluation  He was in agreement

## 2018-05-13 DIAGNOSIS — N40.0 BENIGN PROSTATIC HYPERPLASIA WITHOUT LOWER URINARY TRACT SYMPTOMS: Primary | ICD-10-CM

## 2018-05-14 RX ORDER — TERAZOSIN 5 MG/1
CAPSULE ORAL
Qty: 30 CAPSULE | Refills: 4 | Status: SHIPPED | OUTPATIENT
Start: 2018-05-14 | End: 2018-10-15 | Stop reason: SDUPTHER

## 2018-06-11 ENCOUNTER — OFFICE VISIT (OUTPATIENT)
Dept: FAMILY MEDICINE CLINIC | Facility: CLINIC | Age: 58
End: 2018-06-11
Payer: MEDICARE

## 2018-06-11 ENCOUNTER — APPOINTMENT (OUTPATIENT)
Dept: LAB | Facility: CLINIC | Age: 58
End: 2018-06-11
Payer: MEDICARE

## 2018-06-11 VITALS
RESPIRATION RATE: 16 BRPM | HEART RATE: 72 BPM | SYSTOLIC BLOOD PRESSURE: 130 MMHG | DIASTOLIC BLOOD PRESSURE: 70 MMHG | TEMPERATURE: 97.9 F | WEIGHT: 246.4 LBS | HEIGHT: 70 IN | BODY MASS INDEX: 35.28 KG/M2

## 2018-06-11 DIAGNOSIS — G47.09 OTHER INSOMNIA: ICD-10-CM

## 2018-06-11 DIAGNOSIS — G47.00 INSOMNIA, UNSPECIFIED TYPE: ICD-10-CM

## 2018-06-11 DIAGNOSIS — R35.1 NOCTURIA: ICD-10-CM

## 2018-06-11 DIAGNOSIS — I10 BENIGN ESSENTIAL HYPERTENSION: ICD-10-CM

## 2018-06-11 DIAGNOSIS — E11.9 TYPE 2 DIABETES MELLITUS WITHOUT COMPLICATION, WITHOUT LONG-TERM CURRENT USE OF INSULIN (HCC): ICD-10-CM

## 2018-06-11 DIAGNOSIS — E11.9 TYPE 2 DIABETES MELLITUS WITHOUT COMPLICATION, WITHOUT LONG-TERM CURRENT USE OF INSULIN (HCC): Primary | ICD-10-CM

## 2018-06-11 LAB
ALBUMIN SERPL BCP-MCNC: 3.6 G/DL (ref 3.5–5)
ALP SERPL-CCNC: 97 U/L (ref 46–116)
ALT SERPL W P-5'-P-CCNC: 38 U/L (ref 12–78)
ANION GAP SERPL CALCULATED.3IONS-SCNC: 6 MMOL/L (ref 4–13)
AST SERPL W P-5'-P-CCNC: 17 U/L (ref 5–45)
BILIRUB SERPL-MCNC: 0.25 MG/DL (ref 0.2–1)
BUN SERPL-MCNC: 14 MG/DL (ref 5–25)
CALCIUM SERPL-MCNC: 9.2 MG/DL (ref 8.3–10.1)
CHLORIDE SERPL-SCNC: 98 MMOL/L (ref 100–108)
CO2 SERPL-SCNC: 34 MMOL/L (ref 21–32)
CREAT SERPL-MCNC: 0.82 MG/DL (ref 0.6–1.3)
EST. AVERAGE GLUCOSE BLD GHB EST-MCNC: 189 MG/DL
GFR SERPL CREATININE-BSD FRML MDRD: 99 ML/MIN/1.73SQ M
GLUCOSE SERPL-MCNC: 197 MG/DL (ref 65–140)
HBA1C MFR BLD: 8.2 % (ref 4.2–6.3)
POTASSIUM SERPL-SCNC: 3.8 MMOL/L (ref 3.5–5.3)
PROT SERPL-MCNC: 7.3 G/DL (ref 6.4–8.2)
PSA SERPL-MCNC: 0.5 NG/ML (ref 0–4)
SODIUM SERPL-SCNC: 138 MMOL/L (ref 136–145)

## 2018-06-11 PROCEDURE — 83036 HEMOGLOBIN GLYCOSYLATED A1C: CPT

## 2018-06-11 PROCEDURE — G0103 PSA SCREENING: HCPCS

## 2018-06-11 PROCEDURE — 36415 COLL VENOUS BLD VENIPUNCTURE: CPT

## 2018-06-11 PROCEDURE — 99213 OFFICE O/P EST LOW 20 MIN: CPT | Performed by: FAMILY MEDICINE

## 2018-06-11 PROCEDURE — 80053 COMPREHEN METABOLIC PANEL: CPT

## 2018-06-11 RX ORDER — GLIMEPIRIDE 1 MG/1
TABLET ORAL
Qty: 60 TABLET | Refills: 5 | Status: SHIPPED | OUTPATIENT
Start: 2018-06-11 | End: 2018-06-12 | Stop reason: SDUPTHER

## 2018-06-11 RX ORDER — GLIMEPIRIDE 1 MG/1
TABLET ORAL
Qty: 60 TABLET | Refills: 3 | Status: SHIPPED | OUTPATIENT
Start: 2018-06-11 | End: 2018-06-11 | Stop reason: SDUPTHER

## 2018-06-11 RX ORDER — TIZANIDINE 4 MG/1
TABLET ORAL
Refills: 2 | COMMUNITY
Start: 2018-05-22 | End: 2018-06-11

## 2018-06-11 RX ORDER — ZALEPLON 10 MG/1
10 CAPSULE ORAL
Qty: 30 CAPSULE | Refills: 5 | Status: SHIPPED | OUTPATIENT
Start: 2018-06-11 | End: 2018-11-21 | Stop reason: ALTCHOICE

## 2018-06-11 NOTE — ASSESSMENT & PLAN NOTE
Diabetes  Patient will check hemoglobin A1c blood work and continue with current regimen of medications
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
## 2018-06-11 NOTE — ASSESSMENT & PLAN NOTE
Insomnia  Patient given prescription for sonata at 10 mg 1 p o  Q HS  He will continue with melatonin 5 mg    Hopefully this will continue to improve his sleep pattern

## 2018-06-12 ENCOUNTER — TELEPHONE (OUTPATIENT)
Dept: FAMILY MEDICINE CLINIC | Facility: CLINIC | Age: 58
End: 2018-06-12

## 2018-06-12 DIAGNOSIS — E11.9 TYPE 2 DIABETES MELLITUS WITHOUT COMPLICATION, WITHOUT LONG-TERM CURRENT USE OF INSULIN (HCC): ICD-10-CM

## 2018-06-12 RX ORDER — GLIMEPIRIDE 2 MG/1
TABLET ORAL
Qty: 60 TABLET | Refills: 5 | Status: SHIPPED | OUTPATIENT
Start: 2018-06-12 | End: 2018-07-30 | Stop reason: SDUPTHER

## 2018-07-26 ENCOUNTER — TELEPHONE (OUTPATIENT)
Dept: FAMILY MEDICINE CLINIC | Facility: CLINIC | Age: 58
End: 2018-07-26

## 2018-07-26 NOTE — TELEPHONE ENCOUNTER
Patient called stating he is taking his Glimepiride 2mg tab per day and he is stating his sugar is still high in the 200's  Patient wants to know if he can try another medication  Patient uses [facility]  Please advise patient at [phone]

## 2018-07-26 NOTE — TELEPHONE ENCOUNTER
Glimepiride can be used as high as 8 mg daily therefore I would have him try to increase his dose and take 2 tablets in the am and 2 tablets in pm   Call if sugars persist without change after the 1st week

## 2018-07-30 DIAGNOSIS — E11.9 TYPE 2 DIABETES MELLITUS WITHOUT COMPLICATION, WITHOUT LONG-TERM CURRENT USE OF INSULIN (HCC): ICD-10-CM

## 2018-07-30 RX ORDER — GLIMEPIRIDE 2 MG/1
TABLET ORAL
Qty: 120 TABLET | Refills: 5 | Status: SHIPPED | OUTPATIENT
Start: 2018-07-30 | End: 2018-11-13 | Stop reason: SDUPTHER

## 2018-08-02 ENCOUNTER — HOSPITAL ENCOUNTER (OUTPATIENT)
Dept: RADIOLOGY | Facility: HOSPITAL | Age: 58
Discharge: HOME | End: 2018-08-02
Attending: NEUROLOGICAL SURGERY
Payer: OTHER MISCELLANEOUS

## 2018-08-02 DIAGNOSIS — Z09 POSTOPERATIVE EXAMINATION: ICD-10-CM

## 2018-08-02 PROCEDURE — 72110 X-RAY EXAM L-2 SPINE 4/>VWS: CPT

## 2018-08-09 ENCOUNTER — OFFICE VISIT (OUTPATIENT)
Dept: NEUROSURGERY | Facility: CLINIC | Age: 58
End: 2018-08-09
Payer: OTHER MISCELLANEOUS

## 2018-08-09 VITALS
HEIGHT: 70 IN | HEART RATE: 62 BPM | TEMPERATURE: 97.7 F | SYSTOLIC BLOOD PRESSURE: 130 MMHG | BODY MASS INDEX: 34.04 KG/M2 | DIASTOLIC BLOOD PRESSURE: 75 MMHG | WEIGHT: 237.8 LBS | RESPIRATION RATE: 16 BRPM

## 2018-08-09 DIAGNOSIS — Z98.1 ENCOUNTER FOR POSTOPERATIVE CARE RELATED TO SURGICAL JOINT FUSION: Primary | ICD-10-CM

## 2018-08-09 DIAGNOSIS — Z48.89 ENCOUNTER FOR POSTOPERATIVE CARE RELATED TO SURGICAL JOINT FUSION: Primary | ICD-10-CM

## 2018-08-09 PROCEDURE — 99213 OFFICE O/P EST LOW 20 MIN: CPT | Performed by: NEUROLOGICAL SURGERY
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
## 2018-08-09 NOTE — LETTER
August 9, 1847     [address] [address]    Patient: Miguel Angel Gonzalez   YOB: 1960   Date of Visit: 8/9/2018       Dear Dr Bhanu Quevedo: Thank you for referring Orlyndmarina Daugherty to me for evaluation  Below are my notes for this consultation  If you have questions, please do not hesitate to call me  I look forward to following your patient along with you           Sincerely,        Kp Mata MD        CC: No Recipients

## 2018-08-20 ENCOUNTER — EVALUATION (OUTPATIENT)
Dept: PHYSICAL THERAPY | Facility: CLINIC | Age: 58
End: 2018-08-20
Payer: OTHER MISCELLANEOUS

## 2018-08-20 DIAGNOSIS — M54.16 LUMBAR RADICULOPATHY: ICD-10-CM

## 2018-08-20 DIAGNOSIS — Z48.89 ENCOUNTER FOR POSTOPERATIVE CARE RELATED TO SURGICAL JOINT FUSION: ICD-10-CM

## 2018-08-20 DIAGNOSIS — R26.9 GAIT ABNORMALITY: Primary | ICD-10-CM

## 2018-08-20 DIAGNOSIS — Z98.1 ENCOUNTER FOR POSTOPERATIVE CARE RELATED TO SURGICAL JOINT FUSION: ICD-10-CM

## 2018-08-20 PROCEDURE — 97162 PT EVAL MOD COMPLEX 30 MIN: CPT | Performed by: PHYSICAL THERAPIST

## 2018-08-20 PROCEDURE — 97110 THERAPEUTIC EXERCISES: CPT | Performed by: PHYSICAL THERAPIST

## 2018-08-20 PROCEDURE — G8979 MOBILITY GOAL STATUS: HCPCS | Performed by: PHYSICAL THERAPIST

## 2018-08-20 PROCEDURE — G8978 MOBILITY CURRENT STATUS: HCPCS | Performed by: PHYSICAL THERAPIST
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
## 2018-08-23 ENCOUNTER — OFFICE VISIT (OUTPATIENT)
Dept: PHYSICAL THERAPY | Facility: CLINIC | Age: 58
End: 2018-08-23
Payer: OTHER MISCELLANEOUS

## 2018-08-23 DIAGNOSIS — Z48.89 ENCOUNTER FOR POSTOPERATIVE CARE RELATED TO SURGICAL JOINT FUSION: ICD-10-CM

## 2018-08-23 DIAGNOSIS — M54.16 LUMBAR RADICULOPATHY: ICD-10-CM

## 2018-08-23 DIAGNOSIS — Z98.1 ENCOUNTER FOR POSTOPERATIVE CARE RELATED TO SURGICAL JOINT FUSION: ICD-10-CM

## 2018-08-23 DIAGNOSIS — R26.9 GAIT ABNORMALITY: Primary | ICD-10-CM

## 2018-08-23 PROCEDURE — 97110 THERAPEUTIC EXERCISES: CPT | Performed by: PHYSICAL THERAPIST

## 2018-08-23 PROCEDURE — G8979 MOBILITY GOAL STATUS: HCPCS | Performed by: PHYSICAL THERAPIST

## 2018-08-23 PROCEDURE — 97112 NEUROMUSCULAR REEDUCATION: CPT | Performed by: PHYSICAL THERAPIST

## 2018-08-23 NOTE — PROGRESS NOTES
Daily Note     Today's date: 2018  Patient name: Maria Teresa Holcomb  : 1960  MRN: [id number]  Referring provider: Norma Ramos MD  Dx:   Encounter Diagnosis     ICD-10-CM    1  Gait abnormality R26 9    2  Encounter for postoperative care related to surgical joint fusion Z48 89     Z98 1    3  Lumbar radiculopathy M54 16                   Subjective:  Pt presents today stating that he is a little sore but he considers this because he hadn't been doing much of any exercise at all over the last few weeks  Objective: See treatment diary below      Assessment: Not progressed strenuously due to initial elevated neurological soreness, Balance base intervention went without symptom exacerbation  Consider progressing pending presentation n v       Precautions: No End Range Bend, Twist, Mobs of LS, Fall risk  DM2, Depression, HTN      Daily Treatment Diary       Manual                                                                             Exercise Diary             Treadmill UE support             Bike  6 mins           Hip Add/TA draw 10" x10            Knee Flexion 2 x 10 2 x 10           HR/TR 2 x 10 2 x 10           Standing Hip Flexion             Seated LAQ + Hip March  2 x 10 1 5#          Sit to stand trial             Step up + down 4-6"             NBOS EO Foam  1 min           NBOS EC  1 min           Seated Ham ST B  20" x 4                                                                                                                                 Modalities             HP to LS prn seated  CP x 10 seated

## 2018-08-28 ENCOUNTER — OFFICE VISIT (OUTPATIENT)
Dept: PHYSICAL THERAPY | Facility: CLINIC | Age: 58
End: 2018-08-28
Payer: OTHER MISCELLANEOUS

## 2018-08-28 DIAGNOSIS — Z48.89 ENCOUNTER FOR POSTOPERATIVE CARE RELATED TO SURGICAL JOINT FUSION: ICD-10-CM

## 2018-08-28 DIAGNOSIS — Z98.1 ENCOUNTER FOR POSTOPERATIVE CARE RELATED TO SURGICAL JOINT FUSION: ICD-10-CM

## 2018-08-28 DIAGNOSIS — M54.16 LUMBAR RADICULOPATHY: ICD-10-CM

## 2018-08-28 DIAGNOSIS — R26.9 GAIT ABNORMALITY: Primary | ICD-10-CM

## 2018-08-28 PROCEDURE — 97010 HOT OR COLD PACKS THERAPY: CPT | Performed by: PHYSICAL THERAPIST

## 2018-08-28 PROCEDURE — G8980 MOBILITY D/C STATUS: HCPCS | Performed by: PHYSICAL THERAPIST

## 2018-08-28 PROCEDURE — 97110 THERAPEUTIC EXERCISES: CPT | Performed by: PHYSICAL THERAPIST

## 2018-08-28 PROCEDURE — 97112 NEUROMUSCULAR REEDUCATION: CPT | Performed by: PHYSICAL THERAPIST

## 2018-08-28 NOTE — PROGRESS NOTES
Daily Note     Today's date: 2018  Patient name: Ann Marie Richardson  : 1960  MRN: [id number]  Referring provider: General Loco MD  Dx:   Encounter Diagnosis     ICD-10-CM    1  Gait abnormality R26 9    2  Encounter for postoperative care related to surgical joint fusion Z48 89     Z98 1    3  Lumbar radiculopathy M54 16                   Subjective:  Pt presents today stating he was very sore over the weekend, his heals have been very sore and his back has been sore  Objective: See treatment diary below      Assessment: Decreased intensity of HR/TR as well as Adduction to assist in reduction of residual soreness  Follow up with pt in regards of any decrease in DOMs related symptoms  Precautions: No End Range Bend, Twist, Mobs of LS, Fall risk  DM2, Depression, HTN      Daily Treatment Diary       Manual                                                                            Exercise Diary             Treadmill UE support             Bike  6 mins 6 mins          Hip Add/TA draw 10" x10 10" x 10 10" x 10          Knee Flexion 2 x 10 2 x 10 2 x 10          HR/TR 2 x 10 2 x 10 NP          Standing Hip Flexion             Seated LAQ + Hip March  2 x 10 2 x 10          Sit to stand trial             Step up + down 4-6"             NBOS EO Foam  1 min 1 min          NBOS EC  1 min 1 min          Seated Ham ST B  20" x 4  20" x 4                                                                                                                               Modalities             HP to LS prn seated  CP x 10 seated CP x 10 seated with Pillow

## 2018-08-30 ENCOUNTER — TELEPHONE (OUTPATIENT)
Dept: FAMILY MEDICINE CLINIC | Facility: CLINIC | Age: 58
End: 2018-08-30

## 2018-08-30 ENCOUNTER — APPOINTMENT (OUTPATIENT)
Dept: PHYSICAL THERAPY | Facility: CLINIC | Age: 58
End: 2018-08-30
Payer: OTHER MISCELLANEOUS

## 2018-08-30 DIAGNOSIS — E11.9 TYPE 2 DIABETES MELLITUS WITHOUT COMPLICATION, WITHOUT LONG-TERM CURRENT USE OF INSULIN (HCC): Primary | ICD-10-CM

## 2018-08-30 NOTE — TELEPHONE ENCOUNTER
Patient would like to know if he can have some diabetic shoes since his feet bother him  Also his sugars have been running in the low 200's  Is there anything that he should be doing  I did offer appointment but wife asked if you can call them instead    Please call to advise

## 2018-08-30 NOTE — TELEPHONE ENCOUNTER
Spoke with patient he takes glimepiride 2 in the am and 2 in the pm a total of 8mg daily  He is not sure if he was  on metformin

## 2018-08-30 NOTE — TELEPHONE ENCOUNTER
He should continue glimepiride and I will send in prescription for metformin to take 1 it is in the morning    He if he could drop off his blood sugar readings over the next 2 weeks so that I could evaluate and adjust medication as needed

## 2018-08-30 NOTE — TELEPHONE ENCOUNTER
If he is on his glimepiride 2 tablets twice daily that is as high as we can go with that medication    Has he been on metformin in the past?   If not we could try adding 1 metformin tablet daily to his current regimen medication

## 2018-09-13 NOTE — PROGRESS NOTES
PT Discharge    Today's date: 2018  Patient name: Tamar Alfaro  : 1960  MRN: [id number]  Referring provider: Christiano Alejandro MD  Dx:   Encounter Diagnosis     ICD-10-CM    1  Gait abnormality R26 9    2  Encounter for postoperative care related to surgical joint fusion Z48 89     Z98 1    3  Lumbar radiculopathy M54 16        Start Time: 0900  Stop Time: 0940  Total time in clinic (min): 40 minutes    Assessment/Plan Pt has not been present since 18  Pt's chart will be DC in compliance of facility policy as all Charts are DC within 30 days of last scheduled visit          Subjective    Objective    Flowsheet Rows      Most Recent Value   PT/OT G-Codes   Current Score  51   Projected Score  57   Assessment Type  Discharge   G code set  Mobility: Walking & Moving Around   Mobility: Walking and Moving Around Goal Status ()  CK   Mobility: Walking and Moving Around Discharge Status ()  CK

## 2018-09-15 ENCOUNTER — TELEPHONE (OUTPATIENT)
Dept: FAMILY MEDICINE CLINIC | Facility: CLINIC | Age: 58
End: 2018-09-15

## 2018-09-15 NOTE — TELEPHONE ENCOUNTER
I reviewed his blood sugar log and they appear to be improving   I would continue with his Metformin and continue checking sugars until his ov with me next month

## 2018-10-02 ENCOUNTER — TELEPHONE (OUTPATIENT)
Dept: FAMILY MEDICINE CLINIC | Facility: CLINIC | Age: 58
End: 2018-10-02

## 2018-10-02 DIAGNOSIS — E11.9 TYPE 2 DIABETES MELLITUS WITHOUT COMPLICATION, WITHOUT LONG-TERM CURRENT USE OF INSULIN (HCC): ICD-10-CM

## 2018-10-02 NOTE — TELEPHONE ENCOUNTER
I reviewed his blood sugar log  How many metformin tabs is he currently taking? If 1 daily increase to 1 bid  If already on 1 bid increase to 2 in am 1 in pm  All other meds stay same   Recheck log in 2 weeks

## 2018-10-15 ENCOUNTER — HOSPITAL ENCOUNTER (OUTPATIENT)
Dept: RADIOLOGY | Facility: HOSPITAL | Age: 58
Discharge: HOME | End: 2018-10-15
Payer: MEDICARE

## 2018-10-15 ENCOUNTER — OFFICE VISIT (OUTPATIENT)
Dept: FAMILY MEDICINE CLINIC | Facility: CLINIC | Age: 58
End: 2018-10-15
Payer: MEDICARE

## 2018-10-15 VITALS
SYSTOLIC BLOOD PRESSURE: 126 MMHG | WEIGHT: 250.2 LBS | TEMPERATURE: 97.3 F | BODY MASS INDEX: 35.82 KG/M2 | HEIGHT: 70 IN | HEART RATE: 60 BPM | RESPIRATION RATE: 14 BRPM | DIASTOLIC BLOOD PRESSURE: 88 MMHG

## 2018-10-15 DIAGNOSIS — M79.672 PAIN IN BOTH FEET: ICD-10-CM

## 2018-10-15 DIAGNOSIS — R35.1 NOCTURIA: Primary | ICD-10-CM

## 2018-10-15 DIAGNOSIS — M79.671 PAIN IN BOTH FEET: ICD-10-CM

## 2018-10-15 DIAGNOSIS — I10 BENIGN ESSENTIAL HYPERTENSION: ICD-10-CM

## 2018-10-15 DIAGNOSIS — N40.0 BENIGN PROSTATIC HYPERPLASIA WITHOUT LOWER URINARY TRACT SYMPTOMS: ICD-10-CM

## 2018-10-15 DIAGNOSIS — E11.9 TYPE 2 DIABETES MELLITUS WITHOUT COMPLICATION, WITH LONG-TERM CURRENT USE OF INSULIN (HCC): ICD-10-CM

## 2018-10-15 DIAGNOSIS — Z79.4 TYPE 2 DIABETES MELLITUS WITHOUT COMPLICATION, WITH LONG-TERM CURRENT USE OF INSULIN (HCC): ICD-10-CM

## 2018-10-15 PROCEDURE — 73630 X-RAY EXAM OF FOOT: CPT

## 2018-10-15 PROCEDURE — 99214 OFFICE O/P EST MOD 30 MIN: CPT | Performed by: FAMILY MEDICINE

## 2018-10-15 RX ORDER — TERAZOSIN 5 MG/1
5 CAPSULE ORAL DAILY
Qty: 30 CAPSULE | Refills: 5 | Status: SHIPPED | OUTPATIENT
Start: 2018-10-15 | End: 2019-02-11 | Stop reason: SDUPTHER

## 2018-10-15 RX ORDER — INFLUENZA A VIRUS A/SINGAPORE/GP1908/2015 IVR-180A (H1N1) ANTIGEN (PROPIOLACTONE INACTIVATED), INFLUENZA A VIRUS A/HONG KONG/4801/2014 X-263B (H3N2) ANTIGEN (PROPIOLACTONE INACTIVATED), INFLUENZA B VIRUS B/BRISBANE/46/2015 ANTIGEN (PROPIOLACTONE INACTIVATED), AND INFLUENZA B VIRUS B/PHUKET/3073/2013 BVR-1B ANTIGEN (PROPIOLACTONE INACTIVATED) 15; 15; 15; 15 UG/.5ML; UG/.5ML; UG/.5ML; UG/.5ML
INJECTION, SUSPENSION INTRAMUSCULAR
Refills: 0 | COMMUNITY
Start: 2018-09-22 | End: 2018-10-15

## 2018-10-15 NOTE — ASSESSMENT & PLAN NOTE
Lab Results   Component Value Date    HGBA1C 8 2 (H) 06/11/2018       No results for input(s): POCGLU in the last 72 hours  Blood Sugar Average: Last 72 hrs:   diabetes  The patient will obtain blood work as ordered for further evaluation  He discontinue metformin due to GI upset  We will make further recommendations are consider trying Tradjenta or Jardiance     His eye and foot exams are up-to-date

## 2018-10-15 NOTE — ASSESSMENT & PLAN NOTE
Hypertension    Patient's blood pressure is stable at this time he will continue current regimen of medications
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
## 2018-10-18 ENCOUNTER — TRANSCRIBE ORDERS (OUTPATIENT)
Dept: LAB | Facility: CLINIC | Age: 58
End: 2018-10-18

## 2018-10-18 ENCOUNTER — APPOINTMENT (OUTPATIENT)
Dept: LAB | Facility: CLINIC | Age: 58
End: 2018-10-18
Payer: MEDICARE

## 2018-10-18 DIAGNOSIS — Z79.4 TYPE 2 DIABETES MELLITUS WITHOUT COMPLICATION, WITH LONG-TERM CURRENT USE OF INSULIN (HCC): ICD-10-CM

## 2018-10-18 DIAGNOSIS — E11.9 TYPE 2 DIABETES MELLITUS WITHOUT COMPLICATION, WITH LONG-TERM CURRENT USE OF INSULIN (HCC): ICD-10-CM

## 2018-10-18 DIAGNOSIS — R35.1 NOCTURIA: ICD-10-CM

## 2018-10-18 LAB
ALBUMIN SERPL BCP-MCNC: 3.8 G/DL (ref 3.5–5)
ALP SERPL-CCNC: 81 U/L (ref 46–116)
ALT SERPL W P-5'-P-CCNC: 45 U/L (ref 12–78)
ANION GAP SERPL CALCULATED.3IONS-SCNC: 4 MMOL/L (ref 4–13)
AST SERPL W P-5'-P-CCNC: 27 U/L (ref 5–45)
BILIRUB SERPL-MCNC: 0.47 MG/DL (ref 0.2–1)
BUN SERPL-MCNC: 15 MG/DL (ref 5–25)
CALCIUM SERPL-MCNC: 9.1 MG/DL (ref 8.3–10.1)
CHLORIDE SERPL-SCNC: 100 MMOL/L (ref 100–108)
CHOLEST SERPL-MCNC: 161 MG/DL (ref 50–200)
CO2 SERPL-SCNC: 34 MMOL/L (ref 21–32)
CREAT SERPL-MCNC: 0.83 MG/DL (ref 0.6–1.3)
ERYTHROCYTE [DISTWIDTH] IN BLOOD BY AUTOMATED COUNT: 14.1 % (ref 11.6–15.1)
EST. AVERAGE GLUCOSE BLD GHB EST-MCNC: 174 MG/DL
GFR SERPL CREATININE-BSD FRML MDRD: 98 ML/MIN/1.73SQ M
GLUCOSE P FAST SERPL-MCNC: 141 MG/DL (ref 65–99)
HBA1C MFR BLD: 7.7 % (ref 4.2–6.3)
HCT VFR BLD AUTO: 38.1 % (ref 36.5–49.3)
HDLC SERPL-MCNC: 47 MG/DL (ref 40–60)
HGB BLD-MCNC: 12.3 G/DL (ref 12–17)
LDLC SERPL CALC-MCNC: 89 MG/DL (ref 0–100)
MCH RBC QN AUTO: 26.3 PG (ref 26.8–34.3)
MCHC RBC AUTO-ENTMCNC: 32.3 G/DL (ref 31.4–37.4)
MCV RBC AUTO: 81 FL (ref 82–98)
NONHDLC SERPL-MCNC: 114 MG/DL
PLATELET # BLD AUTO: 293 THOUSANDS/UL (ref 149–390)
PMV BLD AUTO: 9.5 FL (ref 8.9–12.7)
POTASSIUM SERPL-SCNC: 3.9 MMOL/L (ref 3.5–5.3)
PROT SERPL-MCNC: 7.3 G/DL (ref 6.4–8.2)
PSA SERPL-MCNC: 0.7 NG/ML (ref 0–4)
RBC # BLD AUTO: 4.68 MILLION/UL (ref 3.88–5.62)
SODIUM SERPL-SCNC: 138 MMOL/L (ref 136–145)
TRIGL SERPL-MCNC: 124 MG/DL
TSH SERPL DL<=0.05 MIU/L-ACNC: 1.95 UIU/ML (ref 0.36–3.74)
WBC # BLD AUTO: 7.02 THOUSAND/UL (ref 4.31–10.16)

## 2018-10-18 PROCEDURE — 83036 HEMOGLOBIN GLYCOSYLATED A1C: CPT

## 2018-10-18 PROCEDURE — G0103 PSA SCREENING: HCPCS

## 2018-10-18 PROCEDURE — 84443 ASSAY THYROID STIM HORMONE: CPT

## 2018-10-18 PROCEDURE — 85027 COMPLETE CBC AUTOMATED: CPT

## 2018-10-18 PROCEDURE — 80061 LIPID PANEL: CPT

## 2018-10-18 PROCEDURE — 36415 COLL VENOUS BLD VENIPUNCTURE: CPT

## 2018-10-18 PROCEDURE — 80053 COMPREHEN METABOLIC PANEL: CPT

## 2018-10-19 ENCOUNTER — TELEPHONE (OUTPATIENT)
Dept: FAMILY MEDICINE CLINIC | Facility: CLINIC | Age: 58
End: 2018-10-19

## 2018-10-19 DIAGNOSIS — E11.9 TYPE 2 DIABETES MELLITUS WITHOUT COMPLICATION, WITHOUT LONG-TERM CURRENT USE OF INSULIN (HCC): Primary | ICD-10-CM

## 2018-10-19 NOTE — TELEPHONE ENCOUNTER
----- Message from Hui Oglesby MD sent at 30/81/0248  2:48 PM EDT -----  Blood test shows his diabetic A1c number had improved from 8 2 down to 7 7  I would still recommend starting a different medications metformin as we had discussed  If he is agreeable I would send in for Foster or Vidya Kaufman which are once a day tablets that he may be used to lower his A1c

## 2018-10-19 NOTE — TELEPHONE ENCOUNTER
----- Message from Neri Gilbert MD sent at 73/20/0689  2:48 PM EDT -----  Blood test shows his diabetic A1c number had improved from 8 2 down to 7 7  I would still recommend starting a different medications metformin as we had discussed  If he is agreeable I would send in for Januvia or Matilde Radha which are once a day tablets that he may be used to lower his A1c

## 2018-10-19 NOTE — TELEPHONE ENCOUNTER
----- Message from Hui Oglesby MD sent at 80/98/4472  2:48 PM EDT -----  Blood test shows his diabetic A1c number had improved from 8 2 down to 7 7  I would still recommend starting a different medications metformin as we had discussed  If he is agreeable I would send in for Foster or Vidya Kaufman which are once a day tablets that he may be used to lower his A1c

## 2018-10-19 NOTE — TELEPHONE ENCOUNTER
----- Message from Shea Lara MD sent at 88/70/4389 12:34 PM EDT -----  X-rays of his feet show mild to moderate arthritic changes but also has heel spur with some calcification of the tendons underneath his foot  These conditions can certainly cause his foot pains  I recommend seeing a podiatrist to discuss possible treatment options    Please give phone number for Dr Maryan Helms in [facility]

## 2018-10-19 NOTE — TELEPHONE ENCOUNTER
Spoke w/ pt and gave results and pt is agreeable to starting an additional medication  Please send to [facility]

## 2018-11-13 DIAGNOSIS — E11.9 TYPE 2 DIABETES MELLITUS WITHOUT COMPLICATION, WITHOUT LONG-TERM CURRENT USE OF INSULIN (HCC): ICD-10-CM

## 2018-11-13 DIAGNOSIS — I10 BENIGN ESSENTIAL HYPERTENSION: ICD-10-CM

## 2018-11-13 LAB
LEFT EYE DIABETIC RETINOPATHY: NORMAL
RIGHT EYE DIABETIC RETINOPATHY: NORMAL
SEVERITY (EYE EXAM): NORMAL

## 2018-11-13 RX ORDER — GLIMEPIRIDE 2 MG/1
TABLET ORAL
Qty: 360 TABLET | Refills: 1 | Status: SHIPPED | OUTPATIENT
Start: 2018-11-13 | End: 2019-02-27

## 2018-11-13 RX ORDER — LISINOPRIL AND HYDROCHLOROTHIAZIDE 20; 12.5 MG/1; MG/1
2 TABLET ORAL DAILY
Qty: 180 TABLET | Refills: 1 | Status: SHIPPED | OUTPATIENT
Start: 2018-11-13 | End: 2019-04-15 | Stop reason: SDUPTHER

## 2018-11-20 ENCOUNTER — TELEPHONE (OUTPATIENT)
Dept: FAMILY MEDICINE CLINIC | Facility: CLINIC | Age: 58
End: 2018-11-20

## 2018-11-20 NOTE — TELEPHONE ENCOUNTER
Patient started januvia 100 mg about a month ago and it is really helping on his blood sugars but seems to be be urinating slightly less and his ankles are slightly swollen, may be retainging a little fluid  If he should be concerned about this please let him know

## 2018-11-21 ENCOUNTER — OFFICE VISIT (OUTPATIENT)
Dept: FAMILY MEDICINE CLINIC | Facility: CLINIC | Age: 58
End: 2018-11-21
Payer: MEDICARE

## 2018-11-21 ENCOUNTER — TELEPHONE (OUTPATIENT)
Dept: FAMILY MEDICINE CLINIC | Facility: CLINIC | Age: 58
End: 2018-11-21

## 2018-11-21 VITALS
DIASTOLIC BLOOD PRESSURE: 90 MMHG | HEIGHT: 70 IN | RESPIRATION RATE: 16 BRPM | TEMPERATURE: 97.7 F | SYSTOLIC BLOOD PRESSURE: 142 MMHG | BODY MASS INDEX: 35.96 KG/M2 | HEART RATE: 62 BPM | WEIGHT: 251.2 LBS

## 2018-11-21 DIAGNOSIS — E11.9 TYPE 2 DIABETES MELLITUS WITHOUT COMPLICATION, WITHOUT LONG-TERM CURRENT USE OF INSULIN (HCC): Primary | ICD-10-CM

## 2018-11-21 DIAGNOSIS — I10 BENIGN ESSENTIAL HYPERTENSION: ICD-10-CM

## 2018-11-21 PROCEDURE — 99213 OFFICE O/P EST LOW 20 MIN: CPT | Performed by: FAMILY MEDICINE

## 2018-11-21 RX ORDER — TIZANIDINE 4 MG/1
4 TABLET ORAL EVERY 8 HOURS
Refills: 2 | COMMUNITY
Start: 2018-10-21 | End: 2018-11-21 | Stop reason: ALTCHOICE

## 2018-11-21 RX ORDER — CELECOXIB 200 MG/1
200 CAPSULE ORAL DAILY
Refills: 2 | COMMUNITY
Start: 2018-10-21 | End: 2020-09-25 | Stop reason: ALTCHOICE

## 2018-11-21 RX ORDER — ZALEPLON 5 MG/1
CAPSULE ORAL
Refills: 2 | COMMUNITY
Start: 2018-10-21 | End: 2019-01-17

## 2018-11-21 NOTE — TELEPHONE ENCOUNTER
----- Message from Charity Meza sent at 11/21/2018  6:02 AM EST -----  Regarding: Non-Urgent Medical Question  Contact: [phone]  I have been experiencing  both hands swollen and pain ,had to take off ring, I noticed some swelling in ankles   I  hope this is not related to Januvia  these symptoms just started with in the last week or 2   Thanks, Leti Alejo

## 2018-11-21 NOTE — ASSESSMENT & PLAN NOTE
Hypertension  Blood pressure is slightly above goal at this time  He will see if peripheral edema improved after holding his Januvia medication  If he continues with edema we may consider switching his hydrochlorothiazide in blood pressure medicine to low dose Lasix    Patient will call with results in the next 2 weeks

## 2018-11-21 NOTE — ASSESSMENT & PLAN NOTE
Lab Results   Component Value Date    HGBA1C 7 7 (H) 10/18/2018       No results for input(s): POCGLU in the last 72 hours  Blood Sugar Average: Last 72 hrs:   diabetes  Patient will hold on Januvia for at least 2 weeks to see if he feels his swelling improves  There is a slight risk of angioedema with this medication  If symptoms improve we will consider alternative medicine for diabetes

## 2018-11-21 NOTE — TELEPHONE ENCOUNTER
These would be unusual side effects to this medication however if his blood sugars are coming down he may urinate less since elevated blood sugars cause increase frequency of urinating
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
## 2018-11-27 ENCOUNTER — TELEPHONE (OUTPATIENT)
Dept: FAMILY MEDICINE CLINIC | Facility: CLINIC | Age: 58
End: 2018-11-27

## 2018-11-27 DIAGNOSIS — E11.9 TYPE 2 DIABETES MELLITUS WITHOUT COMPLICATION, WITHOUT LONG-TERM CURRENT USE OF INSULIN (HCC): Primary | ICD-10-CM

## 2018-11-27 NOTE — TELEPHONE ENCOUNTER
RE: sitaGLIPtin (JANUVIA) 100 mg tablet     Patient states since you took him off this medication that the swelling has gone down  Wants to know if you will be prescribing him a different medication since he can no longer take this one  Please call to advise           [facility] [address]

## 2018-11-30 ENCOUNTER — TELEPHONE (OUTPATIENT)
Dept: FAMILY MEDICINE CLINIC | Facility: CLINIC | Age: 58
End: 2018-11-30

## 2018-11-30 NOTE — TELEPHONE ENCOUNTER
----- Message from Nurix Post sent at 11/30/2018  9:25 AM EST -----  Regarding: Prescription Question  Contact: [phone]  I have called [facility] to see if You have called a script in for a new Diabetes Med, they said the one you sent a few days ago my insurance does not cover, [facility] is waiting to see if there is some other Diabetes Med, that they will cover, My hands are back to normal completely, no more pain and fluid in my legs seems to have gone also, If I can get my Diabetes numbers lower we both will be happy  Thank You for being my primary Doctor    Sincerely, Hiral Quiñonez

## 2018-12-03 NOTE — TELEPHONE ENCOUNTER
If  New med is not covered then I would just have him try to avoid more of things that are carbs such as breads,potatoes,rice,pasta   Recheck bw prior to next ov

## 2018-12-04 ENCOUNTER — TELEPHONE (OUTPATIENT)
Dept: FAMILY MEDICINE CLINIC | Facility: CLINIC | Age: 58
End: 2018-12-04

## 2018-12-04 NOTE — TELEPHONE ENCOUNTER
Pt's Rx plan [facility]: [phone]) denied coverage for Jardiance 10mg  Pt needs documentation of trial and failure of at least 2 formulary alternatives:   1  Farxiga  2  Invokanna  3  Invokamet  4  Xigduo XR  Call reference #: [id number]  Please change to covered formulary alternative  Please call pt with outcome

## 2018-12-11 ENCOUNTER — TELEPHONE (OUTPATIENT)
Dept: FAMILY MEDICINE CLINIC | Facility: CLINIC | Age: 58
End: 2018-12-11

## 2018-12-11 NOTE — TELEPHONE ENCOUNTER
Patient may try taking his Januvia 1 tablet every other day  If he develops similar swelling he may discontinue once again    He will continue with current dose of glimepiride

## 2019-01-15 ENCOUNTER — TELEPHONE (OUTPATIENT)
Dept: FAMILY MEDICINE CLINIC | Facility: CLINIC | Age: 59
End: 2019-01-15

## 2019-01-16 NOTE — TELEPHONE ENCOUNTER
I reviewed the patient's blood sugar logs that he dropped off at the office  I am assuming he is taking his glimepiride 2 mg tablet 2 tablets twice daily plus Januvia 100 mg every other day? ?  If this his his regimen I would see if he could try taking Januvia on a daily basis once again but call if he again experiences side effect of swelling

## 2019-01-17 ENCOUNTER — OFFICE VISIT (OUTPATIENT)
Dept: FAMILY MEDICINE CLINIC | Facility: CLINIC | Age: 59
End: 2019-01-17
Payer: MEDICARE

## 2019-01-17 VITALS
DIASTOLIC BLOOD PRESSURE: 60 MMHG | RESPIRATION RATE: 16 BRPM | WEIGHT: 255.2 LBS | HEART RATE: 60 BPM | HEIGHT: 70 IN | SYSTOLIC BLOOD PRESSURE: 120 MMHG | BODY MASS INDEX: 36.54 KG/M2 | TEMPERATURE: 96.4 F

## 2019-01-17 DIAGNOSIS — E11.9 TYPE 2 DIABETES MELLITUS WITHOUT COMPLICATION, WITHOUT LONG-TERM CURRENT USE OF INSULIN (HCC): Primary | ICD-10-CM

## 2019-01-17 DIAGNOSIS — I10 BENIGN ESSENTIAL HYPERTENSION: ICD-10-CM

## 2019-01-17 DIAGNOSIS — S16.1XXA STRAIN OF NECK MUSCLE, INITIAL ENCOUNTER: ICD-10-CM

## 2019-01-17 PROBLEM — Z01.818 PREOPERATIVE EVALUATION OF A MEDICAL CONDITION TO RULE OUT SURGICAL CONTRAINDICATIONS (TAR REQUIRED): Status: RESOLVED | Noted: 2018-02-22 | Resolved: 2019-01-17

## 2019-01-17 PROBLEM — Z48.89 ENCOUNTER FOR POSTOPERATIVE CARE RELATED TO SURGICAL JOINT FUSION: Status: RESOLVED | Noted: 2018-08-09 | Resolved: 2019-01-17

## 2019-01-17 PROBLEM — Z98.1 ENCOUNTER FOR POSTOPERATIVE CARE RELATED TO SURGICAL JOINT FUSION: Status: RESOLVED | Noted: 2018-08-09 | Resolved: 2019-01-17

## 2019-01-17 PROBLEM — G47.09 OTHER INSOMNIA: Status: RESOLVED | Noted: 2018-06-11 | Resolved: 2019-01-17

## 2019-01-17 PROCEDURE — 99214 OFFICE O/P EST MOD 30 MIN: CPT | Performed by: FAMILY MEDICINE

## 2019-01-17 RX ORDER — ZALEPLON 10 MG/1
CAPSULE ORAL
Refills: 0 | COMMUNITY
Start: 2018-12-15 | End: 2021-08-19

## 2019-01-17 NOTE — ASSESSMENT & PLAN NOTE
Lab Results   Component Value Date    HGBA1C 7 7 (H) 10/18/2018       No results for input(s): POCGLU in the last 72 hours  Blood Sugar Average: Last 72 hrs:   diabetes  Patient will continue with current regimen of medications  We will have him check blood test as ordered for hemoglobin A1c    His eye and foot exams are up-to-date
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
## 2019-01-17 NOTE — ASSESSMENT & PLAN NOTE
Cervical neck strain  Patient will try introducing heat to the affected area of his muscle alternating with ice    If symptoms persist without change over the next 2 weeks he may call the office will we will consider a consultation with physical therapy

## 2019-01-18 ENCOUNTER — APPOINTMENT (OUTPATIENT)
Dept: LAB | Facility: CLINIC | Age: 59
End: 2019-01-18
Payer: MEDICARE

## 2019-01-18 ENCOUNTER — TRANSCRIBE ORDERS (OUTPATIENT)
Dept: LAB | Facility: CLINIC | Age: 59
End: 2019-01-18

## 2019-01-18 DIAGNOSIS — E11.9 TYPE 2 DIABETES MELLITUS WITHOUT COMPLICATION, WITHOUT LONG-TERM CURRENT USE OF INSULIN (HCC): ICD-10-CM

## 2019-01-18 LAB
ALBUMIN SERPL BCP-MCNC: 3.7 G/DL (ref 3.5–5)
ALP SERPL-CCNC: 97 U/L (ref 46–116)
ALT SERPL W P-5'-P-CCNC: 47 U/L (ref 12–78)
ANION GAP SERPL CALCULATED.3IONS-SCNC: 3 MMOL/L (ref 4–13)
AST SERPL W P-5'-P-CCNC: 26 U/L (ref 5–45)
BILIRUB SERPL-MCNC: 0.37 MG/DL (ref 0.2–1)
BUN SERPL-MCNC: 14 MG/DL (ref 5–25)
CALCIUM SERPL-MCNC: 9 MG/DL (ref 8.3–10.1)
CHLORIDE SERPL-SCNC: 101 MMOL/L (ref 100–108)
CHOLEST SERPL-MCNC: 146 MG/DL (ref 50–200)
CO2 SERPL-SCNC: 33 MMOL/L (ref 21–32)
CREAT SERPL-MCNC: 0.87 MG/DL (ref 0.6–1.3)
EST. AVERAGE GLUCOSE BLD GHB EST-MCNC: 203 MG/DL
GFR SERPL CREATININE-BSD FRML MDRD: 96 ML/MIN/1.73SQ M
GLUCOSE P FAST SERPL-MCNC: 189 MG/DL (ref 65–99)
HBA1C MFR BLD: 8.7 % (ref 4.2–6.3)
HDLC SERPL-MCNC: 47 MG/DL (ref 40–60)
LDLC SERPL CALC-MCNC: 77 MG/DL (ref 0–100)
NONHDLC SERPL-MCNC: 99 MG/DL
POTASSIUM SERPL-SCNC: 3.8 MMOL/L (ref 3.5–5.3)
PROT SERPL-MCNC: 7.6 G/DL (ref 6.4–8.2)
SODIUM SERPL-SCNC: 137 MMOL/L (ref 136–145)
TRIGL SERPL-MCNC: 108 MG/DL

## 2019-01-18 PROCEDURE — 83036 HEMOGLOBIN GLYCOSYLATED A1C: CPT

## 2019-01-18 PROCEDURE — 36415 COLL VENOUS BLD VENIPUNCTURE: CPT

## 2019-01-18 PROCEDURE — 80061 LIPID PANEL: CPT

## 2019-01-18 PROCEDURE — 80053 COMPREHEN METABOLIC PANEL: CPT

## 2019-01-19 DIAGNOSIS — E11.9 TYPE 2 DIABETES MELLITUS WITHOUT COMPLICATION, WITHOUT LONG-TERM CURRENT USE OF INSULIN (HCC): ICD-10-CM

## 2019-01-21 ENCOUNTER — TELEPHONE (OUTPATIENT)
Dept: FAMILY MEDICINE CLINIC | Facility: CLINIC | Age: 59
End: 2019-01-21

## 2019-01-21 DIAGNOSIS — E11.9 TYPE 2 DIABETES MELLITUS WITHOUT COMPLICATION, WITHOUT LONG-TERM CURRENT USE OF INSULIN (HCC): Primary | ICD-10-CM

## 2019-01-21 RX ORDER — SITAGLIPTIN 100 MG/1
TABLET, FILM COATED ORAL
Qty: 30 TABLET | Refills: 4 | Status: SHIPPED | OUTPATIENT
Start: 2019-01-21 | End: 2019-05-14 | Stop reason: SDUPTHER

## 2019-01-21 NOTE — TELEPHONE ENCOUNTER
----- Message from Spring Schwab MD sent at 6/72/7645  7:40 AM EST -----  Recent blood work shows increase in A1c up to 8 7  His insurance does state that another medication such as Invokana would be covered under his plain  If it is affordable I would recommend adding 1 tablet daily to his current regimen of medicationn and recheck blood work in 3 months    I will send to pharmacy for medication

## 2019-01-21 NOTE — TELEPHONE ENCOUNTER
I will send in for Jardiance however, this was denied when Uriel Hernandez did prior auth in 12/18 unless they had change in formulary status as of January

## 2019-01-21 NOTE — TELEPHONE ENCOUNTER
Patient calling back stating the insurance co  Prefers either [place] or [place]  Please call to advise

## 2019-02-01 ENCOUNTER — TELEPHONE (OUTPATIENT)
Dept: FAMILY MEDICINE CLINIC | Facility: CLINIC | Age: 59
End: 2019-02-01

## 2019-02-01 NOTE — TELEPHONE ENCOUNTER
Patient called stating he believes his new medication Catalina Bean is having a reaction  He is stating yesterdy he had pain in his upper back, and now today he is having pain in his groin area  He stated he will no longer take this medication  Please advise patient if this could be a side effect and what he should do at [phone]

## 2019-02-04 NOTE — TELEPHONE ENCOUNTER
This would be unusual side effect to the medication  He could stop for now to see if symptoms resolve    If they resolve I would have him try restarting medication once again to see if it is truly related to medication side effect

## 2019-02-07 ENCOUNTER — TELEPHONE (OUTPATIENT)
Dept: FAMILY MEDICINE CLINIC | Facility: CLINIC | Age: 59
End: 2019-02-07

## 2019-02-07 NOTE — TELEPHONE ENCOUNTER
Patient called back to advise he has those symptoms back again  The  pain in his upper back and in his groin area  He just started back taking the Jardiance  Please call to advise what to do

## 2019-02-07 NOTE — TELEPHONE ENCOUNTER
I would discontinue his Jardiance  He would either have to see  OF [facility] for consultation or if he would be willing to start using insulin once again once at bedtime?

## 2019-02-07 NOTE — TELEPHONE ENCOUNTER
Spoke with pt, he is willing to restart the insulin and asked if he would then have to take his oral meds as well?

## 2019-02-08 DIAGNOSIS — Z79.4 TYPE 2 DIABETES MELLITUS WITHOUT COMPLICATION, WITH LONG-TERM CURRENT USE OF INSULIN (HCC): Primary | ICD-10-CM

## 2019-02-08 DIAGNOSIS — E11.9 TYPE 2 DIABETES MELLITUS WITHOUT COMPLICATION, WITH LONG-TERM CURRENT USE OF INSULIN (HCC): Primary | ICD-10-CM

## 2019-02-08 NOTE — TELEPHONE ENCOUNTER
I would start with Lantus 10 units in the evening/bedtime  Only other change is to decrease his Glimiperide to ONE tablet twice daily  I will send in to pharm  In the past was he using vials or pens for Lantus?

## 2019-02-08 NOTE — TELEPHONE ENCOUNTER
Spoke with pt, MD instructions given  Should he continue the Januvia as well? Please send in for pens and needles

## 2019-02-11 DIAGNOSIS — Z79.4 TYPE 2 DIABETES MELLITUS WITHOUT COMPLICATION, WITH LONG-TERM CURRENT USE OF INSULIN (HCC): ICD-10-CM

## 2019-02-11 DIAGNOSIS — N40.0 BENIGN PROSTATIC HYPERPLASIA WITHOUT LOWER URINARY TRACT SYMPTOMS: ICD-10-CM

## 2019-02-11 DIAGNOSIS — E11.9 TYPE 2 DIABETES MELLITUS WITHOUT COMPLICATION, WITH LONG-TERM CURRENT USE OF INSULIN (HCC): ICD-10-CM

## 2019-02-11 RX ORDER — TERAZOSIN 5 MG/1
5 CAPSULE ORAL DAILY
Qty: 90 CAPSULE | Refills: 0 | Status: SHIPPED | OUTPATIENT
Start: 2019-02-11 | End: 2019-04-15 | Stop reason: SDUPTHER

## 2019-02-25 ENCOUNTER — TELEPHONE (OUTPATIENT)
Dept: FAMILY MEDICINE CLINIC | Facility: CLINIC | Age: 59
End: 2019-02-25

## 2019-02-25 DIAGNOSIS — J06.9 UPPER RESPIRATORY TRACT INFECTION, UNSPECIFIED TYPE: Primary | ICD-10-CM

## 2019-02-25 RX ORDER — AMOXICILLIN 875 MG/1
875 TABLET, COATED ORAL 2 TIMES DAILY
Qty: 20 TABLET | Refills: 0 | Status: SHIPPED | OUTPATIENT
Start: 2019-02-25 | End: 2019-03-07

## 2019-02-25 NOTE — TELEPHONE ENCOUNTER
Patient's wife states her  now has the same symptoms that she came in to see Dr Nadeen Sarmiento for & she wants to know if Dr Nadeen Sarmiento can call in something for him  Please call to advise

## 2019-02-27 ENCOUNTER — TELEPHONE (OUTPATIENT)
Dept: FAMILY MEDICINE CLINIC | Facility: CLINIC | Age: 59
End: 2019-02-27

## 2019-02-27 DIAGNOSIS — E11.9 TYPE 2 DIABETES MELLITUS WITHOUT COMPLICATION, WITHOUT LONG-TERM CURRENT USE OF INSULIN (HCC): ICD-10-CM

## 2019-02-27 RX ORDER — GLIMEPIRIDE 2 MG/1
TABLET ORAL
Qty: 360 TABLET | Refills: 1 | Status: SHIPPED | OUTPATIENT
Start: 2019-02-27 | End: 2019-04-15

## 2019-02-27 NOTE — TELEPHONE ENCOUNTER
Patient called he would like to verify his Glimepride medication, he needs to know the dosage and directions on taking the medication, he thinks it changed  Please advise patient at [phone]

## 2019-02-27 NOTE — TELEPHONE ENCOUNTER
Yes his glimepiride was decreased from 2 tablets twice daily to 1 tablet twice daily once he started his nighttime insulin

## 2019-02-28 ENCOUNTER — TELEPHONE (OUTPATIENT)
Dept: FAMILY MEDICINE CLINIC | Facility: CLINIC | Age: 59
End: 2019-02-28

## 2019-02-28 DIAGNOSIS — E11.9 TYPE 2 DIABETES MELLITUS WITHOUT COMPLICATION, WITHOUT LONG-TERM CURRENT USE OF INSULIN (HCC): Primary | ICD-10-CM

## 2019-02-28 NOTE — TELEPHONE ENCOUNTER
Pt is requesting Unifine Pentix 32g 4mm [id number] (insulin pens)   Not on current med list  Please advise   [facility]

## 2019-03-01 DIAGNOSIS — E11.9 TYPE 2 DIABETES MELLITUS WITHOUT COMPLICATION, WITHOUT LONG-TERM CURRENT USE OF INSULIN (HCC): Primary | ICD-10-CM

## 2019-03-05 RX ORDER — PEN NEEDLE, DIABETIC 30 GX3/16"
NEEDLE, DISPOSABLE MISCELLANEOUS DAILY
Qty: 30 EACH | Refills: 5 | Status: SHIPPED | OUTPATIENT
Start: 2019-03-05 | End: 2020-06-29 | Stop reason: SDUPTHER

## 2019-03-25 DIAGNOSIS — E11.9 TYPE 2 DIABETES MELLITUS WITHOUT COMPLICATION, WITHOUT LONG-TERM CURRENT USE OF INSULIN (HCC): ICD-10-CM

## 2019-04-11 DIAGNOSIS — E11.9 TYPE 2 DIABETES MELLITUS WITHOUT COMPLICATION, WITHOUT LONG-TERM CURRENT USE OF INSULIN (HCC): ICD-10-CM

## 2019-04-12 ENCOUNTER — TELEPHONE (OUTPATIENT)
Dept: FAMILY MEDICINE CLINIC | Facility: CLINIC | Age: 59
End: 2019-04-12

## 2019-04-12 DIAGNOSIS — E11.9 TYPE 2 DIABETES MELLITUS WITHOUT COMPLICATION, WITHOUT LONG-TERM CURRENT USE OF INSULIN (HCC): ICD-10-CM

## 2019-04-12 RX ORDER — LANCETS 33 GAUGE
EACH MISCELLANEOUS DAILY
Qty: 100 EACH | Refills: 5 | Status: SHIPPED | OUTPATIENT
Start: 2019-04-12 | End: 2020-04-25 | Stop reason: SDUPTHER

## 2019-04-12 RX ORDER — LANCETS 33 GAUGE
EACH MISCELLANEOUS DAILY
Qty: 1 EACH | Refills: 6 | Status: CANCELLED | OUTPATIENT
Start: 2019-04-12 | End: 2020-04-11

## 2019-04-15 ENCOUNTER — OFFICE VISIT (OUTPATIENT)
Dept: FAMILY MEDICINE CLINIC | Facility: CLINIC | Age: 59
End: 2019-04-15
Payer: MEDICARE

## 2019-04-15 VITALS
SYSTOLIC BLOOD PRESSURE: 122 MMHG | RESPIRATION RATE: 16 BRPM | WEIGHT: 250.4 LBS | DIASTOLIC BLOOD PRESSURE: 84 MMHG | TEMPERATURE: 98.9 F | BODY MASS INDEX: 35.85 KG/M2 | HEART RATE: 64 BPM | HEIGHT: 70 IN

## 2019-04-15 DIAGNOSIS — N40.0 BENIGN PROSTATIC HYPERPLASIA WITHOUT LOWER URINARY TRACT SYMPTOMS: ICD-10-CM

## 2019-04-15 DIAGNOSIS — M46.96 INFLAMMATORY SPONDYLOPATHY OF LUMBAR REGION (HCC): ICD-10-CM

## 2019-04-15 DIAGNOSIS — I10 BENIGN ESSENTIAL HYPERTENSION: ICD-10-CM

## 2019-04-15 DIAGNOSIS — I80.8 THROMBOPHLEBITIS OF DEEP VEINS OF UPPER EXTREMITIES: ICD-10-CM

## 2019-04-15 DIAGNOSIS — F32.2 MAJOR DEPRESSIVE DISORDER, SINGLE EPISODE, SEVERE WITHOUT PSYCHOTIC FEATURES (HCC): ICD-10-CM

## 2019-04-15 DIAGNOSIS — Z79.4 TYPE 2 DIABETES MELLITUS WITHOUT COMPLICATION, WITH LONG-TERM CURRENT USE OF INSULIN (HCC): Primary | ICD-10-CM

## 2019-04-15 DIAGNOSIS — R04.0 EPISTAXIS: ICD-10-CM

## 2019-04-15 DIAGNOSIS — E11.9 TYPE 2 DIABETES MELLITUS WITHOUT COMPLICATION, WITH LONG-TERM CURRENT USE OF INSULIN (HCC): Primary | ICD-10-CM

## 2019-04-15 PROBLEM — M79.672 PAIN IN BOTH FEET: Status: RESOLVED | Noted: 2018-10-15 | Resolved: 2019-04-15

## 2019-04-15 PROBLEM — M79.671 PAIN IN BOTH FEET: Status: RESOLVED | Noted: 2018-10-15 | Resolved: 2019-04-15

## 2019-04-15 PROCEDURE — G0439 PPPS, SUBSEQ VISIT: HCPCS | Performed by: FAMILY MEDICINE

## 2019-04-15 PROCEDURE — 99214 OFFICE O/P EST MOD 30 MIN: CPT | Performed by: FAMILY MEDICINE

## 2019-04-15 RX ORDER — LISINOPRIL AND HYDROCHLOROTHIAZIDE 20; 12.5 MG/1; MG/1
2 TABLET ORAL DAILY
Qty: 180 TABLET | Refills: 3 | Status: SHIPPED | OUTPATIENT
Start: 2019-04-15 | End: 2020-05-11 | Stop reason: ALTCHOICE

## 2019-04-15 RX ORDER — GLIMEPIRIDE 1 MG/1
1 TABLET ORAL 2 TIMES DAILY
COMMUNITY
End: 2019-05-14 | Stop reason: SDUPTHER

## 2019-04-15 RX ORDER — TERAZOSIN 5 MG/1
5 CAPSULE ORAL DAILY
Qty: 90 CAPSULE | Refills: 3 | Status: SHIPPED | OUTPATIENT
Start: 2019-04-15 | End: 2020-04-09 | Stop reason: SDUPTHER

## 2019-04-18 ENCOUNTER — LAB (OUTPATIENT)
Dept: LAB | Facility: CLINIC | Age: 59
End: 2019-04-18
Payer: MEDICARE

## 2019-04-18 ENCOUNTER — TRANSCRIBE ORDERS (OUTPATIENT)
Dept: LAB | Facility: CLINIC | Age: 59
End: 2019-04-18

## 2019-04-18 DIAGNOSIS — Z79.4 TYPE 2 DIABETES MELLITUS WITHOUT COMPLICATION, WITH LONG-TERM CURRENT USE OF INSULIN (HCC): ICD-10-CM

## 2019-04-18 DIAGNOSIS — E11.9 TYPE 2 DIABETES MELLITUS WITHOUT COMPLICATION, WITH LONG-TERM CURRENT USE OF INSULIN (HCC): ICD-10-CM

## 2019-04-18 LAB
ALBUMIN SERPL BCP-MCNC: 3.6 G/DL (ref 3.5–5)
ALP SERPL-CCNC: 62 U/L (ref 46–116)
ALT SERPL W P-5'-P-CCNC: 49 U/L (ref 12–78)
ANION GAP SERPL CALCULATED.3IONS-SCNC: 3 MMOL/L (ref 4–13)
AST SERPL W P-5'-P-CCNC: 27 U/L (ref 5–45)
BILIRUB SERPL-MCNC: 0.36 MG/DL (ref 0.2–1)
BUN SERPL-MCNC: 13 MG/DL (ref 5–25)
CALCIUM SERPL-MCNC: 8.9 MG/DL (ref 8.3–10.1)
CHLORIDE SERPL-SCNC: 108 MMOL/L (ref 100–108)
CHOLEST SERPL-MCNC: 155 MG/DL (ref 50–200)
CO2 SERPL-SCNC: 33 MMOL/L (ref 21–32)
CREAT SERPL-MCNC: 0.85 MG/DL (ref 0.6–1.3)
EST. AVERAGE GLUCOSE BLD GHB EST-MCNC: 177 MG/DL
GFR SERPL CREATININE-BSD FRML MDRD: 97 ML/MIN/1.73SQ M
GLUCOSE P FAST SERPL-MCNC: 136 MG/DL (ref 65–99)
HBA1C MFR BLD: 7.8 % (ref 4.2–6.3)
HDLC SERPL-MCNC: 44 MG/DL (ref 40–60)
LDLC SERPL CALC-MCNC: 87 MG/DL (ref 0–100)
NONHDLC SERPL-MCNC: 111 MG/DL
POTASSIUM SERPL-SCNC: 3.9 MMOL/L (ref 3.5–5.3)
PROT SERPL-MCNC: 7.2 G/DL (ref 6.4–8.2)
SODIUM SERPL-SCNC: 144 MMOL/L (ref 136–145)
TRIGL SERPL-MCNC: 120 MG/DL
TSH SERPL DL<=0.05 MIU/L-ACNC: 0.8 UIU/ML (ref 0.36–3.74)

## 2019-04-18 PROCEDURE — 83036 HEMOGLOBIN GLYCOSYLATED A1C: CPT

## 2019-04-18 PROCEDURE — 36415 COLL VENOUS BLD VENIPUNCTURE: CPT

## 2019-04-18 PROCEDURE — 80053 COMPREHEN METABOLIC PANEL: CPT

## 2019-04-18 PROCEDURE — 84443 ASSAY THYROID STIM HORMONE: CPT

## 2019-04-18 PROCEDURE — 80061 LIPID PANEL: CPT

## 2019-04-22 ENCOUNTER — TELEPHONE (OUTPATIENT)
Dept: FAMILY MEDICINE CLINIC | Facility: CLINIC | Age: 59
End: 2019-04-22

## 2019-04-22 DIAGNOSIS — Z79.4 TYPE 2 DIABETES MELLITUS WITHOUT COMPLICATION, WITH LONG-TERM CURRENT USE OF INSULIN (HCC): Primary | ICD-10-CM

## 2019-04-22 DIAGNOSIS — E11.9 TYPE 2 DIABETES MELLITUS WITHOUT COMPLICATION, WITH LONG-TERM CURRENT USE OF INSULIN (HCC): Primary | ICD-10-CM

## 2019-05-14 DIAGNOSIS — E11.9 TYPE 2 DIABETES MELLITUS WITHOUT COMPLICATION, WITHOUT LONG-TERM CURRENT USE OF INSULIN (HCC): ICD-10-CM

## 2019-05-14 RX ORDER — GLIMEPIRIDE 1 MG/1
1 TABLET ORAL 2 TIMES DAILY
Qty: 60 TABLET | Refills: 5 | Status: SHIPPED | OUTPATIENT
Start: 2019-05-14 | End: 2019-05-21 | Stop reason: SDUPTHER

## 2019-05-20 ENCOUNTER — TELEPHONE (OUTPATIENT)
Dept: FAMILY MEDICINE CLINIC | Facility: CLINIC | Age: 59
End: 2019-05-20

## 2019-05-21 DIAGNOSIS — E11.9 TYPE 2 DIABETES MELLITUS WITHOUT COMPLICATION, WITHOUT LONG-TERM CURRENT USE OF INSULIN (HCC): ICD-10-CM

## 2019-05-21 RX ORDER — GLIMEPIRIDE 1 MG/1
1 TABLET ORAL 2 TIMES DAILY
Qty: 60 TABLET | Refills: 5 | Status: SHIPPED | OUTPATIENT
Start: 2019-05-21 | End: 2019-06-05 | Stop reason: SDUPTHER

## 2019-06-05 DIAGNOSIS — E11.9 TYPE 2 DIABETES MELLITUS WITHOUT COMPLICATION, WITHOUT LONG-TERM CURRENT USE OF INSULIN (HCC): ICD-10-CM

## 2019-06-06 RX ORDER — GLIMEPIRIDE 1 MG/1
1 TABLET ORAL 2 TIMES DAILY
Qty: 60 TABLET | Refills: 5 | Status: SHIPPED | OUTPATIENT
Start: 2019-06-06 | End: 2019-10-16 | Stop reason: SDUPTHER

## 2019-07-22 ENCOUNTER — TRANSCRIBE ORDERS (OUTPATIENT)
Dept: LAB | Facility: CLINIC | Age: 59
End: 2019-07-22

## 2019-07-22 ENCOUNTER — APPOINTMENT (OUTPATIENT)
Dept: LAB | Facility: CLINIC | Age: 59
End: 2019-07-22
Payer: MEDICARE

## 2019-07-22 DIAGNOSIS — E11.9 TYPE 2 DIABETES MELLITUS WITHOUT COMPLICATION, WITH LONG-TERM CURRENT USE OF INSULIN (HCC): ICD-10-CM

## 2019-07-22 DIAGNOSIS — Z79.4 TYPE 2 DIABETES MELLITUS WITHOUT COMPLICATION, WITH LONG-TERM CURRENT USE OF INSULIN (HCC): ICD-10-CM

## 2019-07-22 LAB
EST. AVERAGE GLUCOSE BLD GHB EST-MCNC: 171 MG/DL
HBA1C MFR BLD: 7.6 % (ref 4.2–6.3)

## 2019-07-22 PROCEDURE — 83036 HEMOGLOBIN GLYCOSYLATED A1C: CPT

## 2019-07-22 PROCEDURE — 36415 COLL VENOUS BLD VENIPUNCTURE: CPT

## 2019-07-23 ENCOUNTER — TELEPHONE (OUTPATIENT)
Dept: FAMILY MEDICINE CLINIC | Facility: CLINIC | Age: 59
End: 2019-07-23

## 2019-10-16 ENCOUNTER — OFFICE VISIT (OUTPATIENT)
Dept: FAMILY MEDICINE CLINIC | Facility: CLINIC | Age: 59
End: 2019-10-16
Payer: MEDICARE

## 2019-10-16 VITALS
BODY MASS INDEX: 36.85 KG/M2 | HEART RATE: 74 BPM | DIASTOLIC BLOOD PRESSURE: 84 MMHG | WEIGHT: 256.8 LBS | TEMPERATURE: 96.4 F | OXYGEN SATURATION: 97 % | SYSTOLIC BLOOD PRESSURE: 130 MMHG

## 2019-10-16 DIAGNOSIS — R35.1 NOCTURIA: Primary | ICD-10-CM

## 2019-10-16 DIAGNOSIS — F32.2 CURRENT SEVERE EPISODE OF MAJOR DEPRESSIVE DISORDER WITHOUT PSYCHOTIC FEATURES WITHOUT PRIOR EPISODE (HCC): ICD-10-CM

## 2019-10-16 DIAGNOSIS — E11.9 TYPE 2 DIABETES MELLITUS WITHOUT COMPLICATION, WITHOUT LONG-TERM CURRENT USE OF INSULIN (HCC): ICD-10-CM

## 2019-10-16 DIAGNOSIS — I10 BENIGN ESSENTIAL HYPERTENSION: ICD-10-CM

## 2019-10-16 PROCEDURE — 99214 OFFICE O/P EST MOD 30 MIN: CPT | Performed by: FAMILY MEDICINE

## 2019-10-16 RX ORDER — GLIMEPIRIDE 1 MG/1
1 TABLET ORAL 2 TIMES DAILY
Qty: 180 TABLET | Refills: 3 | Status: SHIPPED | OUTPATIENT
Start: 2019-10-16 | End: 2020-06-29 | Stop reason: SDUPTHER
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
## 2019-10-16 NOTE — ASSESSMENT & PLAN NOTE
Diabetes  Patient will obtain blood work as ordered next month for further evaluations of his A1c    His foot and eye exams are up-to-date  Lab Results   Component Value Date    HGBA1C 7 6 (H) 07/22/2019

## 2019-10-17 DIAGNOSIS — E11.9 TYPE 2 DIABETES MELLITUS WITHOUT COMPLICATION, WITHOUT LONG-TERM CURRENT USE OF INSULIN (HCC): ICD-10-CM

## 2019-11-04 ENCOUNTER — APPOINTMENT (OUTPATIENT)
Dept: LAB | Facility: CLINIC | Age: 59
End: 2019-11-04
Payer: MEDICARE

## 2019-11-04 ENCOUNTER — TELEPHONE (OUTPATIENT)
Dept: FAMILY MEDICINE CLINIC | Facility: CLINIC | Age: 59
End: 2019-11-04

## 2019-11-04 ENCOUNTER — DOCUMENTATION (OUTPATIENT)
Dept: FAMILY MEDICINE CLINIC | Facility: CLINIC | Age: 59
End: 2019-11-04

## 2019-11-04 DIAGNOSIS — R35.1 NOCTURIA: ICD-10-CM

## 2019-11-04 DIAGNOSIS — E11.9 TYPE 2 DIABETES MELLITUS WITHOUT COMPLICATION, WITHOUT LONG-TERM CURRENT USE OF INSULIN (HCC): ICD-10-CM

## 2019-11-04 LAB
ALBUMIN SERPL BCP-MCNC: 3.5 G/DL (ref 3.5–5)
ALP SERPL-CCNC: 82 U/L (ref 46–116)
ALT SERPL W P-5'-P-CCNC: 59 U/L (ref 12–78)
ANION GAP SERPL CALCULATED.3IONS-SCNC: 6 MMOL/L (ref 4–13)
AST SERPL W P-5'-P-CCNC: 33 U/L (ref 5–45)
BILIRUB SERPL-MCNC: 0.36 MG/DL (ref 0.2–1)
BUN SERPL-MCNC: 14 MG/DL (ref 5–25)
CALCIUM SERPL-MCNC: 9 MG/DL (ref 8.3–10.1)
CHLORIDE SERPL-SCNC: 102 MMOL/L (ref 100–108)
CHOLEST SERPL-MCNC: 143 MG/DL (ref 50–200)
CO2 SERPL-SCNC: 31 MMOL/L (ref 21–32)
CREAT SERPL-MCNC: 0.79 MG/DL (ref 0.6–1.3)
ERYTHROCYTE [DISTWIDTH] IN BLOOD BY AUTOMATED COUNT: 13.4 % (ref 11.6–15.1)
EST. AVERAGE GLUCOSE BLD GHB EST-MCNC: 160 MG/DL
GFR SERPL CREATININE-BSD FRML MDRD: 99 ML/MIN/1.73SQ M
GLUCOSE P FAST SERPL-MCNC: 133 MG/DL (ref 65–99)
HBA1C MFR BLD: 7.2 % (ref 4.2–6.3)
HCT VFR BLD AUTO: 38.3 % (ref 36.5–49.3)
HDLC SERPL-MCNC: 49 MG/DL
HGB BLD-MCNC: 12.3 G/DL (ref 12–17)
LDLC SERPL CALC-MCNC: 73 MG/DL (ref 0–100)
MCH RBC QN AUTO: 26.5 PG (ref 26.8–34.3)
MCHC RBC AUTO-ENTMCNC: 32.1 G/DL (ref 31.4–37.4)
MCV RBC AUTO: 83 FL (ref 82–98)
NONHDLC SERPL-MCNC: 94 MG/DL
PLATELET # BLD AUTO: 283 THOUSANDS/UL (ref 149–390)
PMV BLD AUTO: 10.2 FL (ref 8.9–12.7)
POTASSIUM SERPL-SCNC: 3.6 MMOL/L (ref 3.5–5.3)
PROT SERPL-MCNC: 7.1 G/DL (ref 6.4–8.2)
PSA SERPL-MCNC: 0.8 NG/ML (ref 0–4)
RBC # BLD AUTO: 4.64 MILLION/UL (ref 3.88–5.62)
SODIUM SERPL-SCNC: 139 MMOL/L (ref 136–145)
TRIGL SERPL-MCNC: 106 MG/DL
TSH SERPL DL<=0.05 MIU/L-ACNC: 1.6 UIU/ML (ref 0.36–3.74)
WBC # BLD AUTO: 7.55 THOUSAND/UL (ref 4.31–10.16)

## 2019-11-04 PROCEDURE — 80061 LIPID PANEL: CPT

## 2019-11-04 PROCEDURE — 84443 ASSAY THYROID STIM HORMONE: CPT

## 2019-11-04 PROCEDURE — 80053 COMPREHEN METABOLIC PANEL: CPT

## 2019-11-04 PROCEDURE — 85027 COMPLETE CBC AUTOMATED: CPT

## 2019-11-04 PROCEDURE — 36415 COLL VENOUS BLD VENIPUNCTURE: CPT

## 2019-11-04 PROCEDURE — 83036 HEMOGLOBIN GLYCOSYLATED A1C: CPT

## 2019-11-04 PROCEDURE — G0103 PSA SCREENING: HCPCS

## 2019-11-04 NOTE — TELEPHONE ENCOUNTER
----- Message from Madison Perez MD sent at 67/1/3827  2:02 PM EST -----  Recent blood work was stable with an A1c at 7 2    Continue with current regimen of medications

## 2019-11-22 ENCOUNTER — TRANSCRIBE ORDERS (OUTPATIENT)
Dept: ADMINISTRATIVE | Facility: HOSPITAL | Age: 59
End: 2019-11-22

## 2019-11-22 DIAGNOSIS — M54.6 PAIN IN THORACIC SPINE: Primary | ICD-10-CM

## 2019-11-25 ENCOUNTER — HOSPITAL ENCOUNTER (EMERGENCY)
Facility: HOSPITAL | Age: 59
Discharge: HOME | End: 2019-11-25
Attending: EMERGENCY MEDICINE | Admitting: EMERGENCY MEDICINE
Payer: MEDICARE

## 2019-11-25 VITALS
DIASTOLIC BLOOD PRESSURE: 84 MMHG | SYSTOLIC BLOOD PRESSURE: 174 MMHG | HEART RATE: 66 BPM | RESPIRATION RATE: 18 BRPM | OXYGEN SATURATION: 97 %

## 2019-11-25 DIAGNOSIS — M25.551 RIGHT HIP PAIN: ICD-10-CM

## 2019-11-25 DIAGNOSIS — M54.9 CHRONIC BACK PAIN: Primary | ICD-10-CM

## 2019-11-25 DIAGNOSIS — G89.29 CHRONIC BACK PAIN: Primary | ICD-10-CM

## 2019-11-25 PROCEDURE — 96372 THER/PROPH/DIAG INJ SC/IM: CPT

## 2019-11-25 PROCEDURE — 99284 EMERGENCY DEPT VISIT MOD MDM: CPT | Performed by: EMERGENCY MEDICINE

## 2019-11-25 PROCEDURE — 99283 EMERGENCY DEPT VISIT LOW MDM: CPT

## 2019-11-25 RX ORDER — METHYLPREDNISOLONE 4 MG/1
TABLET ORAL
Qty: 21 TABLET | Refills: 0 | Status: SHIPPED | OUTPATIENT
Start: 2019-11-25 | End: 2019-12-26 | Stop reason: HOSPADM

## 2019-11-25 RX ORDER — KETOROLAC TROMETHAMINE 30 MG/ML
15 INJECTION, SOLUTION INTRAMUSCULAR; INTRAVENOUS ONCE
Status: COMPLETED | OUTPATIENT
Start: 2019-11-25 | End: 2019-11-25

## 2019-11-25 RX ORDER — LIDOCAINE 50 MG/G
1 PATCH TOPICAL ONCE
Status: DISCONTINUED | OUTPATIENT
Start: 2019-11-25 | End: 2019-11-25 | Stop reason: HOSPADM

## 2019-11-25 RX ORDER — HYDROMORPHONE HCL/PF 1 MG/ML
1 SYRINGE (ML) INJECTION ONCE
Status: COMPLETED | OUTPATIENT
Start: 2019-11-25 | End: 2019-11-25

## 2019-11-25 RX ADMIN — LIDOCAINE 1 PATCH: 50 PATCH TOPICAL at 11:45

## 2019-11-25 RX ADMIN — KETOROLAC TROMETHAMINE 15 MG: 30 INJECTION, SOLUTION INTRAMUSCULAR at 11:45

## 2019-11-25 RX ADMIN — HYDROMORPHONE HYDROCHLORIDE 1 MG: 1 INJECTION, SOLUTION INTRAMUSCULAR; INTRAVENOUS; SUBCUTANEOUS at 13:15

## 2019-11-25 RX ADMIN — HYDROMORPHONE HYDROCHLORIDE 1 MG: 1 INJECTION, SOLUTION INTRAMUSCULAR; INTRAVENOUS; SUBCUTANEOUS at 12:25

## 2019-11-25 NOTE — ED ATTENDING ATTESTATION
11/25/2019  I, Avril Yao MD, saw and evaluated the patient  I have discussed the patient with the resident/non-physician practitioner and agree with the resident's/non-physician practitioner's findings, Plan of Care, and MDM as documented in the resident's/non-physician practitioner's note, except where noted  All available labs and Radiology studies were reviewed  I was present for key portions of any procedure(s) performed by the resident/non-physician practitioner and I was immediately available to provide assistance  At this point I agree with the current assessment done in the Emergency Department  I have conducted an independent evaluation of this patient a history and physical is as follows:   Pt has history of back problems with fusion in past For past week has had increased  R hip pain no new trauma or injury  Pt saw pain mgt on Friday did not change meds but ordered mri  PE: pt walked into ed    No new numbness n b and b symptoms Pt feels decreased strength on right due to pain MDM: will treat pain in ed and refer back to pain mgt  ED Course         Critical Care Time  Procedures

## 2019-11-25 NOTE — DISCHARGE INSTRUCTIONS
Return to the emergency department for loss of bowel or bladder control, inability to walk, inability to feel your legs, any other new or concerning symptoms
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
## 2019-11-30 ENCOUNTER — HOSPITAL ENCOUNTER (OUTPATIENT)
Dept: RADIOLOGY | Facility: HOSPITAL | Age: 59
Discharge: HOME | End: 2019-11-30

## 2019-11-30 DIAGNOSIS — M54.6 PAIN IN THORACIC SPINE: ICD-10-CM

## 2019-12-23 ENCOUNTER — HOSPITAL ENCOUNTER (OUTPATIENT)
Dept: RADIOLOGY | Facility: HOSPITAL | Age: 59
Discharge: HOME | End: 2019-12-23
Payer: OTHER MISCELLANEOUS

## 2019-12-23 ENCOUNTER — TRANSCRIBE ORDERS (OUTPATIENT)
Dept: ADMINISTRATIVE | Facility: HOSPITAL | Age: 59
End: 2019-12-23

## 2019-12-23 ENCOUNTER — APPOINTMENT (EMERGENCY)
Dept: RADIOLOGY | Facility: HOSPITAL | Age: 59
DRG: 481 | End: 2019-12-23
Payer: MEDICARE

## 2019-12-23 ENCOUNTER — HOSPITAL ENCOUNTER (INPATIENT)
Facility: HOSPITAL | Age: 59
LOS: 3 days | Discharge: HOME | DRG: 481 | End: 2019-12-26
Attending: EMERGENCY MEDICINE | Admitting: FAMILY MEDICINE
Payer: MEDICARE

## 2019-12-23 DIAGNOSIS — S72.001A CLOSED FRACTURE OF NECK OF RIGHT FEMUR, INITIAL ENCOUNTER (HCC): ICD-10-CM

## 2019-12-23 DIAGNOSIS — M25.551 RIGHT HIP PAIN: ICD-10-CM

## 2019-12-23 DIAGNOSIS — S72.009A FRACTURE OF HIP (HCC): Primary | ICD-10-CM

## 2019-12-23 DIAGNOSIS — M25.551 RIGHT HIP PAIN: Primary | ICD-10-CM

## 2019-12-23 PROBLEM — F11.90 CHRONIC, CONTINUOUS USE OF OPIOIDS: Status: ACTIVE | Noted: 2019-12-23

## 2019-12-23 LAB
ANION GAP SERPL CALCULATED.3IONS-SCNC: 7 MMOL/L (ref 4–13)
APTT PPP: 25 SECONDS (ref 23–37)
BASOPHILS # BLD AUTO: 0.03 THOUSANDS/ΜL (ref 0–0.1)
BASOPHILS NFR BLD AUTO: 0 % (ref 0–1)
BUN SERPL-MCNC: 14 MG/DL (ref 5–25)
CALCIUM SERPL-MCNC: 9.6 MG/DL (ref 8.3–10.1)
CHLORIDE SERPL-SCNC: 102 MMOL/L (ref 100–108)
CO2 SERPL-SCNC: 32 MMOL/L (ref 21–32)
CREAT SERPL-MCNC: 0.87 MG/DL (ref 0.6–1.3)
EOSINOPHIL # BLD AUTO: 0.06 THOUSAND/ΜL (ref 0–0.61)
EOSINOPHIL NFR BLD AUTO: 1 % (ref 0–6)
ERYTHROCYTE [DISTWIDTH] IN BLOOD BY AUTOMATED COUNT: 13.3 % (ref 11.6–15.1)
GFR SERPL CREATININE-BSD FRML MDRD: 95 ML/MIN/1.73SQ M
GLUCOSE SERPL-MCNC: 126 MG/DL (ref 65–140)
GLUCOSE SERPL-MCNC: 127 MG/DL (ref 65–140)
GLUCOSE SERPL-MCNC: 159 MG/DL (ref 65–140)
HCT VFR BLD AUTO: 38.9 % (ref 36.5–49.3)
HGB BLD-MCNC: 12.3 G/DL (ref 12–17)
IMM GRANULOCYTES # BLD AUTO: 0.15 THOUSAND/UL (ref 0–0.2)
IMM GRANULOCYTES NFR BLD AUTO: 1 % (ref 0–2)
INR PPP: 1.02 (ref 0.84–1.19)
LYMPHOCYTES # BLD AUTO: 2.68 THOUSANDS/ΜL (ref 0.6–4.47)
LYMPHOCYTES NFR BLD AUTO: 25 % (ref 14–44)
MCH RBC QN AUTO: 25.7 PG (ref 26.8–34.3)
MCHC RBC AUTO-ENTMCNC: 31.6 G/DL (ref 31.4–37.4)
MCV RBC AUTO: 81 FL (ref 82–98)
MONOCYTES # BLD AUTO: 0.7 THOUSAND/ΜL (ref 0.17–1.22)
MONOCYTES NFR BLD AUTO: 7 % (ref 4–12)
NEUTROPHILS # BLD AUTO: 7.23 THOUSANDS/ΜL (ref 1.85–7.62)
NEUTS SEG NFR BLD AUTO: 66 % (ref 43–75)
NRBC BLD AUTO-RTO: 0 /100 WBCS
PLATELET # BLD AUTO: 186 THOUSANDS/UL (ref 149–390)
PMV BLD AUTO: 9.7 FL (ref 8.9–12.7)
POTASSIUM SERPL-SCNC: 4.1 MMOL/L (ref 3.5–5.3)
PROTHROMBIN TIME: 12.8 SECONDS (ref 11.6–14.5)
RBC # BLD AUTO: 4.79 MILLION/UL (ref 3.88–5.62)
SODIUM SERPL-SCNC: 141 MMOL/L (ref 136–145)
WBC # BLD AUTO: 10.85 THOUSAND/UL (ref 4.31–10.16)

## 2019-12-23 PROCEDURE — 85610 PROTHROMBIN TIME: CPT | Performed by: EMERGENCY MEDICINE

## 2019-12-23 PROCEDURE — 73502 X-RAY EXAM HIP UNI 2-3 VIEWS: CPT

## 2019-12-23 PROCEDURE — 80048 BASIC METABOLIC PNL TOTAL CA: CPT | Performed by: EMERGENCY MEDICINE

## 2019-12-23 PROCEDURE — 99223 1ST HOSP IP/OBS HIGH 75: CPT | Performed by: FAMILY MEDICINE

## 2019-12-23 PROCEDURE — 82948 REAGENT STRIP/BLOOD GLUCOSE: CPT

## 2019-12-23 PROCEDURE — 99285 EMERGENCY DEPT VISIT HI MDM: CPT

## 2019-12-23 PROCEDURE — 85025 COMPLETE CBC W/AUTO DIFF WBC: CPT | Performed by: EMERGENCY MEDICINE

## 2019-12-23 PROCEDURE — 85730 THROMBOPLASTIN TIME PARTIAL: CPT | Performed by: EMERGENCY MEDICINE

## 2019-12-23 PROCEDURE — 93005 ELECTROCARDIOGRAM TRACING: CPT

## 2019-12-23 PROCEDURE — 36415 COLL VENOUS BLD VENIPUNCTURE: CPT | Performed by: EMERGENCY MEDICINE

## 2019-12-23 PROCEDURE — 99285 EMERGENCY DEPT VISIT HI MDM: CPT | Performed by: EMERGENCY MEDICINE

## 2019-12-23 RX ORDER — TIZANIDINE 2 MG/1
4 TABLET ORAL 3 TIMES DAILY
Status: DISCONTINUED | OUTPATIENT
Start: 2019-12-23 | End: 2019-12-26 | Stop reason: HOSPADM

## 2019-12-23 RX ORDER — TIZANIDINE HYDROCHLORIDE 4 MG/1
4 CAPSULE, GELATIN COATED ORAL 3 TIMES DAILY
Status: DISCONTINUED | OUTPATIENT
Start: 2019-12-23 | End: 2019-12-23

## 2019-12-23 RX ORDER — INSULIN GLARGINE 100 [IU]/ML
8 INJECTION, SOLUTION SUBCUTANEOUS
Status: DISCONTINUED | OUTPATIENT
Start: 2019-12-23 | End: 2019-12-26 | Stop reason: HOSPADM

## 2019-12-23 RX ORDER — ACETAMINOPHEN 325 MG/1
975 TABLET ORAL EVERY 8 HOURS SCHEDULED
Status: DISCONTINUED | OUTPATIENT
Start: 2019-12-23 | End: 2019-12-26 | Stop reason: HOSPADM

## 2019-12-23 RX ORDER — OXYCODONE HYDROCHLORIDE 10 MG/1
30 TABLET ORAL 3 TIMES DAILY
Status: DISCONTINUED | OUTPATIENT
Start: 2019-12-23 | End: 2019-12-26 | Stop reason: HOSPADM

## 2019-12-23 RX ORDER — PANTOPRAZOLE SODIUM 40 MG/1
40 TABLET, DELAYED RELEASE ORAL
Status: DISCONTINUED | OUTPATIENT
Start: 2019-12-24 | End: 2019-12-26 | Stop reason: HOSPADM

## 2019-12-23 RX ORDER — HYDROMORPHONE HCL/PF 1 MG/ML
0.5 SYRINGE (ML) INJECTION ONCE
Status: COMPLETED | OUTPATIENT
Start: 2019-12-23 | End: 2019-12-23

## 2019-12-23 RX ORDER — LANOLIN ALCOHOL/MO/W.PET/CERES
4.5 CREAM (GRAM) TOPICAL
Status: DISCONTINUED | OUTPATIENT
Start: 2019-12-23 | End: 2019-12-26 | Stop reason: HOSPADM

## 2019-12-23 RX ORDER — CELECOXIB 100 MG/1
200 CAPSULE ORAL DAILY
Status: DISCONTINUED | OUTPATIENT
Start: 2019-12-24 | End: 2019-12-26 | Stop reason: HOSPADM

## 2019-12-23 RX ORDER — HYDROMORPHONE HCL/PF 1 MG/ML
1 SYRINGE (ML) INJECTION
Status: DISCONTINUED | OUTPATIENT
Start: 2019-12-23 | End: 2019-12-26 | Stop reason: HOSPADM

## 2019-12-23 RX ORDER — TERAZOSIN 5 MG/1
5 CAPSULE ORAL DAILY
Status: DISCONTINUED | OUTPATIENT
Start: 2019-12-24 | End: 2019-12-26 | Stop reason: HOSPADM

## 2019-12-23 RX ORDER — HYOSCYAMINE SULFATE EXTENDED-RELEASE 0.38 MG/1
0.38 TABLET ORAL DAILY
Status: DISCONTINUED | OUTPATIENT
Start: 2019-12-24 | End: 2019-12-26 | Stop reason: HOSPADM

## 2019-12-23 RX ORDER — CHOLECALCIFEROL (VITAMIN D3) 125 MCG
5 CAPSULE ORAL
Status: DISCONTINUED | OUTPATIENT
Start: 2019-12-23 | End: 2019-12-23

## 2019-12-23 RX ORDER — GABAPENTIN 800 MG/1
800 TABLET ORAL 3 TIMES DAILY
Status: DISCONTINUED | OUTPATIENT
Start: 2019-12-23 | End: 2019-12-23

## 2019-12-23 RX ORDER — GABAPENTIN 400 MG/1
800 CAPSULE ORAL 3 TIMES DAILY
Status: DISCONTINUED | OUTPATIENT
Start: 2019-12-23 | End: 2019-12-26 | Stop reason: HOSPADM

## 2019-12-23 RX ORDER — ZOLPIDEM TARTRATE 5 MG/1
10 TABLET ORAL
Status: DISCONTINUED | OUTPATIENT
Start: 2019-12-23 | End: 2019-12-26 | Stop reason: HOSPADM

## 2019-12-23 RX ORDER — FENTANYL 100 UG/H
1 PATCH TRANSDERMAL
Status: DISCONTINUED | OUTPATIENT
Start: 2019-12-23 | End: 2019-12-26 | Stop reason: HOSPADM

## 2019-12-23 RX ADMIN — INSULIN GLARGINE 8 UNITS: 100 INJECTION, SOLUTION SUBCUTANEOUS at 22:21

## 2019-12-23 RX ADMIN — HYDROMORPHONE HYDROCHLORIDE 1 MG: 1 INJECTION, SOLUTION INTRAMUSCULAR; INTRAVENOUS; SUBCUTANEOUS at 23:35

## 2019-12-23 RX ADMIN — TIZANIDINE 4 MG: 2 TABLET ORAL at 22:23

## 2019-12-23 RX ADMIN — MELATONIN 4.5 MG: at 22:22

## 2019-12-23 RX ADMIN — HYDROMORPHONE HYDROCHLORIDE 0.5 MG: 1 INJECTION, SOLUTION INTRAMUSCULAR; INTRAVENOUS; SUBCUTANEOUS at 18:47

## 2019-12-23 RX ADMIN — ACETAMINOPHEN 975 MG: 325 TABLET, FILM COATED ORAL at 22:22

## 2019-12-23 RX ADMIN — GABAPENTIN 800 MG: 400 CAPSULE ORAL at 22:23

## 2019-12-23 RX ADMIN — OXYCODONE HYDROCHLORIDE 30 MG: 10 TABLET ORAL at 22:22
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
## 2019-12-23 NOTE — ASSESSMENT & PLAN NOTE
Lab Results   Component Value Date    HGBA1C 7 2 (H) 11/04/2019       No results for input(s): POCGLU in the last 72 hours      Blood Sugar Average: Last 72 hrs:  · most recent hemoglobin a1c does not indicate goal control  · Hold PO meds while hospitalized  · Continue home insulin regimen   · Add SSI for correction  · QID accuchecks

## 2019-12-23 NOTE — ASSESSMENT & PLAN NOTE
· Reviewed PDMP  · Last Rx filled on 12/6/19 for #30 day supply of oxycodone IR 30mg PO TID and Fentanyl 100mcg patch TD Q72 hours  · Continue home medications
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
## 2019-12-23 NOTE — ASSESSMENT & PLAN NOTE
· Reports hx of R hip pain progressing for about 1-2 months  S/P Fall at home about 1 week ago-- reports falling down steps, but denies significant trauma  Has had increasing pain since fall  · XR hip R: Current examination demonstrates a basicervical fracture of the right femoral neck  Fracture site is nondisplaced but mildly angulated; Although there is concern for underlying pathologic fracture given that there is no description of recent fall, no definite osseous lesion is seen    Loss of bone mineral density along the superior margin of the fracture site may represent resorption of the fracture if it did not immediately occur or has been gradually developing such as with a stress or insufficiency fracture  · Ortho to see patient tomorrow AM and plan for operative management tomorrow  · NPO at midnight  · Pain management  · Check vitamin D levels  · Eventual PT/OT evaluation post-operatively

## 2019-12-24 ENCOUNTER — ANESTHESIA (INPATIENT)
Dept: PERIOP | Facility: HOSPITAL | Age: 59
DRG: 481 | End: 2019-12-24
Payer: MEDICARE

## 2019-12-24 ENCOUNTER — ANESTHESIA EVENT (INPATIENT)
Dept: PERIOP | Facility: HOSPITAL | Age: 59
DRG: 481 | End: 2019-12-24
Payer: MEDICARE

## 2019-12-24 ENCOUNTER — APPOINTMENT (INPATIENT)
Dept: RADIOLOGY | Facility: HOSPITAL | Age: 59
DRG: 481 | End: 2019-12-24
Payer: MEDICARE

## 2019-12-24 PROBLEM — S72.009A FRACTURE OF HIP (HCC): Status: ACTIVE | Noted: 2019-12-23

## 2019-12-24 LAB
ABO GROUP BLD: NORMAL
ANION GAP SERPL CALCULATED.3IONS-SCNC: 7 MMOL/L (ref 4–13)
ATRIAL RATE: 67 BPM
BASOPHILS # BLD AUTO: 0.04 THOUSANDS/ΜL (ref 0–0.1)
BASOPHILS NFR BLD AUTO: 0 % (ref 0–1)
BLD GP AB SCN SERPL QL: NEGATIVE
BUN SERPL-MCNC: 14 MG/DL (ref 5–25)
CALCIUM SERPL-MCNC: 9.7 MG/DL (ref 8.3–10.1)
CHLORIDE SERPL-SCNC: 101 MMOL/L (ref 100–108)
CO2 SERPL-SCNC: 32 MMOL/L (ref 21–32)
CREAT SERPL-MCNC: 0.99 MG/DL (ref 0.6–1.3)
EOSINOPHIL # BLD AUTO: 0.12 THOUSAND/ΜL (ref 0–0.61)
EOSINOPHIL NFR BLD AUTO: 1 % (ref 0–6)
ERYTHROCYTE [DISTWIDTH] IN BLOOD BY AUTOMATED COUNT: 13.4 % (ref 11.6–15.1)
GFR SERPL CREATININE-BSD FRML MDRD: 84 ML/MIN/1.73SQ M
GLUCOSE SERPL-MCNC: 115 MG/DL (ref 65–140)
GLUCOSE SERPL-MCNC: 119 MG/DL (ref 65–140)
GLUCOSE SERPL-MCNC: 141 MG/DL (ref 65–140)
GLUCOSE SERPL-MCNC: 145 MG/DL (ref 65–140)
GLUCOSE SERPL-MCNC: 295 MG/DL (ref 65–140)
HCT VFR BLD AUTO: 40.3 % (ref 36.5–49.3)
HGB BLD-MCNC: 12.6 G/DL (ref 12–17)
IMM GRANULOCYTES # BLD AUTO: 0.12 THOUSAND/UL (ref 0–0.2)
IMM GRANULOCYTES NFR BLD AUTO: 1 % (ref 0–2)
INR PPP: 1.09 (ref 0.84–1.19)
LYMPHOCYTES # BLD AUTO: 3.07 THOUSANDS/ΜL (ref 0.6–4.47)
LYMPHOCYTES NFR BLD AUTO: 25 % (ref 14–44)
MCH RBC QN AUTO: 25.8 PG (ref 26.8–34.3)
MCHC RBC AUTO-ENTMCNC: 31.3 G/DL (ref 31.4–37.4)
MCV RBC AUTO: 82 FL (ref 82–98)
MONOCYTES # BLD AUTO: 0.84 THOUSAND/ΜL (ref 0.17–1.22)
MONOCYTES NFR BLD AUTO: 7 % (ref 4–12)
NEUTROPHILS # BLD AUTO: 8.07 THOUSANDS/ΜL (ref 1.85–7.62)
NEUTS SEG NFR BLD AUTO: 66 % (ref 43–75)
NRBC BLD AUTO-RTO: 0 /100 WBCS
P AXIS: 38 DEGREES
PLATELET # BLD AUTO: 293 THOUSANDS/UL (ref 149–390)
PLATELET # BLD AUTO: 309 THOUSANDS/UL (ref 149–390)
PMV BLD AUTO: 8.7 FL (ref 8.9–12.7)
PMV BLD AUTO: 9 FL (ref 8.9–12.7)
POTASSIUM SERPL-SCNC: 3.8 MMOL/L (ref 3.5–5.3)
PR INTERVAL: 170 MS
PROTHROMBIN TIME: 13.5 SECONDS (ref 11.6–14.5)
QRS AXIS: 66 DEGREES
QRSD INTERVAL: 108 MS
QT INTERVAL: 390 MS
QTC INTERVAL: 403 MS
RBC # BLD AUTO: 4.89 MILLION/UL (ref 3.88–5.62)
RH BLD: POSITIVE
SODIUM SERPL-SCNC: 140 MMOL/L (ref 136–145)
SPECIMEN EXPIRATION DATE: NORMAL
T WAVE AXIS: 36 DEGREES
VENTRICULAR RATE: 64 BPM
WBC # BLD AUTO: 12.26 THOUSAND/UL (ref 4.31–10.16)

## 2019-12-24 PROCEDURE — 86900 BLOOD TYPING SEROLOGIC ABO: CPT | Performed by: PHYSICIAN ASSISTANT

## 2019-12-24 PROCEDURE — 93010 ELECTROCARDIOGRAM REPORT: CPT | Performed by: INTERNAL MEDICINE

## 2019-12-24 PROCEDURE — C1769 GUIDE WIRE: HCPCS | Performed by: SURGERY

## 2019-12-24 PROCEDURE — 86901 BLOOD TYPING SEROLOGIC RH(D): CPT | Performed by: PHYSICIAN ASSISTANT

## 2019-12-24 PROCEDURE — 86850 RBC ANTIBODY SCREEN: CPT | Performed by: PHYSICIAN ASSISTANT

## 2019-12-24 PROCEDURE — 99223 1ST HOSP IP/OBS HIGH 75: CPT | Performed by: SURGERY

## 2019-12-24 PROCEDURE — 27235 TREAT THIGH FRACTURE: CPT | Performed by: PHYSICIAN ASSISTANT

## 2019-12-24 PROCEDURE — 99232 SBSQ HOSP IP/OBS MODERATE 35: CPT | Performed by: PHYSICIAN ASSISTANT

## 2019-12-24 PROCEDURE — C1713 ANCHOR/SCREW BN/BN,TIS/BN: HCPCS | Performed by: SURGERY

## 2019-12-24 PROCEDURE — 85610 PROTHROMBIN TIME: CPT | Performed by: PHYSICIAN ASSISTANT

## 2019-12-24 PROCEDURE — 85049 AUTOMATED PLATELET COUNT: CPT | Performed by: PHYSICIAN ASSISTANT

## 2019-12-24 PROCEDURE — 27235 TREAT THIGH FRACTURE: CPT | Performed by: SURGERY

## 2019-12-24 PROCEDURE — 73552 X-RAY EXAM OF FEMUR 2/>: CPT | Performed by: SURGERY

## 2019-12-24 PROCEDURE — 73552 X-RAY EXAM OF FEMUR 2/>: CPT

## 2019-12-24 PROCEDURE — 80048 BASIC METABOLIC PNL TOTAL CA: CPT | Performed by: PHYSICIAN ASSISTANT

## 2019-12-24 PROCEDURE — 82948 REAGENT STRIP/BLOOD GLUCOSE: CPT

## 2019-12-24 PROCEDURE — 0QS604Z REPOSITION RIGHT UPPER FEMUR WITH INTERNAL FIXATION DEVICE, OPEN APPROACH: ICD-10-PCS | Performed by: SURGERY

## 2019-12-24 PROCEDURE — 85025 COMPLETE CBC W/AUTO DIFF WBC: CPT | Performed by: PHYSICIAN ASSISTANT

## 2019-12-24 DEVICE — 7.3MM CANNULATED SCREW 16MM THREAD/100MM: Type: IMPLANTABLE DEVICE | Site: HIP | Status: FUNCTIONAL

## 2019-12-24 DEVICE — 7.3MM CANNULATED SCREW 16MM THREAD/95MM: Type: IMPLANTABLE DEVICE | Site: HIP | Status: FUNCTIONAL

## 2019-12-24 RX ORDER — MAGNESIUM HYDROXIDE 1200 MG/15ML
LIQUID ORAL AS NEEDED
Status: DISCONTINUED | OUTPATIENT
Start: 2019-12-24 | End: 2019-12-24 | Stop reason: HOSPADM

## 2019-12-24 RX ORDER — ONDANSETRON 2 MG/ML
4 INJECTION INTRAMUSCULAR; INTRAVENOUS ONCE AS NEEDED
Status: DISCONTINUED | OUTPATIENT
Start: 2019-12-24 | End: 2019-12-24 | Stop reason: HOSPADM

## 2019-12-24 RX ORDER — HYDROMORPHONE HCL/PF 1 MG/ML
0.5 SYRINGE (ML) INJECTION
Status: DISCONTINUED | OUTPATIENT
Start: 2019-12-24 | End: 2019-12-24

## 2019-12-24 RX ORDER — ONDANSETRON 2 MG/ML
INJECTION INTRAMUSCULAR; INTRAVENOUS AS NEEDED
Status: DISCONTINUED | OUTPATIENT
Start: 2019-12-24 | End: 2019-12-24 | Stop reason: SURG

## 2019-12-24 RX ORDER — DEXAMETHASONE SODIUM PHOSPHATE 10 MG/ML
INJECTION, SOLUTION INTRAMUSCULAR; INTRAVENOUS AS NEEDED
Status: DISCONTINUED | OUTPATIENT
Start: 2019-12-24 | End: 2019-12-24 | Stop reason: SURG

## 2019-12-24 RX ORDER — FENTANYL CITRATE 50 UG/ML
INJECTION, SOLUTION INTRAMUSCULAR; INTRAVENOUS AS NEEDED
Status: DISCONTINUED | OUTPATIENT
Start: 2019-12-24 | End: 2019-12-24 | Stop reason: SURG

## 2019-12-24 RX ORDER — SODIUM CHLORIDE, SODIUM LACTATE, POTASSIUM CHLORIDE, CALCIUM CHLORIDE 600; 310; 30; 20 MG/100ML; MG/100ML; MG/100ML; MG/100ML
INJECTION, SOLUTION INTRAVENOUS CONTINUOUS PRN
Status: DISCONTINUED | OUTPATIENT
Start: 2019-12-24 | End: 2019-12-24 | Stop reason: SURG

## 2019-12-24 RX ORDER — EPHEDRINE SULFATE 50 MG/ML
INJECTION INTRAVENOUS AS NEEDED
Status: DISCONTINUED | OUTPATIENT
Start: 2019-12-24 | End: 2019-12-24 | Stop reason: SURG

## 2019-12-24 RX ORDER — LIDOCAINE HYDROCHLORIDE 10 MG/ML
INJECTION, SOLUTION EPIDURAL; INFILTRATION; INTRACAUDAL; PERINEURAL AS NEEDED
Status: DISCONTINUED | OUTPATIENT
Start: 2019-12-24 | End: 2019-12-24 | Stop reason: SURG

## 2019-12-24 RX ORDER — HYDROMORPHONE HCL/PF 1 MG/ML
1 SYRINGE (ML) INJECTION
Status: DISCONTINUED | OUTPATIENT
Start: 2019-12-24 | End: 2019-12-24 | Stop reason: HOSPADM

## 2019-12-24 RX ORDER — FENTANYL CITRATE/PF 50 MCG/ML
50 SYRINGE (ML) INJECTION
Status: DISCONTINUED | OUTPATIENT
Start: 2019-12-24 | End: 2019-12-24 | Stop reason: HOSPADM

## 2019-12-24 RX ORDER — DEXMEDETOMIDINE HYDROCHLORIDE 100 UG/ML
INJECTION, SOLUTION INTRAVENOUS AS NEEDED
Status: DISCONTINUED | OUTPATIENT
Start: 2019-12-24 | End: 2019-12-24 | Stop reason: SURG

## 2019-12-24 RX ORDER — CEFAZOLIN SODIUM 2 G/50ML
SOLUTION INTRAVENOUS AS NEEDED
Status: DISCONTINUED | OUTPATIENT
Start: 2019-12-24 | End: 2019-12-24 | Stop reason: SURG

## 2019-12-24 RX ORDER — HYDROMORPHONE HCL 110MG/55ML
PATIENT CONTROLLED ANALGESIA SYRINGE INTRAVENOUS AS NEEDED
Status: DISCONTINUED | OUTPATIENT
Start: 2019-12-24 | End: 2019-12-24 | Stop reason: SURG

## 2019-12-24 RX ORDER — SUCCINYLCHOLINE/SOD CL,ISO/PF 100 MG/5ML
SYRINGE (ML) INTRAVENOUS AS NEEDED
Status: DISCONTINUED | OUTPATIENT
Start: 2019-12-24 | End: 2019-12-24 | Stop reason: SURG

## 2019-12-24 RX ORDER — METOCLOPRAMIDE HYDROCHLORIDE 5 MG/ML
10 INJECTION INTRAMUSCULAR; INTRAVENOUS ONCE AS NEEDED
Status: DISCONTINUED | OUTPATIENT
Start: 2019-12-24 | End: 2019-12-24 | Stop reason: HOSPADM

## 2019-12-24 RX ORDER — MIDAZOLAM HYDROCHLORIDE 2 MG/2ML
INJECTION, SOLUTION INTRAMUSCULAR; INTRAVENOUS AS NEEDED
Status: DISCONTINUED | OUTPATIENT
Start: 2019-12-24 | End: 2019-12-24 | Stop reason: SURG

## 2019-12-24 RX ORDER — ONDANSETRON 2 MG/ML
4 INJECTION INTRAMUSCULAR; INTRAVENOUS EVERY 6 HOURS PRN
Status: DISCONTINUED | OUTPATIENT
Start: 2019-12-24 | End: 2019-12-26 | Stop reason: HOSPADM

## 2019-12-24 RX ORDER — PROPOFOL 10 MG/ML
INJECTION, EMULSION INTRAVENOUS AS NEEDED
Status: DISCONTINUED | OUTPATIENT
Start: 2019-12-24 | End: 2019-12-24 | Stop reason: SURG

## 2019-12-24 RX ADMIN — MIDAZOLAM HYDROCHLORIDE 2 MG: 1 INJECTION, SOLUTION INTRAMUSCULAR; INTRAVENOUS at 13:18

## 2019-12-24 RX ADMIN — GABAPENTIN 800 MG: 400 CAPSULE ORAL at 08:14

## 2019-12-24 RX ADMIN — FENTANYL CITRATE 50 MCG: 50 INJECTION, SOLUTION INTRAMUSCULAR; INTRAVENOUS at 15:53

## 2019-12-24 RX ADMIN — TIZANIDINE 4 MG: 2 TABLET ORAL at 18:33

## 2019-12-24 RX ADMIN — ACETAMINOPHEN 975 MG: 325 TABLET, FILM COATED ORAL at 06:27

## 2019-12-24 RX ADMIN — FENTANYL CITRATE 100 MCG: 50 INJECTION INTRAMUSCULAR; INTRAVENOUS at 13:39

## 2019-12-24 RX ADMIN — INSULIN GLARGINE 8 UNITS: 100 INJECTION, SOLUTION SUBCUTANEOUS at 21:35

## 2019-12-24 RX ADMIN — CELECOXIB 200 MG: 100 CAPSULE ORAL at 08:15

## 2019-12-24 RX ADMIN — EPHEDRINE SULFATE 10 MG: 50 INJECTION, SOLUTION INTRAVENOUS at 14:02

## 2019-12-24 RX ADMIN — EPHEDRINE SULFATE 5 MG: 50 INJECTION, SOLUTION INTRAVENOUS at 13:39

## 2019-12-24 RX ADMIN — FENTANYL CITRATE 50 MCG: 50 INJECTION, SOLUTION INTRAMUSCULAR; INTRAVENOUS at 15:31

## 2019-12-24 RX ADMIN — FENTANYL TRANSDERMAL 1 PATCH: 100 PATCH, EXTENDED RELEASE TRANSDERMAL at 18:36

## 2019-12-24 RX ADMIN — GABAPENTIN 800 MG: 400 CAPSULE ORAL at 21:24

## 2019-12-24 RX ADMIN — EPHEDRINE SULFATE 15 MG: 50 INJECTION, SOLUTION INTRAVENOUS at 13:59

## 2019-12-24 RX ADMIN — EPHEDRINE SULFATE 10 MG: 50 INJECTION, SOLUTION INTRAVENOUS at 14:44

## 2019-12-24 RX ADMIN — OXYCODONE HYDROCHLORIDE 30 MG: 10 TABLET ORAL at 08:24

## 2019-12-24 RX ADMIN — HYDROMORPHONE HYDROCHLORIDE 1 MG: 1 INJECTION, SOLUTION INTRAMUSCULAR; INTRAVENOUS; SUBCUTANEOUS at 21:31

## 2019-12-24 RX ADMIN — ONDANSETRON 4 MG: 2 INJECTION INTRAMUSCULAR; INTRAVENOUS at 13:39

## 2019-12-24 RX ADMIN — HYDROMORPHONE HYDROCHLORIDE 1 MG: 1 INJECTION, SOLUTION INTRAMUSCULAR; INTRAVENOUS; SUBCUTANEOUS at 16:14

## 2019-12-24 RX ADMIN — EPHEDRINE SULFATE 5 MG: 50 INJECTION, SOLUTION INTRAVENOUS at 13:53

## 2019-12-24 RX ADMIN — FENTANYL CITRATE 50 MCG: 50 INJECTION, SOLUTION INTRAMUSCULAR; INTRAVENOUS at 15:28

## 2019-12-24 RX ADMIN — OXYCODONE HYDROCHLORIDE 30 MG: 10 TABLET ORAL at 18:33

## 2019-12-24 RX ADMIN — TIZANIDINE 4 MG: 2 TABLET ORAL at 08:14

## 2019-12-24 RX ADMIN — CEFAZOLIN SODIUM 2000 MG: 2 SOLUTION INTRAVENOUS at 13:30

## 2019-12-24 RX ADMIN — Medication 0.5 MCG/KG/HR: at 13:49

## 2019-12-24 RX ADMIN — EPHEDRINE SULFATE 10 MG: 50 INJECTION, SOLUTION INTRAVENOUS at 13:56

## 2019-12-24 RX ADMIN — DEXAMETHASONE SODIUM PHOSPHATE 4 MG: 10 INJECTION, SOLUTION INTRAMUSCULAR; INTRAVENOUS at 13:40

## 2019-12-24 RX ADMIN — GABAPENTIN 800 MG: 400 CAPSULE ORAL at 18:33

## 2019-12-24 RX ADMIN — SODIUM CHLORIDE, SODIUM LACTATE, POTASSIUM CHLORIDE, AND CALCIUM CHLORIDE: .6; .31; .03; .02 INJECTION, SOLUTION INTRAVENOUS at 13:18

## 2019-12-24 RX ADMIN — ACETAMINOPHEN 975 MG: 325 TABLET, FILM COATED ORAL at 21:23

## 2019-12-24 RX ADMIN — SERTRALINE HYDROCHLORIDE 75 MG: 50 TABLET ORAL at 08:14

## 2019-12-24 RX ADMIN — ONDANSETRON 4 MG: 2 INJECTION INTRAMUSCULAR; INTRAVENOUS at 08:57

## 2019-12-24 RX ADMIN — HYDROMORPHONE HYDROCHLORIDE 1 MG: 1 INJECTION, SOLUTION INTRAMUSCULAR; INTRAVENOUS; SUBCUTANEOUS at 16:04

## 2019-12-24 RX ADMIN — TIZANIDINE 4 MG: 2 TABLET ORAL at 21:24

## 2019-12-24 RX ADMIN — INSULIN LISPRO 6 UNITS: 100 INJECTION, SOLUTION INTRAVENOUS; SUBCUTANEOUS at 21:35

## 2019-12-24 RX ADMIN — PROPOFOL 200 MG: 10 INJECTION, EMULSION INTRAVENOUS at 13:39

## 2019-12-24 RX ADMIN — PHENYLEPHRINE HYDROCHLORIDE 100 MCG: 10 INJECTION INTRAVENOUS at 14:44

## 2019-12-24 RX ADMIN — TERAZOSIN HYDROCHLORIDE ANHYDROUS 5 MG: 5 CAPSULE ORAL at 08:14

## 2019-12-24 RX ADMIN — LIDOCAINE HYDROCHLORIDE 50 MG: 10 INJECTION, SOLUTION EPIDURAL; INFILTRATION; INTRACAUDAL; PERINEURAL at 13:39

## 2019-12-24 RX ADMIN — HYDROMORPHONE HYDROCHLORIDE 0.5 MG: 2 INJECTION INTRAMUSCULAR; INTRAVENOUS; SUBCUTANEOUS at 14:25

## 2019-12-24 RX ADMIN — DEXMEDETOMIDINE HYDROCHLORIDE 8 MCG: 100 INJECTION, SOLUTION INTRAVENOUS at 13:39

## 2019-12-24 RX ADMIN — HYOSCYAMINE SULFATE 0.38 MG: 0.38 TABLET, EXTENDED RELEASE ORAL at 08:24

## 2019-12-24 RX ADMIN — FENTANYL CITRATE 50 MCG: 50 INJECTION, SOLUTION INTRAMUSCULAR; INTRAVENOUS at 15:36

## 2019-12-24 RX ADMIN — Medication 140 MG: at 13:39

## 2019-12-24 RX ADMIN — PANTOPRAZOLE SODIUM 40 MG: 40 TABLET, DELAYED RELEASE ORAL at 06:27

## 2019-12-24 RX ADMIN — PHENYLEPHRINE HYDROCHLORIDE 100 MCG: 10 INJECTION INTRAVENOUS at 14:02

## 2019-12-24 NOTE — ASSESSMENT & PLAN NOTE
Lab Results   Component Value Date    HGBA1C 7 2 (H) 11/04/2019       Recent Labs     12/23/19  1925 12/23/19  2121 12/24/19  0707 12/24/19  1102   POCGLU 126 127 115 119       Blood Sugar Average: Last 72 hrs:  · (P) 121 75most recent hemoglobin a1c does not indicate goal control  · Hold PO meds while hospitalized  · Continue home insulin regimen   · Add SSI for correction  · QID accuchecks

## 2019-12-24 NOTE — PLAN OF CARE
Problem: Potential for Falls  Goal: Patient will remain free of falls  Description  INTERVENTIONS:  - Assess patient frequently for physical needs  -  Identify cognitive and physical deficits and behaviors that affect risk of falls    -  [place] fall precautions as indicated by assessment   - Educate patient/family on patient safety including physical limitations  - Instruct patient to call for assistance with activity based on assessment  - Modify environment to reduce risk of injury  - Consider OT/PT consult to assist with strengthening/mobility  Outcome: Progressing

## 2019-12-24 NOTE — OP NOTE
OPERATIVE REPORT    PATIENT NAME: Bryn Bender   :  1960  MRN: [id number]  Pt Location: AN OR ROOM 01    SURGERY DATE: 2019    SURGEON(S) and ROLE:  Primary: Melisa Rosas MD  Assisting: Chalino Landon PA-C    NOTE:  The presence of a physician assistant was necessary to help with patient positioning, surgical exposure, wound retraction, wound closure, and other key portions of the procedure  No qualified resident was available for this case  PREOPERATIVE DIAGNOSES:  Right Basicervical Femoral Neck Fracture    POSTOPERATIVE DIAGNOSES:  Same as Preoperatvie Diagnoses    PROCEDURES:  ORIF of Right Basicervical Femoral Neck Fracture      ANESTHESIA TYPE:  General endotracheal    ANESTHESIA STAFF:   Anesthesiologist: Jac Shin MD    ESTIMATED BLOOD LOSS:  50 mL    PERIOPERATIVE ANTIBIOTICS:  cefazolin, 2 grams    IMPLANTS:  Sythes 7 2 mm cannulated screws ( 95 x 2, 100 x1)     Implant Name Type Inv  Item Serial No   Lot No  LRB No  Used Action   SCREW SHAWN 7 3 X 95MM THRD 16MM - [id number]  SCREW SHAWN 7 3 X 95MM THRD 16MM  Synthes  Right 2 Implanted   SCREW SHAWN 7 3 X 100MM THRD 16MM - [id number]  SCREW SHAWN 7 3 X 100MM THRD 16MM  Synthes  Right 1 Implanted       SPECIMENS:  * No specimens in log *    DRAINS:  None      OPERATIVE INDICATIONS:  The patient is a 63 y o  male with right subacute right femoral basicervical neck fracture with mild angulation  Surgical treatment was indicated due to liklihood of prolonged or permanent functional impairment with non-surgical treatment  After a thorough discussion of the potential risks, benefits, and alternative treatments, the patient agreed to proce further surgical intervention     The patient understands that the risks of surgery include, but are not limited to: failure of repair, nonunion, malunion, loss of fixation, infection, neurovascular injury, wound healing complications, venous thromboembolism, persistent pain, stiffness, instability, recurrence of symptoms, potential need for additional surgeries, and loss of limb or life, possible need for further surgical intervention including but not limited to total hip arthroplasty   Oral and written consent for surgery was obtained from the patient preoperatively  PROCEDURE AND TECHNIQUE:  On the day of surgery, the patient was identified in the preoperative holding area  The operative site was marked by the surgeon  The patient was taken into the operating room  A time-out was conducted to confirm the patient's identity, the operative site, and the proposed procedure  The patient was anesthetized, and perioperative antibiotics were administered  The patient was positioned supine on the fracture table  All bony prominences were padded  The operative site was prepped and draped using standard sterile technique  The fracture was inspected with fluoroscopy and confirmed to be aligned appropriately  A lateral incision was made just distal to the greater trochanter  The IT band was split with in line with the incision  Using fluoroscopic guidance, three guide pins were positioned laterally on the femur at the level of the lesser trochanter and advanced into the femoral head  The pins were placed in an inverted triangle configuration, posterior-inferior, postero-superior, and anterior  Fluoroscopy confirmed appropriate pin placement  A depth gauge was used to select the screw lengths  The lateral femoral cortex was drilled with a cannulated drill bit  Three Synthes cannulated screws were placed over the guide wires and fully seated by hand  The guide wires were removed  Final fluoroscopic images confirmed appropriate hardware placement and fracture alignment  The wound was irrigated with sterile saline  The fascia was closed with 0 Vicryl  The incision was closed with 2-0 vicryl and staples  A sterile dressing was applied  The drapes were removed   The patient was transferred to the hospital bed, awakened from anesthesia, and taken to the PACU in stable condition        COMPLICATIONS:  None    PATIENT DISPOSITION:  PACU       SIGNATURE:  Zandra Templeton MD  DATE:  December 24, 2019  TIME:  3:12 PM

## 2019-12-24 NOTE — DISCHARGE INSTRUCTIONS
Discharge Instructions - Orthopedics  Bouchra Banda 63 y o  male MRN: [id number]  Unit/Bed#: AN OR MAIN    Weight Bearing Status:                                           Weight bearing as tolerated     DVT prophylaxis:  Complete course of Lovenox as directed    Pain:  Continue analgesics as directed    Dressing Instructions:   Keep dressing clean, dry and intact until follow up appointment  Do not shower until day 3    PT/OT:  Continue PT/OT on outpatient basis as directed    Appt Instructions: If you do not have your appointment, please call the clinic at [phone] to f/u with Dr Hien Acuna in 10-14 days  Otherwise followup as scheduled     Contact the office sooner if you experience any increased numbness/tingling in the extremities, or if you develop redness in the surgery site, fever, chills, or any wound healing complications        Miscellaneous:  N/A

## 2019-12-24 NOTE — PROGRESS NOTES
Progress Note - Bryn Catarizm 1960, 63 y o  male MRN: [id number]    Unit/Bed#: S -01 Encounter: [id number]    Primary Care Provider: Madison Perez MD   Date and time admitted to hospital: 12/23/2019  3:57 PM    * Closed fracture of neck of right femur ([address] )  Assessment & Plan  · Reports hx of R hip pain progressing for about 1-2 months  S/P Fall at home about 1 week ago-- reports falling down steps, but denies significant trauma  Has had increasing pain since fall  · XR hip R: Current examination demonstrates a basicervical fracture of the right femoral neck  Fracture site is nondisplaced but mildly angulated; Although there is concern for underlying pathologic fracture given that there is no description of recent fall, no definite osseous lesion is seen    Loss of bone mineral density along the superior margin of the fracture site may represent resorption of the fracture if it did not immediately occur or has been gradually developing such as with a stress or insufficiency fracture  · Closed reduction percutaneous pinning of R femoral neck fracture planned for today  · Pain management  · Check vitamin D levels; OP dexa scan  · Eventual PT/OT evaluation post-operatively     Chronic, continuous use of opioids 2/2 post-laminectomy syndrome  Assessment & Plan  · Reviewed PDMP  · Last Rx filled on 12/6/19 for #30 day supply of oxycodone IR 30mg PO TID and Fentanyl 100mcg patch TD Q72 hours  · Continue home medications    Type 2 diabetes mellitus without complication [facility])  Assessment & Plan  Lab Results   Component Value Date    HGBA1C 7 2 (H) 11/04/2019       Recent Labs     12/23/19  1925 12/23/19  2121 12/24/19  0707 12/24/19  1102   POCGLU 126 127 115 119       Blood Sugar Average: Last 72 hrs:  · (P) 121 75most recent hemoglobin a1c does not indicate goal control  · Hold PO meds while hospitalized  · Continue home insulin regimen   · Add SSI for correction  · QID accuchecks    Essential hypertension  Assessment & Plan  · BP above goal, but not urgent   · Continue home medications  · Pain control       VTE Pharmacologic Prophylaxis:   Pharmacologic: Enoxaparin (Lovenox)  Mechanical VTE Prophylaxis in Place: No    Patient Centered Rounds: I have performed bedside rounds with nursing staff today  Discussions with Specialists or Other Care Team Provider: MALIK, RN     Education and Discussions with Family / Patient: patient     Time Spent for Care: 30 minutes  More than 50% of total time spent on counseling and coordination of care as described above  Current Length of Stay: 1 day(s)    Current Patient Status: Inpatient   Certification Statement: The patient will continue to require additional inpatient hospital stay due to ongoing tx of R hip fracture    Discharge Plan: d/c likely 48 hours pending improvement of pain and PT eval     Code Status: Level 1 - Full Code      Subjective:   Patient feels OK  Pain in manageable  Objective:     Vitals:   Temp (24hrs), Av 5 °F (36 9 °C), Min:98 2 °F (36 8 °C), Max:98 7 °F (37 1 °C)    Temp:  [98 2 °F (36 8 °C)-98 7 °F (37 1 °C)] 98 6 °F (37 °C)  HR:  [59-73] 59  Resp:  [18-19] 18  BP: (161-167)/(80-94) 164/89  SpO2:  [93 %-98 %] 93 %  Body mass index is 35 44 kg/m²  Input and Output Summary (last 24 hours): Intake/Output Summary (Last 24 hours) at 2019 1209  Last data filed at 2019 0500  Gross per 24 hour   Intake 320 ml   Output 350 ml   Net -30 ml       Physical Exam:     Physical Exam   Constitutional: He is oriented to person, place, and time  No distress  HENT:   Head: Normocephalic and atraumatic  Eyes: Pupils are equal, round, and reactive to light  EOM are normal    Neck: No JVD present  Cardiovascular: Normal rate, regular rhythm and intact distal pulses  Exam reveals no gallop and no friction rub  No murmur heard  Pulmonary/Chest: Effort normal and breath sounds normal  No stridor  No respiratory distress   He has no wheezes  Abdominal: Soft  Bowel sounds are normal  He exhibits no distension  There is no tenderness  There is no guarding  Musculoskeletal: He exhibits tenderness (R hip)  Limited ROM R hip 2/2 pain   Neurological: He is alert and oriented to person, place, and time  Skin: Skin is warm and dry  He is not diaphoretic  Psychiatric: He has a normal mood and affect  His behavior is normal        Additional Data:     Labs:    Results from last 7 days   Lab Units 12/24/19  0839 12/24/19  0456   WBC Thousand/uL  --  12 26*   HEMOGLOBIN g/dL  --  12 6   HEMATOCRIT %  --  40 3   PLATELETS Thousands/uL 293 309   NEUTROS PCT %  --  66   LYMPHS PCT %  --  25   MONOS PCT %  --  7   EOS PCT %  --  1     Results from last 7 days   Lab Units 12/24/19  0456   SODIUM mmol/L 140   POTASSIUM mmol/L 3 8   CHLORIDE mmol/L 101   CO2 mmol/L 32   BUN mg/dL 14   CREATININE mg/dL 0 99   ANION GAP mmol/L 7   CALCIUM mg/dL 9 7   GLUCOSE RANDOM mg/dL 145*     Results from last 7 days   Lab Units 12/24/19  0456   INR  1 09     Results from last 7 days   Lab Units 12/24/19  1102 12/24/19  0707 12/23/19  2121 12/23/19  1925   POC GLUCOSE mg/dl 119 115 127 126                   * I Have Reviewed All Lab Data Listed Above  * Additional Pertinent Lab Tests Reviewed:  [address] Admission Reviewed    Imaging:    Imaging Reports Reviewed Today Include: XR hips R  Imaging Personally Reviewed by Myself Includes:  XR hip R    Recent Cultures (last 7 days):           Last 24 Hours Medication List:     Current Facility-Administered Medications:  acetaminophen 975 mg Oral Q8H [facility] Salud Oglesby PA-C   celecoxib 200 mg Oral Daily Salud Oglesby PA-C   enoxaparin 40 mg Subcutaneous Daily Salud Oglesby PA-C   fentaNYL 1 patch Transdermal Q48H Salud Oglesby PA-C   gabapentin 800 mg Oral TID Fran Alvarado DO   HYDROmorphone 1 mg Intravenous Q3H PRN Salud Oglesby PA-C   hyoscyamine 0 375 mg Oral Daily Salud Oglesby PA-C   insulin glargine 8 Units Subcutaneous HS Salud Oglesby PA-C   insulin lispro 2-12 Units Subcutaneous 4x Daily (AC & HS) Salud Oglesby PA-C   melatonin 4 5 mg Oral HS Fran Alvarado DO   ondansetron 4 mg Intravenous Q6H PRN Vita Howard MD   oxyCODONE 30 mg Oral TID Salud Oglesby PA-C   pantoprazole 40 mg Oral Early Morning Salud Oglesby PA-C   sertraline 75 mg Oral Daily Salud Oglesby PA-C   terazosin 5 mg Oral Daily Salud Oglesby PA-C   tiZANidine 4 mg Oral TID Fran Alvarado DO   zolpidem 10 mg Oral HS PRN Salud Oglesby PA-C        Today, Patient Was Seen By: Denise Ellison PA-C    ** Please Note: Dictation voice to text software may have been used in the creation of this document   **
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
## 2019-12-24 NOTE — PHYSICAL THERAPY NOTE
PHYSICAL THERAPY NOTE  Patient Name: Endy Wu  QHMTK'A Date: 12/24/2019  PT orders received and chart review completed  Per chart, plan for OR today for R femoral neck fracture  Please re consult following surgery w/ activity/weight bearing   Shagufta Greene PT

## 2019-12-24 NOTE — ANESTHESIA POSTPROCEDURE EVALUATION
Post-Op Assessment Note    CV Status:  Stable  Pain Score: 5    Pain management: adequate     Mental Status:  Alert and awake   Hydration Status:  Euvolemic   PONV Controlled:  Controlled   Airway Patency:  Patent   Post Op Vitals Reviewed: Yes      Staff: Anesthesiologist           BP   163/80   Temp  98 9   Pulse  79   Resp   15   SpO2   95

## 2019-12-24 NOTE — OCCUPATIONAL THERAPY NOTE
Occupational Therapy Cancellation Note        Patient Name: Peterson Hughes  XHLIY'F Date: 12/24/2019    OT orders received and chart review completed  Plan for OR today for R femoral neck fracture  Please re consult following surgery w/ activity/weight bearing   D/C OT    Mana Garcia, OTR/L

## 2019-12-24 NOTE — ANESTHESIA PREPROCEDURE EVALUATION
Review of Systems/Medical History  Patient summary reviewed  Chart reviewed  No history of anesthetic complications     Cardiovascular  Exercise tolerance (METS): <4,  Hypertension ,    Pulmonary  Sleep apnea CPAP and Sleep Study completed,        GI/Hepatic    GERD well controlled,        Negative  ROS        Endo/Other  Diabetes poorly controlled type 2 Insulin,      GYN       Hematology  Negative hematology ROS      Musculoskeletal  Back pain , lumbar pain,   Comment: Right Hip Fracture  Walks with walker 2/2 lumbar spinal fusion x 2 for LBP  Neurology  Negative neurology ROS      Psychology   Depression ,              Physical Exam    Airway    Mallampati score: III  TM Distance: >3 FB  Neck ROM: full     Dental   No notable dental hx     Cardiovascular  Rhythm: regular, Rate: normal, Cardiovascular exam normal    Pulmonary  Pulmonary exam normal     Other Findings        Anesthesia Plan  ASA Score- 3     Anesthesia Type- general with ASA Monitors  Additional Monitors:   Airway Plan: ETT  Plan Factors-Patient not instructed to abstain from smoking on day of procedure  Patient did not smoke on day of surgery  Induction- intravenous  Postoperative Plan- Plan for postoperative opioid use  Planned trial extubation    Informed Consent- Anesthetic plan and risks discussed with patient  I personally reviewed this patient with the CRNA  Discussed and agreed on the Anesthesia Plan with the CRNA  Brady Danielson

## 2019-12-24 NOTE — ASSESSMENT & PLAN NOTE
· Reports hx of R hip pain progressing for about 1-2 months  S/P Fall at home about 1 week ago-- reports falling down steps, but denies significant trauma  Has had increasing pain since fall  · XR hip R: Current examination demonstrates a basicervical fracture of the right femoral neck  Fracture site is nondisplaced but mildly angulated; Although there is concern for underlying pathologic fracture given that there is no description of recent fall, no definite osseous lesion is seen    Loss of bone mineral density along the superior margin of the fracture site may represent resorption of the fracture if it did not immediately occur or has been gradually developing such as with a stress or insufficiency fracture  · Closed reduction percutaneous pinning of R femoral neck fracture planned for today  · Pain management  · Check vitamin D levels; OP dexa scan  · Eventual PT/OT evaluation post-operatively

## 2019-12-25 LAB
25(OH)D3 SERPL-MCNC: 30.7 NG/ML (ref 30–100)
ANION GAP SERPL CALCULATED.3IONS-SCNC: 6 MMOL/L (ref 4–13)
BUN SERPL-MCNC: 15 MG/DL (ref 5–25)
CALCIUM SERPL-MCNC: 9.7 MG/DL (ref 8.3–10.1)
CHLORIDE SERPL-SCNC: 102 MMOL/L (ref 100–108)
CO2 SERPL-SCNC: 32 MMOL/L (ref 21–32)
CREAT SERPL-MCNC: 0.83 MG/DL (ref 0.6–1.3)
ERYTHROCYTE [DISTWIDTH] IN BLOOD BY AUTOMATED COUNT: 13.5 % (ref 11.6–15.1)
GFR SERPL CREATININE-BSD FRML MDRD: 97 ML/MIN/1.73SQ M
GLUCOSE SERPL-MCNC: 136 MG/DL (ref 65–140)
GLUCOSE SERPL-MCNC: 148 MG/DL (ref 65–140)
GLUCOSE SERPL-MCNC: 161 MG/DL (ref 65–140)
GLUCOSE SERPL-MCNC: 166 MG/DL (ref 65–140)
GLUCOSE SERPL-MCNC: 177 MG/DL (ref 65–140)
HCT VFR BLD AUTO: 39.4 % (ref 36.5–49.3)
HGB BLD-MCNC: 12.6 G/DL (ref 12–17)
MCH RBC QN AUTO: 26.1 PG (ref 26.8–34.3)
MCHC RBC AUTO-ENTMCNC: 32 G/DL (ref 31.4–37.4)
MCV RBC AUTO: 82 FL (ref 82–98)
PLATELET # BLD AUTO: 288 THOUSANDS/UL (ref 149–390)
PMV BLD AUTO: 9 FL (ref 8.9–12.7)
POTASSIUM SERPL-SCNC: 4.1 MMOL/L (ref 3.5–5.3)
RBC # BLD AUTO: 4.83 MILLION/UL (ref 3.88–5.62)
SODIUM SERPL-SCNC: 140 MMOL/L (ref 136–145)
WBC # BLD AUTO: 13.95 THOUSAND/UL (ref 4.31–10.16)

## 2019-12-25 PROCEDURE — G8979 MOBILITY GOAL STATUS: HCPCS

## 2019-12-25 PROCEDURE — 82306 VITAMIN D 25 HYDROXY: CPT | Performed by: PHYSICIAN ASSISTANT

## 2019-12-25 PROCEDURE — 97163 PT EVAL HIGH COMPLEX 45 MIN: CPT

## 2019-12-25 PROCEDURE — 82948 REAGENT STRIP/BLOOD GLUCOSE: CPT

## 2019-12-25 PROCEDURE — 99232 SBSQ HOSP IP/OBS MODERATE 35: CPT | Performed by: PHYSICIAN ASSISTANT

## 2019-12-25 PROCEDURE — 80048 BASIC METABOLIC PNL TOTAL CA: CPT | Performed by: PHYSICIAN ASSISTANT

## 2019-12-25 PROCEDURE — 85027 COMPLETE CBC AUTOMATED: CPT | Performed by: PHYSICIAN ASSISTANT

## 2019-12-25 PROCEDURE — 99024 POSTOP FOLLOW-UP VISIT: CPT | Performed by: SURGERY

## 2019-12-25 PROCEDURE — G8978 MOBILITY CURRENT STATUS: HCPCS

## 2019-12-25 RX ORDER — POLYETHYLENE GLYCOL 3350 17 G/17G
17 POWDER, FOR SOLUTION ORAL DAILY PRN
Status: DISCONTINUED | OUTPATIENT
Start: 2019-12-25 | End: 2019-12-26 | Stop reason: HOSPADM

## 2019-12-25 RX ORDER — DOCUSATE SODIUM 100 MG/1
100 CAPSULE, LIQUID FILLED ORAL 2 TIMES DAILY
Status: DISCONTINUED | OUTPATIENT
Start: 2019-12-25 | End: 2019-12-26 | Stop reason: HOSPADM

## 2019-12-25 RX ADMIN — INSULIN GLARGINE 8 UNITS: 100 INJECTION, SOLUTION SUBCUTANEOUS at 22:05

## 2019-12-25 RX ADMIN — DOCUSATE SODIUM 100 MG: 100 CAPSULE, LIQUID FILLED ORAL at 18:07

## 2019-12-25 RX ADMIN — PANTOPRAZOLE SODIUM 40 MG: 40 TABLET, DELAYED RELEASE ORAL at 05:29

## 2019-12-25 RX ADMIN — TIZANIDINE 4 MG: 2 TABLET ORAL at 16:28

## 2019-12-25 RX ADMIN — TIZANIDINE 4 MG: 2 TABLET ORAL at 22:05

## 2019-12-25 RX ADMIN — GABAPENTIN 800 MG: 400 CAPSULE ORAL at 22:05

## 2019-12-25 RX ADMIN — TIZANIDINE 4 MG: 2 TABLET ORAL at 09:18

## 2019-12-25 RX ADMIN — OXYCODONE HYDROCHLORIDE 30 MG: 10 TABLET ORAL at 16:28

## 2019-12-25 RX ADMIN — OXYCODONE HYDROCHLORIDE 30 MG: 10 TABLET ORAL at 22:05

## 2019-12-25 RX ADMIN — ACETAMINOPHEN 975 MG: 325 TABLET, FILM COATED ORAL at 22:04

## 2019-12-25 RX ADMIN — ACETAMINOPHEN 975 MG: 325 TABLET, FILM COATED ORAL at 13:11

## 2019-12-25 RX ADMIN — ENOXAPARIN SODIUM 40 MG: 40 INJECTION SUBCUTANEOUS at 09:17

## 2019-12-25 RX ADMIN — SERTRALINE HYDROCHLORIDE 75 MG: 50 TABLET ORAL at 09:17

## 2019-12-25 RX ADMIN — HYDROMORPHONE HYDROCHLORIDE 1 MG: 1 INJECTION, SOLUTION INTRAMUSCULAR; INTRAVENOUS; SUBCUTANEOUS at 05:11

## 2019-12-25 RX ADMIN — TERAZOSIN HYDROCHLORIDE ANHYDROUS 5 MG: 5 CAPSULE ORAL at 09:17

## 2019-12-25 RX ADMIN — INSULIN LISPRO 2 UNITS: 100 INJECTION, SOLUTION INTRAVENOUS; SUBCUTANEOUS at 12:23

## 2019-12-25 RX ADMIN — DOCUSATE SODIUM 100 MG: 100 CAPSULE, LIQUID FILLED ORAL at 11:13

## 2019-12-25 RX ADMIN — GABAPENTIN 800 MG: 400 CAPSULE ORAL at 16:28

## 2019-12-25 RX ADMIN — ACETAMINOPHEN 975 MG: 325 TABLET, FILM COATED ORAL at 05:29

## 2019-12-25 RX ADMIN — OXYCODONE HYDROCHLORIDE 30 MG: 10 TABLET ORAL at 09:18

## 2019-12-25 RX ADMIN — OXYCODONE HYDROCHLORIDE 30 MG: 10 TABLET ORAL at 01:13

## 2019-12-25 RX ADMIN — HYOSCYAMINE SULFATE 0.38 MG: 0.38 TABLET, EXTENDED RELEASE ORAL at 09:18

## 2019-12-25 RX ADMIN — CELECOXIB 200 MG: 100 CAPSULE ORAL at 09:17

## 2019-12-25 RX ADMIN — GABAPENTIN 800 MG: 400 CAPSULE ORAL at 09:17

## 2019-12-25 RX ADMIN — MELATONIN 4.5 MG: at 22:05

## 2019-12-25 NOTE — ASSESSMENT & PLAN NOTE
Lab Results   Component Value Date    HGBA1C 7 2 (H) 11/04/2019       Recent Labs     12/24/19  1102 12/24/19  1525 12/24/19  2113 12/25/19  0701   POCGLU 119 141* 295* 136       Blood Sugar Average: Last 72 hrs:  · (P) 151 [id number] recent hemoglobin a1c 7 2, reasonable  · Hold PO meds while hospitalized  · Continue lantus at 8 units with SSI for meals for now  · Change to diabetic diet

## 2019-12-25 NOTE — PROGRESS NOTES
Progress Note - Orthopedics   [facility] 63 y o  male MRN: [id number]  Unit/Bed#: S -01 Encounter: [id number]    Assessment:  52-year-old male postop day 1 status post right hip subacute femoral neck fracture CR PP    Plan:  Toe touch weight-bearing with walker right lower extremity  Pain control per primary  Continue Lovenox for 4 weeks  Will await PT evaluation for whether discharged to home or rehab    VTE Pharmacologic Prophylaxis: Enoxaparin (Lovenox)  VTE Mechanical Prophylaxis: sequential compression device    Subjective:  No acute events overnight  Pain well controlled  States his hip is sore but otherwise he feels well  Again had a long discussion with the patient he still believes that his fracture happened approximately a month ago  We discussed again that given the subacute presentation there may be increased risk for a nonunion especially in the setting of diabetes  Patient is understanding of this  Vitals: Blood pressure 165/80, pulse 63, temperature 98 2 °F (36 8 °C), temperature source Oral, resp  rate 18, height 5' 10" (1 778 m), weight 112 kg (247 lb), SpO2 93 %  ,Body mass index is 35 44 kg/m²  Intake/Output Summary (Last 24 hours) at 12/25/2019 0816  Last data filed at 12/25/2019 6403  Gross per 24 hour   Intake 1630 ml   Output 1625 ml   Net 5 ml       Invasive Devices     Peripheral Intravenous Line            Peripheral IV 12/25/19 Right Wrist less than 1 day                Physical Exam:   General: well developed and well nourished, alert, oriented times 3 and appears comfortable  Psychiatric: Normal  HEENT: Normocephalic, Atraumatic Trachea Midline, No torticollis  Pulmonary: No audible wheezing or respiratory distress   Cardiovascular: No pitting edema, 2+ radial pulse   Skin:  No significant ecchymosis around the surgical site  Musculoskeletal: Normal, except as noted in detailed exam and in HPI        Ortho Exam:   Dressing clean dry intact  Thigh soft without significant swelling  Positive EHL FHL TIB Ant, Gastoc   SILT DP, SP, Tib nerve   BCR all toes     Lab, Imaging and other studies:   I have personally reviewed pertinent lab results    CBC:   Lab Results   Component Value Date    WBC 13 95 (H) 12/25/2019    HGB 12 6 12/25/2019    HCT 39 4 12/25/2019    MCV 82 12/25/2019     12/25/2019    MCH 26 1 (L) 12/25/2019    MCHC 32 0 12/25/2019    RDW 13 5 12/25/2019    MPV 9 0 12/25/2019     CMP:   Lab Results   Component Value Date    SODIUM 140 12/25/2019     12/25/2019    CO2 32 12/25/2019    BUN 15 12/25/2019    CREATININE 0 83 12/25/2019    CALCIUM 9 7 12/25/2019    EGFR 97 12/25/2019     PT/INR: No results found for: PT, INR

## 2019-12-25 NOTE — PLAN OF CARE
Problem: Potential for Falls  Goal: Patient will remain free of falls  Description  INTERVENTIONS:  - Assess patient frequently for physical needs  -  Identify cognitive and physical deficits and behaviors that affect risk of falls    -  [place] fall precautions as indicated by assessment   - Educate patient/family on patient safety including physical limitations  - Instruct patient to call for assistance with activity based on assessment  - Modify environment to reduce risk of injury  - Consider OT/PT consult to assist with strengthening/mobility  Outcome: Progressing     Problem: PAIN - ADULT  Goal: Verbalizes/displays adequate comfort level or baseline comfort level  Description  Interventions:  - Encourage patient to monitor pain and request assistance  - Assess pain using appropriate pain scale  - Administer analgesics based on type and severity of pain and evaluate response  - Implement non-pharmacological measures as appropriate and evaluate response  - Consider cultural and social influences on pain and pain management  - Notify physician/advanced practitioner if interventions unsuccessful or patient reports new pain  Outcome: Progressing     Problem: INFECTION - ADULT  Goal: Absence or prevention of progression during hospitalization  Description  INTERVENTIONS:  - Assess and monitor for signs and symptoms of infection  - Monitor lab/diagnostic results  - Monitor all insertion sites, i e  indwelling lines, tubes, and drains  - Monitor endotracheal if appropriate and nasal secretions for changes in amount and color  - [place] appropriate cooling/warming therapies per order  - Administer medications as ordered  - Instruct and encourage patient and family to use good hand hygiene technique  - Identify and instruct in appropriate isolation precautions for identified infection/condition  Outcome: Progressing  Goal: Absence of fever/infection during neutropenic period  Description  INTERVENTIONS:  - Monitor WBC    Outcome: Progressing     Problem: SAFETY ADULT  Goal: Patient will remain free of falls  Description  INTERVENTIONS:  - Assess patient frequently for physical needs  -  Identify cognitive and physical deficits and behaviors that affect risk of falls    -  [place] fall precautions as indicated by assessment   - Educate patient/family on patient safety including physical limitations  - Instruct patient to call for assistance with activity based on assessment  - Modify environment to reduce risk of injury  - Consider OT/PT consult to assist with strengthening/mobility  Outcome: Progressing  Goal: Maintain or return to baseline ADL function  Description  INTERVENTIONS:  -  Assess patient's ability to carry out ADLs; assess patient's baseline for ADL function and identify physical deficits which impact ability to perform ADLs (bathing, care of mouth/teeth, toileting, grooming, dressing, etc )  - Assess/evaluate cause of self-care deficits   - Assess range of motion  - Assess patient's mobility; develop plan if impaired  - Assess patient's need for assistive devices and provide as appropriate  - Encourage maximum independence but intervene and supervise when necessary  - Involve family in performance of ADLs  - Assess for home care needs following discharge   - Consider OT consult to assist with ADL evaluation and planning for discharge  - Provide patient education as appropriate  Outcome: Progressing  Goal: Maintain or return mobility status to optimal level  Description  INTERVENTIONS:  - Assess patient's baseline mobility status (ambulation, transfers, stairs, etc )    - Identify cognitive and physical deficits and behaviors that affect mobility  - Identify mobility aids required to assist with transfers and/or ambulation (gait belt, sit-to-stand, lift, walker, cane, etc )  - [place] fall precautions as indicated by assessment  - Record patient progress and toleration of activity level on Mobility SBAR; progress patient to next Phase/Stage  - Instruct patient to call for assistance with activity based on assessment  - Consider rehabilitation consult to assist with strengthening/weightbearing, etc   Outcome: Progressing     Problem: DISCHARGE PLANNING  Goal: Discharge to home or other facility with appropriate resources  Description  INTERVENTIONS:  - Identify barriers to discharge w/patient and caregiver  - Arrange for needed discharge resources and transportation as appropriate  - Identify discharge learning needs (meds, wound care, etc )  - Arrange for interpretive services to assist at discharge as needed  - Refer to Case Management Department for coordinating discharge planning if the patient needs post-hospital services based on physician/advanced practitioner order or complex needs related to functional status, cognitive ability, or social support system  Outcome: Progressing     Problem: Knowledge Deficit  Goal: Patient/family/caregiver demonstrates understanding of disease process, treatment plan, medications, and discharge instructions  Description  Complete learning assessment and assess knowledge base    Interventions:  - Provide teaching at level of understanding  - Provide teaching via preferred learning methods  Outcome: Progressing

## 2019-12-25 NOTE — PLAN OF CARE
Problem: PHYSICAL THERAPY ADULT  Goal: Performs mobility at highest level of function for planned discharge setting  See evaluation for individualized goals  Description  Treatment/Interventions: Functional transfer training, LE strengthening/ROM, Therapeutic exercise, Endurance training, Cognitive reorientation, Patient/family training, Equipment eval/education, Bed mobility, Gait training, Spoke to nursing, Spoke to case management  Equipment Recommended: Deepika Diaz       See flowsheet documentation for full assessment, interventions and recommendations  Outcome: Progressing  Note:   Prognosis: Good  Problem List: Decreased strength, Decreased range of motion, Decreased endurance, Impaired balance, Decreased mobility, Decreased coordination, Pain, Orthopedic restrictions  Assessment: Pt  is a 62 yo M who presents s/p ORIF of FX of R femur  POD 1  TTWB RLE  order placed for PT eval and tx, w/ activity order of up w/ A and TTWB R LE  pt presents w/ comorbidities of ht, DM2, acute back pain, depression, lumbar radiculopathy, L foot drop, Fx of Hip, and epistaxis and personal factors of mobilizing w/ assistive device, inability to navigate community distances, inability to navigate level surfaces w/o external assistance, unable to perform dynamic tasks in community, positive fall history, unable to perform physical activity, inability to perform IADLs, inability to perform ADLs and inability to live alone  pt presents w/ pain, weakness, decreased ROM, decreased endurance, impaired balance, gait deviations, orthopedic restrictions and fall risk   these impairments are evident in findings from physical examination (weakness, decreased ROM and impaired skin integrity), mobility assessment (need for Supervision  assist w/ all phases of mobility when usually mobilizing independently, tolerance to only 100 feet of ambulation and need for cueing for mobility technique), and Barthel Index: 75/100  pt needed input for task focus and mobility technique  pt is at risk for falls due to physical and safety awareness deficits  pt's clinical presentation is unstable/unpredictable (evident in need for assist w/ all phases of mobility when usually mobilizing independently, tolerance to only 100 feet of ambulation, need for input for mobility technique, need for input for task focus and mobility technique and need for input for mobility technique/safety)  pt needs inpatient PT tx to improve mobility deficits  discharge recommendation is for outpatient PT and home w/ family support to reduce fall risk and maximize level of functional independence  Recommendation: Outpatient PT, Home with family support(when medically appropriate OPPT)     PT - OK to Discharge: Yes    See flowsheet documentation for full assessment

## 2019-12-25 NOTE — ASSESSMENT & PLAN NOTE
· My colleague reviewed PDMP  · Last Rx filled on 12/6/19 for #30 day supply of oxycodone IR 30mg PO TID and Fentanyl 100mcg patch TD Q72 hours  · Continue home medications

## 2019-12-25 NOTE — PHYSICAL THERAPY NOTE
PHYSICAL THERAPY Evaluation NOTE    Patient Name: Eliot Bridges  HKBCD'F Date: 12/25/2019     AGE:   63 y o  Mrn:   [id number]  ADMIT DX:  Fracture of hip ([facility] [address] ) [S72 009A]  Hip pain [M25 559]    Past Medical History:   Diagnosis Date    Abnormal weight loss     Chronic narcotic dependence (HCC)     Chronic pain disorder     CPAP (continuous positive airway pressure) dependence     Depression     Diabetes mellitus (HCC)     Dorsodynia     Esophageal reflux     Fatigue     Hearing loss     Hypertension     Hypokalemia     Insulin dependent diabetes mellitus type IA (HCC)     Nocturia     Non-toxic uninodular goiter     Obstructive sleep apnea     Sleep apnea     Sleep disorder     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     Type 2 diabetes mellitus ([facility] )      Length Of Stay: 2  PHYSICAL THERAPY EVALUATION :    12/25/19 0855   Note Type   Note type Eval only   Pain Assessment   Pain Assessment 0-10   Pain Score 5   Pain Type Surgical pain   Pain Location Leg   Pain Orientation Right   Home Living   Type of Home House  (1 , 1 MARLA )   Home Layout One level;Stairs to enter without rails; Able to live on main level with bedroom/bathroom  (threshold)   Bathroom Shower/Tub Tub/shower unit   Bathroom Toilet Standard   Bathroom Accessibility Accessible   Home Equipment Walker   Additional Comments Pt  lives in a [address] with spouse, 1 MARLA   Prior Function   Level of [place] Independent with ADLs and functional mobility   Lives With Tre Help From Family   ADL Assistance Independent   IADLs Independent   Falls in the last 6 months 1 to 4   Vocational Retired   Comments PTA, Pt  reports Antelmo Huh with ADLs, IADLs and functional mobility with RW due to pain x1-2 months prior no Ad      Restrictions/Precautions   Weight Bearing Precautions Per Order Yes   RLE Weight Bearing Per Order TTWB   Other Precautions Fall Risk;Pain;WBS;Chair Alarm; Bed Alarm   General   Additional Pertinent History Pt  is a 64 yo M who presents s/p ORIF of FX of R femur  POD 1  TTWB RLE  Family/Caregiver Present Yes   Cognition   Overall Cognitive Status WFL   Arousal/Participation Cooperative   Orientation Level Oriented X4   Memory Within functional limits   Following Commands Follows all commands and directions without difficulty   Comments Pt  was identified with full name and birthdate   RUE Assessment   RUE Assessment WFL   LUE Assessment   LUE Assessment WFL   RLE Assessment   RLE Assessment   (limited MMT due to POD 1)   LLE Assessment   LLE Assessment WFL   Coordination   Movements are Fluid and Coordinated 1   Sensation WFL   Light Touch   RLE Light Touch Grossly intact   LLE Light Touch Grossly intact   Bed Mobility   Supine to Sit 5  Supervision   Additional items LE management;Verbal cues  (for use of sheet for LE management in and out of bed)   Sit to Supine Unable to assess   Additional Comments Pt  seated OOB in recliner   Transfers   Sit to Stand 5  Supervision   Additional items Assist x 1; Increased time required;Verbal cues  (for hand placement and sequencing)   Stand to Sit 5  Supervision   Additional items Assist x 1; Impulsive;Verbal cues  (to reach back to control descent)   Ambulation/Elevation   Gait pattern Improper Weight shift;Narrow LYNDA; Forward Flexion;Decreased foot clearance;Shuffling; Inconsistent marcelle; Redundant gait at times; Short stride; Step to;Excessively slow   Gait Assistance 5  Supervision   Additional items Assist x 1;Verbal cues  (for RLE TTWB; good compliance)   Assistive Device Rolling walker   Distance 100'x1   Balance   Static Sitting Good   Dynamic Sitting Fair +   Static Standing Fair   Dynamic Standing Fair -   Ambulatory Fair -   Endurance Deficit   Endurance Deficit Yes   Endurance Deficit Description postural and gait degradation noted with fatigue   Activity Tolerance   Activity Tolerance Patient tolerated treatment well   Medical Staff Made Aware Spoke to Dr Isaak Vivas to RN   Assessment   Prognosis Good   Problem List Decreased strength;Decreased range of motion;Decreased endurance; Impaired balance;Decreased mobility; Decreased coordination;Pain;Orthopedic restrictions   Assessment Pt  is a 64 yo M who presents s/p ORIF of FX of R femur  POD 1  TTWB RLE  order placed for PT eval and tx, w/ activity order of up w/ A and TTWB R LE  pt presents w/ comorbidities of ht, DM2, acute back pain, depression, lumbar radiculopathy, L foot drop, Fx of Hip, and epistaxis and personal factors of mobilizing w/ assistive device, inability to navigate community distances, inability to navigate level surfaces w/o external assistance, unable to perform dynamic tasks in community, positive fall history, unable to perform physical activity, inability to perform IADLs, inability to perform ADLs and inability to live alone  pt presents w/ pain, weakness, decreased ROM, decreased endurance, impaired balance, gait deviations, orthopedic restrictions and fall risk  these impairments are evident in findings from physical examination (weakness, decreased ROM and impaired skin integrity), mobility assessment (need for Supervision  assist w/ all phases of mobility when usually mobilizing independently, tolerance to only 100 feet of ambulation and need for cueing for mobility technique), and Barthel Index: 75/100  pt needed input for task focus and mobility technique  pt is at risk for falls due to physical and safety awareness deficits  pt's clinical presentation is unstable/unpredictable (evident in need for assist w/ all phases of mobility when usually mobilizing independently, tolerance to only 100 feet of ambulation, need for input for mobility technique, need for input for task focus and mobility technique and need for input for mobility technique/safety)   pt needs inpatient PT tx to improve mobility deficits  discharge recommendation is for outpatient PT and home w/ family support to reduce fall risk and maximize level of functional independence  Goals   Patient Goals to get home   STG Expiration Date 01/04/20   Short Term Goal #1 pt will: Increase bilateral LE strength 1/2 grade to facilitate independent mobility, Perform all bed mobility tasks independently to decrease fall risk factors, Perform all transfers modified independent to improve independence, Ambulate 250 ft  with roller walker modified independent w/o LOB, Navigate 1 stairs w/ supervision without handrail and use of standard walker to facilitate return to previous living environment, Increase all balance 1/2 grade to decrease risk for falls and Improve Barthel Index score to 100 or greater to facilitate independence   PT Treatment Day 0   Plan   Treatment/Interventions Functional transfer training;LE strengthening/ROM; Therapeutic exercise; Endurance training;Cognitive reorientation;Patient/family training;Equipment eval/education; Bed mobility;Gait training;Spoke to nursing;Spoke to case management   PT Frequency   (4-6x/week)   Recommendation   Recommendation Outpatient PT; Home with family support  (when medically appropriate OPPT)   Equipment Recommended Walker   PT - OK to Discharge Yes   Additional Comments when medically appropriate   Barthel Index   Feeding 10   Bathing 5   Grooming Score 5   Dressing Score 5   Bladder Score 10   Bowels Score 10   Toilet Use Score 5   Transfers (Bed/Chair) Score 10   Mobility (Level Surface) Score 10   Stairs Score 5   Barthel Index Score 75     Skilled PT recommended while in hospital and upon DC to progress pt toward treatment goals       Arcadio Oropeza, PT, DPT 12/25/2019

## 2019-12-25 NOTE — PROGRESS NOTES
Progress Note - Delfin Grey 1960, 63 y o  male MRN: [id number]    Unit/Bed#: S -01 Encounter: [id number]    Primary Care Provider: Blaire Tobin MD   Date and time admitted to hospital: 12/23/2019  3:57 PM    * Closed fracture of neck of right femur ([address] )  Assessment & Plan  · Reported hx of R hip pain progressing for about 1-2 months  Was evaluated previously in ER but no hip xray taken at the time  S/P event at home about 1 week ago of missing/coming down hard on one step, no severe trauma  Has had increasing pain since fall  · XR hip R: basicervical fracture of the right femoral neck  Fracture site is nondisplaced but mildly angulated; Although there is concern for underlying pathologic fracture given that there is no description of recent fall, no definite osseous lesion is seen    Loss of bone mineral density along the superior margin of the fracture site may represent resorption of the fracture if it did not immediately occur or has been gradually developing such as with a stress or insufficiency fracture  · POD #1 s/p Closed reduction percutaneous pinning of R femoral neck fracture  · Pain management and bowel regimen/dvt prophylaxis  · Checking vitamin D levels; OP dexa scan  · PT/OT evaluations in progress    Chronic, continuous use of opioids 2/2 post-laminectomy syndrome  Assessment & Plan  · My colleague reviewed PDMP  · Last Rx filled on 12/6/19 for #30 day supply of oxycodone IR 30mg PO TID and Fentanyl 100mcg patch TD Q72 hours  · Continue home medications    Type 2 diabetes mellitus without complication [facility])  Assessment & Plan  Lab Results   Component Value Date    HGBA1C 7 2 (H) 11/04/2019       Recent Labs     12/24/19  1102 12/24/19  1525 12/24/19  2113 12/25/19  0701   POCGLU 119 141* 295* 136       Blood Sugar Average: Last 72 hrs:  · (P) 151 [id number] recent hemoglobin a1c 7 2, reasonable  · Hold PO meds while hospitalized  · Continue lantus at 8 units with SSI for meals for now  · Change to diabetic diet    Essential hypertension  Assessment & Plan  · BP above goal, but not urgently elevated  Only on hytrin, not really any real BP meds      Sleep apnea  Assessment & Plan  · CPAP QHS    VTE Pharmacologic Prophylaxis:   Pharmacologic: Enoxaparin (Lovenox)  Mechanical VTE Prophylaxis in Place: Yes    Patient Centered Rounds: I have performed bedside rounds with nursing staff today  Discussions with Specialists or Other Care Team Provider: PT    Education and Discussions with Family / Patient: family at bedside    Time Spent for Care: 25 min  More than 50% of total time spent on counseling and coordination of care as described above  Current Length of Stay: 2 day(s)    Current Patient Status: Inpatient   Certification Statement: The patient will continue to require additional inpatient hospital stay due to ongoing OT/case mgmt evals for dc needs; post op care    Discharge Plan: home with PT vs rehab    Code Status: Level 1 - Full Code      Subjective:   Patient reports last night was rough and he was having pain  He is doing better at this time now  Has not had a bowel movement in 2 days  He is not otherwise reporting any shortness of breath or difficulty passing urine and was just up with physical therapy    Objective:     Vitals:   Temp (24hrs), Av 1 °F (36 7 °C), Min:97 °F (36 1 °C), Max:98 9 °F (37 2 °C)    Temp:  [97 °F (36 1 °C)-98 9 °F (37 2 °C)] 98 2 °F (36 8 °C)  HR:  [58-74] 63  Resp:  [16-18] 18  BP: (139-175)/(60-90) 165/80  SpO2:  [90 %-99 %] 93 %  Body mass index is 35 44 kg/m²  Input and Output Summary (last 24 hours): Intake/Output Summary (Last 24 hours) at 2019 0925  Last data filed at 2019 0520  Gross per 24 hour   Intake 1630 ml   Output 1625 ml   Net 5 ml       Physical Exam:     Physical Exam   Constitutional: He appears well-developed and well-nourished  No distress  HENT:   Head: Normocephalic     Eyes: Conjunctivae are normal  Right eye exhibits no discharge  Left eye exhibits no discharge  No scleral icterus  Cardiovascular: Normal rate, regular rhythm and normal heart sounds  Exam reveals no gallop and no friction rub  No murmur heard  Pulmonary/Chest: Effort normal and breath sounds normal  No stridor  No respiratory distress  He has no wheezes  Abdominal: Soft  Bowel sounds are normal  He exhibits no distension  There is no tenderness  Neurological: He is alert  Awake alert interactive, good historian   Skin: Skin is warm and dry  No rash noted  He is not diaphoretic  No erythema  No pallor  Psychiatric: He has a normal mood and affect  His behavior is normal    Vitals reviewed  Additional Data:     Labs:    Results from last 7 days   Lab Units 12/25/19  0516  12/24/19  0456   WBC Thousand/uL 13 95*  --  12 26*   HEMOGLOBIN g/dL 12 6  --  12 6   HEMATOCRIT % 39 4  --  40 3   PLATELETS Thousands/uL 288   < > 309   NEUTROS PCT %  --   --  66   LYMPHS PCT %  --   --  25   MONOS PCT %  --   --  7   EOS PCT %  --   --  1    < > = values in this interval not displayed  Results from last 7 days   Lab Units 12/25/19  0516   SODIUM mmol/L 140   POTASSIUM mmol/L 4 1   CHLORIDE mmol/L 102   CO2 mmol/L 32   BUN mg/dL 15   CREATININE mg/dL 0 83   ANION GAP mmol/L 6   CALCIUM mg/dL 9 7   GLUCOSE RANDOM mg/dL 161*     Results from last 7 days   Lab Units 12/24/19  0456   INR  1 09     Results from last 7 days   Lab Units 12/25/19  0701 12/24/19  2113 12/24/19  1525 12/24/19  1102 12/24/19  0707 12/23/19  2121 12/23/19  1925   POC GLUCOSE mg/dl 136 295* 141* 119 115 127 126               * I Have Reviewed All Lab Data Listed Above  * Additional Pertinent Lab Tests Reviewed:  [facility] Admission Reviewed    Imaging:    Imaging Reports Reviewed Today Include:   Imaging Personally Reviewed by Myself Includes:      Recent Cultures (last 7 days):           Last 24 Hours Medication List:     Current Facility-Administered Medications:  acetaminophen 975 mg Oral [facility] Elyse Stokes, VIOLETA   celecoxib 200 mg Oral Daily Heancelmoie Ghcarmen, VIOLETA   docusate sodium 100 mg Oral BID Nida Lee, VIOLETA   enoxaparin 40 mg Subcutaneous Daily Heancelmoie Ghcarmen, VIOLETA   fentaNYL 1 patch Transdermal Q48H Heancelmoie Ghee, VIOLETA   gabapentin 800 mg Oral TID Heancelmoie Ghcarmen, VIOLETA   HYDROmorphone 1 mg Intravenous Q3H PRN Henate Ghee, VIOLETA   hyoscyamine 0 375 mg Oral Daily Heancelmoie Ghee, VIOLETA   insulin glargine 8 Units Subcutaneous HS Hezzie Ghee, VIOLETA   insulin lispro 2-12 Units Subcutaneous 4x Daily (AC & HS) Henate Stokes, VIOLETA   melatonin 4 5 mg Oral HS Hezzie Ghee, VIOLETA   ondansetron 4 mg Intravenous Q6H PRN Henate Stokes, VIOLETA   oxyCODONE 30 mg Oral TID Heancelmoie Ghee, VIOLETA   pantoprazole 40 mg Oral Early Morning Heancelmoie Ghcarmen, VIOLETA   polyethylene glycol 17 g Oral Daily PRN Nida Lee, VIOLETA   sertraline 75 mg Oral Daily Heancelmoie Ghcarmen, VIOLETA   terazosin 5 mg Oral Daily Hezzie Ghee, VIOLETA   tiZANidine 4 mg Oral TID Hezzie Ghee, VIOLETA   zolpidem 10 mg Oral HS PRN Elyse Stokes, VIOLETA        Today, Patient Was Seen By: Elva Wilcox PA-C    ** Please Note: Dictation voice to text software may have been used in the creation of this document   **

## 2019-12-25 NOTE — ASSESSMENT & PLAN NOTE
· Reported hx of R hip pain progressing for about 1-2 months  Was evaluated previously in ER but no hip xray taken at the time  S/P event at home about 1 week ago of missing/coming down hard on one step, no severe trauma  Has had increasing pain since fall  · XR hip R: basicervical fracture of the right femoral neck  Fracture site is nondisplaced but mildly angulated; Although there is concern for underlying pathologic fracture given that there is no description of recent fall, no definite osseous lesion is seen    Loss of bone mineral density along the superior margin of the fracture site may represent resorption of the fracture if it did not immediately occur or has been gradually developing such as with a stress or insufficiency fracture  · POD #1 s/p Closed reduction percutaneous pinning of R femoral neck fracture  · Pain management and bowel regimen/dvt prophylaxis  · Checking vitamin D levels; OP dexa scan  · PT/OT evaluations in progress

## 2019-12-26 VITALS
SYSTOLIC BLOOD PRESSURE: 169 MMHG | WEIGHT: 247 LBS | TEMPERATURE: 98.5 F | RESPIRATION RATE: 18 BRPM | HEIGHT: 70 IN | BODY MASS INDEX: 35.36 KG/M2 | HEART RATE: 70 BPM | DIASTOLIC BLOOD PRESSURE: 88 MMHG | OXYGEN SATURATION: 93 %

## 2019-12-26 LAB
BASOPHILS # BLD AUTO: 0.04 THOUSANDS/ΜL (ref 0–0.1)
BASOPHILS NFR BLD AUTO: 0 % (ref 0–1)
EOSINOPHIL # BLD AUTO: 0.1 THOUSAND/ΜL (ref 0–0.61)
EOSINOPHIL NFR BLD AUTO: 1 % (ref 0–6)
ERYTHROCYTE [DISTWIDTH] IN BLOOD BY AUTOMATED COUNT: 13.6 % (ref 11.6–15.1)
GLUCOSE SERPL-MCNC: 125 MG/DL (ref 65–140)
HCT VFR BLD AUTO: 39.6 % (ref 36.5–49.3)
HGB BLD-MCNC: 12.4 G/DL (ref 12–17)
IMM GRANULOCYTES # BLD AUTO: 0.11 THOUSAND/UL (ref 0–0.2)
IMM GRANULOCYTES NFR BLD AUTO: 1 % (ref 0–2)
LYMPHOCYTES # BLD AUTO: 2.9 THOUSANDS/ΜL (ref 0.6–4.47)
LYMPHOCYTES NFR BLD AUTO: 25 % (ref 14–44)
MCH RBC QN AUTO: 25.7 PG (ref 26.8–34.3)
MCHC RBC AUTO-ENTMCNC: 31.3 G/DL (ref 31.4–37.4)
MCV RBC AUTO: 82 FL (ref 82–98)
MONOCYTES # BLD AUTO: 1.04 THOUSAND/ΜL (ref 0.17–1.22)
MONOCYTES NFR BLD AUTO: 9 % (ref 4–12)
NEUTROPHILS # BLD AUTO: 7.25 THOUSANDS/ΜL (ref 1.85–7.62)
NEUTS SEG NFR BLD AUTO: 64 % (ref 43–75)
NRBC BLD AUTO-RTO: 0 /100 WBCS
PLATELET # BLD AUTO: 259 THOUSANDS/UL (ref 149–390)
PMV BLD AUTO: 9.3 FL (ref 8.9–12.7)
RBC # BLD AUTO: 4.82 MILLION/UL (ref 3.88–5.62)
WBC # BLD AUTO: 11.44 THOUSAND/UL (ref 4.31–10.16)

## 2019-12-26 PROCEDURE — 99239 HOSP IP/OBS DSCHRG MGMT >30: CPT | Performed by: PHYSICIAN ASSISTANT

## 2019-12-26 PROCEDURE — 99024 POSTOP FOLLOW-UP VISIT: CPT | Performed by: PHYSICIAN ASSISTANT

## 2019-12-26 PROCEDURE — 82948 REAGENT STRIP/BLOOD GLUCOSE: CPT

## 2019-12-26 PROCEDURE — 85025 COMPLETE CBC W/AUTO DIFF WBC: CPT | Performed by: PHYSICIAN ASSISTANT

## 2019-12-26 RX ORDER — ACETAMINOPHEN 325 MG/1
975 TABLET ORAL EVERY 8 HOURS SCHEDULED
Qty: 30 TABLET | Refills: 0
Start: 2019-12-26 | End: 2020-01-28

## 2019-12-26 RX ORDER — POLYETHYLENE GLYCOL 3350 17 G/17G
17 POWDER, FOR SOLUTION ORAL DAILY PRN
Qty: 14 EACH | Refills: 0 | Status: SHIPPED | OUTPATIENT
Start: 2019-12-26 | End: 2020-01-28

## 2019-12-26 RX ORDER — DOCUSATE SODIUM 100 MG/1
100 CAPSULE, LIQUID FILLED ORAL 2 TIMES DAILY
Qty: 10 CAPSULE | Refills: 0 | Status: SHIPPED | OUTPATIENT
Start: 2019-12-26 | End: 2020-01-28

## 2019-12-26 RX ADMIN — SERTRALINE HYDROCHLORIDE 75 MG: 50 TABLET ORAL at 09:02

## 2019-12-26 RX ADMIN — DOCUSATE SODIUM 100 MG: 100 CAPSULE, LIQUID FILLED ORAL at 09:03

## 2019-12-26 RX ADMIN — GABAPENTIN 800 MG: 400 CAPSULE ORAL at 09:03

## 2019-12-26 RX ADMIN — ACETAMINOPHEN 975 MG: 325 TABLET, FILM COATED ORAL at 06:03

## 2019-12-26 RX ADMIN — OXYCODONE HYDROCHLORIDE 30 MG: 10 TABLET ORAL at 09:02

## 2019-12-26 RX ADMIN — TIZANIDINE 4 MG: 2 TABLET ORAL at 09:02

## 2019-12-26 RX ADMIN — HYDROMORPHONE HYDROCHLORIDE 1 MG: 1 INJECTION, SOLUTION INTRAMUSCULAR; INTRAVENOUS; SUBCUTANEOUS at 01:17

## 2019-12-26 RX ADMIN — TERAZOSIN HYDROCHLORIDE ANHYDROUS 5 MG: 5 CAPSULE ORAL at 09:03

## 2019-12-26 RX ADMIN — HYOSCYAMINE SULFATE 0.38 MG: 0.38 TABLET, EXTENDED RELEASE ORAL at 09:03

## 2019-12-26 RX ADMIN — CELECOXIB 200 MG: 100 CAPSULE ORAL at 09:03

## 2019-12-26 RX ADMIN — ENOXAPARIN SODIUM 40 MG: 40 INJECTION SUBCUTANEOUS at 09:02

## 2019-12-26 RX ADMIN — PANTOPRAZOLE SODIUM 40 MG: 40 TABLET, DELAYED RELEASE ORAL at 06:03

## 2019-12-26 NOTE — SOCIAL WORK
Lovenox delivered to bedside by [facility]  IMM reviewed and provided to patient, patient signed IMM, and copy placed in chart  Pt's wife at bedside and will transport pt home

## 2019-12-26 NOTE — ASSESSMENT & PLAN NOTE
Lab Results   Component Value Date    HGBA1C 7 2 (H) 11/04/2019       Recent Labs     12/25/19  1054 12/25/19  1620 12/25/19  2125 12/26/19  0710   POCGLU 166* 148* 177* 125       Blood Sugar Average: Last 72 hrs:  · (P) 152 [id number] recent hemoglobin a1c 7 2, reasonable  · Hold PO meds while hospitalized, resume at dc  · Resume home lantus  · diabetic diet

## 2019-12-26 NOTE — NURSING NOTE
Pt is doing well  He is able to get himself out of bed without assistance, and make his way to the bathroom with the walker  He does report pain, but he is able to push through it    Pt is currently up and eating breakfast

## 2019-12-26 NOTE — SOCIAL WORK
Per Lexii Bowser, PA-C pt will be discharged today  RX for Lovenox was e-scribed to [address] [place] [address] for pricing  Cost of Lovenox is $3 40/month  CM notified PA-C and pt of cost  Pt requested RX to be filled by [address] [place] [address] and delivered to bedside  Pt will have out pt PT and refused VNA services  Pt stated his wife will provide transportation to out pt PT and follow up  Appointments

## 2019-12-26 NOTE — PROGRESS NOTES
Orthopedics   John Patten 63 y o  male MRN: [id number]  Unit/Bed#: S -01      Subjective:  63 y o male post operative day 2 status post CRPP of right femoral neck fracture  Reports his pain is improved since yesterday  Just some mild soreness  Denies any fevers or chills  No numbness or tingling    Pain is well controlled    Labs:  0   Lab Value Date/Time    HCT 39 6 12/26/2019 0558    HCT 39 4 12/25/2019 0516    HCT 40 3 12/24/2019 0456    HCT 38 2 12/14/2015 0734    HCT 37 9 06/26/2015 0702    HCT 36 1 (L) 03/17/2015 0510    HGB 12 4 12/26/2019 0558    HGB 12 6 12/25/2019 0516    HGB 12 6 12/24/2019 0456    HGB 12 9 12/14/2015 0734    HGB 12 6 06/26/2015 0702    HGB 12 7 03/17/2015 0510    INR 1 09 12/24/2019 0456    WBC 11 44 (H) 12/26/2019 0558    WBC 13 95 (H) 12/25/2019 0516    WBC 12 26 (H) 12/24/2019 0456    WBC 6 10 12/14/2015 0734    WBC 12 72 (H) 06/26/2015 0702    WBC 8 40 03/17/2015 0510    ESR 7 04/11/2014 1031       Meds:    Current Facility-Administered Medications:     acetaminophen (TYLENOL) tablet 975 mg, 975 mg, Oral, Q8H [facility], Nahid Chaudhari PA-C, 975 mg at 12/26/19 0603    celecoxib (CeleBREX) capsule 200 mg, 200 mg, Oral, Daily, Nahid Chaudhari PA-C, 200 mg at 12/26/19 8899    docusate sodium (COLACE) capsule 100 mg, 100 mg, Oral, BID, Nida Lee PA-C, 100 mg at 12/26/19 4742    enoxaparin (LOVENOX) subcutaneous injection 40 mg, 40 mg, Subcutaneous, Daily, Nahid Chaudhari PA-C, 40 mg at 12/26/19 0902    fentaNYL (DURAGESIC) 100 mcg/hr TD 72 hr patch 1 patch, 1 patch, Transdermal, Q48H, Nahid Chaudhari PA-C, 1 patch at 12/24/19 1836    gabapentin (NEURONTIN) capsule 800 mg, 800 mg, Oral, TID, Nahid Chaudhari PA-C, 800 mg at 12/26/19 8808    HYDROmorphone (DILAUDID) injection 1 mg, 1 mg, Intravenous, Q3H PRN, Nahid Chaudhari PA-C, 1 mg at 12/26/19 0117    hyoscyamine (LEVBID) 12 hr tablet 0 375 mg, 0 375 mg, Oral, Daily, Nahid Chaudhari PA-C, 0 375 mg at 12/26/19 0903    insulin glargine (LANTUS) subcutaneous injection 8 Units 0 08 mL, 8 Units, Subcutaneous, HS, Dirk Prior, PA-C, 8 Units at 12/25/19 2205    insulin lispro (HumaLOG) 100 units/mL subcutaneous injection 2-12 Units, 2-12 Units, Subcutaneous, 4x Daily (AC & HS), 2 Units at 12/25/19 1223 **AND** Fingerstick Glucose (POCT), , , 4x Daily AC and at bedtime, Dirk Prior, PA-C    melatonin tablet 4 5 mg, 4 5 mg, Oral, HS, Dirk Prior, PA-C, 4 5 mg at 12/25/19 2205    ondansetron (ZOFRAN) injection 4 mg, 4 mg, Intravenous, Q6H PRN, Dirk Prior, PA-C, 4 mg at 12/24/19 0857    oxyCODONE (ROXICODONE) immediate release tablet 30 mg, 30 mg, Oral, TID, Dirk Prior, PA-C, 30 mg at 12/26/19 0902    pantoprazole (PROTONIX) EC tablet 40 mg, 40 mg, Oral, Early Morning, Dirk Prior, PA-C, 40 mg at 12/26/19 0603    polyethylene glycol (MIRALAX) packet 17 g, 17 g, Oral, Daily PRN, MARY Canada-INDU    sertraline (ZOLOFT) tablet 75 mg, 75 mg, Oral, Daily, Dirk Prior, PA-C, 75 mg at 12/26/19 0902    terazosin (HYTRIN) capsule 5 mg, 5 mg, Oral, Daily, Dirk Prior, PA-C, 5 mg at 12/26/19 6458    tiZANidine (ZANAFLEX) tablet 4 mg, 4 mg, Oral, TID, Dirk Prior, PA-C, 4 mg at 12/26/19 0902    zolpidem (AMBIEN) tablet 10 mg, 10 mg, Oral, HS PRN, Dirk Prior, PA-C    Blood Culture:   No results found for: BLOODCX    Wound Culture:   No results found for: WOUNDCULT    Ins and Outs:  I/O last 24 hours:   In: 540 [P O :540]  Out: 2625 [Urine:2625]          Physical exam:  Vitals:    12/26/19 0704   BP: 169/88   Pulse: 70   Resp: 18   Temp: 98 5 °F (36 9 °C)   SpO2: 93%     right lower extremity  · Dressing clean dry intact, no drainage no erythema no excessive soft tissue swelling of the thigh  · Sensation intact to light touch L2-S1  · Motor intact with EHL/FHL  · DP pulse intact    Assessment:  59 y o male post operative day 2 status post ORIF with cannulated screws right femoral neck fracture    Plan:  · Up and out of bed  · Toe Touch Weight Bearing to right lower extremity with walker  · PT/OT  · DVT prophylaxis - SCDs, Lovenox for 30 days postop  · Analgesics per primary service  · Will continue to assess for acute blood loss anemia, no evidence of acute blood loss anemia postoperatively, vitals have remained stable  · Orthopedic least stable for discharge follow-up with Dr Iris Hammer in the office      Patricio Johnson PA-C

## 2019-12-26 NOTE — SOCIAL WORK
Pt requested a shower bench but wanted to know cost prior to CM ordering  Pt requested [facility] equipment for pricing  Shower bench cost $45 00 and transfer bench is $85 00 per [employer]  CM notified pt of cost and he stated his wife will check [employer] prices and informed CM not to order from [facility]

## 2019-12-26 NOTE — ASSESSMENT & PLAN NOTE
· BP above goal, but not urgently elevated   Only on hytrin, not really any real BP meds  · Needs to be followed by PCP

## 2019-12-26 NOTE — DISCHARGE SUMMARY
Discharge- Sara Neves 1960, 63 y o  male MRN: [id number]    Unit/Bed#: S -01 Encounter: [id number]    Primary Care Provider: Roxana Yañez MD   Date and time admitted to hospital: 12/23/2019  3:57 PM  * Closed fracture of neck of right femur ([address] )  Assessment & Plan  · Reported hx of R hip pain progressing for about 1-2 months  Was evaluated previously in ER but no hip xray taken at the time  S/P event at home about 1 week ago of missing/coming down hard on one step, no severe trauma  Has had increasing pain since fall  · XR hip R: basicervical fracture of the right femoral neck  Fracture site is nondisplaced but mildly angulated; Although there is concern for underlying pathologic fracture given that there is no description of recent fall, no definite osseous lesion is seen  Loss of bone mineral density along the superior margin of the fracture site may represent resorption of the fracture if it did not immediately occur or has been gradually developing such as with a stress or insufficiency fracture  · POD #2 s/p Closed reduction percutaneous pinning of R femoral neck fracture  · Pain management and bowel regimen/dvt prophylaxis  · Checking vitamin D levels; OP dexa scan  · Lovenox for 30 days postop  · PT/OT evaluations appreciated    Home PT recommended    Chronic, continuous use of opioids 2/2 post-laminectomy syndrome  Assessment & Plan  · My colleague reviewed PDMP  · Last Rx filled on 12/6/19 for #30 day supply of oxycodone IR 30mg PO TID and Fentanyl 100mcg patch TD Q72 hours  · Continue home medications    Type 2 diabetes mellitus without complication [facility])  Assessment & Plan  Lab Results   Component Value Date    HGBA1C 7 2 (H) 11/04/2019       Recent Labs     12/25/19  1054 12/25/19  1620 12/25/19  2125 12/26/19  0710   POCGLU 166* 148* 177* 125       Blood Sugar Average: Last 72 hrs:  · (P) 152 [id number] recent hemoglobin a1c 7 2, reasonable  · Hold PO meds while hospitalized, resume at dc  · Resume home lantus  · diabetic diet    Essential hypertension  Assessment & Plan  · BP above goal, but not urgently elevated  Only on hytrin, not really any real BP meds  · Needs to be followed by PCP      Sleep apnea  Assessment & Plan  · CPAP QHS    Discharging Physician / Practitioner: Darren Linares PA-C  PCP: Alyson Munoz MD  Admission Date:   Admission Orders (From admission, onward)     Ordered        12/23/19 1738  Inpatient Admission  Once                   Discharge Date: 12/26/19    Resolved Problems  Date Reviewed: 12/26/2019    None          Consultations During Hospital Stay:  · Orthopedics    Procedures Performed:   · CRPP    Significant Findings / Test Results:   · As above    Incidental Findings:   · Leukocytosis, mild    Test Results Pending at Discharge (will require follow up): · Vitamin-D     Outpatient Tests Requested:  · DEXA scan    Complications:  none    Reason for Admission:  Right-sided hip pain    Hospital Course:     Donna Andujar is a 63 y o  male patient who originally presented to the hospital on 12/23/2019 due to right-sided hip pain  Patient had actually been experiencing this for some time but it was not suspected to be a fracture as he had no real significant traumatic injury  However, upon admission to the hospital on the 23rd, x-ray showed mildly angulated basicervical right femoral neck fracture and patient was seen by orthopedic team   He underwent CRPP and has been stable postoperatively  He worked with physical therapy and will return home with outpatient PT and orthopedic follow-up  Of note, he has evidence of hypertension which should be followed up by his family doctor is he is not on any antihypertensive agents  Please see above list of diagnoses and related plan for additional information       Condition at Discharge: stable     Discharge Day Visit / Exam:     Subjective:  Patient reports some pain but overall states he is doing fine and has had no issues with bowels, bladder, eating, drinking, or shortness of breath  He feels ready to go home today  Vitals: Blood Pressure: 169/88 (12/26/19 0704)  Pulse: 70 (12/26/19 0704)  Temperature: 98 5 °F (36 9 °C) (12/26/19 0704)  Temp Source: Oral (12/26/19 0704)  Respirations: 18 (12/26/19 0704)  Height: 5' 10" (177 8 cm) (12/23/19 1921)  Weight - Scale: 112 kg (247 lb) (12/23/19 1921)  SpO2: 93 % (12/26/19 0704)  Exam:   Physical Exam   Constitutional: He appears well-developed and well-nourished  No distress  Nontoxic, well-appearing gentleman seen in bedside chair   Eyes: Conjunctivae are normal  Right eye exhibits no discharge  Left eye exhibits no discharge  No scleral icterus  Cardiovascular: Normal rate, regular rhythm and normal heart sounds  No murmur heard  Pulmonary/Chest: Effort normal and breath sounds normal  No stridor  No respiratory distress  He has no wheezes  Abdominal: Soft  He exhibits no distension  There is no tenderness  There is no guarding  Soft obese   Musculoskeletal: He exhibits no edema  Neurological: He is alert  Awake alert interactive, good historian   Skin: Skin is warm and dry  No rash noted  He is not diaphoretic  No erythema  No pallor  Psychiatric: He has a normal mood and affect  His behavior is normal  Thought content normal    Vitals reviewed  Discussion with Family:     Discharge instructions/Information to patient and family:   See after visit summary for information provided to patient and family  Provisions for Follow-Up Care:  See after visit summary for information related to follow-up care and any pertinent home health orders  Disposition:  Home with outpatient PT    Planned Readmission: none     Discharge Statement:  I spent 35 minutes discharging the patient  This time was spent on the day of discharge  I had direct contact with the patient on the day of discharge   Greater than 50% of the total time was spent examining patient, answering all patient questions, arranging and discussing plan of care with patient as well as directly providing post-discharge instructions  Additional time then spent on discharge activities  Bedside nursing rounds performed  Case discussed with case management and Orthopedics    Discharge Medications:  See after visit summary for reconciled discharge medications provided to patient and family        ** Please Note: This note has been constructed using a voice recognition system **

## 2019-12-26 NOTE — ASSESSMENT & PLAN NOTE
· Reported hx of R hip pain progressing for about 1-2 months  Was evaluated previously in ER but no hip xray taken at the time  S/P event at home about 1 week ago of missing/coming down hard on one step, no severe trauma  Has had increasing pain since fall  · XR hip R: basicervical fracture of the right femoral neck  Fracture site is nondisplaced but mildly angulated; Although there is concern for underlying pathologic fracture given that there is no description of recent fall, no definite osseous lesion is seen  Loss of bone mineral density along the superior margin of the fracture site may represent resorption of the fracture if it did not immediately occur or has been gradually developing such as with a stress or insufficiency fracture  · POD #2 s/p Closed reduction percutaneous pinning of R femoral neck fracture  · Pain management and bowel regimen/dvt prophylaxis  · Checking vitamin D levels; OP dexa scan  · Lovenox for 30 days postop  · PT/OT evaluations appreciated    Home PT recommended

## 2019-12-27 ENCOUNTER — TRANSITIONAL CARE MANAGEMENT (OUTPATIENT)
Dept: FAMILY MEDICINE CLINIC | Facility: CLINIC | Age: 59
End: 2019-12-27

## 2020-01-02 ENCOUNTER — EVALUATION (OUTPATIENT)
Dept: PHYSICAL THERAPY | Facility: CLINIC | Age: 60
End: 2020-01-02
Payer: COMMERCIAL

## 2020-01-02 DIAGNOSIS — Z47.89 UNSPECIFIED ORTHOPEDIC AFTERCARE: ICD-10-CM

## 2020-01-02 DIAGNOSIS — S72.009A FRACTURE OF HIP (HCC): ICD-10-CM

## 2020-01-02 DIAGNOSIS — M25.551 RIGHT HIP PAIN: Primary | ICD-10-CM

## 2020-01-02 PROCEDURE — 97110 THERAPEUTIC EXERCISES: CPT | Performed by: PHYSICAL THERAPIST

## 2020-01-02 PROCEDURE — 97161 PT EVAL LOW COMPLEX 20 MIN: CPT | Performed by: PHYSICAL THERAPIST

## 2020-01-02 NOTE — PROGRESS NOTES
PT Evaluation    Today's date: 2020  Patient name: Endy Wu  : 1960  MRN: [id number]  Referring provider: Parviz Avalos PA-C  Dx:   Encounter Diagnosis     ICD-10-CM    1  Right hip pain M25 551 PT plan of care cert/re-cert   2  Fracture of hip ([facility] [address] ) S72 009A Ambulatory referral to Physical Therapy     PT plan of care cert/re-cert   3  Unspecified orthopedic aftercare Z47 [id number]            Subjective Evaluation     History of Present Illness    Patient presents s/p ORIF of Right Basicervical Femoral Neck Fracture on 19  His hip pain began from 1-2 months duration and had difficulty walking and putting weight in his R hip  He felt like when he first went to the ER in November he thinks it may be from missing a step and landing on his heel and felt like his pain was dismissed and he was referred to pain management  He went to the ER on  and had an X-ray which showed fracture and then he had the surgery  He notices he can lift his knee up which he couldn't do before  He does have back pain all the time  He does have urge  Incontinence, but has neurogenic bladder since lumbar surgery- since his hip surgery he feels his pressure of urinating is improved  He is sleeping in recliner    Neuro signs:  A little bit of tingling and numbness in lateral hip  Red flags: none  Occupation: , [employer] retired       Pain  At best pain ratin  At worst pain ratin  Location: lateral hip to lateral knee  Quality: sore  Relieving factors: ice  Aggravating factors: laq makes knee sore    Social Support  Lives at home c his wife  There are 1 MARLA and 1 flight but does not have to go up and down steps      Patient Goals  Patient goals for therapy: Go up and down stairs, sleep in bed, get moving    STGs  1  Decrease pain by 20% in 2-4 weeks  2  Improve hip ROM by 10 degrees in 2-4 weeks  3  Improve hip strength by 1/3 grade in 2-4 weeks  LTGs  1  Decrease pain by 60% in 6-8 weeks    2  Improve walking tolerance to >30 minutes in 6-8 weeks  3  Perform stairs/sleeping activities without pain in 6-8 weeks  Objective Measurements:    Incision: staples intact no drainage bandage reapplied after assessed  TU 74 sec c RW  Gait: TTWB c RW    Slump: - SLR: Fortino Medici:-  Faddir: + pain  Active SLR: 3 reps c discomfort        Knee A/PROM L R Strength L R   Flex   /  114/ Flex 4+ 3+   Ext  /  -18/ Ext 4+ 4           [facility]PROM [facility]       Flex  110/ 95 / Flex 4+ 3+p   ER at 90  45 ER     IR at 90  Pain 5 IR     Abd   Abd     Ext   Ext             Lumbar AROM   [facility] WFL   Flex   DF     Ext   PF           Assessment:    Donna Andujar is a pleasant 63 y o  male who presents with referring diagnosis limiting his ability to walk with normal gait pattern  The patient's greatest concern is getting back to his normal activity  No further referral appears necessary at this time based upon examination results  Impairments include:  1) Decreased hip IR ROM   2) Decreased hip abd strength  3) Decreased WB tolerance     Etiologic factors include post-surgical     Negative prognostic indicators: multiple surgeries, limited amount of visits due to co-pay  Positive prognostic indicators: good motivation  Please contact me if you have any further questions or recommendations  Thank you very much for the kind referral         Plan  Patient would benefit from:Skilled physical therapy  Planned therapy interventions: manual therapy, neuromuscular re-education, stretching, strengthening, therapeutic activities, therapeutic exercise, patient education, home exercise program, and activity modification      Frequency: 2x week  Duration in weeks: 8  Treatment plan discussed with: patient         Goal: Patient's goal is to walk up and down stairs and sleep in bed    Precautions: [address]- Lumbar laminectomy, -discectomy/laminectomy, - Lumbar fusion L4-S1, 2018- L2-L4 fusion, TTWB  Dx: ORIF of Right Basicervical Femoral Neck Fracture    Daily Treatment Diary     Manuals 1/2/2020         knee PROM                                                    Exercise Diary          bike          LAQ 2x10         SLR 3 reps difficulty         TKE          Quad set 10x          Hip abd slides hep         Hip 3 way LLE          Steps after MD visit          bridges 10x mild soreness                                                                                                                                 Modalities          CP prn

## 2020-01-07 ENCOUNTER — OFFICE VISIT (OUTPATIENT)
Dept: PHYSICAL THERAPY | Facility: CLINIC | Age: 60
End: 2020-01-07
Payer: COMMERCIAL

## 2020-01-07 DIAGNOSIS — Z47.89 UNSPECIFIED ORTHOPEDIC AFTERCARE: ICD-10-CM

## 2020-01-07 DIAGNOSIS — M25.551 RIGHT HIP PAIN: Primary | ICD-10-CM

## 2020-01-07 PROCEDURE — 97110 THERAPEUTIC EXERCISES: CPT | Performed by: PHYSICAL THERAPIST

## 2020-01-07 NOTE — PROGRESS NOTES
Daily Note     Today's date: 2020  Patient name: Caitlin Rascon  : 1960  MRN: [id number]  Referring provider: Jorgito Mendez PA-C  Dx:   Encounter Diagnosis     ICD-10-CM    1  Right hip pain M25 551    2  Unspecified orthopedic aftercare Z47 [id number]                   Subjective: He states he is feeling pretty good as he has been doing his exercises and can sleep in bed now  He states he was showering and the dressing became wet so he had taken it off  Objective: See treatment diary below      Assessment: Tolerated treatment well  Patient would benefit from continued PT  His wound was assessed and s any drainage and staples intact  Bandage was placed c Tefla on the wound then gauze placed over this  He will follow up c MD on Thursday  Plan: Continue per plan of care        Goal: Patient's goal is to walk up and down stairs and sleep in bed    Precautions: [address]- Lumbar laminectomy, -discectomy/laminectomy, - Lumbar fusion L4-S1, - L2-L4 fusion, TTWB  Dx: ORIF of Right Basicervical Femoral Neck Fracture on 19    Daily Treatment Diary     Manuals 2020        hip PROM   nv                                                 Exercise Diary          bike  6'        LAQ 2x10 2x10        SLR 3 reps difficulty 2x7        TKE  20x :05        Quad set 10x  2x10        Hip abd slides hep 2x10        Hip abd LLE  2x10        Steps after MD visit          bridges 10x mild soreness 2x10        Hamstring curl  2x10                                                                                                                      Modalities          CP prn in elevation  10'

## 2020-01-09 ENCOUNTER — APPOINTMENT (OUTPATIENT)
Dept: RADIOLOGY | Facility: AMBULARY SURGERY CENTER | Age: 60
End: 2020-01-09
Attending: SURGERY
Payer: COMMERCIAL

## 2020-01-09 ENCOUNTER — OFFICE VISIT (OUTPATIENT)
Dept: OBGYN CLINIC | Facility: CLINIC | Age: 60
End: 2020-01-09

## 2020-01-09 VITALS
DIASTOLIC BLOOD PRESSURE: 76 MMHG | BODY MASS INDEX: 35.36 KG/M2 | HEART RATE: 73 BPM | SYSTOLIC BLOOD PRESSURE: 164 MMHG | WEIGHT: 247 LBS | HEIGHT: 70 IN

## 2020-01-09 DIAGNOSIS — M25.551 PAIN IN RIGHT HIP: Primary | ICD-10-CM

## 2020-01-09 DIAGNOSIS — Z98.890 S/P ORIF (OPEN REDUCTION INTERNAL FIXATION) FRACTURE: ICD-10-CM

## 2020-01-09 DIAGNOSIS — Z87.81 S/P ORIF (OPEN REDUCTION INTERNAL FIXATION) FRACTURE: ICD-10-CM

## 2020-01-09 DIAGNOSIS — M25.551 PAIN IN RIGHT HIP: ICD-10-CM

## 2020-01-09 PROCEDURE — 73502 X-RAY EXAM HIP UNI 2-3 VIEWS: CPT

## 2020-01-09 PROCEDURE — 99024 POSTOP FOLLOW-UP VISIT: CPT | Performed by: SURGERY

## 2020-01-09 NOTE — PROGRESS NOTES
Assessment/Plan:  Patient ID: Doni Olivera 63 y o  male   Surgery: Closed Reduction Pinning Percutaneous Right Hip - Right  Date of Surgery: 12/24/2019    16 days s/p right hip ORIF  Staples were removed today and steri strips were applied  He may shower normally and the steri strips will fall off on there own  He was advised to avoid soaking his incision for 2 more weeks  He will also continue TTWB for 2 more weeks and then may transition to WBAT  He will continue PT and a new script was provided  He may use a stationary bike for exercise  Follow up in 4 weeks with repeat right hip x-rays  Follow Up:  4  week(s)    To Do Next Visit:  X-rays of the  right  hip      CHIEF COMPLAINT:  Chief Complaint   Patient presents with    Right Hip - Post-op         SUBJECTIVE:  Doni Olivera is a 63y o  year old male who presents for follow up after Closed Reduction Pinning Percutaneous Right Hip - Right  Today patient has None and No Complaints   He has been attending PT         PHYSICAL EXAMINATION:  General: well developed and well nourished, alert, oriented times 3 and appears comfortable  Psychiatric: Normal    MUSCULOSKELETAL EXAMINATION:  Incision: clean, dry and staples intact   Surgery Site: normal, no evidence of infection  and swelling present  Range of Motion: As expected  Neurovascular status: Neuro intact, good cap refill  Activity Restrictions: avoid soaking the incision for 2 more weeks, TTWB for 2 more weeks   Done today: staples removed, steri strips applied       STUDIES REVIEWED:  Images were reviewd in PACS:   Near anatomic alignment of right femoral neck fracture with stable hardware position      PROCEDURES PERFORMED:  Procedures  No Procedures performed today       Scribe Attestation    I,:   Kobi Jones am acting as a scribe while in the presence of the attending physician :        I,:   Chaya Cheng MD personally performed the services described in this documentation    as scribed in my presence :

## 2020-01-10 ENCOUNTER — APPOINTMENT (OUTPATIENT)
Dept: PHYSICAL THERAPY | Facility: CLINIC | Age: 60
End: 2020-01-10
Payer: COMMERCIAL

## 2020-01-17 ENCOUNTER — OFFICE VISIT (OUTPATIENT)
Dept: PHYSICAL THERAPY | Facility: CLINIC | Age: 60
End: 2020-01-17
Payer: COMMERCIAL

## 2020-01-17 DIAGNOSIS — Z47.89 UNSPECIFIED ORTHOPEDIC AFTERCARE: ICD-10-CM

## 2020-01-17 DIAGNOSIS — M25.551 RIGHT HIP PAIN: Primary | ICD-10-CM

## 2020-01-17 PROCEDURE — 97110 THERAPEUTIC EXERCISES: CPT | Performed by: PHYSICAL THERAPIST

## 2020-01-17 PROCEDURE — 97140 MANUAL THERAPY 1/> REGIONS: CPT | Performed by: PHYSICAL THERAPIST

## 2020-01-17 PROCEDURE — 97112 NEUROMUSCULAR REEDUCATION: CPT | Performed by: PHYSICAL THERAPIST

## 2020-01-17 NOTE — PROGRESS NOTES
Daily Note     Today's date: 2020  Patient name: Truett Lanes  : 1960  MRN: [id number]  Referring provider: Reji Rodriguez PA-C  Dx:   Encounter Diagnosis     ICD-10-CM    1  Right hip pain M25 551    2  Unspecified orthopedic aftercare Z47 [id number]                   Subjective: He states he is feeling pretty good as he has been doing his exercises and feels it is getting stronger  Objective: See treatment diary below      Assessment: Tolerated treatment very well today  Patient would benefit from continued PT  His wound was assessed and is all healed and firm posterior to incision  Will work on [employer]  We will commence steps once WBAT is granted next Thursday  Plan: Continue per plan of care        Goal: Patient's goal is to walk up and down stairs and sleep in bed    Precautions: [address] - Lumbar laminectomy, -discectomy/laminectomy, - Lumbar fusion L4-S1, - L2-L4 fusion, TTWB until  then WBAT  Dx: ORIF of Right Basicervical Femoral Neck Fracture on 19    Daily Treatment Diary     Manuals 2020       hip PROM   nv 8'       Scar stm                                         Exercise Diary          bike  6' 6'       LAQ 2x10 2x10 2x10       SLR 3 reps difficulty 2x7 2x10       TKE BTB  20x :05 20x :05       Quad set 10x  2x10 2x10        Hip abd slides hep 2x10 2x10       S/l Hip abd RLE  2x10 2x10       Steps after           bridges 10x mild soreness 2x10 2x10       Hamstring curl  2x10 2x10 wt nv       S/l clamshell R    2x10                                                                                                           Modalities          CP prn in elevation  10'  10'

## 2020-01-24 ENCOUNTER — APPOINTMENT (OUTPATIENT)
Dept: PHYSICAL THERAPY | Facility: CLINIC | Age: 60
End: 2020-01-24
Payer: COMMERCIAL

## 2020-01-29 ENCOUNTER — TELEPHONE (OUTPATIENT)
Dept: FAMILY MEDICINE CLINIC | Facility: CLINIC | Age: 60
End: 2020-01-29

## 2020-01-29 ENCOUNTER — TELEPHONE (OUTPATIENT)
Dept: OBGYN CLINIC | Facility: HOSPITAL | Age: 60
End: 2020-01-29

## 2020-01-29 ENCOUNTER — OFFICE VISIT (OUTPATIENT)
Dept: FAMILY MEDICINE CLINIC | Facility: CLINIC | Age: 60
End: 2020-01-29
Payer: COMMERCIAL

## 2020-01-29 VITALS
RESPIRATION RATE: 18 BRPM | DIASTOLIC BLOOD PRESSURE: 82 MMHG | HEART RATE: 65 BPM | SYSTOLIC BLOOD PRESSURE: 142 MMHG | TEMPERATURE: 96.7 F | OXYGEN SATURATION: 97 % | WEIGHT: 259.4 LBS | HEIGHT: 70 IN | BODY MASS INDEX: 37.14 KG/M2

## 2020-01-29 DIAGNOSIS — J40 BRONCHITIS: Primary | ICD-10-CM

## 2020-01-29 PROCEDURE — 3008F BODY MASS INDEX DOCD: CPT | Performed by: NURSE PRACTITIONER

## 2020-01-29 PROCEDURE — 99213 OFFICE O/P EST LOW 20 MIN: CPT | Performed by: NURSE PRACTITIONER

## 2020-01-29 RX ORDER — CEFPROZIL 500 MG/1
500 TABLET, FILM COATED ORAL 2 TIMES DAILY
Qty: 14 TABLET | Refills: 0 | Status: SHIPPED | OUTPATIENT
Start: 2020-01-29 | End: 2020-01-29 | Stop reason: ALTCHOICE

## 2020-01-29 RX ORDER — FLUTICASONE FUROATE AND VILANTEROL 200; 25 UG/1; UG/1
1 POWDER RESPIRATORY (INHALATION) DAILY
Qty: 1 INHALER | Refills: 0 | Status: SHIPPED | OUTPATIENT
Start: 2020-01-29 | End: 2020-04-15

## 2020-01-29 RX ORDER — CEFUROXIME AXETIL 500 MG/1
500 TABLET ORAL EVERY 12 HOURS SCHEDULED
Qty: 14 TABLET | Refills: 0 | Status: SHIPPED | OUTPATIENT
Start: 2020-01-29 | End: 2020-02-05

## 2020-01-29 RX ORDER — ALBUTEROL SULFATE 90 UG/1
2 AEROSOL, METERED RESPIRATORY (INHALATION) EVERY 6 HOURS PRN
Qty: 18 G | Refills: 0 | Status: SHIPPED | OUTPATIENT
Start: 2020-01-29 | End: 2020-04-15

## 2020-01-29 RX ORDER — METHYLPREDNISOLONE 4 MG/1
TABLET ORAL
Qty: 21 EACH | Refills: 0 | Status: SHIPPED | OUTPATIENT
Start: 2020-01-29 | End: 2020-01-29 | Stop reason: ALTCHOICE
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
## 2020-01-29 NOTE — TELEPHONE ENCOUNTER
Please let patient know I spoke to Dr Damion Peerz physician assistant Kev Burrell,   It is preferred to avoid taking steroid pills right now to make sure the hip heals well  So, do not take the steroid pack  In place of this please try an additional inhaler  This inhaler is called Breo  Take 1 puff once daily for the next 2 weeks  Rinse your mouth out after using this inhaler  You can still use the albuterol inhaler every 4-6 hours as needed for chest tightness, wheezing, or cough  [facility] does not have the original antibiotic I prescribed, Cefprozil  I prescribed an alternative, that is in the same class  Take Ceftin twice daily for 7 days

## 2020-01-29 NOTE — TELEPHONE ENCOUNTER
Pharmacy called stating they are currently out of cefprosil would like to know if you want to to prescribe something else or sent it over to another pharmacy  Please call back

## 2020-01-29 NOTE — TELEPHONE ENCOUNTER
Pt aware but stated that he wants to try without the [facility] for now   Will use his inhaler and antibiotic

## 2020-01-31 ENCOUNTER — OFFICE VISIT (OUTPATIENT)
Dept: PHYSICAL THERAPY | Facility: CLINIC | Age: 60
End: 2020-01-31
Payer: COMMERCIAL

## 2020-01-31 DIAGNOSIS — M25.551 RIGHT HIP PAIN: Primary | ICD-10-CM

## 2020-01-31 PROCEDURE — 97116 GAIT TRAINING THERAPY: CPT | Performed by: PHYSICAL THERAPIST

## 2020-01-31 PROCEDURE — 97110 THERAPEUTIC EXERCISES: CPT | Performed by: PHYSICAL THERAPIST

## 2020-01-31 PROCEDURE — 97112 NEUROMUSCULAR REEDUCATION: CPT | Performed by: PHYSICAL THERAPIST

## 2020-01-31 NOTE — PROGRESS NOTES
Daily Note     Today's date: 2020  Patient name: Magnolia Acuna  : 1960  MRN: [id number]  Referring provider: Artem Deleon PA-C  Dx:   Encounter Diagnosis     ICD-10-CM    1  Right hip pain M25 551                   Subjective: He states he is feeling much better as he had the flu the whole last week  He feels he could walk s the walker at this time  Objective: See treatment diary below  Hip abd 4-    Assessment: Tolerated treatment very well today as he showed good control on stairs c rail and cane support  Hip ROM WFL but will continue to work on scar  HE had some pain p hip abd/ext and was updated in his HEP  FOTO did not change due to inability to run  Plan: Continue per plan of care  Will follow up in 2 weeks       Goal: Patient's goal is to walk up and down stairs and sleep in bed    Precautions: [address]- Lumbar laminectomy, -discectomy/laminectomy, - Lumbar fusion L4-S1, - L2-L4 fusion, WBAT  Dx: ORIF of Right Basicervical Femoral Neck Fracture on 19    Daily Treatment Diary     Manuals 2020      hip PROM   nv 8' 5'      Scar stm    3'                                     Exercise Diary          bike  6' 6' 6'      LAQ 2x10 2x10 2x10 2x10 #4      SLR 3 reps difficulty 2x7 2x10 2x10      TKE BTB  20x :05 20x :05 20x :05                          S/l Hip abd RLE  2x10 2x10 2x10      Steps-     2 flights      bridges 10x mild soreness 2x10 2x10 2x10      Hamstring curl  2x10 2x10 wt nv       S/l clamshell R    2x10 2x10 GTB      Standing hip abd/ext GTB    2x10      Gait- c cane    8'                                                                                      Modalities          CP prn in elevation  10'  10'

## 2020-02-10 ENCOUNTER — APPOINTMENT (OUTPATIENT)
Dept: RADIOLOGY | Facility: AMBULARY SURGERY CENTER | Age: 60
End: 2020-02-10
Attending: SURGERY
Payer: COMMERCIAL

## 2020-02-10 ENCOUNTER — OFFICE VISIT (OUTPATIENT)
Dept: OBGYN CLINIC | Facility: CLINIC | Age: 60
End: 2020-02-10

## 2020-02-10 VITALS
HEIGHT: 70 IN | HEART RATE: 73 BPM | WEIGHT: 255 LBS | DIASTOLIC BLOOD PRESSURE: 79 MMHG | BODY MASS INDEX: 36.51 KG/M2 | SYSTOLIC BLOOD PRESSURE: 146 MMHG

## 2020-02-10 DIAGNOSIS — Z98.890 S/P ORIF (OPEN REDUCTION INTERNAL FIXATION) FRACTURE: Primary | ICD-10-CM

## 2020-02-10 DIAGNOSIS — Z98.890 S/P ORIF (OPEN REDUCTION INTERNAL FIXATION) FRACTURE: ICD-10-CM

## 2020-02-10 DIAGNOSIS — Z87.81 S/P ORIF (OPEN REDUCTION INTERNAL FIXATION) FRACTURE: ICD-10-CM

## 2020-02-10 DIAGNOSIS — Z87.81 S/P ORIF (OPEN REDUCTION INTERNAL FIXATION) FRACTURE: Primary | ICD-10-CM

## 2020-02-10 PROCEDURE — 3078F DIAST BP <80 MM HG: CPT | Performed by: SURGERY

## 2020-02-10 PROCEDURE — 3077F SYST BP >= 140 MM HG: CPT | Performed by: SURGERY

## 2020-02-10 PROCEDURE — 73502 X-RAY EXAM HIP UNI 2-3 VIEWS: CPT

## 2020-02-10 PROCEDURE — 99024 POSTOP FOLLOW-UP VISIT: CPT | Performed by: SURGERY

## 2020-02-10 PROCEDURE — 1111F DSCHRG MED/CURRENT MED MERGE: CPT | Performed by: SURGERY

## 2020-02-10 PROCEDURE — 3008F BODY MASS INDEX DOCD: CPT | Performed by: SURGERY

## 2020-02-10 NOTE — PROGRESS NOTES
Assessment/Plan:  Patient ID: Annabelle Paul 63 y o  male   Surgery: Closed Reduction Pinning Percutaneous Right Hip - Right  Date of Surgery: 12/24/2019    7 weeks s/p right hip ORIF  X-ray's were obtained today  Overall Fausto Storey is doing well  Activities as tolerated  Continue PT and transition to a HEP  Follow up in 3 months time  Follow Up:  3  month(s)    To Do Next Visit:  X-rays of the  right  hip      CHIEF COMPLAINT:  Chief Complaint   Patient presents with    Right Hip - Post-op         SUBJECTIVE:  Annabelle Paul is a 63y o  year old male who presents for follow up after Closed Reduction Pinning Percutaneous Right Hip - Right  Today patient has No Complaints  He notes back pain, he has a hx of 2 back surgeries  He is ambulating with a cane  He states that he does not need the cane for short distances         PHYSICAL EXAMINATION:  General: well developed and well nourished, alert, oriented times 3 and appears comfortable  Psychiatric: Normal    MUSCULOSKELETAL EXAMINATION:  Incision: healed  Surgery Site: normal, no evidence of infection   Range of Motion: As expected  Neurovascular status: Neuro intact, good cap refill  Activity Restrictions: No restrictions      STUDIES REVIEWED:  Images were reviewd in PACS: Orthopedic hardware in good alignment, healing fracture       PROCEDURES PERFORMED:  Procedures  No Procedures performed today       Scribe Attestation    I,:   Juan Luis Jones am acting as a scribe while in the presence of the attending physician :        I,:   Hortencia Nur MD personally performed the services described in this documentation    as scribed in my presence :

## 2020-02-14 ENCOUNTER — EVALUATION (OUTPATIENT)
Dept: PHYSICAL THERAPY | Facility: CLINIC | Age: 60
End: 2020-02-14
Payer: COMMERCIAL

## 2020-02-14 DIAGNOSIS — Z47.89 UNSPECIFIED ORTHOPEDIC AFTERCARE: ICD-10-CM

## 2020-02-14 DIAGNOSIS — M25.551 RIGHT HIP PAIN: Primary | ICD-10-CM

## 2020-02-14 PROCEDURE — 97112 NEUROMUSCULAR REEDUCATION: CPT | Performed by: PHYSICAL THERAPIST

## 2020-02-14 PROCEDURE — 97110 THERAPEUTIC EXERCISES: CPT | Performed by: PHYSICAL THERAPIST

## 2020-02-14 PROCEDURE — 97116 GAIT TRAINING THERAPY: CPT | Performed by: PHYSICAL THERAPIST

## 2020-02-14 NOTE — PROGRESS NOTES
PT Re-Evaluation and Discharge note    Today's date: 2020  Patient name: Marko Spence  : 1960  MRN: [id number]  Referring provider: Loki De Los Santos PA-C  Dx:   Encounter Diagnosis     ICD-10-CM    1  Right hip pain M25 551    2  Unspecified orthopedic aftercare Z47 [id number]            Subjective Evaluation     History of Present Illness  He states his hip is doing really well but still has deficits of B pelvis pain to the gluts  He feels a lot better for the sake of his R hip and knows he still will have LBP due to hx of surgeries  He wishes to get updated HEP and to perform it at home  Hx of injury:  Patient presents s/p ORIF of Right Basicervical Femoral Neck Fracture on 19  His hip pain began from 1-2 months duration and had difficulty walking and putting weight in his R hip  He felt like when he first went to the ER in November he thinks it may be from missing a step and landing on his heel and felt like his pain was dismissed and he was referred to pain management  He went to the ER on  and had an X-ray which showed fracture and then he had the surgery  He notices he can lift his knee up which he couldn't do before  He does have back pain all the time  He does have urge  Incontinence, but has neurogenic bladder since lumbar surgery- since his hip surgery he feels his pressure of urinating is improved  He is sleeping in recliner    Neuro signs:  A little bit of tingling and numbness in lateral hip  Red flags: none  Occupation: , [employer]- retired       Pain  At best pain ratin  At worst pain ratin  Location: lateral hip on scar  Quality: burning  Relieving factors: ice  Aggravating factors: laq makes knee sore    Social Support  Lives at home c his wife  There are 1 MARLA and 1 flight but does not have to go up and down steps      Patient Goals  Patient goals for therapy: Go up and down stairs, sleep in bed, get moving- met    STGs  1   Decrease pain by 20% in 2-4 weeks - met  2  Improve hip ROM by 10 degrees in 2-4 weeks  - met  3  Improve hip strength by 1/3 grade in 2-4 weeks  - met      LTGs  1  Decrease pain by 60% in 6-8 weeks  2  Improve walking tolerance to >30 minutes in 6-8 weeks  3  Perform stairs/sleeping activities without pain in 6-8 weeks  - met      Objective Measurements:        TU sec c cane    Gait: cane decr WB on RLE    Slump: - SLR: Rohith Kamara:-  Faddir: + pain  Active SLR: 1 rep c pain but improved a bit c stabilization  TA contraction difficult to illicit  WB tolerance: #/256  Stairs: decreased RLE strength descending/ascending stairs c use of cane    Knee A/PROM L R Strength L R   Flex   /  125/ Flex 4+ 4+   Ext  /  -17/ Ext 4+ 4+           Hip A/PROM [facility]       Flex  110/ 98/105 Flex 4+ 4   ER at 90  45 ER     IR at 90  Pain 5 IR     Abd   Abd 4+ 4+   Ext   Ext 4 4-           Lumbar AROM   Ankle [facility]   Flex   DF     Ext   PF           Assessment:    Eliot Bridges has demonstrated overall improvement in ROM, strength, function, and a reduction in pain  Currently, he has made steady progress towards his goals, but continues to remain limited with hip mobility, WB tolerance, and hip ext strength  At this time, skilled physical therapy is warranted to address the remaining impairments and aide in return to independent HEP but he wishes to perform independent HEP at this time  Limiting factors to therapy Poor WB tolerance, multiple surgeries and comorbidities, low finances and he  demonstrates great motivation  Plan  Patient would benefit from:Skilled physical therapy but will be d/c to HEP at this time and follow up with any further questions or HEP progressions  Planned therapy interventions: manual therapy, neuromuscular re-education, stretching, strengthening, therapeutic activities, therapeutic exercise, patient education, home exercise program, and activity modification        Treatment plan discussed with: patient         Goal: Patient's goal is to walk up and down stairs and sleep in bed    Precautions: 1985, [address]- Lumbar laminectomy, 1992-discectomy/laminectomy, 1994- Lumbar fusion L4-S1, 2018- L2-L4 fusion, TTWB  Dx: ORIF of Right Basicervical Femoral Neck Fracture       Daily Treatment Diary     Manuals 1/2/2020 1/7 1/17 1/31 2/14     hip PROM   nv 8' 5' 5'     Scar stm    3'                                     Exercise Diary          bike  6' 6' 6' 6'     LAQ 2x10 2x10 2x10 2x10 #4      SLR 3 reps difficulty 2x7 2x10 2x10 10x c stab     TKE BTB  20x :05 20x :05 20x :05      slb     5x :05               S/l Hip abd RLE  2x10 2x10 2x10      Steps-     2 flights 1 flight     bridges 10x mild soreness 2x10 2x10 2x10      Hamstring curl  2x10 2x10 wt nv       S/l clamshell R    2x10 2x10 GTB      Standing hip abd/ext GTB    2x10      Gait- c cane    8' 3'     Prone/standing R hip flexor st     4x :20                                                                           Modalities          CP prn in elevation  10'  10'

## 2020-04-09 DIAGNOSIS — N40.0 BENIGN PROSTATIC HYPERPLASIA WITHOUT LOWER URINARY TRACT SYMPTOMS: ICD-10-CM

## 2020-04-09 RX ORDER — TERAZOSIN 5 MG/1
5 CAPSULE ORAL DAILY
Qty: 90 CAPSULE | Refills: 3 | Status: SHIPPED | OUTPATIENT
Start: 2020-04-09 | End: 2020-04-09 | Stop reason: SDUPTHER

## 2020-04-09 RX ORDER — TERAZOSIN 5 MG/1
5 CAPSULE ORAL DAILY
Qty: 90 CAPSULE | Refills: 3 | Status: SHIPPED | OUTPATIENT
Start: 2020-04-09 | End: 2021-03-20 | Stop reason: SDUPTHER

## 2020-04-10 ENCOUNTER — TELEPHONE (OUTPATIENT)
Dept: OBGYN CLINIC | Facility: HOSPITAL | Age: 60
End: 2020-04-10

## 2020-04-13 ENCOUNTER — TELEMEDICINE (OUTPATIENT)
Dept: OBGYN CLINIC | Facility: CLINIC | Age: 60
End: 2020-04-13
Payer: COMMERCIAL

## 2020-04-13 DIAGNOSIS — Z87.81 S/P ORIF (OPEN REDUCTION INTERNAL FIXATION) FRACTURE: Primary | ICD-10-CM

## 2020-04-13 DIAGNOSIS — Z98.890 S/P ORIF (OPEN REDUCTION INTERNAL FIXATION) FRACTURE: Primary | ICD-10-CM

## 2020-04-13 PROCEDURE — 99213 OFFICE O/P EST LOW 20 MIN: CPT | Performed by: SURGERY

## 2020-04-15 ENCOUNTER — TELEPHONE (OUTPATIENT)
Dept: FAMILY MEDICINE CLINIC | Facility: CLINIC | Age: 60
End: 2020-04-15

## 2020-04-15 ENCOUNTER — OFFICE VISIT (OUTPATIENT)
Dept: FAMILY MEDICINE CLINIC | Facility: CLINIC | Age: 60
End: 2020-04-15
Payer: COMMERCIAL

## 2020-04-15 ENCOUNTER — APPOINTMENT (OUTPATIENT)
Dept: LAB | Facility: CLINIC | Age: 60
End: 2020-04-15
Payer: COMMERCIAL

## 2020-04-15 VITALS
RESPIRATION RATE: 16 BRPM | DIASTOLIC BLOOD PRESSURE: 96 MMHG | WEIGHT: 262.6 LBS | SYSTOLIC BLOOD PRESSURE: 170 MMHG | BODY MASS INDEX: 37.59 KG/M2 | HEART RATE: 66 BPM | OXYGEN SATURATION: 98 % | HEIGHT: 70 IN | TEMPERATURE: 96.8 F

## 2020-04-15 DIAGNOSIS — I10 BENIGN ESSENTIAL HYPERTENSION: ICD-10-CM

## 2020-04-15 DIAGNOSIS — Z79.4 TYPE 2 DIABETES MELLITUS WITH DIABETIC POLYNEUROPATHY, WITH LONG-TERM CURRENT USE OF INSULIN (HCC): ICD-10-CM

## 2020-04-15 DIAGNOSIS — E11.42 TYPE 2 DIABETES MELLITUS WITH DIABETIC POLYNEUROPATHY, WITH LONG-TERM CURRENT USE OF INSULIN (HCC): ICD-10-CM

## 2020-04-15 DIAGNOSIS — R60.9 EDEMA, UNSPECIFIED TYPE: Primary | ICD-10-CM

## 2020-04-15 LAB
ALBUMIN SERPL BCP-MCNC: 3.6 G/DL (ref 3.5–5)
ALP SERPL-CCNC: 85 U/L (ref 46–116)
ALT SERPL W P-5'-P-CCNC: 76 U/L (ref 12–78)
ANION GAP SERPL CALCULATED.3IONS-SCNC: 5 MMOL/L (ref 4–13)
AST SERPL W P-5'-P-CCNC: 37 U/L (ref 5–45)
BILIRUB SERPL-MCNC: 0.33 MG/DL (ref 0.2–1)
BUN SERPL-MCNC: 11 MG/DL (ref 5–25)
CALCIUM SERPL-MCNC: 9.3 MG/DL (ref 8.3–10.1)
CHLORIDE SERPL-SCNC: 105 MMOL/L (ref 100–108)
CHOLEST SERPL-MCNC: 137 MG/DL (ref 50–200)
CO2 SERPL-SCNC: 29 MMOL/L (ref 21–32)
CREAT SERPL-MCNC: 0.78 MG/DL (ref 0.6–1.3)
ERYTHROCYTE [DISTWIDTH] IN BLOOD BY AUTOMATED COUNT: 13.2 % (ref 11.6–15.1)
EST. AVERAGE GLUCOSE BLD GHB EST-MCNC: 166 MG/DL
GFR SERPL CREATININE-BSD FRML MDRD: 99 ML/MIN/1.73SQ M
GLUCOSE P FAST SERPL-MCNC: 126 MG/DL (ref 65–99)
HBA1C MFR BLD: 7.4 %
HCT VFR BLD AUTO: 36.5 % (ref 36.5–49.3)
HDLC SERPL-MCNC: 44 MG/DL
HGB BLD-MCNC: 11.6 G/DL (ref 12–17)
LDLC SERPL CALC-MCNC: 74 MG/DL (ref 0–100)
MCH RBC QN AUTO: 25.8 PG (ref 26.8–34.3)
MCHC RBC AUTO-ENTMCNC: 31.8 G/DL (ref 31.4–37.4)
MCV RBC AUTO: 81 FL (ref 82–98)
NONHDLC SERPL-MCNC: 93 MG/DL
PLATELET # BLD AUTO: 282 THOUSANDS/UL (ref 149–390)
PMV BLD AUTO: 9.8 FL (ref 8.9–12.7)
POTASSIUM SERPL-SCNC: 4.1 MMOL/L (ref 3.5–5.3)
PROT SERPL-MCNC: 7.4 G/DL (ref 6.4–8.2)
RBC # BLD AUTO: 4.5 MILLION/UL (ref 3.88–5.62)
SODIUM SERPL-SCNC: 139 MMOL/L (ref 136–145)
TRIGL SERPL-MCNC: 94 MG/DL
TSH SERPL DL<=0.05 MIU/L-ACNC: 2.08 UIU/ML (ref 0.36–3.74)
WBC # BLD AUTO: 8.82 THOUSAND/UL (ref 4.31–10.16)

## 2020-04-15 PROCEDURE — 84443 ASSAY THYROID STIM HORMONE: CPT

## 2020-04-15 PROCEDURE — 85027 COMPLETE CBC AUTOMATED: CPT

## 2020-04-15 PROCEDURE — 4010F ACE/ARB THERAPY RXD/TAKEN: CPT | Performed by: FAMILY MEDICINE

## 2020-04-15 PROCEDURE — 3008F BODY MASS INDEX DOCD: CPT | Performed by: FAMILY MEDICINE

## 2020-04-15 PROCEDURE — 3080F DIAST BP >= 90 MM HG: CPT | Performed by: FAMILY MEDICINE

## 2020-04-15 PROCEDURE — 80053 COMPREHEN METABOLIC PANEL: CPT

## 2020-04-15 PROCEDURE — 83036 HEMOGLOBIN GLYCOSYLATED A1C: CPT

## 2020-04-15 PROCEDURE — 99214 OFFICE O/P EST MOD 30 MIN: CPT | Performed by: FAMILY MEDICINE

## 2020-04-15 PROCEDURE — 1036F TOBACCO NON-USER: CPT | Performed by: FAMILY MEDICINE

## 2020-04-15 PROCEDURE — 36415 COLL VENOUS BLD VENIPUNCTURE: CPT

## 2020-04-15 PROCEDURE — 3077F SYST BP >= 140 MM HG: CPT | Performed by: FAMILY MEDICINE

## 2020-04-15 PROCEDURE — 80061 LIPID PANEL: CPT

## 2020-04-15 RX ORDER — FUROSEMIDE 20 MG/1
20 TABLET ORAL DAILY
Qty: 90 TABLET | Refills: 1 | Status: SHIPPED | OUTPATIENT
Start: 2020-04-15 | End: 2020-08-17 | Stop reason: SDUPTHER

## 2020-04-15 RX ORDER — LISINOPRIL 40 MG/1
40 TABLET ORAL DAILY
Qty: 90 TABLET | Refills: 1 | Status: SHIPPED | OUTPATIENT
Start: 2020-04-15 | End: 2020-08-06 | Stop reason: ALTCHOICE

## 2020-04-16 ENCOUNTER — TELEPHONE (OUTPATIENT)
Dept: FAMILY MEDICINE CLINIC | Facility: CLINIC | Age: 60
End: 2020-04-16

## 2020-04-25 DIAGNOSIS — E11.9 TYPE 2 DIABETES MELLITUS WITHOUT COMPLICATION, WITHOUT LONG-TERM CURRENT USE OF INSULIN (HCC): ICD-10-CM

## 2020-04-27 DIAGNOSIS — E11.9 TYPE 2 DIABETES MELLITUS WITHOUT COMPLICATION, WITHOUT LONG-TERM CURRENT USE OF INSULIN (HCC): ICD-10-CM

## 2020-04-27 RX ORDER — LANCETS 33 GAUGE
EACH MISCELLANEOUS DAILY
Qty: 100 EACH | Refills: 3 | Status: SHIPPED | OUTPATIENT
Start: 2020-04-27 | End: 2020-04-28 | Stop reason: SDUPTHER

## 2020-04-27 RX ORDER — LANCETS 33 GAUGE
EACH MISCELLANEOUS DAILY
Qty: 100 EACH | Refills: 3 | Status: SHIPPED | OUTPATIENT
Start: 2020-04-27 | End: 2020-04-27 | Stop reason: SDUPTHER

## 2020-04-28 DIAGNOSIS — E11.9 TYPE 2 DIABETES MELLITUS WITHOUT COMPLICATION, WITHOUT LONG-TERM CURRENT USE OF INSULIN (HCC): ICD-10-CM

## 2020-04-29 ENCOUNTER — HOSPITAL ENCOUNTER (OUTPATIENT)
Dept: NON INVASIVE DIAGNOSTICS | Facility: CLINIC | Age: 60
Discharge: HOME | End: 2020-04-29
Payer: COMMERCIAL

## 2020-04-29 ENCOUNTER — TELEPHONE (OUTPATIENT)
Dept: FAMILY MEDICINE CLINIC | Facility: CLINIC | Age: 60
End: 2020-04-29

## 2020-04-29 DIAGNOSIS — R60.9 EDEMA, UNSPECIFIED TYPE: ICD-10-CM

## 2020-04-29 DIAGNOSIS — I82.4Z1 ACUTE DEEP VEIN THROMBOSIS (DVT) OF DISTAL VEIN OF RIGHT LOWER EXTREMITY (HCC): Primary | ICD-10-CM

## 2020-04-29 PROCEDURE — 93971 EXTREMITY STUDY: CPT

## 2020-04-29 PROCEDURE — 93971 EXTREMITY STUDY: CPT | Performed by: SURGERY

## 2020-04-29 RX ORDER — LANCETS 33 GAUGE
EACH MISCELLANEOUS DAILY
Qty: 100 EACH | Refills: 0 | Status: SHIPPED | OUTPATIENT
Start: 2020-04-29

## 2020-04-30 ENCOUNTER — TELEPHONE (OUTPATIENT)
Dept: FAMILY MEDICINE CLINIC | Facility: CLINIC | Age: 60
End: 2020-04-30

## 2020-05-11 ENCOUNTER — TELEMEDICINE (OUTPATIENT)
Dept: FAMILY MEDICINE CLINIC | Facility: CLINIC | Age: 60
End: 2020-05-11
Payer: COMMERCIAL

## 2020-05-11 ENCOUNTER — TELEPHONE (OUTPATIENT)
Dept: FAMILY MEDICINE CLINIC | Facility: CLINIC | Age: 60
End: 2020-05-11

## 2020-05-11 VITALS
DIASTOLIC BLOOD PRESSURE: 98 MMHG | WEIGHT: 257.6 LBS | HEART RATE: 65 BPM | BODY MASS INDEX: 36.96 KG/M2 | SYSTOLIC BLOOD PRESSURE: 174 MMHG | TEMPERATURE: 97.5 F

## 2020-05-11 DIAGNOSIS — I10 BENIGN ESSENTIAL HYPERTENSION: ICD-10-CM

## 2020-05-11 DIAGNOSIS — I82.4Z1 ACUTE DEEP VEIN THROMBOSIS (DVT) OF DISTAL VEIN OF RIGHT LOWER EXTREMITY (HCC): Primary | ICD-10-CM

## 2020-05-11 PROCEDURE — 99213 OFFICE O/P EST LOW 20 MIN: CPT | Performed by: FAMILY MEDICINE

## 2020-05-11 RX ORDER — AMLODIPINE BESYLATE AND BENAZEPRIL HYDROCHLORIDE 5; 40 MG/1; MG/1
1 CAPSULE ORAL DAILY
Qty: 30 CAPSULE | Refills: 3 | Status: SHIPPED | OUTPATIENT
Start: 2020-05-11 | End: 2020-08-17 | Stop reason: ALTCHOICE

## 2020-05-12 PROBLEM — I82.4Z1 ACUTE DEEP VEIN THROMBOSIS (DVT) OF DISTAL VEIN OF RIGHT LOWER EXTREMITY (HCC): Status: ACTIVE | Noted: 2020-05-12

## 2020-05-27 ENCOUNTER — TELEPHONE (OUTPATIENT)
Dept: VASCULAR SURGERY | Facility: CLINIC | Age: 60
End: 2020-05-27

## 2020-05-27 PROBLEM — I82.431 ACUTE DEEP VEIN THROMBOSIS (DVT) OF POPLITEAL VEIN OF RIGHT LOWER EXTREMITY (HCC): Status: ACTIVE | Noted: 2020-05-12

## 2020-05-28 ENCOUNTER — CONSULT (OUTPATIENT)
Dept: VASCULAR SURGERY | Facility: CLINIC | Age: 60
End: 2020-05-28
Payer: COMMERCIAL

## 2020-05-28 ENCOUNTER — TELEPHONE (OUTPATIENT)
Dept: SURGICAL ONCOLOGY | Facility: CLINIC | Age: 60
End: 2020-05-28

## 2020-05-28 VITALS
DIASTOLIC BLOOD PRESSURE: 80 MMHG | RESPIRATION RATE: 16 BRPM | TEMPERATURE: 98.3 F | HEART RATE: 68 BPM | HEIGHT: 70 IN | WEIGHT: 262 LBS | SYSTOLIC BLOOD PRESSURE: 142 MMHG | BODY MASS INDEX: 37.51 KG/M2

## 2020-05-28 DIAGNOSIS — I10 BENIGN ESSENTIAL HYPERTENSION: ICD-10-CM

## 2020-05-28 DIAGNOSIS — Z79.4 TYPE 2 DIABETES MELLITUS WITH DIABETIC POLYNEUROPATHY, WITH LONG-TERM CURRENT USE OF INSULIN (HCC): ICD-10-CM

## 2020-05-28 DIAGNOSIS — M43.16 SPONDYLOLISTHESIS OF LUMBAR REGION: ICD-10-CM

## 2020-05-28 DIAGNOSIS — E11.42 TYPE 2 DIABETES MELLITUS WITH DIABETIC POLYNEUROPATHY, WITH LONG-TERM CURRENT USE OF INSULIN (HCC): ICD-10-CM

## 2020-05-28 DIAGNOSIS — I82.4Z1 ACUTE DEEP VEIN THROMBOSIS (DVT) OF DISTAL VEIN OF RIGHT LOWER EXTREMITY (HCC): ICD-10-CM

## 2020-05-28 DIAGNOSIS — I82.431 ACUTE DEEP VEIN THROMBOSIS (DVT) OF POPLITEAL VEIN OF RIGHT LOWER EXTREMITY (HCC): Primary | ICD-10-CM

## 2020-05-28 PROCEDURE — 3051F HG A1C>EQUAL 7.0%<8.0%: CPT | Performed by: PHYSICIAN ASSISTANT

## 2020-05-28 PROCEDURE — 99204 OFFICE O/P NEW MOD 45 MIN: CPT | Performed by: PHYSICIAN ASSISTANT

## 2020-06-09 ENCOUNTER — TELEMEDICINE (OUTPATIENT)
Dept: FAMILY MEDICINE CLINIC | Facility: CLINIC | Age: 60
End: 2020-06-09
Payer: COMMERCIAL

## 2020-06-09 DIAGNOSIS — E11.42 TYPE 2 DIABETES MELLITUS WITH DIABETIC POLYNEUROPATHY, WITH LONG-TERM CURRENT USE OF INSULIN (HCC): Primary | ICD-10-CM

## 2020-06-09 DIAGNOSIS — Z79.4 TYPE 2 DIABETES MELLITUS WITH DIABETIC POLYNEUROPATHY, WITH LONG-TERM CURRENT USE OF INSULIN (HCC): Primary | ICD-10-CM

## 2020-06-09 PROCEDURE — 99213 OFFICE O/P EST LOW 20 MIN: CPT | Performed by: FAMILY MEDICINE

## 2020-06-17 ENCOUNTER — OFFICE VISIT (OUTPATIENT)
Dept: OBGYN CLINIC | Facility: CLINIC | Age: 60
End: 2020-06-17
Payer: COMMERCIAL

## 2020-06-17 ENCOUNTER — APPOINTMENT (OUTPATIENT)
Dept: RADIOLOGY | Facility: AMBULARY SURGERY CENTER | Age: 60
End: 2020-06-17
Attending: SURGERY
Payer: COMMERCIAL

## 2020-06-17 VITALS
WEIGHT: 262 LBS | HEIGHT: 70 IN | SYSTOLIC BLOOD PRESSURE: 168 MMHG | HEART RATE: 80 BPM | DIASTOLIC BLOOD PRESSURE: 82 MMHG | BODY MASS INDEX: 37.51 KG/M2

## 2020-06-17 DIAGNOSIS — Z98.890 S/P ORIF (OPEN REDUCTION INTERNAL FIXATION) FRACTURE: Primary | ICD-10-CM

## 2020-06-17 DIAGNOSIS — Z98.890 S/P ORIF (OPEN REDUCTION INTERNAL FIXATION) FRACTURE: ICD-10-CM

## 2020-06-17 DIAGNOSIS — Z87.81 S/P ORIF (OPEN REDUCTION INTERNAL FIXATION) FRACTURE: Primary | ICD-10-CM

## 2020-06-17 DIAGNOSIS — M25.551 PAIN IN RIGHT HIP: ICD-10-CM

## 2020-06-17 DIAGNOSIS — Z87.81 S/P ORIF (OPEN REDUCTION INTERNAL FIXATION) FRACTURE: ICD-10-CM

## 2020-06-17 PROCEDURE — 99213 OFFICE O/P EST LOW 20 MIN: CPT | Performed by: SURGERY

## 2020-06-17 PROCEDURE — 3079F DIAST BP 80-89 MM HG: CPT | Performed by: SURGERY

## 2020-06-17 PROCEDURE — 73502 X-RAY EXAM HIP UNI 2-3 VIEWS: CPT

## 2020-06-17 PROCEDURE — 3008F BODY MASS INDEX DOCD: CPT | Performed by: SURGERY

## 2020-06-17 PROCEDURE — 1036F TOBACCO NON-USER: CPT | Performed by: SURGERY

## 2020-06-17 PROCEDURE — 3077F SYST BP >= 140 MM HG: CPT | Performed by: SURGERY

## 2020-06-17 PROCEDURE — 3051F HG A1C>EQUAL 7.0%<8.0%: CPT | Performed by: SURGERY

## 2020-06-23 ENCOUNTER — HOSPITAL ENCOUNTER (OUTPATIENT)
Dept: CT IMAGING | Facility: HOSPITAL | Age: 60
Discharge: HOME | End: 2020-06-23
Attending: SURGERY
Payer: COMMERCIAL

## 2020-06-23 DIAGNOSIS — Z98.890 S/P ORIF (OPEN REDUCTION INTERNAL FIXATION) FRACTURE: ICD-10-CM

## 2020-06-23 DIAGNOSIS — M25.551 PAIN IN RIGHT HIP: ICD-10-CM

## 2020-06-23 DIAGNOSIS — Z87.81 S/P ORIF (OPEN REDUCTION INTERNAL FIXATION) FRACTURE: ICD-10-CM

## 2020-06-23 PROCEDURE — 73700 CT LOWER EXTREMITY W/O DYE: CPT

## 2020-06-29 DIAGNOSIS — E11.9 TYPE 2 DIABETES MELLITUS WITHOUT COMPLICATION, WITHOUT LONG-TERM CURRENT USE OF INSULIN (HCC): ICD-10-CM

## 2020-06-30 RX ORDER — GLIMEPIRIDE 1 MG/1
1 TABLET ORAL 2 TIMES DAILY
Qty: 180 TABLET | Refills: 3 | Status: SHIPPED | OUTPATIENT
Start: 2020-06-30 | End: 2020-07-01 | Stop reason: SDUPTHER

## 2020-06-30 RX ORDER — PEN NEEDLE, DIABETIC 30 GX3/16"
NEEDLE, DISPOSABLE MISCELLANEOUS DAILY
Qty: 30 EACH | Refills: 5 | Status: SHIPPED | OUTPATIENT
Start: 2020-06-30 | End: 2021-02-13

## 2020-06-30 RX ORDER — PEN NEEDLE, DIABETIC 30 GX3/16"
NEEDLE, DISPOSABLE MISCELLANEOUS DAILY
Qty: 30 EACH | Refills: 5 | Status: SHIPPED | OUTPATIENT
Start: 2020-06-30 | End: 2020-07-01 | Stop reason: SDUPTHER

## 2020-07-01 DIAGNOSIS — E11.9 TYPE 2 DIABETES MELLITUS WITHOUT COMPLICATION, WITHOUT LONG-TERM CURRENT USE OF INSULIN (HCC): ICD-10-CM

## 2020-07-02 ENCOUNTER — TELEPHONE (OUTPATIENT)
Dept: FAMILY MEDICINE CLINIC | Facility: CLINIC | Age: 60
End: 2020-07-02

## 2020-07-02 ENCOUNTER — OFFICE VISIT (OUTPATIENT)
Dept: OBGYN CLINIC | Facility: CLINIC | Age: 60
End: 2020-07-02
Payer: COMMERCIAL

## 2020-07-02 VITALS
DIASTOLIC BLOOD PRESSURE: 86 MMHG | SYSTOLIC BLOOD PRESSURE: 190 MMHG | HEART RATE: 84 BPM | WEIGHT: 262 LBS | BODY MASS INDEX: 37.51 KG/M2 | HEIGHT: 70 IN

## 2020-07-02 DIAGNOSIS — Z87.81 S/P ORIF (OPEN REDUCTION INTERNAL FIXATION) FRACTURE: ICD-10-CM

## 2020-07-02 DIAGNOSIS — M12.551 TRAUMATIC ARTHRITIS OF HIP, RIGHT: Primary | ICD-10-CM

## 2020-07-02 DIAGNOSIS — Z98.890 S/P ORIF (OPEN REDUCTION INTERNAL FIXATION) FRACTURE: ICD-10-CM

## 2020-07-02 DIAGNOSIS — E11.9 TYPE 2 DIABETES MELLITUS WITHOUT COMPLICATION, WITHOUT LONG-TERM CURRENT USE OF INSULIN (HCC): ICD-10-CM

## 2020-07-02 DIAGNOSIS — M25.551 PAIN IN RIGHT HIP: ICD-10-CM

## 2020-07-02 PROCEDURE — 1036F TOBACCO NON-USER: CPT | Performed by: SURGERY

## 2020-07-02 PROCEDURE — 3077F SYST BP >= 140 MM HG: CPT | Performed by: SURGERY

## 2020-07-02 PROCEDURE — 3008F BODY MASS INDEX DOCD: CPT | Performed by: SURGERY

## 2020-07-02 PROCEDURE — 3051F HG A1C>EQUAL 7.0%<8.0%: CPT | Performed by: SURGERY

## 2020-07-02 PROCEDURE — 99213 OFFICE O/P EST LOW 20 MIN: CPT | Performed by: SURGERY

## 2020-07-02 PROCEDURE — 3079F DIAST BP 80-89 MM HG: CPT | Performed by: SURGERY

## 2020-07-02 RX ORDER — GLIMEPIRIDE 2 MG/1
2 TABLET ORAL 2 TIMES DAILY
Qty: 180 TABLET | Refills: 1 | Status: SHIPPED | OUTPATIENT
Start: 2020-07-02 | End: 2020-12-23

## 2020-07-02 RX ORDER — PEN NEEDLE, DIABETIC 30 GX3/16"
NEEDLE, DISPOSABLE MISCELLANEOUS DAILY
Qty: 30 EACH | Refills: 5 | Status: SHIPPED | OUTPATIENT
Start: 2020-07-02 | End: 2020-08-27 | Stop reason: SDUPTHER

## 2020-07-02 RX ORDER — GLIMEPIRIDE 1 MG/1
1 TABLET ORAL 2 TIMES DAILY
Qty: 180 TABLET | Refills: 3 | Status: SHIPPED | OUTPATIENT
Start: 2020-07-02 | End: 2020-07-02 | Stop reason: SDUPTHER

## 2020-07-02 NOTE — TELEPHONE ENCOUNTER
Patient called and stated that he is supposed to be taking gimepiride 2 mg twice a day and his insulin 20 units at bedtime    Please send over new scripts to [facility] in [facility]
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
## 2020-07-06 ENCOUNTER — TELEPHONE (OUTPATIENT)
Dept: HEMATOLOGY ONCOLOGY | Facility: CLINIC | Age: 60
End: 2020-07-06

## 2020-07-07 ENCOUNTER — CONSULT (OUTPATIENT)
Dept: HEMATOLOGY ONCOLOGY | Facility: CLINIC | Age: 60
End: 2020-07-07
Payer: COMMERCIAL

## 2020-07-07 VITALS
DIASTOLIC BLOOD PRESSURE: 88 MMHG | HEIGHT: 70 IN | TEMPERATURE: 98.8 F | SYSTOLIC BLOOD PRESSURE: 172 MMHG | BODY MASS INDEX: 36.94 KG/M2 | OXYGEN SATURATION: 94 % | WEIGHT: 258 LBS | HEART RATE: 81 BPM | RESPIRATION RATE: 16 BRPM

## 2020-07-07 DIAGNOSIS — I82.431 ACUTE DEEP VEIN THROMBOSIS (DVT) OF POPLITEAL VEIN OF RIGHT LOWER EXTREMITY (HCC): Primary | ICD-10-CM

## 2020-07-07 PROCEDURE — 3051F HG A1C>EQUAL 7.0%<8.0%: CPT | Performed by: INTERNAL MEDICINE

## 2020-07-07 PROCEDURE — 99205 OFFICE O/P NEW HI 60 MIN: CPT | Performed by: INTERNAL MEDICINE
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
## 2020-07-13 ENCOUNTER — HOSPITAL ENCOUNTER (OUTPATIENT)
Dept: RADIOLOGY | Facility: HOSPITAL | Age: 60
Discharge: HOME | End: 2020-07-13
Payer: COMMERCIAL

## 2020-07-13 DIAGNOSIS — M12.551 TRAUMATIC ARTHRITIS OF HIP, RIGHT: ICD-10-CM

## 2020-07-13 DIAGNOSIS — Z87.81 S/P ORIF (OPEN REDUCTION INTERNAL FIXATION) FRACTURE: ICD-10-CM

## 2020-07-13 DIAGNOSIS — M25.551 PAIN IN RIGHT HIP: ICD-10-CM

## 2020-07-13 DIAGNOSIS — Z98.890 S/P ORIF (OPEN REDUCTION INTERNAL FIXATION) FRACTURE: ICD-10-CM

## 2020-07-13 PROCEDURE — 77002 NEEDLE LOCALIZATION BY XRAY: CPT

## 2020-07-13 PROCEDURE — 20610 DRAIN/INJ JOINT/BURSA W/O US: CPT

## 2020-07-13 RX ORDER — METHYLPREDNISOLONE ACETATE 80 MG/ML
80 INJECTION, SUSPENSION INTRA-ARTICULAR; INTRALESIONAL; INTRAMUSCULAR; SOFT TISSUE
Status: COMPLETED | OUTPATIENT
Start: 2020-07-13 | End: 2020-07-13

## 2020-07-13 RX ORDER — LIDOCAINE HYDROCHLORIDE 10 MG/ML
5 INJECTION, SOLUTION EPIDURAL; INFILTRATION; INTRACAUDAL; PERINEURAL
Status: COMPLETED | OUTPATIENT
Start: 2020-07-13 | End: 2020-07-13

## 2020-07-13 RX ORDER — BUPIVACAINE HYDROCHLORIDE 2.5 MG/ML
2 INJECTION, SOLUTION EPIDURAL; INFILTRATION; INTRACAUDAL
Status: COMPLETED | OUTPATIENT
Start: 2020-07-13 | End: 2020-07-13

## 2020-07-13 RX ADMIN — LIDOCAINE HYDROCHLORIDE 5 ML: 10 INJECTION, SOLUTION EPIDURAL; INFILTRATION; INTRACAUDAL; PERINEURAL at 11:50

## 2020-07-13 RX ADMIN — IOHEXOL 1 ML: 300 INJECTION, SOLUTION INTRAVENOUS at 11:50

## 2020-07-13 RX ADMIN — METHYLPREDNISOLONE ACETATE 80 MG: 80 INJECTION, SUSPENSION INTRA-ARTICULAR; INTRALESIONAL; INTRAMUSCULAR; SOFT TISSUE at 11:50

## 2020-07-13 RX ADMIN — BUPIVACAINE HYDROCHLORIDE 2 ML: 2.5 INJECTION, SOLUTION EPIDURAL; INFILTRATION; INTRACAUDAL; PERINEURAL at 11:50

## 2020-07-16 ENCOUNTER — TRANSCRIBE ORDERS (OUTPATIENT)
Dept: LAB | Facility: CLINIC | Age: 60
End: 2020-07-16

## 2020-07-16 ENCOUNTER — APPOINTMENT (OUTPATIENT)
Dept: LAB | Facility: CLINIC | Age: 60
End: 2020-07-16
Payer: COMMERCIAL

## 2020-07-16 DIAGNOSIS — I82.431 ACUTE DEEP VEIN THROMBOSIS (DVT) OF POPLITEAL VEIN OF RIGHT LOWER EXTREMITY (HCC): ICD-10-CM

## 2020-07-16 LAB
ALBUMIN SERPL BCP-MCNC: 3.6 G/DL (ref 3.5–5)
ALP SERPL-CCNC: 108 U/L (ref 46–116)
ALT SERPL W P-5'-P-CCNC: 54 U/L (ref 12–78)
ANION GAP SERPL CALCULATED.3IONS-SCNC: 4 MMOL/L (ref 4–13)
AST SERPL W P-5'-P-CCNC: 24 U/L (ref 5–45)
BASOPHILS # BLD AUTO: 0.04 THOUSANDS/ΜL (ref 0–0.1)
BASOPHILS NFR BLD AUTO: 0 % (ref 0–1)
BILIRUB SERPL-MCNC: 0.2 MG/DL (ref 0.2–1)
BUN SERPL-MCNC: 16 MG/DL (ref 5–25)
CALCIUM SERPL-MCNC: 8.7 MG/DL (ref 8.3–10.1)
CHLORIDE SERPL-SCNC: 101 MMOL/L (ref 100–108)
CO2 SERPL-SCNC: 32 MMOL/L (ref 21–32)
CREAT SERPL-MCNC: 0.93 MG/DL (ref 0.6–1.3)
DEPRECATED AT III PPP: 84 % OF NORMAL (ref 92–136)
EOSINOPHIL # BLD AUTO: 0.14 THOUSAND/ΜL (ref 0–0.61)
EOSINOPHIL NFR BLD AUTO: 1 % (ref 0–6)
ERYTHROCYTE [DISTWIDTH] IN BLOOD BY AUTOMATED COUNT: 14 % (ref 11.6–15.1)
GFR SERPL CREATININE-BSD FRML MDRD: 90 ML/MIN/1.73SQ M
GLUCOSE SERPL-MCNC: 252 MG/DL (ref 65–140)
HCT VFR BLD AUTO: 38.9 % (ref 36.5–49.3)
HGB BLD-MCNC: 12.5 G/DL (ref 12–17)
IMM GRANULOCYTES # BLD AUTO: 0.08 THOUSAND/UL (ref 0–0.2)
IMM GRANULOCYTES NFR BLD AUTO: 1 % (ref 0–2)
LYMPHOCYTES # BLD AUTO: 3.03 THOUSANDS/ΜL (ref 0.6–4.47)
LYMPHOCYTES NFR BLD AUTO: 30 % (ref 14–44)
MCH RBC QN AUTO: 26.3 PG (ref 26.8–34.3)
MCHC RBC AUTO-ENTMCNC: 32.1 G/DL (ref 31.4–37.4)
MCV RBC AUTO: 82 FL (ref 82–98)
MONOCYTES # BLD AUTO: 0.66 THOUSAND/ΜL (ref 0.17–1.22)
MONOCYTES NFR BLD AUTO: 7 % (ref 4–12)
NEUTROPHILS # BLD AUTO: 6.04 THOUSANDS/ΜL (ref 1.85–7.62)
NEUTS SEG NFR BLD AUTO: 61 % (ref 43–75)
NRBC BLD AUTO-RTO: 0 /100 WBCS
PLATELET # BLD AUTO: 281 THOUSANDS/UL (ref 149–390)
PMV BLD AUTO: 9.7 FL (ref 8.9–12.7)
POTASSIUM SERPL-SCNC: 3.7 MMOL/L (ref 3.5–5.3)
PROT C AG ACT/NOR PPP IA: 62 % OF NORMAL (ref 60–150)
PROT SERPL-MCNC: 7.4 G/DL (ref 6.4–8.2)
RBC # BLD AUTO: 4.75 MILLION/UL (ref 3.88–5.62)
SODIUM SERPL-SCNC: 137 MMOL/L (ref 136–145)
WBC # BLD AUTO: 9.99 THOUSAND/UL (ref 4.31–10.16)

## 2020-07-16 PROCEDURE — 85613 RUSSELL VIPER VENOM DILUTED: CPT

## 2020-07-16 PROCEDURE — 85670 THROMBIN TIME PLASMA: CPT

## 2020-07-16 PROCEDURE — 85306 CLOT INHIBIT PROT S FREE: CPT

## 2020-07-16 PROCEDURE — 80053 COMPREHEN METABOLIC PANEL: CPT

## 2020-07-16 PROCEDURE — 85025 COMPLETE CBC W/AUTO DIFF WBC: CPT

## 2020-07-16 PROCEDURE — 85305 CLOT INHIBIT PROT S TOTAL: CPT

## 2020-07-16 PROCEDURE — 86146 BETA-2 GLYCOPROTEIN ANTIBODY: CPT

## 2020-07-16 PROCEDURE — 86147 CARDIOLIPIN ANTIBODY EA IG: CPT

## 2020-07-16 PROCEDURE — 85732 THROMBOPLASTIN TIME PARTIAL: CPT

## 2020-07-16 PROCEDURE — 36415 COLL VENOUS BLD VENIPUNCTURE: CPT

## 2020-07-16 PROCEDURE — 85300 ANTITHROMBIN III ACTIVITY: CPT

## 2020-07-16 PROCEDURE — 85705 THROMBOPLASTIN INHIBITION: CPT

## 2020-07-16 PROCEDURE — 85303 CLOT INHIBIT PROT C ACTIVITY: CPT

## 2020-07-17 LAB
CARDIOLIPIN IGA SER IA-ACNC: <9 APL U/ML (ref 0–11)
CARDIOLIPIN IGG SER IA-ACNC: <9 GPL U/ML (ref 0–14)
CARDIOLIPIN IGM SER IA-ACNC: <9 MPL U/ML (ref 0–12)

## 2020-07-18 LAB
PROT S ACT/NOR PPP: 124 % (ref 57–157)
PROT S PPP-ACNC: 87 % (ref 60–150)

## 2020-07-19 LAB
APTT SCREEN TO CONFIRM RATIO: 0.93 RATIO (ref 0–1.4)
CONFIRM APTT/NORMAL: 60.1 SEC (ref 0–55)
DRVVT IMM 1:2 NP PPP: 74.1 SEC (ref 0–47)
DRVVT SCREEN TO CONFIRM RATIO: 1.6 RATIO (ref 0.8–1.2)
LA PPP-IMP: ABNORMAL
PROT S ACT/NOR PPP: 132 % (ref 63–140)
SCREEN APTT: 48.4 SEC (ref 0–51.9)
SCREEN DRVVT: 113.9 SEC (ref 0–47)
THROMBIN TIME: 20.4 SEC (ref 0–23)

## 2020-07-20 LAB
B2 GLYCOPROT1 IGA SER-ACNC: 9 GPI IGA UNITS (ref 0–25)
B2 GLYCOPROT1 IGG SER-ACNC: <9 GPI IGG UNITS (ref 0–20)
B2 GLYCOPROT1 IGM SER-ACNC: <9 GPI IGM UNITS (ref 0–32)

## 2020-07-23 LAB
F2 GENE MUT ANL BLD/T: NORMAL
F5 GENE MUT ANL BLD/T: NORMAL

## 2020-07-24 ENCOUNTER — TELEPHONE (OUTPATIENT)
Dept: RADIOLOGY | Facility: HOSPITAL | Age: 60
End: 2020-07-24

## 2020-07-24 NOTE — TELEPHONE ENCOUNTER
Mr Nati Wellington called today  He underwent a fluoroscopy guided right hip anesthetic and steroid injection on 7/13/2020  He states he had great improvement in his right hip pain for 2 days, but since that time his pain has returned  He notes he doesn't have any further groin pain, but his hip "hurts where the screws are "     The patient denies any fevers, chills, skin erythremia , drainage, or induration  He states it is "just his normal pain "    I informed the patient that sometimes, the steroid injections, may not be as effective as one hopes  I asked him to call his orthopedic team to discuss the above and to see if they have any other suggestions to help manage his pain  I answered all of Damon's questions and concerns to his satisfaction  Please feel free to contact us with any questions or concerns       [facility]

## 2020-07-25 DIAGNOSIS — E11.9 TYPE 2 DIABETES MELLITUS WITHOUT COMPLICATION, WITHOUT LONG-TERM CURRENT USE OF INSULIN (HCC): ICD-10-CM

## 2020-07-27 ENCOUNTER — HOSPITAL ENCOUNTER (OUTPATIENT)
Dept: NON INVASIVE DIAGNOSTICS | Facility: CLINIC | Age: 60
Discharge: HOME | End: 2020-07-27
Payer: COMMERCIAL

## 2020-07-27 DIAGNOSIS — I82.431 ACUTE DEEP VEIN THROMBOSIS (DVT) OF POPLITEAL VEIN OF RIGHT LOWER EXTREMITY (HCC): ICD-10-CM

## 2020-07-27 PROCEDURE — 93970 EXTREMITY STUDY: CPT

## 2020-07-27 PROCEDURE — 93970 EXTREMITY STUDY: CPT | Performed by: SURGERY

## 2020-07-27 RX ORDER — INSULIN GLARGINE 100 [IU]/ML
INJECTION, SOLUTION SUBCUTANEOUS
Qty: 15 ML | Refills: 5 | Status: SHIPPED | OUTPATIENT
Start: 2020-07-27 | End: 2020-08-17

## 2020-08-06 ENCOUNTER — OFFICE VISIT (OUTPATIENT)
Dept: VASCULAR SURGERY | Facility: CLINIC | Age: 60
End: 2020-08-06
Payer: COMMERCIAL

## 2020-08-06 VITALS
DIASTOLIC BLOOD PRESSURE: 64 MMHG | HEART RATE: 72 BPM | HEIGHT: 70 IN | BODY MASS INDEX: 36.94 KG/M2 | WEIGHT: 258 LBS | TEMPERATURE: 98.8 F | SYSTOLIC BLOOD PRESSURE: 110 MMHG

## 2020-08-06 DIAGNOSIS — I82.431 ACUTE DEEP VEIN THROMBOSIS (DVT) OF POPLITEAL VEIN OF RIGHT LOWER EXTREMITY (HCC): Primary | ICD-10-CM

## 2020-08-06 PROCEDURE — 3074F SYST BP LT 130 MM HG: CPT | Performed by: SURGERY

## 2020-08-06 PROCEDURE — 3078F DIAST BP <80 MM HG: CPT | Performed by: SURGERY

## 2020-08-06 PROCEDURE — 1036F TOBACCO NON-USER: CPT | Performed by: SURGERY

## 2020-08-06 PROCEDURE — 3008F BODY MASS INDEX DOCD: CPT | Performed by: SURGERY

## 2020-08-06 PROCEDURE — 3051F HG A1C>EQUAL 7.0%<8.0%: CPT | Performed by: SURGERY

## 2020-08-06 PROCEDURE — 99214 OFFICE O/P EST MOD 30 MIN: CPT | Performed by: SURGERY

## 2020-08-06 NOTE — ASSESSMENT & PLAN NOTE
61-year-old male former smoker with HTN, AALIYAH, type 2 DM, lumbar spondylolisthesis, s/p lumbar fusion, R femoral neck fracture, s/p R femur ORIF 12/24/2019, PICC line associated R proximal subclavian DVT '12 and recent acute provoked R popliteal, peroneal DVT diagnosed 4/29/2020  Patient on Xarelto and no significant persistent right lower extremity edema   Presents back after undergoing repeat Ultrasound, which demonstrates recanalized chronic DVT within the popliteal and peroneal veins      -continue anticoagulation with Xarelto, will defer ultimate course and duration to hematology  -compression and elevation right lower extremity for symptomatic relief  -instructed to contact the office with new symptoms or concerns  -PRN clinic f/u

## 2020-08-06 NOTE — PROGRESS NOTES
Assessment/Plan:    Acute deep vein thrombosis (DVT) of popliteal vein of right lower extremity [facility])  62-year-old male former smoker with HTN, AALIYAH, type 2 DM, lumbar spondylolisthesis, s/p lumbar fusion, R femoral neck fracture, s/p R femur ORIF 12/24/2019, PICC line associated R proximal subclavian DVT '12 and recent acute provoked R popliteal, peroneal DVT diagnosed 4/29/2020  Patient on Xarelto and no significant persistent right lower extremity edema  Presents back after undergoing repeat Ultrasound, which demonstrates recanalized chronic DVT within the popliteal and peroneal veins      -continue anticoagulation with Xarelto, will defer ultimate course and duration to hematology  -compression and elevation right lower extremity for symptomatic relief  -instructed to contact the office with new symptoms or concerns  -PRN clinic f/u          Problem List Items Addressed This Visit        Cardiovascular and Mediastinum    Acute deep vein thrombosis (DVT) of popliteal vein of right lower extremity ([address] ) - Primary     62-year-old male former smoker with HTN, AALIYAH, type 2 DM, lumbar spondylolisthesis, s/p lumbar fusion, R femoral neck fracture, s/p R femur ORIF 12/24/2019, PICC line associated R proximal subclavian DVT '12 and recent acute provoked R popliteal, peroneal DVT diagnosed 4/29/2020  Patient on Xarelto and no significant persistent right lower extremity edema  Presents back after undergoing repeat Ultrasound, which demonstrates recanalized chronic DVT within the popliteal and peroneal veins      -continue anticoagulation with Xarelto, will defer ultimate course and duration to hematology  -compression and elevation right lower extremity for symptomatic relief  -instructed to contact the office with new symptoms or concerns  -PRN clinic f/u                  Subjective:      Patient ID: Arti Whitlock is a 62 y o  male  Patient presents in office today to review LEV   Patient has h/o provoked RLE DVT diagnosed 4/29/20  Taking Xarelto  Denies any persistent significant pain or swelling  He continues to wear compression regularly  The following portions of the patient's history were reviewed and updated as appropriate: allergies, current medications, past family history, past medical history, past social history, past surgical history and problem list     Review of Systems   Constitutional: Negative  HENT: Negative  Eyes: Negative  Respiratory: Negative  Cardiovascular: Positive for leg swelling  Gastrointestinal: Negative  Endocrine: Negative  Genitourinary: Negative  Musculoskeletal: Positive for gait problem and joint swelling  Skin: Negative  Allergic/Immunologic: Negative  Hematological: Negative  Psychiatric/Behavioral: Negative  I have reviewed and updated the ROS as appropriate  Objective:      /64 (BP Location: Right arm, Patient Position: Sitting)   Pulse 72   Temp 98 8 °F (37 1 °C) (Tympanic)   Ht 5' 10" (1 778 m)   Wt 117 kg (258 lb)   BMI 37 02 kg/m²          Physical Exam   Constitutional: He is oriented to person, place, and time  HENT:   Head: Normocephalic and atraumatic  Eyes: Pupils are equal, round, and reactive to light  Conjunctivae are normal    Cardiovascular: Normal rate, regular rhythm and normal pulses  Pulmonary/Chest: Effort normal and breath sounds normal    Abdominal: Normal appearance  Musculoskeletal: Normal range of motion  Right lower leg: No edema  Left lower leg: No edema  Neurological: He is alert and oriented to person, place, and time  Skin: Skin is warm and dry  Capillary refill takes less than 2 seconds     Psychiatric: His behavior is normal  Mood, judgment and thought content normal

## 2020-08-13 ENCOUNTER — OFFICE VISIT (OUTPATIENT)
Dept: OBGYN CLINIC | Facility: CLINIC | Age: 60
End: 2020-08-13
Payer: COMMERCIAL

## 2020-08-13 ENCOUNTER — OFFICE VISIT (OUTPATIENT)
Dept: HEMATOLOGY ONCOLOGY | Facility: CLINIC | Age: 60
End: 2020-08-13
Payer: COMMERCIAL

## 2020-08-13 VITALS
HEIGHT: 70 IN | WEIGHT: 257.6 LBS | BODY MASS INDEX: 36.88 KG/M2 | DIASTOLIC BLOOD PRESSURE: 80 MMHG | HEART RATE: 65 BPM | SYSTOLIC BLOOD PRESSURE: 170 MMHG

## 2020-08-13 VITALS
OXYGEN SATURATION: 97 % | HEART RATE: 93 BPM | SYSTOLIC BLOOD PRESSURE: 220 MMHG | DIASTOLIC BLOOD PRESSURE: 104 MMHG | HEIGHT: 70 IN | RESPIRATION RATE: 16 BRPM | WEIGHT: 257 LBS | BODY MASS INDEX: 36.79 KG/M2 | TEMPERATURE: 98.4 F

## 2020-08-13 DIAGNOSIS — S72.001S CLOSED FRACTURE OF NECK OF RIGHT FEMUR, SEQUELA: Primary | ICD-10-CM

## 2020-08-13 DIAGNOSIS — M12.551 TRAUMATIC ARTHRITIS OF HIP, RIGHT: Primary | ICD-10-CM

## 2020-08-13 DIAGNOSIS — M16.11 PRIMARY OSTEOARTHRITIS OF ONE HIP, RIGHT: ICD-10-CM

## 2020-08-13 DIAGNOSIS — I82.431 ACUTE DEEP VEIN THROMBOSIS (DVT) OF POPLITEAL VEIN OF RIGHT LOWER EXTREMITY (HCC): Primary | ICD-10-CM

## 2020-08-13 PROCEDURE — 1036F TOBACCO NON-USER: CPT | Performed by: INTERNAL MEDICINE

## 2020-08-13 PROCEDURE — 99214 OFFICE O/P EST MOD 30 MIN: CPT | Performed by: INTERNAL MEDICINE

## 2020-08-13 PROCEDURE — 3080F DIAST BP >= 90 MM HG: CPT | Performed by: INTERNAL MEDICINE

## 2020-08-13 PROCEDURE — 3077F SYST BP >= 140 MM HG: CPT | Performed by: INTERNAL MEDICINE

## 2020-08-13 PROCEDURE — 3051F HG A1C>EQUAL 7.0%<8.0%: CPT | Performed by: ORTHOPAEDIC SURGERY

## 2020-08-13 PROCEDURE — 3051F HG A1C>EQUAL 7.0%<8.0%: CPT | Performed by: INTERNAL MEDICINE

## 2020-08-13 PROCEDURE — 99214 OFFICE O/P EST MOD 30 MIN: CPT | Performed by: ORTHOPAEDIC SURGERY

## 2020-08-13 PROCEDURE — 3008F BODY MASS INDEX DOCD: CPT | Performed by: INTERNAL MEDICINE

## 2020-08-13 PROCEDURE — 1036F TOBACCO NON-USER: CPT | Performed by: ORTHOPAEDIC SURGERY
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
## 2020-08-17 ENCOUNTER — TELEPHONE (OUTPATIENT)
Dept: FAMILY MEDICINE CLINIC | Facility: CLINIC | Age: 60
End: 2020-08-17

## 2020-08-17 ENCOUNTER — TRANSCRIBE ORDERS (OUTPATIENT)
Dept: LAB | Facility: CLINIC | Age: 60
End: 2020-08-17

## 2020-08-17 ENCOUNTER — APPOINTMENT (OUTPATIENT)
Dept: LAB | Facility: CLINIC | Age: 60
End: 2020-08-17
Payer: COMMERCIAL

## 2020-08-17 ENCOUNTER — OFFICE VISIT (OUTPATIENT)
Dept: FAMILY MEDICINE CLINIC | Facility: CLINIC | Age: 60
End: 2020-08-17
Payer: COMMERCIAL

## 2020-08-17 VITALS
TEMPERATURE: 98.2 F | RESPIRATION RATE: 18 BRPM | OXYGEN SATURATION: 97 % | DIASTOLIC BLOOD PRESSURE: 96 MMHG | BODY MASS INDEX: 37.92 KG/M2 | WEIGHT: 256 LBS | HEIGHT: 69 IN | SYSTOLIC BLOOD PRESSURE: 150 MMHG | HEART RATE: 65 BPM

## 2020-08-17 DIAGNOSIS — E11.9 TYPE 2 DIABETES MELLITUS WITHOUT COMPLICATION, WITHOUT LONG-TERM CURRENT USE OF INSULIN (HCC): ICD-10-CM

## 2020-08-17 DIAGNOSIS — E11.9 TYPE 2 DIABETES MELLITUS WITHOUT COMPLICATION, WITHOUT LONG-TERM CURRENT USE OF INSULIN (HCC): Primary | ICD-10-CM

## 2020-08-17 DIAGNOSIS — S72.001S CLOSED FRACTURE OF NECK OF RIGHT FEMUR, SEQUELA: ICD-10-CM

## 2020-08-17 DIAGNOSIS — I10 BENIGN ESSENTIAL HYPERTENSION: ICD-10-CM

## 2020-08-17 DIAGNOSIS — I82.431 ACUTE DEEP VEIN THROMBOSIS (DVT) OF POPLITEAL VEIN OF RIGHT LOWER EXTREMITY (HCC): ICD-10-CM

## 2020-08-17 LAB
ALBUMIN SERPL BCP-MCNC: 3.6 G/DL (ref 3.5–5)
ALP SERPL-CCNC: 89 U/L (ref 46–116)
ALT SERPL W P-5'-P-CCNC: 50 U/L (ref 12–78)
ANION GAP SERPL CALCULATED.3IONS-SCNC: 5 MMOL/L (ref 4–13)
AST SERPL W P-5'-P-CCNC: 29 U/L (ref 5–45)
BILIRUB SERPL-MCNC: 0.49 MG/DL (ref 0.2–1)
BUN SERPL-MCNC: 11 MG/DL (ref 5–25)
CALCIUM SERPL-MCNC: 9 MG/DL (ref 8.3–10.1)
CHLORIDE SERPL-SCNC: 105 MMOL/L (ref 100–108)
CHOLEST SERPL-MCNC: 150 MG/DL (ref 50–200)
CO2 SERPL-SCNC: 31 MMOL/L (ref 21–32)
CREAT SERPL-MCNC: 0.9 MG/DL (ref 0.6–1.3)
ERYTHROCYTE [DISTWIDTH] IN BLOOD BY AUTOMATED COUNT: 13.9 % (ref 11.6–15.1)
EST. AVERAGE GLUCOSE BLD GHB EST-MCNC: 169 MG/DL
GFR SERPL CREATININE-BSD FRML MDRD: 93 ML/MIN/1.73SQ M
GLUCOSE P FAST SERPL-MCNC: 126 MG/DL (ref 65–99)
HBA1C MFR BLD: 7.5 %
HCT VFR BLD AUTO: 38.2 % (ref 36.5–49.3)
HDLC SERPL-MCNC: 43 MG/DL
HGB BLD-MCNC: 11.7 G/DL (ref 12–17)
LDLC SERPL CALC-MCNC: 85 MG/DL (ref 0–100)
MCH RBC QN AUTO: 25.3 PG (ref 26.8–34.3)
MCHC RBC AUTO-ENTMCNC: 30.6 G/DL (ref 31.4–37.4)
MCV RBC AUTO: 83 FL (ref 82–98)
NONHDLC SERPL-MCNC: 107 MG/DL
PLATELET # BLD AUTO: 287 THOUSANDS/UL (ref 149–390)
PMV BLD AUTO: 10 FL (ref 8.9–12.7)
POTASSIUM SERPL-SCNC: 3.7 MMOL/L (ref 3.5–5.3)
PROT SERPL-MCNC: 7.5 G/DL (ref 6.4–8.2)
RBC # BLD AUTO: 4.63 MILLION/UL (ref 3.88–5.62)
SODIUM SERPL-SCNC: 141 MMOL/L (ref 136–145)
TRIGL SERPL-MCNC: 109 MG/DL
TSH SERPL DL<=0.05 MIU/L-ACNC: 1.03 UIU/ML (ref 0.36–3.74)
WBC # BLD AUTO: 9.16 THOUSAND/UL (ref 4.31–10.16)

## 2020-08-17 PROCEDURE — 84443 ASSAY THYROID STIM HORMONE: CPT

## 2020-08-17 PROCEDURE — 3051F HG A1C>EQUAL 7.0%<8.0%: CPT | Performed by: FAMILY MEDICINE

## 2020-08-17 PROCEDURE — 3008F BODY MASS INDEX DOCD: CPT | Performed by: FAMILY MEDICINE

## 2020-08-17 PROCEDURE — 3077F SYST BP >= 140 MM HG: CPT | Performed by: FAMILY MEDICINE

## 2020-08-17 PROCEDURE — 85027 COMPLETE CBC AUTOMATED: CPT

## 2020-08-17 PROCEDURE — 80061 LIPID PANEL: CPT

## 2020-08-17 PROCEDURE — 3080F DIAST BP >= 90 MM HG: CPT | Performed by: FAMILY MEDICINE

## 2020-08-17 PROCEDURE — 36415 COLL VENOUS BLD VENIPUNCTURE: CPT

## 2020-08-17 PROCEDURE — G0439 PPPS, SUBSEQ VISIT: HCPCS | Performed by: FAMILY MEDICINE

## 2020-08-17 PROCEDURE — 83036 HEMOGLOBIN GLYCOSYLATED A1C: CPT

## 2020-08-17 PROCEDURE — 99214 OFFICE O/P EST MOD 30 MIN: CPT | Performed by: FAMILY MEDICINE

## 2020-08-17 PROCEDURE — 3725F SCREEN DEPRESSION PERFORMED: CPT | Performed by: FAMILY MEDICINE

## 2020-08-17 PROCEDURE — 80053 COMPREHEN METABOLIC PANEL: CPT

## 2020-08-17 PROCEDURE — 1036F TOBACCO NON-USER: CPT | Performed by: FAMILY MEDICINE

## 2020-08-17 RX ORDER — FUROSEMIDE 20 MG/1
20 TABLET ORAL DAILY
Qty: 90 TABLET | Refills: 3 | Status: SHIPPED | OUTPATIENT
Start: 2020-08-17 | End: 2020-09-21

## 2020-08-17 RX ORDER — INSULIN GLARGINE 100 [IU]/ML
25 INJECTION, SOLUTION SUBCUTANEOUS
Qty: 15 ML | Refills: 5
Start: 2020-08-17 | End: 2020-08-27 | Stop reason: SDUPTHER

## 2020-08-17 RX ORDER — AMLODIPINE BESYLATE AND BENAZEPRIL HYDROCHLORIDE 10; 40 MG/1; MG/1
1 CAPSULE ORAL DAILY
Qty: 90 CAPSULE | Refills: 3 | Status: SHIPPED | OUTPATIENT
Start: 2020-08-17 | End: 2021-07-06 | Stop reason: SDUPTHER
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
## 2020-08-17 NOTE — ASSESSMENT & PLAN NOTE
Diabetes  The patient will have blood work as ordered for A1c  He will continue with current regimen of medications    His eye and foot exams are up-to-date  Lab Results   Component Value Date    HGBA1C 7 4 (H) 04/15/2020

## 2020-08-17 NOTE — PATIENT INSTRUCTIONS

## 2020-08-17 NOTE — ASSESSMENT & PLAN NOTE
Hip fracture  Patient will continue follow-up with his orthopedist in regards to his hip fracture in pain  He is planning to have removal of screws in the near future  He will then have to wait 6 months before possibly a obtaining total right hip replacement

## 2020-08-17 NOTE — TELEPHONE ENCOUNTER
----- Message from Alessandro Wells MD sent at 5/63/9365  1:00 PM EDT -----  Recent blood work stable for patient however A1c is at 7 5    I would increase his Basaglar insulin from 20 up to 25 units in the evening
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
## 2020-08-17 NOTE — ASSESSMENT & PLAN NOTE
DVT   Patient continues to see hematologist for monitoring of blood test for hypercoagulability    He continues to use Xarelto on a daily basis

## 2020-08-17 NOTE — ASSESSMENT & PLAN NOTE
Hypertension  Patient was given increase on his Lotrel medication to 10/40 mg once p o  q day    He will continue with blood pressure checks at home and call if they maintain above 140/90

## 2020-08-27 DIAGNOSIS — E11.9 TYPE 2 DIABETES MELLITUS WITHOUT COMPLICATION, WITHOUT LONG-TERM CURRENT USE OF INSULIN (HCC): ICD-10-CM

## 2020-08-28 RX ORDER — PEN NEEDLE, DIABETIC 30 GX3/16"
NEEDLE, DISPOSABLE MISCELLANEOUS DAILY
Qty: 30 EACH | Refills: 5 | Status: SHIPPED | OUTPATIENT
Start: 2020-08-28 | End: 2020-09-25 | Stop reason: SDUPTHER

## 2020-08-28 RX ORDER — INSULIN GLARGINE 100 [IU]/ML
25 INJECTION, SOLUTION SUBCUTANEOUS
Qty: 15 ML | Refills: 1
Start: 2020-08-28 | End: 2020-08-31 | Stop reason: SDUPTHER

## 2020-08-31 DIAGNOSIS — E11.9 TYPE 2 DIABETES MELLITUS WITHOUT COMPLICATION, WITHOUT LONG-TERM CURRENT USE OF INSULIN (HCC): ICD-10-CM

## 2020-08-31 RX ORDER — INSULIN GLARGINE 100 [IU]/ML
25 INJECTION, SOLUTION SUBCUTANEOUS
Qty: 15 ML | Refills: 5
Start: 2020-08-31 | End: 2021-01-25 | Stop reason: SDUPTHER

## 2020-09-02 DIAGNOSIS — I10 BENIGN ESSENTIAL HYPERTENSION: Primary | ICD-10-CM

## 2020-09-02 RX ORDER — METOPROLOL SUCCINATE 25 MG/1
25 TABLET, EXTENDED RELEASE ORAL DAILY
Qty: 30 TABLET | Refills: 3 | Status: SHIPPED | OUTPATIENT
Start: 2020-09-02 | End: 2020-12-17

## 2020-09-09 LAB
LEFT EYE DIABETIC RETINOPATHY: NORMAL
RIGHT EYE DIABETIC RETINOPATHY: NORMAL

## 2020-09-12 ENCOUNTER — IMMUNIZATIONS (OUTPATIENT)
Dept: FAMILY MEDICINE CLINIC | Facility: CLINIC | Age: 60
End: 2020-09-12
Payer: COMMERCIAL

## 2020-09-12 DIAGNOSIS — Z23 FLU VACCINE NEED: Primary | ICD-10-CM

## 2020-09-12 PROCEDURE — 90682 RIV4 VACC RECOMBINANT DNA IM: CPT

## 2020-09-12 PROCEDURE — G0008 ADMIN INFLUENZA VIRUS VAC: HCPCS

## 2020-09-14 VITALS — TEMPERATURE: 97.6 F

## 2020-09-19 DIAGNOSIS — I10 BENIGN ESSENTIAL HYPERTENSION: ICD-10-CM

## 2020-09-21 ENCOUNTER — OFFICE VISIT (OUTPATIENT)
Dept: OBGYN CLINIC | Facility: CLINIC | Age: 60
End: 2020-09-21
Payer: COMMERCIAL

## 2020-09-21 ENCOUNTER — APPOINTMENT (OUTPATIENT)
Dept: RADIOLOGY | Facility: AMBULARY SURGERY CENTER | Age: 60
End: 2020-09-21
Attending: ORTHOPAEDIC SURGERY
Payer: COMMERCIAL

## 2020-09-21 VITALS
HEIGHT: 69 IN | HEART RATE: 73 BPM | BODY MASS INDEX: 37.92 KG/M2 | WEIGHT: 256 LBS | DIASTOLIC BLOOD PRESSURE: 74 MMHG | SYSTOLIC BLOOD PRESSURE: 156 MMHG

## 2020-09-21 DIAGNOSIS — M16.11 PRIMARY OSTEOARTHRITIS OF ONE HIP, RIGHT: ICD-10-CM

## 2020-09-21 DIAGNOSIS — S72.001S CLOSED FRACTURE OF NECK OF RIGHT FEMUR, SEQUELA: Primary | ICD-10-CM

## 2020-09-21 DIAGNOSIS — S72.001S CLOSED FRACTURE OF NECK OF RIGHT FEMUR, SEQUELA: ICD-10-CM

## 2020-09-21 DIAGNOSIS — T84.84XA PAINFUL ORTHOPAEDIC HARDWARE (HCC): ICD-10-CM

## 2020-09-21 PROCEDURE — 99213 OFFICE O/P EST LOW 20 MIN: CPT | Performed by: ORTHOPAEDIC SURGERY

## 2020-09-21 PROCEDURE — 73502 X-RAY EXAM HIP UNI 2-3 VIEWS: CPT

## 2020-09-21 RX ORDER — FUROSEMIDE 20 MG/1
TABLET ORAL
Qty: 90 TABLET | Refills: 1 | Status: SHIPPED | OUTPATIENT
Start: 2020-09-21 | End: 2021-07-06 | Stop reason: SDUPTHER
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
## 2020-09-22 ENCOUNTER — APPOINTMENT (OUTPATIENT)
Dept: LAB | Facility: CLINIC | Age: 60
End: 2020-09-22
Payer: COMMERCIAL

## 2020-09-22 ENCOUNTER — TELEPHONE (OUTPATIENT)
Dept: HEMATOLOGY ONCOLOGY | Facility: CLINIC | Age: 60
End: 2020-09-22

## 2020-09-22 DIAGNOSIS — Z01.818 PRE-OP TESTING: Primary | ICD-10-CM

## 2020-09-22 DIAGNOSIS — T84.84XA PAINFUL ORTHOPAEDIC HARDWARE (HCC): ICD-10-CM

## 2020-09-22 DIAGNOSIS — I82.431 ACUTE DEEP VEIN THROMBOSIS (DVT) OF POPLITEAL VEIN OF RIGHT LOWER EXTREMITY (HCC): ICD-10-CM

## 2020-09-22 LAB
ALBUMIN SERPL BCP-MCNC: 3.5 G/DL (ref 3.5–5)
ALP SERPL-CCNC: 107 U/L (ref 46–116)
ALT SERPL W P-5'-P-CCNC: 50 U/L (ref 12–78)
ANION GAP SERPL CALCULATED.3IONS-SCNC: 7 MMOL/L (ref 4–13)
AST SERPL W P-5'-P-CCNC: 19 U/L (ref 5–45)
BASOPHILS # BLD AUTO: 0.03 THOUSANDS/ΜL (ref 0–0.1)
BASOPHILS NFR BLD AUTO: 0 % (ref 0–1)
BILIRUB SERPL-MCNC: 0.22 MG/DL (ref 0.2–1)
BUN SERPL-MCNC: 11 MG/DL (ref 5–25)
CALCIUM SERPL-MCNC: 9.2 MG/DL (ref 8.3–10.1)
CHLORIDE SERPL-SCNC: 103 MMOL/L (ref 100–108)
CO2 SERPL-SCNC: 29 MMOL/L (ref 21–32)
CREAT SERPL-MCNC: 0.85 MG/DL (ref 0.6–1.3)
DEPRECATED AT III PPP: 86 % OF NORMAL (ref 92–136)
EOSINOPHIL # BLD AUTO: 0.16 THOUSAND/ΜL (ref 0–0.61)
EOSINOPHIL NFR BLD AUTO: 2 % (ref 0–6)
ERYTHROCYTE [DISTWIDTH] IN BLOOD BY AUTOMATED COUNT: 13.6 % (ref 11.6–15.1)
GFR SERPL CREATININE-BSD FRML MDRD: 95 ML/MIN/1.73SQ M
GLUCOSE SERPL-MCNC: 158 MG/DL (ref 65–140)
HCT VFR BLD AUTO: 36.9 % (ref 36.5–49.3)
HGB BLD-MCNC: 11.5 G/DL (ref 12–17)
IMM GRANULOCYTES # BLD AUTO: 0.04 THOUSAND/UL (ref 0–0.2)
IMM GRANULOCYTES NFR BLD AUTO: 1 % (ref 0–2)
LYMPHOCYTES # BLD AUTO: 2.1 THOUSANDS/ΜL (ref 0.6–4.47)
LYMPHOCYTES NFR BLD AUTO: 27 % (ref 14–44)
MCH RBC QN AUTO: 25.4 PG (ref 26.8–34.3)
MCHC RBC AUTO-ENTMCNC: 31.2 G/DL (ref 31.4–37.4)
MCV RBC AUTO: 82 FL (ref 82–98)
MONOCYTES # BLD AUTO: 0.6 THOUSAND/ΜL (ref 0.17–1.22)
MONOCYTES NFR BLD AUTO: 8 % (ref 4–12)
NEUTROPHILS # BLD AUTO: 4.76 THOUSANDS/ΜL (ref 1.85–7.62)
NEUTS SEG NFR BLD AUTO: 62 % (ref 43–75)
NRBC BLD AUTO-RTO: 0 /100 WBCS
PLATELET # BLD AUTO: 289 THOUSANDS/UL (ref 149–390)
PMV BLD AUTO: 9.5 FL (ref 8.9–12.7)
POTASSIUM SERPL-SCNC: 3.7 MMOL/L (ref 3.5–5.3)
PROT SERPL-MCNC: 7.2 G/DL (ref 6.4–8.2)
RBC # BLD AUTO: 4.53 MILLION/UL (ref 3.88–5.62)
SODIUM SERPL-SCNC: 139 MMOL/L (ref 136–145)
WBC # BLD AUTO: 7.69 THOUSAND/UL (ref 4.31–10.16)

## 2020-09-22 PROCEDURE — 80053 COMPREHEN METABOLIC PANEL: CPT

## 2020-09-22 PROCEDURE — 85300 ANTITHROMBIN III ACTIVITY: CPT

## 2020-09-22 PROCEDURE — 36415 COLL VENOUS BLD VENIPUNCTURE: CPT

## 2020-09-22 PROCEDURE — 85301 ANTITHROMBIN III ANTIGEN: CPT

## 2020-09-22 PROCEDURE — 85025 COMPLETE CBC W/AUTO DIFF WBC: CPT

## 2020-09-22 NOTE — TELEPHONE ENCOUNTER
Reschedule Appointment     Who is calling in  Patient    Doctor Appointment Scheduled with Dr Todd Mccall date and time 10/15 at 3:00pm    New date and time 10/12 at 1:40pm    Location [place]   Patient verbalized understanding    Yes

## 2020-09-24 LAB — AT III AG ACT/NOR PPP IA: 91 % (ref 72–124)

## 2020-09-25 ENCOUNTER — OFFICE VISIT (OUTPATIENT)
Dept: FAMILY MEDICINE CLINIC | Facility: CLINIC | Age: 60
End: 2020-09-25
Payer: COMMERCIAL

## 2020-09-25 VITALS
DIASTOLIC BLOOD PRESSURE: 86 MMHG | TEMPERATURE: 97.6 F | WEIGHT: 257.8 LBS | RESPIRATION RATE: 16 BRPM | BODY MASS INDEX: 38.18 KG/M2 | HEIGHT: 69 IN | OXYGEN SATURATION: 96 % | HEART RATE: 80 BPM | SYSTOLIC BLOOD PRESSURE: 150 MMHG

## 2020-09-25 DIAGNOSIS — I82.431 ACUTE DEEP VEIN THROMBOSIS (DVT) OF POPLITEAL VEIN OF RIGHT LOWER EXTREMITY (HCC): ICD-10-CM

## 2020-09-25 DIAGNOSIS — Z01.818 PREOPERATIVE EVALUATION OF A MEDICAL CONDITION TO RULE OUT SURGICAL CONTRAINDICATIONS (TAR REQUIRED): ICD-10-CM

## 2020-09-25 DIAGNOSIS — I10 BENIGN ESSENTIAL HYPERTENSION: ICD-10-CM

## 2020-09-25 DIAGNOSIS — Z01.818 PRE-OP TESTING: Primary | ICD-10-CM

## 2020-09-25 DIAGNOSIS — E11.9 TYPE 2 DIABETES MELLITUS WITHOUT COMPLICATION, WITHOUT LONG-TERM CURRENT USE OF INSULIN (HCC): ICD-10-CM

## 2020-09-25 PROCEDURE — 93000 ELECTROCARDIOGRAM COMPLETE: CPT | Performed by: FAMILY MEDICINE

## 2020-09-25 PROCEDURE — 3725F SCREEN DEPRESSION PERFORMED: CPT | Performed by: FAMILY MEDICINE

## 2020-09-25 PROCEDURE — 99214 OFFICE O/P EST MOD 30 MIN: CPT | Performed by: FAMILY MEDICINE

## 2020-09-25 NOTE — ASSESSMENT & PLAN NOTE
History of DVT  Patient continues on Xarelto 20 mg daily    We will verify with his hematologist whether patient may be able to discontinue Xarelto after his upcoming surgery or whether he will require life long anticoagulation
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
## 2020-09-25 NOTE — ASSESSMENT & PLAN NOTE
Diabetes  Patient's latest A1c in August was 7 5  However patient states his home daily blood sugar logs have significantly improved since we titrated his insulin to 25 units daily    Lab Results   Component Value Date    HGBA1C 7 5 (H) 08/17/2020

## 2020-09-26 ENCOUNTER — TELEPHONE (OUTPATIENT)
Dept: FAMILY MEDICINE CLINIC | Facility: CLINIC | Age: 60
End: 2020-09-26

## 2020-09-28 NOTE — TELEPHONE ENCOUNTER
Hi Dr Alta Pelayo, I saw Mr Patten for his preop prior to his hip surgery in October  He was wondering if he will be requiring lifelong use of Xarelto or would he be able to discontinue Xarelto 4 weeks after his surgery if he has no complications?  Thanks
Hi Dr Gabe Roberts, I saw Mr Patten for his pre-op prior to upcoming surgery  He was questioning whether he would have to have 100% non-weight bearing status on hip and for how long? He has concerns since he is on Lasix medication that requires him to have frequent trips to the bathroom   Thanks
Hi Dr Rogelio Moritz,  We did already  Discuss with the patient and explained to him why he needs to be 100% nonweightbearing  For at least for possibly 6 weeks after surgery  We  Already did explain to him that this is due to us  Removing the screws in the intertrochanteric region which would leave him susceptible to fracture propagating around the screw holes until enough healing has occurred which would take up to 6 weeks  He will have to use a portable urinal container or bedside commode  Or bedpan until we allow him to bear weight again which will be no sooner than 4 weeks possibly to 6 weeks     Please let us know if you need us to contact the patient and reiterate or re-explain this to him  Thanks,  Ronen Frank PA-C
His activity was low on repeat although antigen was normal   The low activity would mean he should stay on anticoagulation for now and maybe repeat his testing in 4 months to see if it normalizes  With 2 blood clots and low activity he does have a hypercoagulable state 
On review my note, I had stated in my note based on his low antithrombin 3 level that he should stay on his anticoagulation until his mobility is fully restored AND we prove his antithrombin 3 level is normal and the 1st level was fictitious  His antithrombin 3 previously was low  If his antithrombin 3 level when repeated is still low I would advise him to stay on anticoagulation lifelong  If it returns to normal and he is now fully mobile he can then discontinue his anticoagulation 
? retrocardiac opacity on x-ray.  afebrile,  normal sats.  Will d/c with abx, pediatrician follow up.

## 2020-09-29 ENCOUNTER — TELEPHONE (OUTPATIENT)
Dept: FAMILY MEDICINE CLINIC | Facility: CLINIC | Age: 60
End: 2020-09-29

## 2020-09-29 NOTE — TELEPHONE ENCOUNTER
Please call patient  I heard back from his orthopedist Beatriz Valladares who did state he would have to be 100% nonweightbearing on his leg after surgery and that he would have to use a urinal bottle since he does not want him to put any weight on operated leg    I also heard from [facility] who stated he would like to do further blood test in 4 months to see risks involved with future blood clots but for now he would have to stay on Eliquis at least for the next 4 months

## 2020-10-07 DIAGNOSIS — E11.9 TYPE 2 DIABETES MELLITUS WITHOUT COMPLICATION, WITHOUT LONG-TERM CURRENT USE OF INSULIN (HCC): ICD-10-CM

## 2020-10-07 RX ORDER — SITAGLIPTIN 100 MG/1
TABLET, FILM COATED ORAL
Qty: 90 TABLET | Refills: 3 | Status: SHIPPED | OUTPATIENT
Start: 2020-10-07 | End: 2021-07-06 | Stop reason: SDUPTHER

## 2020-10-12 ENCOUNTER — OFFICE VISIT (OUTPATIENT)
Dept: HEMATOLOGY ONCOLOGY | Facility: HOSPITAL | Age: 60
End: 2020-10-12
Payer: COMMERCIAL

## 2020-10-12 ENCOUNTER — ANESTHESIA EVENT (OUTPATIENT)
Dept: PERIOP | Facility: HOSPITAL | Age: 60
End: 2020-10-12
Payer: COMMERCIAL

## 2020-10-12 VITALS
RESPIRATION RATE: 16 BRPM | HEIGHT: 69 IN | TEMPERATURE: 97.9 F | BODY MASS INDEX: 38.95 KG/M2 | WEIGHT: 263 LBS | DIASTOLIC BLOOD PRESSURE: 88 MMHG | SYSTOLIC BLOOD PRESSURE: 168 MMHG

## 2020-10-12 DIAGNOSIS — I82.431 ACUTE DEEP VEIN THROMBOSIS (DVT) OF POPLITEAL VEIN OF RIGHT LOWER EXTREMITY (HCC): ICD-10-CM

## 2020-10-12 DIAGNOSIS — D68.59 ANTITHROMBIN 3 DEFICIENCY (HCC): Primary | ICD-10-CM

## 2020-10-12 PROCEDURE — 1036F TOBACCO NON-USER: CPT | Performed by: INTERNAL MEDICINE

## 2020-10-12 PROCEDURE — 99214 OFFICE O/P EST MOD 30 MIN: CPT | Performed by: INTERNAL MEDICINE

## 2020-10-13 ENCOUNTER — APPOINTMENT (OUTPATIENT)
Dept: RADIOLOGY | Facility: HOSPITAL | Age: 60
End: 2020-10-13
Payer: COMMERCIAL

## 2020-10-13 ENCOUNTER — ANESTHESIA (OUTPATIENT)
Dept: PERIOP | Facility: HOSPITAL | Age: 60
End: 2020-10-13
Payer: COMMERCIAL

## 2020-10-13 ENCOUNTER — HOSPITAL ENCOUNTER (OUTPATIENT)
Facility: HOSPITAL | Age: 60
Setting detail: OUTPATIENT SURGERY
Discharge: HOME | End: 2020-10-13
Attending: ORTHOPAEDIC SURGERY | Admitting: ORTHOPAEDIC SURGERY
Payer: COMMERCIAL

## 2020-10-13 VITALS
BODY MASS INDEX: 38.06 KG/M2 | SYSTOLIC BLOOD PRESSURE: 173 MMHG | OXYGEN SATURATION: 96 % | HEART RATE: 71 BPM | DIASTOLIC BLOOD PRESSURE: 84 MMHG | RESPIRATION RATE: 18 BRPM | WEIGHT: 257 LBS | TEMPERATURE: 97.3 F | HEIGHT: 69 IN

## 2020-10-13 VITALS — HEART RATE: 75 BPM

## 2020-10-13 PROBLEM — T84.84XA PAINFUL ORTHOPAEDIC HARDWARE (HCC): Status: ACTIVE | Noted: 2020-10-13

## 2020-10-13 LAB
GLUCOSE SERPL-MCNC: 121 MG/DL (ref 65–140)
GLUCOSE SERPL-MCNC: 137 MG/DL (ref 65–140)

## 2020-10-13 PROCEDURE — 73501 X-RAY EXAM HIP UNI 1 VIEW: CPT

## 2020-10-13 PROCEDURE — 82948 REAGENT STRIP/BLOOD GLUCOSE: CPT

## 2020-10-13 PROCEDURE — 20680 REMOVAL OF IMPLANT DEEP: CPT | Performed by: PHYSICIAN ASSISTANT

## 2020-10-13 PROCEDURE — 20680 REMOVAL OF IMPLANT DEEP: CPT | Performed by: ORTHOPAEDIC SURGERY

## 2020-10-13 RX ORDER — SODIUM CHLORIDE, SODIUM LACTATE, POTASSIUM CHLORIDE, CALCIUM CHLORIDE 600; 310; 30; 20 MG/100ML; MG/100ML; MG/100ML; MG/100ML
INJECTION, SOLUTION INTRAVENOUS CONTINUOUS PRN
Status: DISCONTINUED | OUTPATIENT
Start: 2020-10-13 | End: 2020-10-13

## 2020-10-13 RX ORDER — SODIUM CHLORIDE, SODIUM LACTATE, POTASSIUM CHLORIDE, CALCIUM CHLORIDE 600; 310; 30; 20 MG/100ML; MG/100ML; MG/100ML; MG/100ML
125 INJECTION, SOLUTION INTRAVENOUS CONTINUOUS
Status: DISCONTINUED | OUTPATIENT
Start: 2020-10-13 | End: 2020-10-13 | Stop reason: HOSPADM

## 2020-10-13 RX ORDER — ACETAMINOPHEN 325 MG/1
975 TABLET ORAL ONCE
Status: COMPLETED | OUTPATIENT
Start: 2020-10-13 | End: 2020-10-13

## 2020-10-13 RX ORDER — SUCCINYLCHOLINE/SOD CL,ISO/PF 100 MG/5ML
SYRINGE (ML) INTRAVENOUS AS NEEDED
Status: DISCONTINUED | OUTPATIENT
Start: 2020-10-13 | End: 2020-10-13

## 2020-10-13 RX ORDER — OXYCODONE HYDROCHLORIDE 5 MG/1
10 TABLET ORAL EVERY 4 HOURS PRN
Status: DISCONTINUED | OUTPATIENT
Start: 2020-10-13 | End: 2020-10-13 | Stop reason: HOSPADM

## 2020-10-13 RX ORDER — ONDANSETRON 2 MG/ML
4 INJECTION INTRAMUSCULAR; INTRAVENOUS EVERY 6 HOURS PRN
Status: DISCONTINUED | OUTPATIENT
Start: 2020-10-13 | End: 2020-10-13 | Stop reason: HOSPADM

## 2020-10-13 RX ORDER — LIDOCAINE HYDROCHLORIDE 10 MG/ML
INJECTION, SOLUTION EPIDURAL; INFILTRATION; INTRACAUDAL; PERINEURAL AS NEEDED
Status: DISCONTINUED | OUTPATIENT
Start: 2020-10-13 | End: 2020-10-13

## 2020-10-13 RX ORDER — LORAZEPAM 2 MG/ML
0.5 INJECTION INTRAMUSCULAR ONCE
Status: COMPLETED | OUTPATIENT
Start: 2020-10-13 | End: 2020-10-13

## 2020-10-13 RX ORDER — LABETALOL 20 MG/4 ML (5 MG/ML) INTRAVENOUS SYRINGE
10
Status: DISCONTINUED | OUTPATIENT
Start: 2020-10-13 | End: 2020-10-13 | Stop reason: HOSPADM

## 2020-10-13 RX ORDER — GLYCOPYRROLATE 0.2 MG/ML
INJECTION INTRAMUSCULAR; INTRAVENOUS AS NEEDED
Status: DISCONTINUED | OUTPATIENT
Start: 2020-10-13 | End: 2020-10-13

## 2020-10-13 RX ORDER — ONDANSETRON 2 MG/ML
4 INJECTION INTRAMUSCULAR; INTRAVENOUS ONCE AS NEEDED
Status: DISCONTINUED | OUTPATIENT
Start: 2020-10-13 | End: 2020-10-13 | Stop reason: HOSPADM

## 2020-10-13 RX ORDER — MIDAZOLAM HYDROCHLORIDE 2 MG/2ML
INJECTION, SOLUTION INTRAMUSCULAR; INTRAVENOUS AS NEEDED
Status: DISCONTINUED | OUTPATIENT
Start: 2020-10-13 | End: 2020-10-13

## 2020-10-13 RX ORDER — MORPHINE SULFATE 4 MG/ML
2 INJECTION, SOLUTION INTRAMUSCULAR; INTRAVENOUS EVERY 4 HOURS PRN
Status: DISCONTINUED | OUTPATIENT
Start: 2020-10-13 | End: 2020-10-13 | Stop reason: HOSPADM

## 2020-10-13 RX ORDER — HYDROMORPHONE HCL/PF 1 MG/ML
SYRINGE (ML) INJECTION AS NEEDED
Status: DISCONTINUED | OUTPATIENT
Start: 2020-10-13 | End: 2020-10-13

## 2020-10-13 RX ORDER — KETAMINE HYDROCHLORIDE 50 MG/ML
INJECTION, SOLUTION, CONCENTRATE INTRAMUSCULAR; INTRAVENOUS AS NEEDED
Status: DISCONTINUED | OUTPATIENT
Start: 2020-10-13 | End: 2020-10-13

## 2020-10-13 RX ORDER — MEPERIDINE HYDROCHLORIDE 25 MG/ML
25 INJECTION INTRAMUSCULAR; INTRAVENOUS; SUBCUTANEOUS
Status: DISCONTINUED | OUTPATIENT
Start: 2020-10-13 | End: 2020-10-13 | Stop reason: HOSPADM

## 2020-10-13 RX ORDER — CEFAZOLIN SODIUM 2 G/50ML
2000 SOLUTION INTRAVENOUS ONCE
Status: DISCONTINUED | OUTPATIENT
Start: 2020-10-13 | End: 2020-10-13 | Stop reason: HOSPADM

## 2020-10-13 RX ORDER — OXYCODONE HYDROCHLORIDE 5 MG/1
5 TABLET ORAL EVERY 4 HOURS PRN
Status: DISCONTINUED | OUTPATIENT
Start: 2020-10-13 | End: 2020-10-13 | Stop reason: HOSPADM

## 2020-10-13 RX ORDER — CEFAZOLIN SODIUM 2 G/50ML
SOLUTION INTRAVENOUS AS NEEDED
Status: DISCONTINUED | OUTPATIENT
Start: 2020-10-13 | End: 2020-10-13

## 2020-10-13 RX ORDER — FENTANYL CITRATE/PF 50 MCG/ML
50 SYRINGE (ML) INJECTION
Status: COMPLETED | OUTPATIENT
Start: 2020-10-13 | End: 2020-10-13

## 2020-10-13 RX ORDER — HYDROMORPHONE HCL/PF 1 MG/ML
0.5 SYRINGE (ML) INJECTION
Status: COMPLETED | OUTPATIENT
Start: 2020-10-13 | End: 2020-10-13

## 2020-10-13 RX ORDER — FENTANYL CITRATE 50 UG/ML
INJECTION, SOLUTION INTRAMUSCULAR; INTRAVENOUS AS NEEDED
Status: DISCONTINUED | OUTPATIENT
Start: 2020-10-13 | End: 2020-10-13

## 2020-10-13 RX ORDER — BUPIVACAINE HYDROCHLORIDE 2.5 MG/ML
INJECTION, SOLUTION EPIDURAL; INFILTRATION; INTRACAUDAL AS NEEDED
Status: DISCONTINUED | OUTPATIENT
Start: 2020-10-13 | End: 2020-10-13 | Stop reason: HOSPADM

## 2020-10-13 RX ORDER — LIDOCAINE HYDROCHLORIDE 10 MG/ML
0.5 INJECTION, SOLUTION EPIDURAL; INFILTRATION; INTRACAUDAL; PERINEURAL ONCE AS NEEDED
Status: DISCONTINUED | OUTPATIENT
Start: 2020-10-13 | End: 2020-10-13 | Stop reason: HOSPADM

## 2020-10-13 RX ADMIN — HYDROMORPHONE HYDROCHLORIDE 0.5 MG: 1 INJECTION, SOLUTION INTRAMUSCULAR; INTRAVENOUS; SUBCUTANEOUS at 14:28

## 2020-10-13 RX ADMIN — KETAMINE HYDROCHLORIDE 10 MG: 50 INJECTION INTRAMUSCULAR; INTRAVENOUS at 13:36

## 2020-10-13 RX ADMIN — PHENYLEPHRINE HYDROCHLORIDE 100 MCG: 10 INJECTION INTRAVENOUS at 13:04

## 2020-10-13 RX ADMIN — FENTANYL CITRATE 50 MCG: 50 INJECTION INTRAMUSCULAR; INTRAVENOUS at 14:12

## 2020-10-13 RX ADMIN — GLYCOPYRROLATE 0.2 MG: 0.2 INJECTION, SOLUTION INTRAMUSCULAR; INTRAVENOUS at 13:06

## 2020-10-13 RX ADMIN — LABETALOL 20 MG/4 ML (5 MG/ML) INTRAVENOUS SYRINGE 10 MG: at 14:42

## 2020-10-13 RX ADMIN — LIDOCAINE HYDROCHLORIDE 50 MG: 10 INJECTION, SOLUTION EPIDURAL; INFILTRATION; INTRACAUDAL at 12:44

## 2020-10-13 RX ADMIN — KETAMINE HYDROCHLORIDE 15 MG: 50 INJECTION INTRAMUSCULAR; INTRAVENOUS at 13:26

## 2020-10-13 RX ADMIN — ACETAMINOPHEN 975 MG: 325 TABLET, FILM COATED ORAL at 12:21

## 2020-10-13 RX ADMIN — PHENYLEPHRINE HYDROCHLORIDE 200 MCG: 10 INJECTION INTRAVENOUS at 13:06

## 2020-10-13 RX ADMIN — FENTANYL CITRATE 50 MCG: 50 INJECTION INTRAMUSCULAR; INTRAVENOUS at 13:20

## 2020-10-13 RX ADMIN — LORAZEPAM 0.5 MG: 2 INJECTION INTRAMUSCULAR; INTRAVENOUS at 14:44

## 2020-10-13 RX ADMIN — FENTANYL CITRATE 50 MCG: 50 INJECTION INTRAMUSCULAR; INTRAVENOUS at 14:17

## 2020-10-13 RX ADMIN — FENTANYL CITRATE 50 MCG: 50 INJECTION INTRAMUSCULAR; INTRAVENOUS at 12:44

## 2020-10-13 RX ADMIN — HYDROMORPHONE HYDROCHLORIDE 0.5 MG: 1 INJECTION, SOLUTION INTRAMUSCULAR; INTRAVENOUS; SUBCUTANEOUS at 14:23

## 2020-10-13 RX ADMIN — KETAMINE HYDROCHLORIDE 25 MG: 50 INJECTION INTRAMUSCULAR; INTRAVENOUS at 12:44

## 2020-10-13 RX ADMIN — Medication 100 MG: at 12:44

## 2020-10-13 RX ADMIN — HYDROMORPHONE HYDROCHLORIDE 0.5 MG: 1 INJECTION, SOLUTION INTRAMUSCULAR; INTRAVENOUS; SUBCUTANEOUS at 13:40

## 2020-10-13 RX ADMIN — OXYCODONE HYDROCHLORIDE 10 MG: 5 TABLET ORAL at 16:01

## 2020-10-13 RX ADMIN — SODIUM CHLORIDE, SODIUM LACTATE, POTASSIUM CHLORIDE, AND CALCIUM CHLORIDE: .6; .31; .03; .02 INJECTION, SOLUTION INTRAVENOUS at 12:38

## 2020-10-13 RX ADMIN — CEFAZOLIN SODIUM 2000 MG: 2 SOLUTION INTRAVENOUS at 12:33

## 2020-10-13 RX ADMIN — MIDAZOLAM HYDROCHLORIDE 2 MG: 1 INJECTION, SOLUTION INTRAMUSCULAR; INTRAVENOUS at 12:35

## 2020-10-13 RX ADMIN — HYDROMORPHONE HYDROCHLORIDE 0.5 MG: 1 INJECTION, SOLUTION INTRAMUSCULAR; INTRAVENOUS; SUBCUTANEOUS at 14:50

## 2020-10-13 RX ADMIN — HYDROMORPHONE HYDROCHLORIDE 0.5 MG: 1 INJECTION, SOLUTION INTRAMUSCULAR; INTRAVENOUS; SUBCUTANEOUS at 14:55

## 2020-10-19 ENCOUNTER — APPOINTMENT (OUTPATIENT)
Dept: RADIOLOGY | Facility: AMBULARY SURGERY CENTER | Age: 60
End: 2020-10-19
Payer: COMMERCIAL

## 2020-10-19 ENCOUNTER — LAB (OUTPATIENT)
Dept: LAB | Facility: HOSPITAL | Age: 60
End: 2020-10-19
Payer: COMMERCIAL

## 2020-10-19 ENCOUNTER — OFFICE VISIT (OUTPATIENT)
Dept: OBGYN CLINIC | Facility: CLINIC | Age: 60
End: 2020-10-19
Payer: COMMERCIAL

## 2020-10-19 ENCOUNTER — TELEPHONE (OUTPATIENT)
Dept: OBGYN CLINIC | Facility: CLINIC | Age: 60
End: 2020-10-19

## 2020-10-19 VITALS — HEART RATE: 88 BPM | DIASTOLIC BLOOD PRESSURE: 75 MMHG | SYSTOLIC BLOOD PRESSURE: 147 MMHG

## 2020-10-19 DIAGNOSIS — M25.561 RIGHT KNEE PAIN, UNSPECIFIED CHRONICITY: ICD-10-CM

## 2020-10-19 DIAGNOSIS — T84.84XA PAINFUL ORTHOPAEDIC HARDWARE (HCC): ICD-10-CM

## 2020-10-19 DIAGNOSIS — M25.461 EFFUSION OF RIGHT KNEE: Primary | ICD-10-CM

## 2020-10-19 DIAGNOSIS — M25.461 EFFUSION OF RIGHT KNEE: ICD-10-CM

## 2020-10-19 LAB
CRYSTALS SNV QL MICRO: NORMAL
MONOCYTES NFR SNV MANUAL: 22 %
NEUTROPHILS NFR SNV MANUAL: 78 %
RBC # SNV MANUAL: 2000 /UL (ref 0–10)
TOTAL CELLS COUNTED SPEC: 100
WBC # FLD MANUAL: 3881 /UL (ref 0–200)

## 2020-10-19 PROCEDURE — 20610 DRAIN/INJ JOINT/BURSA W/O US: CPT | Performed by: PHYSICIAN ASSISTANT

## 2020-10-19 PROCEDURE — 89051 BODY FLUID CELL COUNT: CPT

## 2020-10-19 PROCEDURE — 89050 BODY FLUID CELL COUNT: CPT

## 2020-10-19 PROCEDURE — 89060 EXAM SYNOVIAL FLUID CRYSTALS: CPT | Performed by: PHYSICIAN ASSISTANT

## 2020-10-19 PROCEDURE — 99024 POSTOP FOLLOW-UP VISIT: CPT | Performed by: PHYSICIAN ASSISTANT

## 2020-10-19 PROCEDURE — 87075 CULTR BACTERIA EXCEPT BLOOD: CPT

## 2020-10-19 PROCEDURE — 87070 CULTURE OTHR SPECIMN AEROBIC: CPT | Performed by: PHYSICIAN ASSISTANT

## 2020-10-19 PROCEDURE — 87205 SMEAR GRAM STAIN: CPT | Performed by: PHYSICIAN ASSISTANT

## 2020-10-19 PROCEDURE — 73560 X-RAY EXAM OF KNEE 1 OR 2: CPT

## 2020-10-19 RX ORDER — LIDOCAINE HYDROCHLORIDE 10 MG/ML
5 INJECTION, SOLUTION INFILTRATION; PERINEURAL
Status: COMPLETED | OUTPATIENT
Start: 2020-10-19 | End: 2020-10-19

## 2020-10-19 RX ADMIN — LIDOCAINE HYDROCHLORIDE 5 ML: 10 INJECTION, SOLUTION INFILTRATION; PERINEURAL at 12:03

## 2020-10-22 ENCOUNTER — TELEPHONE (OUTPATIENT)
Dept: OBGYN CLINIC | Facility: HOSPITAL | Age: 60
End: 2020-10-22

## 2020-10-22 LAB
BACTERIA SPEC ANAEROBE CULT: NO GROWTH
BACTERIA SPEC BFLD CULT: NO GROWTH
GRAM STN SPEC: NORMAL
GRAM STN SPEC: NORMAL

## 2020-10-26 ENCOUNTER — OFFICE VISIT (OUTPATIENT)
Dept: OBGYN CLINIC | Facility: CLINIC | Age: 60
End: 2020-10-26

## 2020-10-26 ENCOUNTER — APPOINTMENT (OUTPATIENT)
Dept: RADIOLOGY | Facility: AMBULARY SURGERY CENTER | Age: 60
End: 2020-10-26
Attending: ORTHOPAEDIC SURGERY
Payer: COMMERCIAL

## 2020-10-26 VITALS — WEIGHT: 257 LBS | BODY MASS INDEX: 38.06 KG/M2 | HEIGHT: 69 IN

## 2020-10-26 DIAGNOSIS — T84.84XA PAINFUL ORTHOPAEDIC HARDWARE (HCC): ICD-10-CM

## 2020-10-26 DIAGNOSIS — T84.84XA PAINFUL ORTHOPAEDIC HARDWARE (HCC): Primary | ICD-10-CM

## 2020-10-26 PROCEDURE — 99024 POSTOP FOLLOW-UP VISIT: CPT | Performed by: ORTHOPAEDIC SURGERY

## 2020-10-26 PROCEDURE — 3008F BODY MASS INDEX DOCD: CPT | Performed by: INTERNAL MEDICINE

## 2020-10-26 PROCEDURE — 73502 X-RAY EXAM HIP UNI 2-3 VIEWS: CPT

## 2020-11-11 ENCOUNTER — OFFICE VISIT (OUTPATIENT)
Dept: OBGYN CLINIC | Facility: CLINIC | Age: 60
End: 2020-11-11

## 2020-11-11 ENCOUNTER — APPOINTMENT (OUTPATIENT)
Dept: RADIOLOGY | Facility: AMBULARY SURGERY CENTER | Age: 60
End: 2020-11-11
Attending: ORTHOPAEDIC SURGERY
Payer: COMMERCIAL

## 2020-11-11 VITALS — HEART RATE: 66 BPM | SYSTOLIC BLOOD PRESSURE: 137 MMHG | DIASTOLIC BLOOD PRESSURE: 77 MMHG

## 2020-11-11 DIAGNOSIS — S72.001S CLOSED FRACTURE OF NECK OF RIGHT FEMUR, SEQUELA: Primary | ICD-10-CM

## 2020-11-11 DIAGNOSIS — S72.001S CLOSED FRACTURE OF NECK OF RIGHT FEMUR, SEQUELA: ICD-10-CM

## 2020-11-11 DIAGNOSIS — T84.84XA PAINFUL ORTHOPAEDIC HARDWARE (HCC): ICD-10-CM

## 2020-11-11 PROCEDURE — 73502 X-RAY EXAM HIP UNI 2-3 VIEWS: CPT

## 2020-11-11 PROCEDURE — 99024 POSTOP FOLLOW-UP VISIT: CPT | Performed by: ORTHOPAEDIC SURGERY

## 2020-12-04 ENCOUNTER — OFFICE VISIT (OUTPATIENT)
Dept: OBGYN CLINIC | Facility: CLINIC | Age: 60
End: 2020-12-04

## 2020-12-04 ENCOUNTER — APPOINTMENT (OUTPATIENT)
Dept: RADIOLOGY | Facility: AMBULARY SURGERY CENTER | Age: 60
End: 2020-12-04
Attending: ORTHOPAEDIC SURGERY
Payer: COMMERCIAL

## 2020-12-04 VITALS
SYSTOLIC BLOOD PRESSURE: 170 MMHG | DIASTOLIC BLOOD PRESSURE: 80 MMHG | BODY MASS INDEX: 40.34 KG/M2 | WEIGHT: 257 LBS | HEIGHT: 67 IN | HEART RATE: 73 BPM

## 2020-12-04 DIAGNOSIS — S72.001S CLOSED FRACTURE OF NECK OF RIGHT FEMUR, SEQUELA: Primary | ICD-10-CM

## 2020-12-04 DIAGNOSIS — M16.11 PRIMARY OSTEOARTHRITIS OF ONE HIP, RIGHT: ICD-10-CM

## 2020-12-04 DIAGNOSIS — S72.001S CLOSED FRACTURE OF NECK OF RIGHT FEMUR, SEQUELA: ICD-10-CM

## 2020-12-04 PROCEDURE — 99024 POSTOP FOLLOW-UP VISIT: CPT | Performed by: ORTHOPAEDIC SURGERY

## 2020-12-04 PROCEDURE — 3008F BODY MASS INDEX DOCD: CPT | Performed by: INTERNAL MEDICINE

## 2020-12-04 PROCEDURE — 73502 X-RAY EXAM HIP UNI 2-3 VIEWS: CPT

## 2020-12-17 DIAGNOSIS — I10 BENIGN ESSENTIAL HYPERTENSION: ICD-10-CM

## 2020-12-17 RX ORDER — METOPROLOL SUCCINATE 25 MG/1
TABLET, EXTENDED RELEASE ORAL
Qty: 30 TABLET | Refills: 3 | Status: SHIPPED | OUTPATIENT
Start: 2020-12-17 | End: 2021-04-12

## 2020-12-23 DIAGNOSIS — E11.9 TYPE 2 DIABETES MELLITUS WITHOUT COMPLICATION, WITHOUT LONG-TERM CURRENT USE OF INSULIN (HCC): ICD-10-CM

## 2020-12-23 RX ORDER — GLIMEPIRIDE 2 MG/1
TABLET ORAL
Qty: 180 TABLET | Refills: 1 | Status: SHIPPED | OUTPATIENT
Start: 2020-12-23 | End: 2020-12-30

## 2020-12-30 DIAGNOSIS — E11.9 TYPE 2 DIABETES MELLITUS WITHOUT COMPLICATION, WITHOUT LONG-TERM CURRENT USE OF INSULIN (HCC): ICD-10-CM

## 2020-12-30 RX ORDER — GLIMEPIRIDE 2 MG/1
TABLET ORAL
Qty: 180 TABLET | Refills: 1 | Status: SHIPPED | OUTPATIENT
Start: 2020-12-30 | End: 2021-05-12 | Stop reason: DRUGHIGH

## 2021-01-07 ENCOUNTER — VBI (OUTPATIENT)
Dept: ADMINISTRATIVE | Facility: OTHER | Age: 61
End: 2021-01-07

## 2021-01-13 ENCOUNTER — TELEPHONE (OUTPATIENT)
Dept: HEMATOLOGY ONCOLOGY | Facility: CLINIC | Age: 61
End: 2021-01-13

## 2021-01-13 NOTE — TELEPHONE ENCOUNTER
Patient called stating he does not have lab order  I informed patient the orders are in his chart and he does not need the physical order when going to a [facility] lab   Patient understood

## 2021-01-25 DIAGNOSIS — E11.9 TYPE 2 DIABETES MELLITUS WITHOUT COMPLICATION, WITHOUT LONG-TERM CURRENT USE OF INSULIN (HCC): ICD-10-CM

## 2021-01-26 RX ORDER — INSULIN GLARGINE 100 [IU]/ML
25 INJECTION, SOLUTION SUBCUTANEOUS
Qty: 15 ML | Refills: 5
Start: 2021-01-26 | End: 2021-02-09 | Stop reason: SDUPTHER

## 2021-01-26 NOTE — TELEPHONE ENCOUNTER
Patient Is requesting a refill       Requested Prescriptions     Pending Prescriptions Disp Refills    insulin glargine (Basaglar KwikPen) 100 units/mL injection pen 15 mL 0     Sig: Inject 25 Units under the skin daily at bedtime       Please review and advise

## 2021-01-27 ENCOUNTER — VBI (OUTPATIENT)
Dept: ADMINISTRATIVE | Facility: OTHER | Age: 61
End: 2021-01-27

## 2021-01-27 NOTE — TELEPHONE ENCOUNTER
01/27/21 7:18 AM     [facility] the [facility]       [place] Contes

## 2021-02-05 ENCOUNTER — OFFICE VISIT (OUTPATIENT)
Dept: FAMILY MEDICINE CLINIC | Facility: CLINIC | Age: 61
End: 2021-02-05
Payer: COMMERCIAL

## 2021-02-05 VITALS
RESPIRATION RATE: 16 BRPM | BODY MASS INDEX: 35.53 KG/M2 | HEIGHT: 67 IN | OXYGEN SATURATION: 98 % | WEIGHT: 226.38 LBS | SYSTOLIC BLOOD PRESSURE: 158 MMHG | HEART RATE: 68 BPM | TEMPERATURE: 98 F | DIASTOLIC BLOOD PRESSURE: 90 MMHG

## 2021-02-05 DIAGNOSIS — R35.1 NOCTURIA: ICD-10-CM

## 2021-02-05 DIAGNOSIS — I10 BENIGN ESSENTIAL HYPERTENSION: Primary | ICD-10-CM

## 2021-02-05 DIAGNOSIS — I82.431 ACUTE DEEP VEIN THROMBOSIS (DVT) OF POPLITEAL VEIN OF RIGHT LOWER EXTREMITY (HCC): ICD-10-CM

## 2021-02-05 DIAGNOSIS — Z79.4 TYPE 2 DIABETES MELLITUS WITH DIABETIC POLYNEUROPATHY, WITH LONG-TERM CURRENT USE OF INSULIN (HCC): ICD-10-CM

## 2021-02-05 DIAGNOSIS — E11.42 TYPE 2 DIABETES MELLITUS WITH DIABETIC POLYNEUROPATHY, WITH LONG-TERM CURRENT USE OF INSULIN (HCC): ICD-10-CM

## 2021-02-05 PROBLEM — D68.59 ANTITHROMBIN 3 DEFICIENCY (HCC): Status: ACTIVE | Noted: 2021-02-05

## 2021-02-05 PROCEDURE — 99214 OFFICE O/P EST MOD 30 MIN: CPT | Performed by: FAMILY MEDICINE

## 2021-02-05 PROCEDURE — 3725F SCREEN DEPRESSION PERFORMED: CPT | Performed by: FAMILY MEDICINE

## 2021-02-05 NOTE — ASSESSMENT & PLAN NOTE
History of DVT   Patient will continue to follow-up with his hematologist to monitor his coagulopathy and see if he is able to be cleared for surgery in the future for right total hip replacement
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
## 2021-02-05 NOTE — ASSESSMENT & PLAN NOTE
Diabetes  Patient will obtain blood work as ordered for further evaluation    His eye and foot exams are up-to-date  Lab Results   Component Value Date    HGBA1C 7 5 (H) 08/17/2020

## 2021-02-05 NOTE — ASSESSMENT & PLAN NOTE
Hypertension  Patient blood pressure is stable at this time he will continue with current regimen of medications    He will continue to check home blood pressures and call if he averages above 140/90

## 2021-02-08 ENCOUNTER — APPOINTMENT (OUTPATIENT)
Dept: LAB | Facility: CLINIC | Age: 61
End: 2021-02-08
Payer: COMMERCIAL

## 2021-02-08 DIAGNOSIS — D68.59 ANTITHROMBIN 3 DEFICIENCY (HCC): ICD-10-CM

## 2021-02-08 DIAGNOSIS — R35.1 NOCTURIA: ICD-10-CM

## 2021-02-08 DIAGNOSIS — Z79.4 TYPE 2 DIABETES MELLITUS WITH DIABETIC POLYNEUROPATHY, WITH LONG-TERM CURRENT USE OF INSULIN (HCC): ICD-10-CM

## 2021-02-08 DIAGNOSIS — I82.431 ACUTE DEEP VEIN THROMBOSIS (DVT) OF POPLITEAL VEIN OF RIGHT LOWER EXTREMITY (HCC): ICD-10-CM

## 2021-02-08 DIAGNOSIS — I10 BENIGN ESSENTIAL HYPERTENSION: ICD-10-CM

## 2021-02-08 DIAGNOSIS — E11.42 TYPE 2 DIABETES MELLITUS WITH DIABETIC POLYNEUROPATHY, WITH LONG-TERM CURRENT USE OF INSULIN (HCC): ICD-10-CM

## 2021-02-08 LAB
ALBUMIN SERPL BCP-MCNC: 3.7 G/DL (ref 3.5–5)
ALP SERPL-CCNC: 125 U/L (ref 46–116)
ALT SERPL W P-5'-P-CCNC: 63 U/L (ref 12–78)
ANION GAP SERPL CALCULATED.3IONS-SCNC: 9 MMOL/L (ref 4–13)
AST SERPL W P-5'-P-CCNC: 36 U/L (ref 5–45)
BASOPHILS # BLD AUTO: 0.03 THOUSANDS/ΜL (ref 0–0.1)
BASOPHILS NFR BLD AUTO: 0 % (ref 0–1)
BILIRUB SERPL-MCNC: 0.35 MG/DL (ref 0.2–1)
BUN SERPL-MCNC: 11 MG/DL (ref 5–25)
CALCIUM SERPL-MCNC: 9.3 MG/DL (ref 8.3–10.1)
CHLORIDE SERPL-SCNC: 102 MMOL/L (ref 100–108)
CHOLEST SERPL-MCNC: 159 MG/DL (ref 50–200)
CO2 SERPL-SCNC: 29 MMOL/L (ref 21–32)
CREAT SERPL-MCNC: 0.9 MG/DL (ref 0.6–1.3)
DEPRECATED AT III PPP: 81 % OF NORMAL (ref 92–136)
EOSINOPHIL # BLD AUTO: 0.05 THOUSAND/ΜL (ref 0–0.61)
EOSINOPHIL NFR BLD AUTO: 1 % (ref 0–6)
ERYTHROCYTE [DISTWIDTH] IN BLOOD BY AUTOMATED COUNT: 14.5 % (ref 11.6–15.1)
EST. AVERAGE GLUCOSE BLD GHB EST-MCNC: 174 MG/DL
GFR SERPL CREATININE-BSD FRML MDRD: 93 ML/MIN/1.73SQ M
GLUCOSE P FAST SERPL-MCNC: 202 MG/DL (ref 65–99)
HBA1C MFR BLD: 7.7 %
HCT VFR BLD AUTO: 41.2 % (ref 36.5–49.3)
HDLC SERPL-MCNC: 52 MG/DL
HGB BLD-MCNC: 12.8 G/DL (ref 12–17)
IMM GRANULOCYTES # BLD AUTO: 0.1 THOUSAND/UL (ref 0–0.2)
IMM GRANULOCYTES NFR BLD AUTO: 1 % (ref 0–2)
LDLC SERPL CALC-MCNC: 91 MG/DL (ref 0–100)
LYMPHOCYTES # BLD AUTO: 1.63 THOUSANDS/ΜL (ref 0.6–4.47)
LYMPHOCYTES NFR BLD AUTO: 19 % (ref 14–44)
MCH RBC QN AUTO: 24.6 PG (ref 26.8–34.3)
MCHC RBC AUTO-ENTMCNC: 31.1 G/DL (ref 31.4–37.4)
MCV RBC AUTO: 79 FL (ref 82–98)
MONOCYTES # BLD AUTO: 0.45 THOUSAND/ΜL (ref 0.17–1.22)
MONOCYTES NFR BLD AUTO: 5 % (ref 4–12)
NEUTROPHILS # BLD AUTO: 6.31 THOUSANDS/ΜL (ref 1.85–7.62)
NEUTS SEG NFR BLD AUTO: 74 % (ref 43–75)
NONHDLC SERPL-MCNC: 107 MG/DL
NRBC BLD AUTO-RTO: 0 /100 WBCS
PLATELET # BLD AUTO: 299 THOUSANDS/UL (ref 149–390)
PMV BLD AUTO: 9.2 FL (ref 8.9–12.7)
POTASSIUM SERPL-SCNC: 3.9 MMOL/L (ref 3.5–5.3)
PROT SERPL-MCNC: 7.8 G/DL (ref 6.4–8.2)
PSA SERPL-MCNC: 0.6 NG/ML (ref 0–4)
RBC # BLD AUTO: 5.21 MILLION/UL (ref 3.88–5.62)
SODIUM SERPL-SCNC: 140 MMOL/L (ref 136–145)
TRIGL SERPL-MCNC: 81 MG/DL
TSH SERPL DL<=0.05 MIU/L-ACNC: 0.52 UIU/ML (ref 0.36–3.74)
WBC # BLD AUTO: 8.57 THOUSAND/UL (ref 4.31–10.16)

## 2021-02-08 PROCEDURE — 84443 ASSAY THYROID STIM HORMONE: CPT

## 2021-02-08 PROCEDURE — 83036 HEMOGLOBIN GLYCOSYLATED A1C: CPT

## 2021-02-08 PROCEDURE — 85025 COMPLETE CBC W/AUTO DIFF WBC: CPT

## 2021-02-08 PROCEDURE — 80061 LIPID PANEL: CPT

## 2021-02-08 PROCEDURE — 3051F HG A1C>EQUAL 7.0%<8.0%: CPT | Performed by: INTERNAL MEDICINE

## 2021-02-08 PROCEDURE — G0103 PSA SCREENING: HCPCS

## 2021-02-08 PROCEDURE — 85301 ANTITHROMBIN III ANTIGEN: CPT

## 2021-02-08 PROCEDURE — 36415 COLL VENOUS BLD VENIPUNCTURE: CPT

## 2021-02-08 PROCEDURE — 80053 COMPREHEN METABOLIC PANEL: CPT

## 2021-02-08 PROCEDURE — 85300 ANTITHROMBIN III ACTIVITY: CPT

## 2021-02-09 DIAGNOSIS — E11.9 TYPE 2 DIABETES MELLITUS WITHOUT COMPLICATION, WITHOUT LONG-TERM CURRENT USE OF INSULIN (HCC): ICD-10-CM

## 2021-02-09 LAB — AT III AG ACT/NOR PPP IA: 84 % (ref 72–124)

## 2021-02-09 RX ORDER — INSULIN GLARGINE 100 [IU]/ML
INJECTION, SOLUTION SUBCUTANEOUS
Qty: 15 ML | Refills: 5
Start: 2021-02-09 | End: 2021-03-10 | Stop reason: SDUPTHER

## 2021-02-13 DIAGNOSIS — E11.9 TYPE 2 DIABETES MELLITUS WITHOUT COMPLICATION, WITHOUT LONG-TERM CURRENT USE OF INSULIN (HCC): ICD-10-CM

## 2021-02-13 RX ORDER — PEN NEEDLE, DIABETIC 32GX 5/32"
NEEDLE, DISPOSABLE MISCELLANEOUS
Qty: 30 EACH | Refills: 5 | Status: SHIPPED | OUTPATIENT
Start: 2021-02-13 | End: 2021-03-10 | Stop reason: SDUPTHER

## 2021-02-15 ENCOUNTER — TELEPHONE (OUTPATIENT)
Dept: HEMATOLOGY ONCOLOGY | Facility: CLINIC | Age: 61
End: 2021-02-15

## 2021-02-15 NOTE — TELEPHONE ENCOUNTER
Scheduling Appointment     Who Is Calling to Schedule Patient    Doctor Dr Sarahi Alejo   Date and Time 03/23 at 9:00am         Patient verbalized understanding    yes

## 2021-03-04 ENCOUNTER — OFFICE VISIT (OUTPATIENT)
Dept: OBGYN CLINIC | Facility: CLINIC | Age: 61
End: 2021-03-04
Payer: COMMERCIAL

## 2021-03-04 ENCOUNTER — APPOINTMENT (OUTPATIENT)
Dept: RADIOLOGY | Facility: AMBULARY SURGERY CENTER | Age: 61
End: 2021-03-04
Attending: ORTHOPAEDIC SURGERY
Payer: COMMERCIAL

## 2021-03-04 ENCOUNTER — TELEPHONE (OUTPATIENT)
Dept: FAMILY MEDICINE CLINIC | Facility: CLINIC | Age: 61
End: 2021-03-04

## 2021-03-04 VITALS
HEIGHT: 67 IN | SYSTOLIC BLOOD PRESSURE: 161 MMHG | HEART RATE: 62 BPM | BODY MASS INDEX: 35.47 KG/M2 | WEIGHT: 226 LBS | DIASTOLIC BLOOD PRESSURE: 90 MMHG

## 2021-03-04 DIAGNOSIS — E11.9 TYPE 2 DIABETES MELLITUS WITHOUT COMPLICATION, WITHOUT LONG-TERM CURRENT USE OF INSULIN (HCC): Primary | ICD-10-CM

## 2021-03-04 DIAGNOSIS — M16.11 PRIMARY OSTEOARTHRITIS OF ONE HIP, RIGHT: Primary | ICD-10-CM

## 2021-03-04 DIAGNOSIS — Z79.4 TYPE 2 DIABETES MELLITUS WITH DIABETIC POLYNEUROPATHY, WITH LONG-TERM CURRENT USE OF INSULIN (HCC): ICD-10-CM

## 2021-03-04 DIAGNOSIS — S72.001S CLOSED FRACTURE OF NECK OF RIGHT FEMUR, SEQUELA: ICD-10-CM

## 2021-03-04 DIAGNOSIS — M70.61 GREATER TROCHANTERIC BURSITIS OF RIGHT HIP: ICD-10-CM

## 2021-03-04 DIAGNOSIS — E11.42 TYPE 2 DIABETES MELLITUS WITH DIABETIC POLYNEUROPATHY, WITH LONG-TERM CURRENT USE OF INSULIN (HCC): ICD-10-CM

## 2021-03-04 PROCEDURE — 73502 X-RAY EXAM HIP UNI 2-3 VIEWS: CPT

## 2021-03-04 PROCEDURE — 20610 DRAIN/INJ JOINT/BURSA W/O US: CPT | Performed by: ORTHOPAEDIC SURGERY

## 2021-03-04 PROCEDURE — 99213 OFFICE O/P EST LOW 20 MIN: CPT | Performed by: ORTHOPAEDIC SURGERY

## 2021-03-04 RX ORDER — BUPIVACAINE HYDROCHLORIDE 2.5 MG/ML
1 INJECTION, SOLUTION INFILTRATION; PERINEURAL
Status: COMPLETED | OUTPATIENT
Start: 2021-03-04 | End: 2021-03-04

## 2021-03-04 RX ORDER — BETAMETHASONE SODIUM PHOSPHATE AND BETAMETHASONE ACETATE 3; 3 MG/ML; MG/ML
12 INJECTION, SUSPENSION INTRA-ARTICULAR; INTRALESIONAL; INTRAMUSCULAR; SOFT TISSUE
Status: COMPLETED | OUTPATIENT
Start: 2021-03-04 | End: 2021-03-04

## 2021-03-04 RX ORDER — LIDOCAINE HYDROCHLORIDE 5 MG/ML
1 INJECTION, SOLUTION INFILTRATION; PERINEURAL
Status: COMPLETED | OUTPATIENT
Start: 2021-03-04 | End: 2021-03-04

## 2021-03-04 RX ADMIN — BUPIVACAINE HYDROCHLORIDE 1 ML: 2.5 INJECTION, SOLUTION INFILTRATION; PERINEURAL at 08:08

## 2021-03-04 RX ADMIN — LIDOCAINE HYDROCHLORIDE 1 ML: 5 INJECTION, SOLUTION INFILTRATION; PERINEURAL at 08:08

## 2021-03-04 RX ADMIN — BETAMETHASONE SODIUM PHOSPHATE AND BETAMETHASONE ACETATE 12 MG: 3; 3 INJECTION, SUSPENSION INTRA-ARTICULAR; INTRALESIONAL; INTRAMUSCULAR; SOFT TISSUE at 08:08
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
## 2021-03-04 NOTE — TELEPHONE ENCOUNTER
Please call patient directly  I received e-mail from his orthopedic office  They would like to perform his hip surgery however his A1c for diabetes is too high at 7 7  He will need to have a value of 7 0 to proceed with surgery  They recommended that he have a consultation with endocrinologist to try and help with lower this value in a reasonable time so that he may proceed with surgery  I will place order in epic for patient see [facility] [place] endocrinologist Dr Webster  please give # to call for appt

## 2021-03-10 DIAGNOSIS — E11.9 TYPE 2 DIABETES MELLITUS WITHOUT COMPLICATION, WITHOUT LONG-TERM CURRENT USE OF INSULIN (HCC): ICD-10-CM

## 2021-03-10 DIAGNOSIS — Z23 ENCOUNTER FOR IMMUNIZATION: ICD-10-CM

## 2021-03-11 RX ORDER — INSULIN GLARGINE 100 [IU]/ML
INJECTION, SOLUTION SUBCUTANEOUS
Qty: 15 ML | Refills: 5 | Status: SHIPPED | OUTPATIENT
Start: 2021-03-11 | End: 2021-05-28 | Stop reason: SDUPTHER

## 2021-03-11 RX ORDER — PEN NEEDLE, DIABETIC 32GX 5/32"
NEEDLE, DISPOSABLE MISCELLANEOUS
Qty: 30 EACH | Refills: 5 | Status: SHIPPED | OUTPATIENT
Start: 2021-03-11 | End: 2021-07-02 | Stop reason: SDUPTHER

## 2021-03-11 NOTE — TELEPHONE ENCOUNTER
Requesting Rx   Requested Prescriptions     Pending Prescriptions Disp Refills    insulin glargine (Basaglar KwikPen) 100 units/mL injection pen 15 mL 0     Sig: Use 30 units at bedtime as directed    Insulin Pen Needle (CareOne Unifine Pentips Plus) 32G X 4 MM MISC 30 each 0     Sig: Use at bedtime as directed     Last Rx: 02/09/2021  Last OV :02/05/2021    Please review and advise

## 2021-03-15 NOTE — NURSING NOTE
Call placed to patient to discuss upcoming right hip injection at [facility]  Allergies reviewed with patient  Verified current anticoagulation medication of Xeralto 20 mg daily, but not required to stop per Periprocedural Management of Coagulation Status and Hemostasis Risk in Percutaneous Image Guided Procedures  Pre and post procedure expectations discussed with patient  Questions answered  Patient reminded of location and time of the expected procedure  Patient verbalizes understanding  Contact number provided in case patient has any further questions

## 2021-03-20 DIAGNOSIS — N40.0 BENIGN PROSTATIC HYPERPLASIA WITHOUT LOWER URINARY TRACT SYMPTOMS: ICD-10-CM

## 2021-03-21 ENCOUNTER — IMMUNIZATIONS (OUTPATIENT)
Dept: FAMILY MEDICINE CLINIC | Facility: HOSPITAL | Age: 61
End: 2021-03-21

## 2021-03-21 DIAGNOSIS — Z23 ENCOUNTER FOR IMMUNIZATION: Primary | ICD-10-CM

## 2021-03-21 PROCEDURE — 91300 SARS-COV-2 / COVID-19 MRNA VACCINE (PFIZER-BIONTECH) 30 MCG: CPT

## 2021-03-21 PROCEDURE — 0001A SARS-COV-2 / COVID-19 MRNA VACCINE (PFIZER-BIONTECH) 30 MCG: CPT

## 2021-03-22 DIAGNOSIS — M54.16 LUMBAR RADICULOPATHY: Primary | ICD-10-CM

## 2021-03-22 DIAGNOSIS — N40.0 BENIGN PROSTATIC HYPERPLASIA WITHOUT LOWER URINARY TRACT SYMPTOMS: ICD-10-CM

## 2021-03-22 RX ORDER — TERAZOSIN 5 MG/1
5 CAPSULE ORAL DAILY
Qty: 90 CAPSULE | Refills: 3 | Status: SHIPPED | OUTPATIENT
Start: 2021-03-22 | End: 2021-03-29

## 2021-03-22 RX ORDER — TIZANIDINE HYDROCHLORIDE 4 MG/1
4 CAPSULE, GELATIN COATED ORAL 3 TIMES DAILY
Qty: 90 CAPSULE | Refills: 1 | Status: SHIPPED | OUTPATIENT
Start: 2021-03-22

## 2021-03-22 RX ORDER — TERAZOSIN 5 MG/1
5 CAPSULE ORAL DAILY
Qty: 90 CAPSULE | Refills: 0 | Status: CANCELLED | OUTPATIENT
Start: 2021-03-22

## 2021-03-22 NOTE — TELEPHONE ENCOUNTER
Pt is requesting a refill   Requested Prescriptions     Pending Prescriptions Disp Refills    TiZANidine (ZANAFLEX) 4 MG capsule 90 capsule 1     Sig: Take 1 capsule (4 mg total) by mouth 3 (three) times a day

## 2021-03-23 ENCOUNTER — OFFICE VISIT (OUTPATIENT)
Dept: HEMATOLOGY ONCOLOGY | Facility: CLINIC | Age: 61
End: 2021-03-23
Payer: COMMERCIAL

## 2021-03-23 VITALS
WEIGHT: 263 LBS | DIASTOLIC BLOOD PRESSURE: 82 MMHG | RESPIRATION RATE: 16 BRPM | BODY MASS INDEX: 41.28 KG/M2 | HEIGHT: 67 IN | HEART RATE: 81 BPM | TEMPERATURE: 97.9 F | SYSTOLIC BLOOD PRESSURE: 154 MMHG | OXYGEN SATURATION: 95 %

## 2021-03-23 DIAGNOSIS — D68.59 ANTITHROMBIN 3 DEFICIENCY (HCC): Primary | ICD-10-CM

## 2021-03-23 DIAGNOSIS — I82.431 ACUTE DEEP VEIN THROMBOSIS (DVT) OF POPLITEAL VEIN OF RIGHT LOWER EXTREMITY (HCC): ICD-10-CM

## 2021-03-23 PROCEDURE — 3077F SYST BP >= 140 MM HG: CPT | Performed by: INTERNAL MEDICINE

## 2021-03-23 PROCEDURE — 1036F TOBACCO NON-USER: CPT | Performed by: INTERNAL MEDICINE

## 2021-03-23 PROCEDURE — 3008F BODY MASS INDEX DOCD: CPT | Performed by: INTERNAL MEDICINE

## 2021-03-23 PROCEDURE — 3079F DIAST BP 80-89 MM HG: CPT | Performed by: INTERNAL MEDICINE

## 2021-03-23 PROCEDURE — 99214 OFFICE O/P EST MOD 30 MIN: CPT | Performed by: INTERNAL MEDICINE
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
## 2021-03-24 ENCOUNTER — HOSPITAL ENCOUNTER (OUTPATIENT)
Dept: RADIOLOGY | Facility: HOSPITAL | Age: 61
Discharge: HOME | End: 2021-03-24
Payer: COMMERCIAL

## 2021-03-24 DIAGNOSIS — M16.11 PRIMARY OSTEOARTHRITIS OF ONE HIP, RIGHT: ICD-10-CM

## 2021-03-24 PROCEDURE — 20610 DRAIN/INJ JOINT/BURSA W/O US: CPT

## 2021-03-24 PROCEDURE — 77002 NEEDLE LOCALIZATION BY XRAY: CPT

## 2021-03-24 RX ORDER — BUPIVACAINE HYDROCHLORIDE 2.5 MG/ML
10 INJECTION, SOLUTION EPIDURAL; INFILTRATION; INTRACAUDAL
Status: COMPLETED | OUTPATIENT
Start: 2021-03-24 | End: 2021-03-24

## 2021-03-24 RX ORDER — METHYLPREDNISOLONE ACETATE 80 MG/ML
80 INJECTION, SUSPENSION INTRA-ARTICULAR; INTRALESIONAL; INTRAMUSCULAR; SOFT TISSUE
Status: COMPLETED | OUTPATIENT
Start: 2021-03-24 | End: 2021-03-24

## 2021-03-24 RX ORDER — LIDOCAINE HYDROCHLORIDE 10 MG/ML
5 INJECTION, SOLUTION EPIDURAL; INFILTRATION; INTRACAUDAL; PERINEURAL
Status: COMPLETED | OUTPATIENT
Start: 2021-03-24 | End: 2021-03-24

## 2021-03-24 RX ADMIN — METHYLPREDNISOLONE ACETATE 80 MG: 80 INJECTION, SUSPENSION INTRA-ARTICULAR; INTRALESIONAL; INTRAMUSCULAR; SOFT TISSUE at 13:20

## 2021-03-24 RX ADMIN — IOHEXOL 3 ML: 300 INJECTION, SOLUTION INTRAVENOUS at 13:20

## 2021-03-24 RX ADMIN — LIDOCAINE HYDROCHLORIDE 5 ML: 10 INJECTION, SOLUTION EPIDURAL; INFILTRATION; INTRACAUDAL at 13:20

## 2021-03-24 RX ADMIN — BUPIVACAINE HYDROCHLORIDE 10 ML: 2.5 INJECTION, SOLUTION EPIDURAL; INFILTRATION; INTRACAUDAL; PERINEURAL at 13:20

## 2021-03-29 DIAGNOSIS — N40.0 BENIGN PROSTATIC HYPERPLASIA WITHOUT LOWER URINARY TRACT SYMPTOMS: ICD-10-CM

## 2021-03-29 RX ORDER — TERAZOSIN 5 MG/1
CAPSULE ORAL
Qty: 90 CAPSULE | Refills: 3 | Status: SHIPPED | OUTPATIENT
Start: 2021-03-29 | End: 2021-07-06 | Stop reason: SDUPTHER

## 2021-04-11 ENCOUNTER — IMMUNIZATIONS (OUTPATIENT)
Dept: FAMILY MEDICINE CLINIC | Facility: HOSPITAL | Age: 61
End: 2021-04-11

## 2021-04-11 DIAGNOSIS — Z23 ENCOUNTER FOR IMMUNIZATION: Primary | ICD-10-CM

## 2021-04-11 DIAGNOSIS — I10 BENIGN ESSENTIAL HYPERTENSION: ICD-10-CM

## 2021-04-11 PROCEDURE — 91300 SARS-COV-2 / COVID-19 MRNA VACCINE (PFIZER-BIONTECH) 30 MCG: CPT

## 2021-04-11 PROCEDURE — 0002A SARS-COV-2 / COVID-19 MRNA VACCINE (PFIZER-BIONTECH) 30 MCG: CPT

## 2021-04-12 RX ORDER — METOPROLOL SUCCINATE 25 MG/1
TABLET, EXTENDED RELEASE ORAL
Qty: 30 TABLET | Refills: 3 | Status: SHIPPED | OUTPATIENT
Start: 2021-04-12 | End: 2021-07-06 | Stop reason: SDUPTHER

## 2021-04-26 ENCOUNTER — CONSULT (OUTPATIENT)
Dept: ENDOCRINOLOGY | Facility: CLINIC | Age: 61
End: 2021-04-26
Payer: COMMERCIAL

## 2021-04-26 VITALS
HEIGHT: 67 IN | HEART RATE: 71 BPM | SYSTOLIC BLOOD PRESSURE: 152 MMHG | DIASTOLIC BLOOD PRESSURE: 110 MMHG | BODY MASS INDEX: 42.12 KG/M2 | WEIGHT: 268.4 LBS

## 2021-04-26 DIAGNOSIS — E04.1 NON-TOXIC UNINODULAR GOITER: ICD-10-CM

## 2021-04-26 DIAGNOSIS — I10 BENIGN ESSENTIAL HYPERTENSION: ICD-10-CM

## 2021-04-26 DIAGNOSIS — E11.9 TYPE 2 DIABETES MELLITUS WITHOUT COMPLICATION, WITHOUT LONG-TERM CURRENT USE OF INSULIN (HCC): ICD-10-CM

## 2021-04-26 DIAGNOSIS — Z79.4 CURRENT USE OF INSULIN (HCC): Primary | ICD-10-CM

## 2021-04-26 PROCEDURE — 99204 OFFICE O/P NEW MOD 45 MIN: CPT | Performed by: INTERNAL MEDICINE

## 2021-04-26 PROCEDURE — 3077F SYST BP >= 140 MM HG: CPT | Performed by: INTERNAL MEDICINE

## 2021-04-26 PROCEDURE — 1036F TOBACCO NON-USER: CPT | Performed by: INTERNAL MEDICINE

## 2021-04-26 PROCEDURE — 3080F DIAST BP >= 90 MM HG: CPT | Performed by: INTERNAL MEDICINE

## 2021-04-26 PROCEDURE — 3008F BODY MASS INDEX DOCD: CPT | Performed by: INTERNAL MEDICINE

## 2021-04-26 RX ORDER — MULTIVIT WITH MINERALS/LUTEIN
1000 TABLET ORAL DAILY
COMMUNITY

## 2021-04-26 NOTE — PATIENT INSTRUCTIONS
Please start the metformin 500mg with bf and dinner  Continue the Basaglar 30units, glimiperide 2mg at bf and dinner, and Januvia 100mg once daily  Please get your ultrasound and we will look for the pathology result    Send me the sugars in 2-3 weeks
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
## 2021-04-29 ENCOUNTER — HOSPITAL ENCOUNTER (OUTPATIENT)
Dept: ULTRASOUND IMAGING | Facility: HOSPITAL | Age: 61
Discharge: HOME | End: 2021-04-29
Attending: INTERNAL MEDICINE
Payer: COMMERCIAL

## 2021-04-29 DIAGNOSIS — E04.1 NON-TOXIC UNINODULAR GOITER: ICD-10-CM

## 2021-04-29 PROCEDURE — 76536 US EXAM OF HEAD AND NECK: CPT

## 2021-05-05 DIAGNOSIS — E04.1 NON-TOXIC UNINODULAR GOITER: Primary | ICD-10-CM

## 2021-05-05 NOTE — PROGRESS NOTES
I spoke with Mr Seth Adams re: ultrasound  His left thyroid nodule has grown  I do recommend repeat FNA and will order AFIRMA  He will likely get this done when he returns from a planned trip   Orders in the chart

## 2021-05-12 ENCOUNTER — OFFICE VISIT (OUTPATIENT)
Dept: OBGYN CLINIC | Facility: CLINIC | Age: 61
End: 2021-05-12
Payer: COMMERCIAL

## 2021-05-12 ENCOUNTER — TELEPHONE (OUTPATIENT)
Dept: ENDOCRINOLOGY | Facility: CLINIC | Age: 61
End: 2021-05-12

## 2021-05-12 VITALS
SYSTOLIC BLOOD PRESSURE: 180 MMHG | BODY MASS INDEX: 42.12 KG/M2 | DIASTOLIC BLOOD PRESSURE: 93 MMHG | HEART RATE: 60 BPM | WEIGHT: 268.4 LBS | HEIGHT: 67 IN

## 2021-05-12 DIAGNOSIS — E11.9 TYPE 2 DIABETES MELLITUS WITHOUT COMPLICATION, WITHOUT LONG-TERM CURRENT USE OF INSULIN (HCC): ICD-10-CM

## 2021-05-12 DIAGNOSIS — M16.11 PRIMARY OSTEOARTHRITIS OF ONE HIP, RIGHT: ICD-10-CM

## 2021-05-12 DIAGNOSIS — I82.531 CHRONIC DEEP VEIN THROMBOSIS (DVT) OF RIGHT POPLITEAL VEIN (HCC): ICD-10-CM

## 2021-05-12 DIAGNOSIS — M70.61 GREATER TROCHANTERIC BURSITIS OF RIGHT HIP: Primary | ICD-10-CM

## 2021-05-12 PROCEDURE — 99213 OFFICE O/P EST LOW 20 MIN: CPT | Performed by: ORTHOPAEDIC SURGERY

## 2021-05-12 PROCEDURE — 3080F DIAST BP >= 90 MM HG: CPT | Performed by: ORTHOPAEDIC SURGERY

## 2021-05-12 PROCEDURE — 3077F SYST BP >= 140 MM HG: CPT | Performed by: ORTHOPAEDIC SURGERY

## 2021-05-12 RX ORDER — GLIMEPIRIDE 2 MG/1
2 TABLET ORAL
Qty: 90 TABLET | Refills: 1 | Status: SHIPPED | OUTPATIENT
Start: 2021-05-12 | End: 2021-06-15

## 2021-05-12 RX ORDER — GLIMEPIRIDE 2 MG/1
2 TABLET ORAL 2 TIMES DAILY
Qty: 180 TABLET | Refills: 1 | Status: SHIPPED | OUTPATIENT
Start: 2021-05-12 | End: 2021-05-12 | Stop reason: SDUPTHER

## 2021-05-12 NOTE — TELEPHONE ENCOUNTER
Shirley Thompson MD  to [address]        5/12/21 10:36 AM  Note     Please let him know that I have his sugars  They look much better  If he has no problems with the metformin, Please ask him to increase this to 1000mg twice daily and cut the glimepiride to 2mg with breakfast only   Thanks

## 2021-05-12 NOTE — TELEPHONE ENCOUNTER
Please let him know that I have his sugars  They look much better  If he has no problems with the metformin, Please ask him to increase this to 1000mg twice daily and cut the glimepiride to 2mg with breakfast only   Thanks
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
## 2021-05-17 NOTE — TELEPHONE ENCOUNTER
----- Message from Jil Frank MD sent at 6/13/8453 11:09 AM EDT -----  Blood work showed increase in sugars with an A1c going from 7 4 up to 8 2  I would increase his glimepiride up to 2 mg twice a day  I will send a new prescription to pharmacy    Recheck blood work in 4 months on next office visit Detail Level: Zone Detail Level: Generalized Detail Level: Detailed Azithromycin Counseling:  I discussed with the patient the risks of azithromycin including but not limited to GI upset, allergic reaction, drug rash, diarrhea, and yeast infections. Topical Retinoid Pregnancy And Lactation Text: This medication is Pregnancy Category C. It is unknown if this medication is excreted in breast milk. Birth Control Pills Pregnancy And Lactation Text: This medication should be avoided if pregnant and for the first 30 days post-partum. Sarecycline Counseling: Patient advised regarding possible photosensitivity and discoloration of the teeth, skin, lips, tongue and gums.  Patient instructed to avoid sunlight, if possible.  When exposed to sunlight, patients should wear protective clothing, sunglasses, and sunscreen.  The patient was instructed to call the office immediately if the following severe adverse effects occur:  hearing changes, easy bruising/bleeding, severe headache, or vision changes.  The patient verbalized understanding of the proper use and possible adverse effects of sarecycline.  All of the patient's questions and concerns were addressed. Doxycycline Pregnancy And Lactation Text: This medication is Pregnancy Category D and not consider safe during pregnancy. It is also excreted in breast milk but is considered safe for shorter treatment courses. Use Enhanced Medication Counseling?: No Isotretinoin Pregnancy And Lactation Text: This medication is Pregnancy Category X and is considered extremely dangerous during pregnancy. It is unknown if it is excreted in breast milk. Topical Clindamycin Pregnancy And Lactation Text: This medication is Pregnancy Category B and is considered safe during pregnancy. It is unknown if it is excreted in breast milk. Tetracycline Counseling: Patient counseled regarding possible photosensitivity and increased risk for sunburn.  Patient instructed to avoid sunlight, if possible.  When exposed to sunlight, patients should wear protective clothing, sunglasses, and sunscreen.  The patient was instructed to call the office immediately if the following severe adverse effects occur:  hearing changes, easy bruising/bleeding, severe headache, or vision changes.  The patient verbalized understanding of the proper use and possible adverse effects of tetracycline.  All of the patient's questions and concerns were addressed. Patient understands to avoid pregnancy while on therapy due to potential birth defects. Birth Control Pills Counseling: Birth Control Pill Counseling: I discussed with the patient the potential side effects of OCPs including but not limited to increased risk of stroke, heart attack, thrombophlebitis, deep venous thrombosis, hepatic adenomas, breast changes, GI upset, headaches, and depression.  The patient verbalized understanding of the proper use and possible adverse effects of OCPs. All of the patient's questions and concerns were addressed. Benzoyl Peroxide Counseling: Patient counseled that medicine may cause skin irritation and bleach clothing.  In the event of skin irritation, the patient was advised to reduce the amount of the drug applied or use it less frequently.   The patient verbalized understanding of the proper use and possible adverse effects of benzoyl peroxide.  All of the patient's questions and concerns were addressed. Tazorac Counseling:  Patient advised that medication is irritating and drying.  Patient may need to apply sparingly and wash off after an hour before eventually leaving it on overnight.  The patient verbalized understanding of the proper use and possible adverse effects of tazorac.  All of the patient's questions and concerns were addressed. Erythromycin Counseling:  I discussed with the patient the risks of erythromycin including but not limited to GI upset, allergic reaction, drug rash, diarrhea, increase in liver enzymes, and yeast infections. Azithromycin Pregnancy And Lactation Text: This medication is considered safe during pregnancy and is also secreted in breast milk. Sarecycline Pregnancy And Lactation Text: This medication is Pregnancy Category D and not consider safe during pregnancy. It is also excreted in breast milk. Topical Sulfur Applications Counseling: Topical Sulfur Counseling: Patient counseled that this medication may cause skin irritation or allergic reactions.  In the event of skin irritation, the patient was advised to reduce the amount of the drug applied or use it less frequently.   The patient verbalized understanding of the proper use and possible adverse effects of topical sulfur application.  All of the patient's questions and concerns were addressed. High Dose Vitamin A Counseling: Side effects reviewed, pt to contact office should one occur. Minocycline Counseling: Patient advised regarding possible photosensitivity and discoloration of the teeth, skin, lips, tongue and gums.  Patient instructed to avoid sunlight, if possible.  When exposed to sunlight, patients should wear protective clothing, sunglasses, and sunscreen.  The patient was instructed to call the office immediately if the following severe adverse effects occur:  hearing changes, easy bruising/bleeding, severe headache, or vision changes.  The patient verbalized understanding of the proper use and possible adverse effects of minocycline.  All of the patient's questions and concerns were addressed. Spironolactone Counseling: Patient advised regarding risks of diarrhea, abdominal pain, hyperkalemia, birth defects (for female patients), liver toxicity and renal toxicity. The patient may need blood work to monitor liver and kidney function and potassium levels while on therapy. The patient verbalized understanding of the proper use and possible adverse effects of spironolactone.  All of the patient's questions and concerns were addressed. Benzoyl Peroxide Pregnancy And Lactation Text: This medication is Pregnancy Category C. It is unknown if benzoyl peroxide is excreted in breast milk. Erythromycin Pregnancy And Lactation Text: This medication is Pregnancy Category B and is considered safe during pregnancy. It is also excreted in breast milk. Bactrim Counseling:  I discussed with the patient the risks of sulfa antibiotics including but not limited to GI upset, allergic reaction, drug rash, diarrhea, dizziness, photosensitivity, and yeast infections.  Rarely, more serious reactions can occur including but not limited to aplastic anemia, agranulocytosis, methemoglobinemia, blood dyscrasias, liver or kidney failure, lung infiltrates or desquamative/blistering drug rashes. Tazorac Pregnancy And Lactation Text: This medication is not safe during pregnancy. It is unknown if this medication is excreted in breast milk. Topical Sulfur Applications Pregnancy And Lactation Text: This medication is Pregnancy Category C and has an unknown safety profile during pregnancy. It is unknown if this topical medication is excreted in breast milk. High Dose Vitamin A Pregnancy And Lactation Text: High dose vitamin A therapy is contraindicated during pregnancy and breast feeding. Dapsone Counseling: I discussed with the patient the risks of dapsone including but not limited to hemolytic anemia, agranulocytosis, rashes, methemoglobinemia, kidney failure, peripheral neuropathy, headaches, GI upset, and liver toxicity.  Patients who start dapsone require monitoring including baseline LFTs and weekly CBCs for the first month, then every month thereafter.  The patient verbalized understanding of the proper use and possible adverse effects of dapsone.  All of the patient's questions and concerns were addressed. Doxycycline Counseling:  Patient counseled regarding possible photosensitivity and increased risk for sunburn.  Patient instructed to avoid sunlight, if possible.  When exposed to sunlight, patients should wear protective clothing, sunglasses, and sunscreen.  The patient was instructed to call the office immediately if the following severe adverse effects occur:  hearing changes, easy bruising/bleeding, severe headache, or vision changes.  The patient verbalized understanding of the proper use and possible adverse effects of doxycycline.  All of the patient's questions and concerns were addressed. Topical Retinoid counseling:  Patient advised to apply a pea-sized amount only at bedtime and wait 30 minutes after washing their face before applying.  If too drying, patient may add a non-comedogenic moisturizer. The patient verbalized understanding of the proper use and possible adverse effects of retinoids.  All of the patient's questions and concerns were addressed. Isotretinoin Counseling: Patient should get monthly blood tests, not donate blood, not drive at night if vision affected, not share medication, and not undergo elective surgery for 6 months after tx completed. Side effects reviewed, pt to contact office should one occur. Bactrim Pregnancy And Lactation Text: This medication is Pregnancy Category D and is known to cause fetal risk.  It is also excreted in breast milk. Spironolactone Pregnancy And Lactation Text: This medication can cause feminization of the male fetus and should be avoided during pregnancy. The active metabolite is also found in breast milk. Dapsone Pregnancy And Lactation Text: This medication is Pregnancy Category C and is not considered safe during pregnancy or breast feeding. Topical Clindamycin Counseling: Patient counseled that this medication may cause skin irritation or allergic reactions.  In the event of skin irritation, the patient was advised to reduce the amount of the drug applied or use it less frequently.   The patient verbalized understanding of the proper use and possible adverse effects of clindamycin.  All of the patient's questions and concerns were addressed. Detail Level: Simple

## 2021-05-27 ENCOUNTER — RA CDI HCC (OUTPATIENT)
Dept: OTHER | Facility: HOSPITAL | Age: 61
End: 2021-05-27

## 2021-05-27 NOTE — PROGRESS NOTES
Based on clinical documentation indicated in your record, it appears that the patient may have the following conditions:    E66 01 Morbid obesity     If this is correct, please document and assess at your next visit June 4th    [employer]  coding opportunities             Chart reviewed, (number of) suggestions sent to provider: 1           Patients insurance company: [employer] (Medicare Advantage and Commercial)           Did not use  [employer] [facility]  coding opportunities             Chart reviewed, (number of) suggestions sent to provider: 1                  Patients insurance company: [employer] (Medicare Advantage and [employer])     Visit status: Patient arrived for their scheduled appointment     Provider never responded to [employer]  coding request

## 2021-05-28 DIAGNOSIS — E11.9 TYPE 2 DIABETES MELLITUS WITHOUT COMPLICATION, WITHOUT LONG-TERM CURRENT USE OF INSULIN (HCC): ICD-10-CM

## 2021-05-28 RX ORDER — INSULIN GLARGINE 100 [IU]/ML
INJECTION, SOLUTION SUBCUTANEOUS
Qty: 15 ML | Refills: 5 | Status: SHIPPED | OUTPATIENT
Start: 2021-05-28 | End: 2021-07-02 | Stop reason: SDUPTHER

## 2021-06-02 NOTE — NURSING NOTE
Call placed to patient to discuss upcoming US guided thyroid biopsy with Sanjuana at [address] [place] [address] Radiology  Allergies reviewed  Verified with patient current anticoagulation medication of Xarelto 20 mg daily, but not required to stop per Periprocedural Management of Coagulation Status and Hemostasis Risk in Percutaneous Image Guided Procedures  Pre procedure instructions including diet and taking own medications discussed with patient  Procedure and post procedure expectations and instructions reviewed with the patient  Patient verbalizes understanding  Per patient has had this procedure before and has no further questions at this time  Patient reminded of the location, date and time of the expected procedure  Contact number provided in case patient has any further questions

## 2021-06-04 ENCOUNTER — OFFICE VISIT (OUTPATIENT)
Dept: FAMILY MEDICINE CLINIC | Facility: CLINIC | Age: 61
End: 2021-06-04
Payer: COMMERCIAL

## 2021-06-04 VITALS
TEMPERATURE: 98.1 F | DIASTOLIC BLOOD PRESSURE: 80 MMHG | HEART RATE: 82 BPM | WEIGHT: 261 LBS | BODY MASS INDEX: 40.97 KG/M2 | RESPIRATION RATE: 18 BRPM | OXYGEN SATURATION: 97 % | HEIGHT: 67 IN | SYSTOLIC BLOOD PRESSURE: 142 MMHG

## 2021-06-04 DIAGNOSIS — I10 BENIGN ESSENTIAL HYPERTENSION: ICD-10-CM

## 2021-06-04 DIAGNOSIS — I82.531 CHRONIC DEEP VEIN THROMBOSIS (DVT) OF RIGHT POPLITEAL VEIN (HCC): ICD-10-CM

## 2021-06-04 DIAGNOSIS — E11.42 TYPE 2 DIABETES MELLITUS WITH DIABETIC POLYNEUROPATHY, WITH LONG-TERM CURRENT USE OF INSULIN (HCC): Primary | ICD-10-CM

## 2021-06-04 DIAGNOSIS — Z79.4 TYPE 2 DIABETES MELLITUS WITH DIABETIC POLYNEUROPATHY, WITH LONG-TERM CURRENT USE OF INSULIN (HCC): Primary | ICD-10-CM

## 2021-06-04 PROBLEM — Z01.818 PREOPERATIVE EVALUATION OF A MEDICAL CONDITION TO RULE OUT SURGICAL CONTRAINDICATIONS (TAR REQUIRED): Status: RESOLVED | Noted: 2018-02-22 | Resolved: 2021-06-04

## 2021-06-04 PROCEDURE — 1036F TOBACCO NON-USER: CPT | Performed by: FAMILY MEDICINE

## 2021-06-04 PROCEDURE — 3008F BODY MASS INDEX DOCD: CPT | Performed by: FAMILY MEDICINE

## 2021-06-04 PROCEDURE — 99214 OFFICE O/P EST MOD 30 MIN: CPT | Performed by: FAMILY MEDICINE

## 2021-06-04 NOTE — ASSESSMENT & PLAN NOTE
Diabetes  Patient's A1c is stable at 7 7  He will continue to work with his endocrinologist to continue lowering this value while he has on metformin  He does have an A1c scheduled for July of 2021    Lab Results   Component Value Date    HGBA1C 7 7 (H) 02/08/2021

## 2021-06-04 NOTE — ASSESSMENT & PLAN NOTE
Chronic DVT of right leg    I had a long discussion with the patient in regards to my opinion that he should continue with Xarelto medication as recommended by his hematologist which would make it safer for him to prevent any further blood clots
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
## 2021-06-10 ENCOUNTER — HOSPITAL ENCOUNTER (OUTPATIENT)
Dept: ULTRASOUND IMAGING | Facility: HOSPITAL | Age: 61
Discharge: HOME | End: 2021-06-10
Attending: INTERNAL MEDICINE | Admitting: RADIOLOGY
Payer: COMMERCIAL

## 2021-06-10 DIAGNOSIS — E04.1 NON-TOXIC UNINODULAR GOITER: ICD-10-CM

## 2021-06-10 PROCEDURE — 88172 CYTP DX EVAL FNA 1ST EA SITE: CPT | Performed by: PATHOLOGY

## 2021-06-10 PROCEDURE — 88173 CYTOPATH EVAL FNA REPORT: CPT | Performed by: PATHOLOGY

## 2021-06-10 PROCEDURE — 10005 FNA BX W/US GDN 1ST LES: CPT

## 2021-06-10 PROCEDURE — 88333 PATH CONSLTJ SURG CYTO XM 1: CPT | Performed by: PATHOLOGY

## 2021-06-10 RX ORDER — LIDOCAINE WITH 8.4% SOD BICARB 0.9%(10ML)
10 SYRINGE (ML) INJECTION ONCE
Status: COMPLETED | OUTPATIENT
Start: 2021-06-10 | End: 2021-06-10

## 2021-06-10 RX ADMIN — Medication 5 ML: at 14:39

## 2021-06-15 DIAGNOSIS — E11.9 TYPE 2 DIABETES MELLITUS WITHOUT COMPLICATION, WITHOUT LONG-TERM CURRENT USE OF INSULIN (HCC): ICD-10-CM

## 2021-06-15 RX ORDER — GLIMEPIRIDE 2 MG/1
TABLET ORAL
Qty: 180 TABLET | Refills: 1 | Status: SHIPPED | OUTPATIENT
Start: 2021-06-15 | End: 2021-07-02 | Stop reason: SDUPTHER

## 2021-06-23 ENCOUNTER — TELEPHONE (OUTPATIENT)
Dept: ADMINISTRATIVE | Facility: OTHER | Age: 61
End: 2021-06-23

## 2021-06-23 NOTE — TELEPHONE ENCOUNTER
----- Message from Abigail Herr sent at 6/23/2021  8:57 AM EDT -----  Regarding: care gap request-DM Foot Exam  06/23/21 8:57 AM    Hello, our patient attached above has had Diabetic Foot Exam completed/performed  Please assist in updating the patient chart by pulling the document from the Media Tab  The date of service is 06/21/21-dated today (6/23) scan provider       Thank you,  Isaiah Barnes MA  [facility]

## 2021-06-23 NOTE — TELEPHONE ENCOUNTER
Upon review of the In Basket request we were able to locate, review, and update the patient chart as requested for Diabetic Foot Exam     Any additional questions or concerns should be emailed to the Practice Liaisons via [email or web address] email, please do not reply via In Basket      Thank you  Law Brito

## 2021-07-02 ENCOUNTER — TELEPHONE (OUTPATIENT)
Dept: ENDOCRINOLOGY | Facility: CLINIC | Age: 61
End: 2021-07-02

## 2021-07-02 DIAGNOSIS — E11.9 TYPE 2 DIABETES MELLITUS WITHOUT COMPLICATION, WITHOUT LONG-TERM CURRENT USE OF INSULIN (HCC): ICD-10-CM

## 2021-07-02 RX ORDER — PEN NEEDLE, DIABETIC 32GX 5/32"
NEEDLE, DISPOSABLE MISCELLANEOUS
Qty: 100 EACH | Refills: 1 | Status: SHIPPED | OUTPATIENT
Start: 2021-07-02 | End: 2021-07-13 | Stop reason: SDUPTHER

## 2021-07-02 RX ORDER — INSULIN GLARGINE 100 [IU]/ML
30 INJECTION, SOLUTION SUBCUTANEOUS
Qty: 30 ML | Refills: 2 | Status: SHIPPED | OUTPATIENT
Start: 2021-07-02 | End: 2021-12-06 | Stop reason: SDUPTHER

## 2021-07-02 RX ORDER — GLIMEPIRIDE 2 MG/1
2 TABLET ORAL DAILY
Qty: 90 TABLET | Refills: 1 | Status: SHIPPED | OUTPATIENT
Start: 2021-07-02 | End: 2021-12-06

## 2021-07-02 NOTE — TELEPHONE ENCOUNTER
Asa Morales  to Royer Vides MD          7/2/21 12:00 PM  Dr Valdez, I am reaching out to you letting you know that  [facility]  [facility] will be contacting you to get my Diabetes meds wrote for 90days instead of 30days I am moving to mail order, my [address] insurance told me I will save money this way any Questions please call me @ [phone] Thank Georgina Tucker

## 2021-07-06 DIAGNOSIS — I82.431 ACUTE DEEP VEIN THROMBOSIS (DVT) OF POPLITEAL VEIN OF RIGHT LOWER EXTREMITY (HCC): ICD-10-CM

## 2021-07-06 DIAGNOSIS — N40.0 BENIGN PROSTATIC HYPERPLASIA WITHOUT LOWER URINARY TRACT SYMPTOMS: ICD-10-CM

## 2021-07-06 DIAGNOSIS — I10 BENIGN ESSENTIAL HYPERTENSION: ICD-10-CM

## 2021-07-06 DIAGNOSIS — E11.9 TYPE 2 DIABETES MELLITUS WITHOUT COMPLICATION, WITHOUT LONG-TERM CURRENT USE OF INSULIN (HCC): ICD-10-CM

## 2021-07-06 RX ORDER — FUROSEMIDE 20 MG/1
20 TABLET ORAL DAILY
Qty: 90 TABLET | Refills: 1 | Status: SHIPPED | OUTPATIENT
Start: 2021-07-06 | End: 2021-12-13

## 2021-07-06 RX ORDER — METOPROLOL SUCCINATE 25 MG/1
25 TABLET, EXTENDED RELEASE ORAL DAILY
Qty: 90 TABLET | Refills: 1 | Status: SHIPPED | OUTPATIENT
Start: 2021-07-06 | End: 2021-12-13

## 2021-07-06 RX ORDER — PEN NEEDLE, DIABETIC 32GX 5/32"
NEEDLE, DISPOSABLE MISCELLANEOUS
Qty: 100 EACH | Refills: 11 | Status: CANCELLED | OUTPATIENT
Start: 2021-07-06

## 2021-07-06 RX ORDER — AMLODIPINE BESYLATE AND BENAZEPRIL HYDROCHLORIDE 10; 40 MG/1; MG/1
1 CAPSULE ORAL DAILY
Qty: 90 CAPSULE | Refills: 1 | Status: SHIPPED | OUTPATIENT
Start: 2021-07-06 | End: 2021-12-13

## 2021-07-06 RX ORDER — TERAZOSIN 5 MG/1
5 CAPSULE ORAL DAILY
Qty: 90 CAPSULE | Refills: 1 | Status: SHIPPED | OUTPATIENT
Start: 2021-07-06 | End: 2021-12-13

## 2021-07-06 NOTE — TELEPHONE ENCOUNTER
Pt pharmacy requesting refill   Requested Prescriptions     Pending Prescriptions Disp Refills    terazosin (HYTRIN) 5 mg capsule 90 capsule 3     Sig: Take 1 capsule (5 mg total) by mouth daily    furosemide (LASIX) 20 mg tablet 90 tablet 3     Sig: Take 1 tablet (20 mg total) by mouth daily    metoprolol succinate (TOPROL-XL) 25 mg 24 hr tablet 90 tablet 3     Sig: Take 1 tablet (25 mg total) by mouth daily    rivaroxaban (XARELTO) 20 mg tablet 90 tablet 3     Sig: Take 1 tablet (20 mg total) by mouth daily with breakfast    amLODIPine-benazepril (LOTREL) 10-40 MG per capsule 90 capsule 3     Sig: Take 1 capsule by mouth daily    sitaGLIPtin (Januvia) 100 mg tablet 90 tablet 3     Sig: Take 1 tablet (100 mg total) by mouth daily    Insulin Pen Needle (CareOne Unifine Pentips Plus) 32G X 4 MM MISC 100 each 11     Sig: Use at bedtime as directed

## 2021-07-13 DIAGNOSIS — E11.9 TYPE 2 DIABETES MELLITUS WITHOUT COMPLICATION, WITHOUT LONG-TERM CURRENT USE OF INSULIN (HCC): ICD-10-CM

## 2021-07-13 RX ORDER — PEN NEEDLE, DIABETIC 32GX 5/32"
NEEDLE, DISPOSABLE MISCELLANEOUS
Qty: 100 EACH | Refills: 1 | Status: SHIPPED | OUTPATIENT
Start: 2021-07-13 | End: 2021-12-06 | Stop reason: SDUPTHER

## 2021-07-19 ENCOUNTER — APPOINTMENT (OUTPATIENT)
Dept: LAB | Facility: CLINIC | Age: 61
End: 2021-07-19
Payer: COMMERCIAL

## 2021-07-19 DIAGNOSIS — E11.9 TYPE 2 DIABETES MELLITUS WITHOUT COMPLICATION, WITHOUT LONG-TERM CURRENT USE OF INSULIN (HCC): ICD-10-CM

## 2021-07-19 LAB
ALBUMIN SERPL BCP-MCNC: 3.6 G/DL (ref 3.5–5)
ALP SERPL-CCNC: 108 U/L (ref 46–116)
ALT SERPL W P-5'-P-CCNC: 47 U/L (ref 12–78)
ANION GAP SERPL CALCULATED.3IONS-SCNC: 6 MMOL/L (ref 4–13)
AST SERPL W P-5'-P-CCNC: 25 U/L (ref 5–45)
BILIRUB SERPL-MCNC: 0.17 MG/DL (ref 0.2–1)
BUN SERPL-MCNC: 17 MG/DL (ref 5–25)
CALCIUM SERPL-MCNC: 8.9 MG/DL (ref 8.3–10.1)
CHLORIDE SERPL-SCNC: 106 MMOL/L (ref 100–108)
CO2 SERPL-SCNC: 33 MMOL/L (ref 21–32)
CREAT SERPL-MCNC: 0.82 MG/DL (ref 0.6–1.3)
EST. AVERAGE GLUCOSE BLD GHB EST-MCNC: 151 MG/DL
GFR SERPL CREATININE-BSD FRML MDRD: 96 ML/MIN/1.73SQ M
GLUCOSE P FAST SERPL-MCNC: 143 MG/DL (ref 65–99)
HBA1C MFR BLD: 6.9 %
POTASSIUM SERPL-SCNC: 4.2 MMOL/L (ref 3.5–5.3)
PROT SERPL-MCNC: 7.2 G/DL (ref 6.4–8.2)
SODIUM SERPL-SCNC: 145 MMOL/L (ref 136–145)

## 2021-07-19 PROCEDURE — 80053 COMPREHEN METABOLIC PANEL: CPT

## 2021-07-19 PROCEDURE — 3044F HG A1C LEVEL LT 7.0%: CPT | Performed by: INTERNAL MEDICINE

## 2021-07-19 PROCEDURE — 36415 COLL VENOUS BLD VENIPUNCTURE: CPT

## 2021-07-19 PROCEDURE — 83036 HEMOGLOBIN GLYCOSYLATED A1C: CPT

## 2021-07-28 ENCOUNTER — OFFICE VISIT (OUTPATIENT)
Dept: ENDOCRINOLOGY | Facility: CLINIC | Age: 61
End: 2021-07-28
Payer: COMMERCIAL

## 2021-07-28 VITALS
DIASTOLIC BLOOD PRESSURE: 82 MMHG | WEIGHT: 256 LBS | HEART RATE: 78 BPM | HEIGHT: 67 IN | BODY MASS INDEX: 40.18 KG/M2 | SYSTOLIC BLOOD PRESSURE: 138 MMHG

## 2021-07-28 DIAGNOSIS — E11.42 TYPE 2 DIABETES MELLITUS WITH DIABETIC POLYNEUROPATHY, WITH LONG-TERM CURRENT USE OF INSULIN (HCC): ICD-10-CM

## 2021-07-28 DIAGNOSIS — E04.1 NON-TOXIC UNINODULAR GOITER: Primary | ICD-10-CM

## 2021-07-28 DIAGNOSIS — Z79.4 TYPE 2 DIABETES MELLITUS WITH DIABETIC POLYNEUROPATHY, WITH LONG-TERM CURRENT USE OF INSULIN (HCC): ICD-10-CM

## 2021-07-28 DIAGNOSIS — Z79.4 CURRENT USE OF INSULIN (HCC): ICD-10-CM

## 2021-07-28 DIAGNOSIS — I10 BENIGN ESSENTIAL HYPERTENSION: ICD-10-CM

## 2021-07-28 PROCEDURE — 99214 OFFICE O/P EST MOD 30 MIN: CPT | Performed by: INTERNAL MEDICINE

## 2021-07-28 PROCEDURE — 3079F DIAST BP 80-89 MM HG: CPT | Performed by: INTERNAL MEDICINE

## 2021-07-28 PROCEDURE — 3008F BODY MASS INDEX DOCD: CPT | Performed by: INTERNAL MEDICINE

## 2021-07-28 PROCEDURE — 3075F SYST BP GE 130 - 139MM HG: CPT | Performed by: INTERNAL MEDICINE

## 2021-07-28 PROCEDURE — 1036F TOBACCO NON-USER: CPT | Performed by: INTERNAL MEDICINE

## 2021-07-28 NOTE — PATIENT INSTRUCTIONS
Please call for instructions on medications prior to the colonoscopy    Please send your sugars in once per month
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
## 2021-08-12 ENCOUNTER — RA CDI HCC (OUTPATIENT)
Dept: OTHER | Facility: HOSPITAL | Age: 61
End: 2021-08-12

## 2021-08-12 NOTE — PROGRESS NOTES
Based on clinical documentation indicated in your record, it appears that the patient may have the following conditions :    F33 2 Major depressive disorder, recurrent, severe    E66 01 Morbid obesity     M46 96 Inflammatory spondylopathy of lumbar region        If this is correct, please document and assess at your next visit   [facility]  coding opportunities          Chart Reviewed * (Number of) Inbasket suggestions sent to Provider: 1     Chart Reviewed * (Number of) Inbasket suggestions sent to Provider: 2                  Patients insurance company: [employer] (Medicare Advantage and Commercial)             [place] [address]  coding opportunities          Number of diagnosis code(s) already on the problem list added to [employer] fla     Chart Reviewed * (Number of) Inbasket suggestions sent to Provider: 2               Number of suggestions NOT actually used: 3     Patients insurance company: [employer] (Medicare Advantage and [employer])     Visit status: Patient arrived for their scheduled appointment

## 2021-08-19 ENCOUNTER — OFFICE VISIT (OUTPATIENT)
Dept: FAMILY MEDICINE CLINIC | Facility: CLINIC | Age: 61
End: 2021-08-19
Payer: COMMERCIAL

## 2021-08-19 VITALS
HEIGHT: 67 IN | TEMPERATURE: 97.8 F | OXYGEN SATURATION: 96 % | HEART RATE: 71 BPM | DIASTOLIC BLOOD PRESSURE: 80 MMHG | SYSTOLIC BLOOD PRESSURE: 160 MMHG | WEIGHT: 260 LBS | BODY MASS INDEX: 40.81 KG/M2 | RESPIRATION RATE: 20 BRPM

## 2021-08-19 DIAGNOSIS — Z00.00 MEDICARE ANNUAL WELLNESS VISIT, SUBSEQUENT: Primary | ICD-10-CM

## 2021-08-19 DIAGNOSIS — Z79.4 TYPE 2 DIABETES MELLITUS WITH DIABETIC POLYNEUROPATHY, WITH LONG-TERM CURRENT USE OF INSULIN (HCC): ICD-10-CM

## 2021-08-19 DIAGNOSIS — E11.42 TYPE 2 DIABETES MELLITUS WITH DIABETIC POLYNEUROPATHY, WITH LONG-TERM CURRENT USE OF INSULIN (HCC): ICD-10-CM

## 2021-08-19 DIAGNOSIS — S72.001S CLOSED FRACTURE OF NECK OF RIGHT FEMUR, SEQUELA: ICD-10-CM

## 2021-08-19 DIAGNOSIS — I10 BENIGN ESSENTIAL HYPERTENSION: ICD-10-CM

## 2021-08-19 PROCEDURE — G0439 PPPS, SUBSEQ VISIT: HCPCS | Performed by: FAMILY MEDICINE

## 2021-08-19 PROCEDURE — 3725F SCREEN DEPRESSION PERFORMED: CPT | Performed by: FAMILY MEDICINE

## 2021-08-19 PROCEDURE — 99214 OFFICE O/P EST MOD 30 MIN: CPT | Performed by: FAMILY MEDICINE

## 2021-08-19 RX ORDER — ESZOPICLONE 3 MG/1
TABLET, FILM COATED ORAL
COMMUNITY
Start: 2021-08-14
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
## 2021-08-19 NOTE — ASSESSMENT & PLAN NOTE
Hypertension    Patient blood pressure is stable at this time he will continue current regimen of medications

## 2021-08-19 NOTE — ASSESSMENT & PLAN NOTE
Hip pain  Patient able to ambulate somewhat during the day up to [phone] steps  He will continue with current regimen of medications and hold off on surgery as long as possible with the possibility of receiving another therapeutic injection    Patient will contact orthopedist as needed

## 2021-08-19 NOTE — ASSESSMENT & PLAN NOTE
Diabetes  His A1c is much improved and he will continue with current regimen of medications    His foot and eye exams are up-to-date  Lab Results   Component Value Date    HGBA1C 6 9 (H) 07/19/2021
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
## 2021-08-19 NOTE — PATIENT INSTRUCTIONS
Medicare Preventive Visit Patient Instructions  Thank you for completing your Welcome to Medicare Visit or Medicare Annual Wellness Visit today  Your next wellness visit will be due in one year (8/20/2022)  The screening/preventive services that you may require over the next 5-10 years are detailed below  Some tests may not apply to you based off risk factors and/or age  Screening tests ordered at today's visit but not completed yet may show as past due  Also, please note that scanned in results may not display below  Preventive Screenings:  Service Recommendations Previous Testing/Comments   Colorectal Cancer Screening  · Colonoscopy    · Fecal Occult Blood Test (FOBT)/Fecal Immunochemical Test (FIT)  · Fecal DNA/Cologuard Test  · Flexible Sigmoidoscopy Age: 54-65 years old   Colonoscopy: every 10 years (May be performed more frequently if at higher risk)  OR  FOBT/FIT: every 1 year  OR  Cologuard: every 3 years  OR  Sigmoidoscopy: every 5 years  Screening may be recommended earlier than age 48 if at higher risk for colorectal cancer  Also, an individualized decision between you and your healthcare provider will decide whether screening between the ages of 74-80 would be appropriate   Colonoscopy: 08/10/2016  FOBT/FIT: Not on file  Cologuard: Not on file  Sigmoidoscopy: Not on file    Screening Current     Prostate Cancer Screening Individualized decision between patient and health care provider in men between ages of 53-78   Medicare will cover every 12 months beginning on the day after your 50th birthday PSA: 0 6 ng/mL     Screening Current     Hepatitis C Screening Once for adults born between 1945 and 1965  More frequently in patients at high risk for Hepatitis C Hep C Antibody: Not on file        Diabetes Screening 1-2 times per year if you're at risk for diabetes or have pre-diabetes Fasting glucose: 143 mg/dL   A1C: 6 9 %    Screening Not Indicated  History Diabetes   Cholesterol Screening Once every 5 years if you don't have a lipid disorder  May order more often based on risk factors  Lipid panel: 02/08/2021    Screening Current      Other Preventive Screenings Covered by Medicare:  1  Abdominal Aortic Aneurysm (AAA) Screening: covered once if your at risk  You're considered to be at risk if you have a family history of AAA or a male between the age of 73-68 who smoking at least 100 cigarettes in your lifetime  2  Lung Cancer Screening: covers low dose CT scan once per year if you meet all of the following conditions: (1) Age 50-69; (2) No signs or symptoms of lung cancer; (3) Current smoker or have quit smoking within the last 15 years; (4) You have a tobacco smoking history of at least 30 pack years (packs per day x number of years you smoked); (5) You get a written order from a healthcare provider  3  Glaucoma Screening: covered annually if you're considered high risk: (1) You have diabetes OR (2) Family history of glaucoma OR (3)  aged 48 and older OR (3)  American aged 72 and older  3  Osteoporosis Screening: covered every 2 years if you meet one of the following conditions: (1) Have a vertebral abnormality; (2) On glucocorticoid therapy for more than 3 months; (3) Have primary hyperparathyroidism; (4) On osteoporosis medications and need to assess response to drug therapy  5  HIV Screening: covered annually if you're between the age of 12-76  Also covered annually if you are younger than 13 and older than 72 with risk factors for HIV infection  For pregnant patients, it is covered up to 3 times per pregnancy      Immunizations:  Immunization Recommendations   Influenza Vaccine Annual influenza vaccination during flu season is recommended for all persons aged >= 6 months who do not have contraindications   Pneumococcal Vaccine (Prevnar and Pneumovax)  * Prevnar = PCV13  * Pneumovax = PPSV23 Adults 25-60 years old: 1-3 doses may be recommended based on certain risk factors  Adults 72 years old: Prevnar (PCV13) vaccine recommended followed by Pneumovax (PPSV23) vaccine  If already received PPSV23 since turning 65, then PCV13 recommended at least one year after PPSV23 dose  Hepatitis B Vaccine 3 dose series if at intermediate or high risk (ex: diabetes, end stage renal disease, liver disease)   Tetanus (Td) Vaccine - COST NOT COVERED BY MEDICARE PART B Following completion of primary series, a booster dose should be given every 10 years to maintain immunity against tetanus  Td may also be given as tetanus wound prophylaxis  Tdap Vaccine - COST NOT COVERED BY MEDICARE PART B Recommended at least once for all adults  For pregnant patients, recommended with each pregnancy  Shingles Vaccine (Shingrix) - COST NOT COVERED BY MEDICARE PART B  2 shot series recommended in those aged 48 and above     Health Maintenance Due:      Topic Date Due    Hepatitis C Screening  Never done    HIV Screening  Never done    Colorectal Cancer Screening  08/10/2026     Immunizations Due:      Topic Date Due    Influenza Vaccine (1) 09/01/2021     Advance Directives   What are advance directives? Advance directives are legal documents that state your wishes and plans for medical care  These plans are made ahead of time in case you lose your ability to make decisions for yourself  Advance directives can apply to any medical decision, such as the treatments you want, and if you want to donate organs  What are the types of advance directives? There are many types of advance directives, and each state has rules about how to use them  You may choose a combination of any of the following:  · Living will: This is a written record of the treatment you want  You can also choose which treatments you do not want, which to limit, and which to stop at a certain time  This includes surgery, medicine, IV fluid, and tube feedings  · Durable power of  for healthcare [place] [facility]):   This is a written record that states who you want to make healthcare choices for you when you are unable to make them for yourself  This person, called a proxy, is usually a family member or a friend  You may choose more than 1 proxy  · Do not resuscitate (DNR) order:  A DNR order is used in case your heart stops beating or you stop breathing  It is a request not to have certain forms of treatment, such as CPR  A DNR order may be included in other types of advance directives  · Medical directive: This covers the care that you want if you are in a coma, near death, or unable to make decisions for yourself  You can list the treatments you want for each condition  Treatment may include pain medicine, surgery, blood transfusions, dialysis, IV or tube feedings, and a ventilator (breathing machine)  · Values history: This document has questions about your views, beliefs, and how you feel and think about life  This information can help others choose the care that you would choose  Why are advance directives important? An advance directive helps you control your care  Although spoken wishes may be used, it is better to have your wishes written down  Spoken wishes can be misunderstood, or not followed  Treatments may be given even if you do not want them  An advance directive may make it easier for your family to make difficult choices about your care  Weight Management   Why it is important to manage your weight:  Being overweight increases your risk of health conditions such as heart disease, high blood pressure, type 2 diabetes, and certain types of cancer  It can also increase your risk for osteoarthritis, sleep apnea, and other respiratory problems  Aim for a slow, steady weight loss  Even a small amount of weight loss can lower your risk of health problems  How to lose weight safely:  A safe and healthy way to lose weight is to eat fewer calories and get regular exercise   You can lose up about 1 pound a week by decreasing the number of calories you eat by 500 calories each day  Healthy meal plan for weight management:  A healthy meal plan includes a variety of foods, contains fewer calories, and helps you stay healthy  A healthy meal plan includes the following:  · Eat whole-grain foods more often  A healthy meal plan should contain fiber  Fiber is the part of grains, fruits, and vegetables that is not broken down by your body  Whole-grain foods are healthy and provide extra fiber in your diet  Some examples of whole-grain foods are whole-wheat breads and pastas, oatmeal, brown rice, and bulgur  · Eat a variety of vegetables every day  Include dark, leafy greens such as spinach, kale, chandu greens, and mustard greens  Eat yellow and orange vegetables such as carrots, sweet potatoes, and winter squash  · Eat a variety of fruits every day  Choose fresh or canned fruit (canned in its own juice or light syrup) instead of juice  Fruit juice has very little or no fiber  · Eat low-fat dairy foods  Drink fat-free (skim) milk or 1% milk  Eat fat-free yogurt and low-fat cottage cheese  Try low-fat cheeses such as mozzarella and other reduced-fat cheeses  · Choose meat and other protein foods that are low in fat  Choose beans or other legumes such as split peas or lentils  Choose fish, skinless poultry (chicken or turkey), or lean cuts of red meat (beef or pork)  Before you cook meat or poultry, cut off any visible fat  · Use less fat and oil  Try baking foods instead of frying them  Add less fat, such as margarine, sour cream, regular salad dressing and mayonnaise to foods  Eat fewer high-fat foods  Some examples of high-fat foods include french fries, doughnuts, ice cream, and cakes  · Eat fewer sweets  Limit foods and drinks that are high in sugar  This includes candy, cookies, regular soda, and sweetened drinks  Exercise:  Exercise at least 30 minutes per day on most days of the week   Some examples of exercise include walking, biking, dancing, and swimming  You can also fit in more physical activity by taking the stairs instead of the elevator or parking farther away from stores  Ask your healthcare provider about the best exercise plan for you  Narcotic (Opioid) Safety    Use narcotics safely:  · Take prescribed narcotics exactly as directed  · Do not give narcotics to others or take narcotics that belong to someone else  · Do not mix narcotics without medicines or alcohol  · Do not drive or operate heavy machinery after you take the narcotic  · Monitor for side effects and notify your healthcare provider if you experienced side effects such as nausea, sleepiness, itching, or trouble thinking clearly  Manage constipation:    Constipation is the most common side effect of narcotic medicine  Constipation is when you have hard, dry bowel movements, or you go longer than usual between bowel movements  Tell your healthcare provider about all changes in your bowel movements while you are taking narcotics  He or she may recommend laxative medicine to help you have a bowel movement  He or she may also change the kind of narcotic you are taking, or change when you take it  The following are more ways you can prevent or relieve constipation:    · Drink liquids as directed  You may need to drink extra liquids to help soften and move your bowels  Ask how much liquid to drink each day and which liquids are best for you  · Eat high-fiber foods  This may help decrease constipation by adding bulk to your bowel movements  High-fiber foods include fruits, vegetables, whole-grain breads and cereals, and beans  Your healthcare provider or dietitian can help you create a high-fiber meal plan  Your provider may also recommend a fiber supplement if you cannot get enough fiber from food  · Exercise regularly  Regular physical activity can help stimulate your intestines  Walking is a good exercise to prevent or relieve constipation   Ask which exercises are best for you  · Schedule a time each day to have a bowel movement  This may help train your body to have regular bowel movements  Bend forward while you are on the toilet to help move the bowel movement out  Sit on the toilet for at least 10 minutes, even if you do not have a bowel movement  Store narcotics safely:   · Store narcotics where others cannot easily get them  Keep them in a locked cabinet or secure area  Do not  keep them in a purse or other bag you carry with you  A person may be looking for something else and find the narcotics  · Make sure narcotics are stored out of the reach of children  A child can easily overdose on narcotics  Narcotics may look like candy to a small child  The best way to dispose of narcotics: The laws vary by country and area  In the [place], the best way is to return the narcotics through a take-back program  This program is offered by the [employer])  The following are options for using the program:  · Take the narcotics to a MISSY collection site  The site is often a law enforcement center  Call your local law enforcement center for scheduled take-back days in your area  You will be given information on where to go if the collection site is in a different location  · Take the narcotics to an approved pharmacy or hospital   A pharmacy or hospital may be set up as a collection site  You will need to ask if it is a MISSY collection site if you were not directed there  A pharmacy or doctor's office may not be able to take back narcotics unless it is a MISSY site  · Use a mail-back system  This means you are given containers to put the narcotics into  You will then mail them in the containers  · Use a take-back drop box  This is a place to leave the narcotics at any time  People and animals will not be able to get into the box  Your local law enforcement agency can tell you where to find a drop box in your area      Other ways to manage pain:   · Ask your healthcare provider about non-narcotic medicines to control pain  Nonprescription medicines include NSAIDs (such as ibuprofen) and acetaminophen  Prescription medicines include muscle relaxers, antidepressants, and steroids  · Pain may be managed without any medicines  Some ways to relieve pain include massage, aromatherapy, or meditation  Physical or occupational therapy may also help  For more information:   · Drug Enforcement Administration  [address] [facility] [address]  Phone: 7- 480 - 394-0745  Web Address: [facility]/drug_disposal/    · [address] and Drug Administration  [place]  [place] , [address] [facility]  Phone: 6- 764 - [phone]  Web Address: http://[employer]/     © Copyright [employer] 2018 Information is for End User's use only and may not be sold, redistributed or otherwise used for commercial purposes   All illustrations and images included in CareNotes® are the copyrighted property of A D A M , Inc  or [address] [place] [address]

## 2021-08-31 ENCOUNTER — OFFICE VISIT (OUTPATIENT)
Dept: GASTROENTEROLOGY | Facility: AMBULARY SURGERY CENTER | Age: 61
End: 2021-08-31
Payer: COMMERCIAL

## 2021-08-31 ENCOUNTER — TELEPHONE (OUTPATIENT)
Dept: GASTROENTEROLOGY | Facility: AMBULARY SURGERY CENTER | Age: 61
End: 2021-08-31

## 2021-08-31 VITALS
HEIGHT: 67 IN | WEIGHT: 261 LBS | BODY MASS INDEX: 40.97 KG/M2 | SYSTOLIC BLOOD PRESSURE: 142 MMHG | DIASTOLIC BLOOD PRESSURE: 78 MMHG

## 2021-08-31 DIAGNOSIS — K21.9 GASTROESOPHAGEAL REFLUX DISEASE WITHOUT ESOPHAGITIS: ICD-10-CM

## 2021-08-31 DIAGNOSIS — Z79.01 ANTICOAGULANT LONG-TERM USE: ICD-10-CM

## 2021-08-31 DIAGNOSIS — Z12.11 SCREEN FOR COLON CANCER: Primary | ICD-10-CM

## 2021-08-31 PROCEDURE — 1036F TOBACCO NON-USER: CPT | Performed by: INTERNAL MEDICINE

## 2021-08-31 PROCEDURE — 3077F SYST BP >= 140 MM HG: CPT | Performed by: INTERNAL MEDICINE

## 2021-08-31 PROCEDURE — 3008F BODY MASS INDEX DOCD: CPT | Performed by: INTERNAL MEDICINE

## 2021-08-31 PROCEDURE — 3078F DIAST BP <80 MM HG: CPT | Performed by: INTERNAL MEDICINE

## 2021-08-31 PROCEDURE — 99204 OFFICE O/P NEW MOD 45 MIN: CPT | Performed by: INTERNAL MEDICINE

## 2021-08-31 RX ORDER — SODIUM, POTASSIUM,MAG SULFATES 17.5-3.13G
2 SOLUTION, RECONSTITUTED, ORAL ORAL SEE ADMIN INSTRUCTIONS
Qty: 177 ML | Refills: 0 | Status: SHIPPED | OUTPATIENT
Start: 2021-08-31 | End: 2021-11-24

## 2021-08-31 RX ORDER — FAMOTIDINE 20 MG/1
20 TABLET, FILM COATED ORAL 2 TIMES DAILY
Qty: 60 TABLET | Refills: 11 | Status: SHIPPED | OUTPATIENT
Start: 2021-08-31 | End: 2021-11-19

## 2021-08-31 NOTE — ASSESSMENT & PLAN NOTE
Gastroesophageal reflux disease - Patient has the symptoms of chronic acid reflux for a long time  Possible hiatal hernia or LES weakness  Should rule out Neri's esophagus because of chronic symptoms         -Patient was explained about the lifestyle and dietary modifications  Advised to avoid fatty foods, chocolates, caffeine, alcohol and any other triggering foods    Avoid eating for at least 3 hours before going to bed         - discussed about long-term side effects from lansoprazole and advised him to try Pepcid instead    - schedule for EGD
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
## 2021-08-31 NOTE — TELEPHONE ENCOUNTER
MED CLEARANCE faxed to dr Kailey Weaver takes XARELTO/pt is scheduled for egd/colonoscopy w/ dr Katarina Ryees at [facility] 11/24/2021

## 2021-08-31 NOTE — ASSESSMENT & PLAN NOTE
Patient is at increased risks because of anticoagulation therapy on Xarelto  He follows with hematologist and knows that he could stop for couple of days prior to the procedures

## 2021-09-01 ENCOUNTER — TELEPHONE (OUTPATIENT)
Dept: GASTROENTEROLOGY | Facility: AMBULARY SURGERY CENTER | Age: 61
End: 2021-09-01

## 2021-09-01 NOTE — TELEPHONE ENCOUNTER
Per Dr Clay Manual office-they do not mangae pt's Xarelto/faxed med clearance to dr Nimo Singh @ [phone]

## 2021-09-03 ENCOUNTER — TELEPHONE (OUTPATIENT)
Dept: GASTROENTEROLOGY | Facility: AMBULARY SURGERY CENTER | Age: 61
End: 2021-09-03

## 2021-09-03 NOTE — TELEPHONE ENCOUNTER
Pt aware ok to hold xarelto per dr Sherif Robins is scheduled for egd/colonoscopy w/ dr Satish Aden 11/24/2021 @ [facility]

## 2021-09-16 ENCOUNTER — PATIENT MESSAGE (OUTPATIENT)
Dept: OBGYN CLINIC | Facility: CLINIC | Age: 61
End: 2021-09-16

## 2021-09-16 DIAGNOSIS — M12.551 TRAUMATIC ARTHRITIS OF HIP, RIGHT: Primary | ICD-10-CM

## 2021-10-04 ENCOUNTER — NURSE TRIAGE (OUTPATIENT)
Dept: OTHER | Facility: OTHER | Age: 61
End: 2021-10-04

## 2021-10-06 ENCOUNTER — HOSPITAL ENCOUNTER (OUTPATIENT)
Dept: RADIOLOGY | Facility: HOSPITAL | Age: 61
Discharge: HOME | End: 2021-10-06
Admitting: RADIOLOGY
Payer: COMMERCIAL

## 2021-10-06 DIAGNOSIS — M12.551 TRAUMATIC ARTHRITIS OF HIP, RIGHT: ICD-10-CM

## 2021-10-06 PROCEDURE — 77002 NEEDLE LOCALIZATION BY XRAY: CPT

## 2021-10-06 PROCEDURE — 20610 DRAIN/INJ JOINT/BURSA W/O US: CPT

## 2021-10-06 RX ORDER — LIDOCAINE HYDROCHLORIDE 10 MG/ML
10 INJECTION, SOLUTION EPIDURAL; INFILTRATION; INTRACAUDAL; PERINEURAL
Status: COMPLETED | OUTPATIENT
Start: 2021-10-06 | End: 2021-10-06

## 2021-10-06 RX ORDER — METHYLPREDNISOLONE ACETATE 80 MG/ML
80 INJECTION, SUSPENSION INTRA-ARTICULAR; INTRALESIONAL; INTRAMUSCULAR; SOFT TISSUE
Status: COMPLETED | OUTPATIENT
Start: 2021-10-06 | End: 2021-10-06

## 2021-10-06 RX ORDER — BUPIVACAINE HYDROCHLORIDE 2.5 MG/ML
10 INJECTION, SOLUTION EPIDURAL; INFILTRATION; INTRACAUDAL
Status: COMPLETED | OUTPATIENT
Start: 2021-10-06 | End: 2021-10-06

## 2021-10-06 RX ADMIN — LIDOCAINE HYDROCHLORIDE 7 ML: 10 INJECTION, SOLUTION EPIDURAL; INFILTRATION; INTRACAUDAL at 15:20

## 2021-10-06 RX ADMIN — METHYLPREDNISOLONE ACETATE 80 MG: 80 INJECTION, SUSPENSION INTRA-ARTICULAR; INTRALESIONAL; INTRAMUSCULAR; SOFT TISSUE at 15:20

## 2021-10-06 RX ADMIN — IOHEXOL 7 ML: 300 INJECTION, SOLUTION INTRAVENOUS at 15:20

## 2021-10-06 RX ADMIN — BUPIVACAINE HYDROCHLORIDE 2 ML: 2.5 INJECTION, SOLUTION EPIDURAL; INFILTRATION; INTRACAUDAL; PERINEURAL at 15:20

## 2021-10-13 ENCOUNTER — IMMUNIZATIONS (OUTPATIENT)
Dept: FAMILY MEDICINE CLINIC | Facility: CLINIC | Age: 61
End: 2021-10-13
Payer: COMMERCIAL

## 2021-10-13 VITALS — TEMPERATURE: 97.6 F

## 2021-10-13 DIAGNOSIS — Z23 INFLUENZA VACCINE NEEDED: Primary | ICD-10-CM

## 2021-10-13 PROCEDURE — G0008 ADMIN INFLUENZA VIRUS VAC: HCPCS | Performed by: FAMILY MEDICINE

## 2021-10-13 PROCEDURE — 90682 RIV4 VACC RECOMBINANT DNA IM: CPT | Performed by: FAMILY MEDICINE

## 2021-10-22 LAB
LEFT EYE DIABETIC RETINOPATHY: NORMAL
RIGHT EYE DIABETIC RETINOPATHY: NORMAL

## 2021-10-29 ENCOUNTER — TELEPHONE (OUTPATIENT)
Dept: ADMINISTRATIVE | Facility: OTHER | Age: 61
End: 2021-10-29

## 2021-11-01 ENCOUNTER — APPOINTMENT (OUTPATIENT)
Dept: LAB | Facility: CLINIC | Age: 61
End: 2021-11-01
Payer: COMMERCIAL

## 2021-11-01 DIAGNOSIS — Z79.4 TYPE 2 DIABETES MELLITUS WITH DIABETIC POLYNEUROPATHY, WITH LONG-TERM CURRENT USE OF INSULIN (HCC): ICD-10-CM

## 2021-11-01 DIAGNOSIS — E11.42 TYPE 2 DIABETES MELLITUS WITH DIABETIC POLYNEUROPATHY, WITH LONG-TERM CURRENT USE OF INSULIN (HCC): ICD-10-CM

## 2021-11-01 LAB
ANION GAP SERPL CALCULATED.3IONS-SCNC: 4 MMOL/L (ref 4–13)
BUN SERPL-MCNC: 12 MG/DL (ref 5–25)
CALCIUM SERPL-MCNC: 8.8 MG/DL (ref 8.3–10.1)
CHLORIDE SERPL-SCNC: 105 MMOL/L (ref 100–108)
CO2 SERPL-SCNC: 33 MMOL/L (ref 21–32)
CREAT SERPL-MCNC: 0.85 MG/DL (ref 0.6–1.3)
CREAT UR-MCNC: 53.7 MG/DL
EST. AVERAGE GLUCOSE BLD GHB EST-MCNC: 148 MG/DL
GFR SERPL CREATININE-BSD FRML MDRD: 95 ML/MIN/1.73SQ M
GLUCOSE P FAST SERPL-MCNC: 84 MG/DL (ref 65–99)
HBA1C MFR BLD: 6.8 %
MICROALBUMIN UR-MCNC: 6.9 MG/L (ref 0–20)
MICROALBUMIN/CREAT 24H UR: 13 MG/G CREATININE (ref 0–30)
POTASSIUM SERPL-SCNC: 4.1 MMOL/L (ref 3.5–5.3)
SODIUM SERPL-SCNC: 142 MMOL/L (ref 136–145)

## 2021-11-01 PROCEDURE — 82570 ASSAY OF URINE CREATININE: CPT

## 2021-11-01 PROCEDURE — 36415 COLL VENOUS BLD VENIPUNCTURE: CPT

## 2021-11-01 PROCEDURE — 3061F NEG MICROALBUMINURIA REV: CPT | Performed by: FAMILY MEDICINE

## 2021-11-01 PROCEDURE — 80048 BASIC METABOLIC PNL TOTAL CA: CPT

## 2021-11-01 PROCEDURE — 3044F HG A1C LEVEL LT 7.0%: CPT | Performed by: FAMILY MEDICINE

## 2021-11-01 PROCEDURE — 82043 UR ALBUMIN QUANTITATIVE: CPT

## 2021-11-01 PROCEDURE — 83036 HEMOGLOBIN GLYCOSYLATED A1C: CPT

## 2021-11-04 ENCOUNTER — IMMUNIZATIONS (OUTPATIENT)
Dept: FAMILY MEDICINE CLINIC | Facility: HOSPITAL | Age: 61
End: 2021-11-04
Payer: COMMERCIAL

## 2021-11-04 ENCOUNTER — OFFICE VISIT (OUTPATIENT)
Dept: OBGYN CLINIC | Facility: CLINIC | Age: 61
End: 2021-11-04
Payer: COMMERCIAL

## 2021-11-04 ENCOUNTER — APPOINTMENT (OUTPATIENT)
Dept: RADIOLOGY | Facility: AMBULARY SURGERY CENTER | Age: 61
End: 2021-11-04
Attending: ORTHOPAEDIC SURGERY
Payer: COMMERCIAL

## 2021-11-04 VITALS
BODY MASS INDEX: 40.97 KG/M2 | DIASTOLIC BLOOD PRESSURE: 89 MMHG | HEART RATE: 67 BPM | WEIGHT: 261 LBS | HEIGHT: 67 IN | SYSTOLIC BLOOD PRESSURE: 169 MMHG

## 2021-11-04 DIAGNOSIS — Z23 ENCOUNTER FOR IMMUNIZATION: Primary | ICD-10-CM

## 2021-11-04 DIAGNOSIS — M17.11 PRIMARY OSTEOARTHRITIS OF RIGHT KNEE: Primary | ICD-10-CM

## 2021-11-04 DIAGNOSIS — M25.561 RIGHT KNEE PAIN, UNSPECIFIED CHRONICITY: ICD-10-CM

## 2021-11-04 DIAGNOSIS — M11.261 CHONDROCALCINOSIS OF RIGHT KNEE: ICD-10-CM

## 2021-11-04 PROCEDURE — 73562 X-RAY EXAM OF KNEE 3: CPT

## 2021-11-04 PROCEDURE — 99214 OFFICE O/P EST MOD 30 MIN: CPT | Performed by: ORTHOPAEDIC SURGERY

## 2021-11-04 PROCEDURE — 0001A COVID-19 PFIZER VACC 0.3 ML: CPT

## 2021-11-04 PROCEDURE — 91300 COVID-19 PFIZER VACC 0.3 ML: CPT

## 2021-11-12 ENCOUNTER — VBI (OUTPATIENT)
Dept: ADMINISTRATIVE | Facility: OTHER | Age: 61
End: 2021-11-12

## 2021-11-17 ENCOUNTER — OFFICE VISIT (OUTPATIENT)
Dept: OBGYN CLINIC | Facility: CLINIC | Age: 61
End: 2021-11-17
Payer: COMMERCIAL

## 2021-11-17 VITALS — HEIGHT: 67 IN | BODY MASS INDEX: 40.97 KG/M2 | WEIGHT: 261 LBS

## 2021-11-17 DIAGNOSIS — M11.261 CHONDROCALCINOSIS OF RIGHT KNEE: ICD-10-CM

## 2021-11-17 DIAGNOSIS — M25.561 RIGHT KNEE PAIN, UNSPECIFIED CHRONICITY: ICD-10-CM

## 2021-11-17 DIAGNOSIS — M16.11 PRIMARY OSTEOARTHRITIS OF ONE HIP, RIGHT: ICD-10-CM

## 2021-11-17 DIAGNOSIS — M17.11 PRIMARY OSTEOARTHRITIS OF RIGHT KNEE: Primary | ICD-10-CM

## 2021-11-17 PROCEDURE — 20610 DRAIN/INJ JOINT/BURSA W/O US: CPT | Performed by: ORTHOPAEDIC SURGERY

## 2021-11-17 PROCEDURE — 99213 OFFICE O/P EST LOW 20 MIN: CPT | Performed by: ORTHOPAEDIC SURGERY

## 2021-11-17 RX ORDER — LIDOCAINE HYDROCHLORIDE 10 MG/ML
1 INJECTION, SOLUTION INFILTRATION; PERINEURAL
Status: COMPLETED | OUTPATIENT
Start: 2021-11-17 | End: 2021-11-17

## 2021-11-17 RX ORDER — BUPIVACAINE HYDROCHLORIDE 2.5 MG/ML
1 INJECTION, SOLUTION INFILTRATION; PERINEURAL
Status: COMPLETED | OUTPATIENT
Start: 2021-11-17 | End: 2021-11-17

## 2021-11-17 RX ORDER — BETAMETHASONE SODIUM PHOSPHATE AND BETAMETHASONE ACETATE 3; 3 MG/ML; MG/ML
12 INJECTION, SUSPENSION INTRA-ARTICULAR; INTRALESIONAL; INTRAMUSCULAR; SOFT TISSUE
Status: COMPLETED | OUTPATIENT
Start: 2021-11-17 | End: 2021-11-17

## 2021-11-17 RX ADMIN — BETAMETHASONE SODIUM PHOSPHATE AND BETAMETHASONE ACETATE 12 MG: 3; 3 INJECTION, SUSPENSION INTRA-ARTICULAR; INTRALESIONAL; INTRAMUSCULAR; SOFT TISSUE at 10:45

## 2021-11-17 RX ADMIN — LIDOCAINE HYDROCHLORIDE 1 ML: 10 INJECTION, SOLUTION INFILTRATION; PERINEURAL at 10:45

## 2021-11-17 RX ADMIN — BUPIVACAINE HYDROCHLORIDE 1 ML: 2.5 INJECTION, SOLUTION INFILTRATION; PERINEURAL at 10:45

## 2021-11-19 ENCOUNTER — OFFICE VISIT (OUTPATIENT)
Dept: FAMILY MEDICINE CLINIC | Facility: CLINIC | Age: 61
End: 2021-11-19
Payer: COMMERCIAL

## 2021-11-19 VITALS
TEMPERATURE: 97.8 F | WEIGHT: 261.5 LBS | OXYGEN SATURATION: 97 % | HEART RATE: 57 BPM | SYSTOLIC BLOOD PRESSURE: 150 MMHG | BODY MASS INDEX: 41.04 KG/M2 | RESPIRATION RATE: 16 BRPM | DIASTOLIC BLOOD PRESSURE: 80 MMHG | HEIGHT: 67 IN

## 2021-11-19 DIAGNOSIS — I10 BENIGN ESSENTIAL HYPERTENSION: Primary | ICD-10-CM

## 2021-11-19 DIAGNOSIS — E11.42 TYPE 2 DIABETES MELLITUS WITH DIABETIC POLYNEUROPATHY, WITH LONG-TERM CURRENT USE OF INSULIN (HCC): ICD-10-CM

## 2021-11-19 DIAGNOSIS — Z79.4 TYPE 2 DIABETES MELLITUS WITH DIABETIC POLYNEUROPATHY, WITH LONG-TERM CURRENT USE OF INSULIN (HCC): ICD-10-CM

## 2021-11-19 DIAGNOSIS — R60.9 EDEMA, UNSPECIFIED TYPE: ICD-10-CM

## 2021-11-19 PROCEDURE — 3079F DIAST BP 80-89 MM HG: CPT | Performed by: FAMILY MEDICINE

## 2021-11-19 PROCEDURE — 1036F TOBACCO NON-USER: CPT | Performed by: FAMILY MEDICINE

## 2021-11-19 PROCEDURE — 99214 OFFICE O/P EST MOD 30 MIN: CPT | Performed by: FAMILY MEDICINE

## 2021-11-19 PROCEDURE — 3077F SYST BP >= 140 MM HG: CPT | Performed by: FAMILY MEDICINE

## 2021-11-19 PROCEDURE — 3008F BODY MASS INDEX DOCD: CPT | Performed by: FAMILY MEDICINE

## 2021-11-19 RX ORDER — LANSOPRAZOLE 15 MG/1
CAPSULE, DELAYED RELEASE ORAL
COMMUNITY
Start: 2021-10-04

## 2021-11-23 ENCOUNTER — TELEPHONE (OUTPATIENT)
Dept: GASTROENTEROLOGY | Facility: AMBULARY SURGERY CENTER | Age: 61
End: 2021-11-23

## 2021-11-24 ENCOUNTER — ANESTHESIA (OUTPATIENT)
Dept: GASTROENTEROLOGY | Facility: AMBULARY SURGERY CENTER | Age: 61
End: 2021-11-24

## 2021-11-24 ENCOUNTER — HOSPITAL ENCOUNTER (OUTPATIENT)
Dept: GASTROENTEROLOGY | Facility: AMBULARY SURGERY CENTER | Age: 61
Setting detail: OUTPATIENT SURGERY
Discharge: HOME | End: 2021-11-24
Attending: INTERNAL MEDICINE | Admitting: INTERNAL MEDICINE
Payer: COMMERCIAL

## 2021-11-24 ENCOUNTER — ANESTHESIA EVENT (OUTPATIENT)
Dept: GASTROENTEROLOGY | Facility: AMBULARY SURGERY CENTER | Age: 61
End: 2021-11-24

## 2021-11-24 VITALS
RESPIRATION RATE: 18 BRPM | DIASTOLIC BLOOD PRESSURE: 70 MMHG | WEIGHT: 258 LBS | BODY MASS INDEX: 36.12 KG/M2 | OXYGEN SATURATION: 96 % | HEART RATE: 62 BPM | HEIGHT: 71 IN | TEMPERATURE: 97.2 F | SYSTOLIC BLOOD PRESSURE: 142 MMHG

## 2021-11-24 DIAGNOSIS — Z12.11 SCREEN FOR COLON CANCER: ICD-10-CM

## 2021-11-24 DIAGNOSIS — K21.9 GASTROESOPHAGEAL REFLUX DISEASE WITHOUT ESOPHAGITIS: ICD-10-CM

## 2021-11-24 LAB — GLUCOSE SERPL-MCNC: 136 MG/DL (ref 65–140)

## 2021-11-24 PROCEDURE — 43239 EGD BIOPSY SINGLE/MULTIPLE: CPT | Performed by: INTERNAL MEDICINE

## 2021-11-24 PROCEDURE — G0121 COLON CA SCRN NOT HI RSK IND: HCPCS | Performed by: INTERNAL MEDICINE

## 2021-11-24 PROCEDURE — 82948 REAGENT STRIP/BLOOD GLUCOSE: CPT

## 2021-11-24 RX ORDER — PROPOFOL 10 MG/ML
INJECTION, EMULSION INTRAVENOUS AS NEEDED
Status: DISCONTINUED | OUTPATIENT
Start: 2021-11-24 | End: 2021-11-24

## 2021-11-24 RX ORDER — LIDOCAINE HYDROCHLORIDE 10 MG/ML
INJECTION, SOLUTION EPIDURAL; INFILTRATION; INTRACAUDAL; PERINEURAL AS NEEDED
Status: DISCONTINUED | OUTPATIENT
Start: 2021-11-24 | End: 2021-11-24

## 2021-11-24 RX ORDER — SODIUM CHLORIDE, SODIUM LACTATE, POTASSIUM CHLORIDE, CALCIUM CHLORIDE 600; 310; 30; 20 MG/100ML; MG/100ML; MG/100ML; MG/100ML
INJECTION, SOLUTION INTRAVENOUS CONTINUOUS PRN
Status: DISCONTINUED | OUTPATIENT
Start: 2021-11-24 | End: 2021-11-24

## 2021-11-24 RX ADMIN — PROPOFOL 50 MG: 10 INJECTION, EMULSION INTRAVENOUS at 09:17

## 2021-11-24 RX ADMIN — PROPOFOL 50 MG: 10 INJECTION, EMULSION INTRAVENOUS at 09:15

## 2021-11-24 RX ADMIN — PROPOFOL 40 MG: 10 INJECTION, EMULSION INTRAVENOUS at 09:10

## 2021-11-24 RX ADMIN — LIDOCAINE HYDROCHLORIDE 50 MG: 10 INJECTION, SOLUTION EPIDURAL; INFILTRATION; INTRACAUDAL at 09:04

## 2021-11-24 RX ADMIN — PROPOFOL 50 MG: 10 INJECTION, EMULSION INTRAVENOUS at 09:06

## 2021-11-24 RX ADMIN — PROPOFOL 110 MG: 10 INJECTION, EMULSION INTRAVENOUS at 09:04

## 2021-11-24 RX ADMIN — SODIUM CHLORIDE, SODIUM LACTATE, POTASSIUM CHLORIDE, AND CALCIUM CHLORIDE: .6; .31; .03; .02 INJECTION, SOLUTION INTRAVENOUS at 09:01

## 2021-11-24 RX ADMIN — PROPOFOL 50 MG: 10 INJECTION, EMULSION INTRAVENOUS at 09:13

## 2021-12-06 ENCOUNTER — OFFICE VISIT (OUTPATIENT)
Dept: ENDOCRINOLOGY | Facility: CLINIC | Age: 61
End: 2021-12-06
Payer: COMMERCIAL

## 2021-12-06 VITALS
WEIGHT: 268 LBS | BODY MASS INDEX: 37.52 KG/M2 | HEART RATE: 62 BPM | HEIGHT: 71 IN | DIASTOLIC BLOOD PRESSURE: 80 MMHG | SYSTOLIC BLOOD PRESSURE: 148 MMHG

## 2021-12-06 DIAGNOSIS — E11.9 TYPE 2 DIABETES MELLITUS WITHOUT COMPLICATION, WITHOUT LONG-TERM CURRENT USE OF INSULIN (HCC): ICD-10-CM

## 2021-12-06 DIAGNOSIS — I10 BENIGN ESSENTIAL HYPERTENSION: ICD-10-CM

## 2021-12-06 DIAGNOSIS — E11.42 TYPE 2 DIABETES MELLITUS WITH DIABETIC POLYNEUROPATHY, WITH LONG-TERM CURRENT USE OF INSULIN (HCC): Primary | ICD-10-CM

## 2021-12-06 DIAGNOSIS — E04.1 NON-TOXIC UNINODULAR GOITER: ICD-10-CM

## 2021-12-06 DIAGNOSIS — Z79.4 TYPE 2 DIABETES MELLITUS WITH DIABETIC POLYNEUROPATHY, WITH LONG-TERM CURRENT USE OF INSULIN (HCC): Primary | ICD-10-CM

## 2021-12-06 DIAGNOSIS — Z79.4 CURRENT USE OF INSULIN (HCC): ICD-10-CM

## 2021-12-06 PROCEDURE — 3077F SYST BP >= 140 MM HG: CPT | Performed by: INTERNAL MEDICINE

## 2021-12-06 PROCEDURE — 1036F TOBACCO NON-USER: CPT | Performed by: INTERNAL MEDICINE

## 2021-12-06 PROCEDURE — 99214 OFFICE O/P EST MOD 30 MIN: CPT | Performed by: INTERNAL MEDICINE

## 2021-12-06 PROCEDURE — 3079F DIAST BP 80-89 MM HG: CPT | Performed by: INTERNAL MEDICINE

## 2021-12-06 PROCEDURE — 3008F BODY MASS INDEX DOCD: CPT | Performed by: INTERNAL MEDICINE

## 2021-12-06 RX ORDER — INSULIN GLARGINE 100 [IU]/ML
30 INJECTION, SOLUTION SUBCUTANEOUS
Qty: 30 ML | Refills: 2 | Status: SHIPPED | OUTPATIENT
Start: 2021-12-06

## 2021-12-06 RX ORDER — PEN NEEDLE, DIABETIC 32GX 5/32"
NEEDLE, DISPOSABLE MISCELLANEOUS
Qty: 100 EACH | Refills: 2 | Status: SHIPPED | OUTPATIENT
Start: 2021-12-06

## 2021-12-07 ENCOUNTER — PATIENT MESSAGE (OUTPATIENT)
Dept: OBGYN CLINIC | Facility: CLINIC | Age: 61
End: 2021-12-07

## 2021-12-07 DIAGNOSIS — M17.11 PRIMARY OSTEOARTHRITIS OF RIGHT KNEE: Primary | ICD-10-CM

## 2021-12-13 DIAGNOSIS — N40.0 BENIGN PROSTATIC HYPERPLASIA WITHOUT LOWER URINARY TRACT SYMPTOMS: ICD-10-CM

## 2021-12-13 DIAGNOSIS — I82.431 ACUTE DEEP VEIN THROMBOSIS (DVT) OF POPLITEAL VEIN OF RIGHT LOWER EXTREMITY (HCC): ICD-10-CM

## 2021-12-13 DIAGNOSIS — I10 BENIGN ESSENTIAL HYPERTENSION: ICD-10-CM

## 2021-12-13 RX ORDER — AMLODIPINE BESYLATE AND BENAZEPRIL HYDROCHLORIDE 10; 40 MG/1; MG/1
CAPSULE ORAL
Qty: 90 CAPSULE | Refills: 3 | Status: SHIPPED | OUTPATIENT
Start: 2021-12-13

## 2021-12-13 RX ORDER — METOPROLOL SUCCINATE 25 MG/1
TABLET, EXTENDED RELEASE ORAL
Qty: 90 TABLET | Refills: 3 | Status: SHIPPED | OUTPATIENT
Start: 2021-12-13

## 2021-12-13 RX ORDER — FUROSEMIDE 20 MG/1
TABLET ORAL
Qty: 90 TABLET | Refills: 3 | Status: SHIPPED | OUTPATIENT
Start: 2021-12-13 | End: 2022-07-06 | Stop reason: ALTCHOICE

## 2021-12-13 RX ORDER — TERAZOSIN 5 MG/1
CAPSULE ORAL
Qty: 90 CAPSULE | Refills: 3 | Status: SHIPPED | OUTPATIENT
Start: 2021-12-13

## 2021-12-13 RX ORDER — RIVAROXABAN 20 MG/1
TABLET, FILM COATED ORAL
Qty: 90 TABLET | Refills: 3 | Status: SHIPPED | OUTPATIENT
Start: 2021-12-13

## 2022-01-05 ENCOUNTER — VBI (OUTPATIENT)
Dept: ADMINISTRATIVE | Facility: OTHER | Age: 62
End: 2022-01-05

## 2022-01-26 ENCOUNTER — PROCEDURE VISIT (OUTPATIENT)
Dept: OBGYN CLINIC | Facility: CLINIC | Age: 62
End: 2022-01-26
Payer: COMMERCIAL

## 2022-01-26 VITALS — HEIGHT: 71 IN | WEIGHT: 268 LBS | BODY MASS INDEX: 37.52 KG/M2

## 2022-01-26 DIAGNOSIS — M17.11 PRIMARY OSTEOARTHRITIS OF RIGHT KNEE: Primary | ICD-10-CM

## 2022-01-26 PROCEDURE — 20610 DRAIN/INJ JOINT/BURSA W/O US: CPT | Performed by: ORTHOPAEDIC SURGERY

## 2022-01-26 PROCEDURE — 3008F BODY MASS INDEX DOCD: CPT | Performed by: INTERNAL MEDICINE

## 2022-01-26 RX ORDER — HYALURONATE SODIUM 10 MG/ML
20 SYRINGE (ML) INTRAARTICULAR
Status: COMPLETED | OUTPATIENT
Start: 2022-01-26 | End: 2022-01-26

## 2022-01-26 RX ADMIN — Medication 20 MG: at 14:11

## 2022-01-26 NOTE — PROGRESS NOTES
1  Primary osteoarthritis of right knee       Patient is here for his 1st injection of Euflexxa into the right knee  Patient reports pain  All organ systems normal  Physical exam of the knee shows no effusion no ecchymosis        Large joint arthrocentesis: R knee  Universal Protocol:  Consent given by: patient    Supporting Documentation  Indications: pain   Procedure Details  Location: knee - R knee  Needle size: 22 G  Ultrasound guidance: no  Approach: anterolateral  Medications administered: 20 mg Sodium Hyaluronate 20 MG/2ML    Patient tolerance: patient tolerated the procedure well with no immediate complications          Patient tolerated procedure follow up 1 week

## 2022-02-02 ENCOUNTER — PROCEDURE VISIT (OUTPATIENT)
Dept: OBGYN CLINIC | Facility: CLINIC | Age: 62
End: 2022-02-02
Payer: COMMERCIAL

## 2022-02-02 VITALS
WEIGHT: 268 LBS | DIASTOLIC BLOOD PRESSURE: 84 MMHG | BODY MASS INDEX: 37.52 KG/M2 | SYSTOLIC BLOOD PRESSURE: 162 MMHG | HEIGHT: 71 IN | HEART RATE: 67 BPM

## 2022-02-02 DIAGNOSIS — M17.11 PRIMARY OSTEOARTHRITIS OF RIGHT KNEE: Primary | ICD-10-CM

## 2022-02-02 PROCEDURE — 20610 DRAIN/INJ JOINT/BURSA W/O US: CPT | Performed by: ORTHOPAEDIC SURGERY

## 2022-02-02 RX ORDER — HYALURONATE SODIUM 10 MG/ML
20 SYRINGE (ML) INTRAARTICULAR
Status: COMPLETED | OUTPATIENT
Start: 2022-02-02 | End: 2022-02-02

## 2022-02-02 RX ADMIN — Medication 20 MG: at 14:04

## 2022-02-02 NOTE — PROGRESS NOTES
1  Primary osteoarthritis of right knee       Patient is here for his 2nd injection of Euflexxa into the right knee  Patient reports minimal improvement  All organ systems normal  Physical exam of the knee shows no effusion no ecchymosis        Large joint arthrocentesis: R knee  Universal Protocol:  Consent given by: patient    Supporting Documentation  Indications: pain   Procedure Details  Location: knee - R knee  Needle size: 22 G  Ultrasound guidance: no  Approach: anterolateral  Medications administered: 20 mg Sodium Hyaluronate 20 MG/2ML    Patient tolerance: patient tolerated the procedure well with no immediate complications          Patient tolerated procedure follow up 1 week

## 2022-02-09 ENCOUNTER — PROCEDURE VISIT (OUTPATIENT)
Dept: OBGYN CLINIC | Facility: CLINIC | Age: 62
End: 2022-02-09
Payer: COMMERCIAL

## 2022-02-09 VITALS
SYSTOLIC BLOOD PRESSURE: 158 MMHG | DIASTOLIC BLOOD PRESSURE: 72 MMHG | HEART RATE: 84 BPM | HEIGHT: 71 IN | BODY MASS INDEX: 37.52 KG/M2 | WEIGHT: 268 LBS

## 2022-02-09 DIAGNOSIS — M17.11 PRIMARY OSTEOARTHRITIS OF RIGHT KNEE: Primary | ICD-10-CM

## 2022-02-09 PROCEDURE — 20610 DRAIN/INJ JOINT/BURSA W/O US: CPT | Performed by: ORTHOPAEDIC SURGERY

## 2022-02-09 RX ORDER — HYALURONATE SODIUM 10 MG/ML
20 SYRINGE (ML) INTRAARTICULAR
Status: COMPLETED | OUTPATIENT
Start: 2022-02-09 | End: 2022-02-09

## 2022-02-09 RX ADMIN — Medication 20 MG: at 14:09

## 2022-02-09 NOTE — PROGRESS NOTES
1  Primary osteoarthritis of right knee  Large joint arthrocentesis     Patient is here for his 3rd injection of Euflexxa into the right knee  Patient reports some improvement  All organ systems normal  Physical exam of the knee shows no effusion no ecchymosis  Large joint arthrocentesis: R knee  Universal Protocol:  Consent given by: patient  Time out: Immediately prior to procedure a "time out" was called to verify the correct patient, procedure, equipment, support staff and site/side marked as required    Site marked: the operative site was marked  Supporting Documentation  Indications: pain   Procedure Details  Location: knee - R knee  Preparation: Patient was prepped and draped in the usual sterile fashion  Needle size: 22 G  Ultrasound guidance: no  Approach: anterolateral  Medications administered: 20 mg Sodium Hyaluronate 20 MG/2ML  Specialty Pharmacy Supplied: received medications from pharmacy  Patient tolerance: patient tolerated the procedure well with no immediate complications  Dressing:  Sterile dressing applied          Patient tolerated procedure follow up prn

## 2022-02-11 ENCOUNTER — RA CDI HCC (OUTPATIENT)
Dept: OTHER | Facility: HOSPITAL | Age: 62
End: 2022-02-11

## 2022-02-11 NOTE — PROGRESS NOTES
[facility]  coding opportunities             Chart Reviewed * (Number of) Inbasket suggestions sent to Provider: 1                  Patients insurance company: [employer] (Medicare Advantage and Commercial)

## 2022-02-14 DIAGNOSIS — Z11.52 ENCOUNTER FOR SCREENING FOR COVID-19: Primary | ICD-10-CM

## 2022-02-14 PROCEDURE — U0003 INFECTIOUS AGENT DETECTION BY NUCLEIC ACID (DNA OR RNA); SEVERE ACUTE RESPIRATORY SYNDROME CORONAVIRUS 2 (SARS-COV-2) (CORONAVIRUS DISEASE [COVID-19]), AMPLIFIED PROBE TECHNIQUE, MAKING USE OF HIGH THROUGHPUT TECHNOLOGIES AS DESCRIBED BY CMS-2020-01-R: HCPCS | Performed by: FAMILY MEDICINE

## 2022-02-14 PROCEDURE — U0005 INFEC AGEN DETEC AMPLI PROBE: HCPCS | Performed by: FAMILY MEDICINE

## 2022-02-18 ENCOUNTER — OFFICE VISIT (OUTPATIENT)
Dept: FAMILY MEDICINE CLINIC | Facility: CLINIC | Age: 62
End: 2022-02-18
Payer: COMMERCIAL

## 2022-02-18 VITALS
SYSTOLIC BLOOD PRESSURE: 140 MMHG | WEIGHT: 262 LBS | DIASTOLIC BLOOD PRESSURE: 80 MMHG | HEIGHT: 71 IN | HEART RATE: 64 BPM | OXYGEN SATURATION: 97 % | BODY MASS INDEX: 36.68 KG/M2 | TEMPERATURE: 97.5 F | RESPIRATION RATE: 18 BRPM

## 2022-02-18 DIAGNOSIS — J01.90 ACUTE NON-RECURRENT SINUSITIS, UNSPECIFIED LOCATION: Primary | ICD-10-CM

## 2022-02-18 PROCEDURE — 3079F DIAST BP 80-89 MM HG: CPT | Performed by: NURSE PRACTITIONER

## 2022-02-18 PROCEDURE — 99213 OFFICE O/P EST LOW 20 MIN: CPT | Performed by: NURSE PRACTITIONER

## 2022-02-18 PROCEDURE — 3077F SYST BP >= 140 MM HG: CPT | Performed by: NURSE PRACTITIONER

## 2022-02-18 RX ORDER — AMOXICILLIN 875 MG/1
875 TABLET, COATED ORAL 2 TIMES DAILY
Qty: 20 TABLET | Refills: 0 | Status: SHIPPED | OUTPATIENT
Start: 2022-02-18 | End: 2022-02-28

## 2022-02-18 NOTE — PROGRESS NOTES
FAMILY PRACTICE OFFICE VISIT       NAME: Triny Peres  AGE: 62 y o  SEX: male       : 1960        MRN: [id number]    Assessment and Plan   1  Acute non-recurrent sinusitis, unspecified location  -     amoxicillin (AMOXIL) 875 mg tablet; Take 1 tablet (875 mg total) by mouth 2 (two) times a day for 10 days       Start Amoxicillin  May continue Mucinex as needed  If symptoms are not improving over next 3-4 days, call  Chief Complaint     Chief Complaint   Patient presents with    Cold Like Symptoms     sinus congestion, headache, sore throat and chest congestion 1 + wk       History of Present Illness     Triny Peres is a 62year old male presenting today for upper respiratory symptoms that began about 4-5 days ago  Getting worse  COVID-19 swab is negatvie  Congestion and eyes hurt, sore throat  Feels like mucous is stuck  Taking mucinex  Temperature max 99  +Headaches  No chills, sweats or body aches  Mild nausea, no vomiting  No diarrhea  Wife has similar symptoms and also testing negative for COVID-19  Review of Systems   Review of Systems   Constitutional: Positive for fatigue  Negative for chills, diaphoresis and fever  HENT: Positive for congestion, ear pain (left ear fullness), postnasal drip, rhinorrhea, sinus pressure, sinus pain and sore throat  Respiratory: Positive for cough  Negative for chest tightness, shortness of breath and wheezing  Cardiovascular: Negative for chest pain, palpitations and leg swelling  Gastrointestinal: Negative for diarrhea, nausea and vomiting  Neurological: Positive for headaches  I have reviewed the patient's medical history in detail; there are no changes to the history as noted in the electronic medical record      Objective     Vitals:    22 1049   BP: 140/80   Pulse: 64   Resp: 18   Temp: 97 5 °F (36 4 °C)   TempSrc: Temporal   SpO2: 97%   Weight: 119 kg (262 lb)   Height: 5' 11" (1 803 m) Wt Readings from Last 3 Encounters:   02/18/22 119 kg (262 lb)   02/09/22 122 kg (268 lb)   02/02/22 122 kg (268 lb)     Physical Exam  Vitals and nursing note reviewed  Constitutional:       General: He is not in acute distress  Appearance: Normal appearance  He is not ill-appearing  HENT:      Head: Atraumatic  Right Ear: Tympanic membrane and ear canal normal       Left Ear: Tympanic membrane and ear canal normal       Nose:      Comments: Swollen nasal turbinates  Mouth/Throat:      Mouth: Mucous membranes are moist       Pharynx: Oropharynx is clear  Cardiovascular:      Rate and Rhythm: Normal rate and regular rhythm  Heart sounds: No murmur heard  Pulmonary:      Effort: Pulmonary effort is normal  No respiratory distress  Breath sounds: Normal breath sounds  Musculoskeletal:      Cervical back: Normal range of motion and neck supple  Lymphadenopathy:      Head:      Right side of head: Submandibular adenopathy present  Left side of head: Submandibular adenopathy present  Neurological:      Mental Status: He is alert     Psychiatric:         Mood and Affect: Mood normal             ALLERGIES:  Allergies   Allergen Reactions    Propulsid [Cisapride] Tongue Swelling    Cymbalta [Duloxetine Hcl] Headache    Jardiance [Empagliflozin]      Back pain    Vancomycin Rash     Red man syndrome       Current Medications     Current Outpatient Medications   Medication Sig Dispense Refill    amLODIPine-benazepril (LOTREL) 10-40 MG per capsule TAKE 1 CAPSULE DAILY 90 capsule 3    Ascorbic Acid (vitamin C) 1000 MG tablet Take 1,000 mg by mouth daily      Calcium Carb-Cholecalciferol (CALCIUM 500+D PO) Take 1,600 mg by mouth daily      Docusate Sodium (COLACE PO) Take by mouth      eszopiclone (Lunesta) 3 MG tablet       fentaNYL (DURAGESIC) 100 mcg/hr TD 72 hr patch Place 1 patch on the skin every other day Change every 48 hours      furosemide (LASIX) 20 mg tablet TAKE 1 TABLET DAILY 90 tablet 3    gabapentin (NEURONTIN) 800 mg tablet Take 1 tablet by mouth 3 (three) times a day  0    glimepiride (AMARYL) 2 mg tablet TAKE 1 TABLET DAILY 90 tablet 2    glucose blood (ONE TOUCH ULTRA TEST) test strip Use to test with bf and dinner 200 each 3    hyoscyamine (LEVBID) 0 375 mg 12 hr tablet Take 0 375 mg by mouth daily   insulin glargine (Basaglar KwikPen) 100 units/mL injection pen Inject 30 Units under the skin daily at bedtime 30 mL 2    Insulin Pen Needle (CareOne Unifine Pentips Plus) 32G X 4 MM MISC Use once daily with Basaglar pen 100 each 2    lansoprazole (PREVACID) 15 mg capsule       Melatonin 5 MG TABS Take 10 mg by mouth daily at bedtime        metFORMIN (GLUCOPHAGE) 1000 MG tablet TAKE 1 TABLET TWICE A DAY WITH MEALS 180 tablet 2    metoprolol succinate (TOPROL-XL) 25 mg 24 hr tablet TAKE 1 TABLET DAILY 90 tablet 3    multivitamin (THERAGRAN) TABS Take 1 tablet by mouth daily      OneTouch Delica Lancets 22X MISC by Other route daily As directed 100 each 0    oxyCODONE (ROXICODONE) 30 MG immediate release tablet takes 3 times/day  0    Red Yeast Rice 600 MG CAPS Take 600 mg by mouth 2 (two) times a day       sertraline (ZOLOFT) 50 mg tablet Take 75 mg by mouth daily   sitaGLIPtin (Januvia) 100 mg tablet Take 1 tablet (100 mg total) by mouth daily 90 tablet 2    terazosin (HYTRIN) 5 mg capsule TAKE 1 CAPSULE DAILY 90 capsule 3    TiZANidine (ZANAFLEX) 4 MG capsule Take 1 capsule (4 mg total) by mouth 3 (three) times a day 90 capsule 1    Xarelto 20 MG tablet TAKE 1 TABLET DAILY WITH BREAKFAST 90 tablet 3    amoxicillin (AMOXIL) 875 mg tablet Take 1 tablet (875 mg total) by mouth 2 (two) times a day for 10 days 20 tablet 0     No current facility-administered medications for this visit           Health Maintenance     Health Maintenance   Topic Date Due    Hepatitis C Screening  Never done    SLP PLAN OF CARE  Never done    HIV Screening  Never done    HEMOGLOBIN A1C  05/01/2022    BMI: Followup Plan  06/04/2022    Diabetic Foot Exam  06/21/2022    Medicare Annual Wellness Visit (AWV)  08/19/2022    Depression Remission PHQ  08/19/2022    BMI: Adult  02/18/2023    DM Eye Exam  10/22/2023    DTaP,Tdap,and Td Vaccines (3 - Td or Tdap) 01/18/2026    Pneumococcal Vaccine: Pediatrics (0 to 5 Years) and At-Risk Patients (6 to 59 Years) (2 of 2 - PPSV23) 06/25/2026    Colorectal Cancer Screening  11/22/2031    Influenza Vaccine  Completed    COVID-19 Vaccine  Completed    HIB Vaccine  Aged Out    Hepatitis B Vaccine  Aged Out    IPV Vaccine  Aged Out    Hepatitis A Vaccine  Aged Out    Meningococcal ACWY Vaccine  Aged Out    HPV Vaccine  Aged Out     Immunization History   Administered Date(s) Administered    COVID-19 PFIZER VACCINE 0 3 ML IM 03/21/2021, 04/11/2021, 11/04/2021    INFLUENZA 09/20/2012, 09/22/2018, 09/15/2019    Influenza Quadrivalent Preservative Free 3 years and older IM 09/25/2015, 09/19/2016    Influenza, recombinant, quadrivalent,injectable, preservative free 09/12/2020, 10/13/2021    Influenza, seasonal, injectable 10/05/2009, 10/05/2010, 10/07/2013, 09/26/2014, 10/02/2017, 09/15/2019    Pneumococcal Polysaccharide PPV23 11/04/2003    Tdap 06/18/2004, 01/18/2016    Zoster 09/26/2014       JAMIE Simpson

## 2022-02-21 ENCOUNTER — OFFICE VISIT (OUTPATIENT)
Dept: FAMILY MEDICINE CLINIC | Facility: CLINIC | Age: 62
End: 2022-02-21
Payer: COMMERCIAL

## 2022-02-21 VITALS
SYSTOLIC BLOOD PRESSURE: 140 MMHG | HEART RATE: 76 BPM | BODY MASS INDEX: 36.87 KG/M2 | HEIGHT: 71 IN | DIASTOLIC BLOOD PRESSURE: 80 MMHG | OXYGEN SATURATION: 95 % | WEIGHT: 263.38 LBS | RESPIRATION RATE: 18 BRPM | TEMPERATURE: 97.6 F

## 2022-02-21 DIAGNOSIS — Z79.4 TYPE 2 DIABETES MELLITUS WITH DIABETIC POLYNEUROPATHY, WITH LONG-TERM CURRENT USE OF INSULIN (HCC): Primary | ICD-10-CM

## 2022-02-21 DIAGNOSIS — R35.1 NOCTURIA: ICD-10-CM

## 2022-02-21 DIAGNOSIS — E11.42 TYPE 2 DIABETES MELLITUS WITH DIABETIC POLYNEUROPATHY, WITH LONG-TERM CURRENT USE OF INSULIN (HCC): Primary | ICD-10-CM

## 2022-02-21 DIAGNOSIS — I10 ESSENTIAL HYPERTENSION: ICD-10-CM

## 2022-02-21 DIAGNOSIS — I10 BENIGN ESSENTIAL HYPERTENSION: ICD-10-CM

## 2022-02-21 DIAGNOSIS — F11.20 CONTINUOUS OPIOID DEPENDENCE (HCC): ICD-10-CM

## 2022-02-21 PROBLEM — M17.11 PRIMARY OSTEOARTHRITIS OF RIGHT KNEE: Status: RESOLVED | Noted: 2021-11-04 | Resolved: 2022-02-21

## 2022-02-21 PROBLEM — I82.431 ACUTE DEEP VEIN THROMBOSIS (DVT) OF POPLITEAL VEIN OF RIGHT LOWER EXTREMITY (HCC): Status: RESOLVED | Noted: 2020-05-12 | Resolved: 2022-02-21

## 2022-02-21 PROBLEM — G89.4 PAIN SYNDROME, CHRONIC: Status: RESOLVED | Noted: 2018-02-19 | Resolved: 2022-02-21

## 2022-02-21 PROBLEM — R60.9 EDEMA: Status: RESOLVED | Noted: 2020-04-15 | Resolved: 2022-02-21

## 2022-02-21 PROBLEM — M54.16 LUMBAR RADICULOPATHY: Status: RESOLVED | Noted: 2018-02-22 | Resolved: 2022-02-21

## 2022-02-21 PROBLEM — Z12.11 SCREEN FOR COLON CANCER: Status: RESOLVED | Noted: 2021-08-31 | Resolved: 2022-02-21

## 2022-02-21 PROBLEM — M54.10 RADICULAR PAIN OF RIGHT LOWER EXTREMITY: Status: RESOLVED | Noted: 2018-03-24 | Resolved: 2022-02-21

## 2022-02-21 PROBLEM — S16.1XXA CERVICAL STRAIN: Status: RESOLVED | Noted: 2019-01-17 | Resolved: 2022-02-21

## 2022-02-21 PROBLEM — M25.461 EFFUSION OF RIGHT KNEE: Status: RESOLVED | Noted: 2020-10-19 | Resolved: 2022-02-21

## 2022-02-21 PROBLEM — M51.16 RADICULOPATHY DUE TO LUMBAR INTERVERTEBRAL DISC DISORDER: Status: RESOLVED | Noted: 2018-03-27 | Resolved: 2022-02-21

## 2022-02-21 PROBLEM — M79.651 PAIN OF RIGHT THIGH: Status: RESOLVED | Noted: 2018-03-24 | Resolved: 2022-02-21

## 2022-02-21 PROBLEM — K59.00 CONSTIPATION: Status: RESOLVED | Noted: 2017-10-27 | Resolved: 2022-02-21

## 2022-02-21 PROBLEM — T84.84XA PAINFUL ORTHOPAEDIC HARDWARE (HCC): Status: RESOLVED | Noted: 2020-10-13 | Resolved: 2022-02-21

## 2022-02-21 PROBLEM — M54.9 ACUTE BACK PAIN: Status: RESOLVED | Noted: 2018-02-19 | Resolved: 2022-02-21

## 2022-02-21 LAB — SL AMB POCT HEMOGLOBIN AIC: 6.7 (ref ?–6.5)

## 2022-02-21 PROCEDURE — 1036F TOBACCO NON-USER: CPT | Performed by: FAMILY MEDICINE

## 2022-02-21 PROCEDURE — 99213 OFFICE O/P EST LOW 20 MIN: CPT | Performed by: FAMILY MEDICINE

## 2022-02-21 PROCEDURE — 83036 HEMOGLOBIN GLYCOSYLATED A1C: CPT | Performed by: FAMILY MEDICINE

## 2022-02-21 PROCEDURE — 3008F BODY MASS INDEX DOCD: CPT | Performed by: FAMILY MEDICINE
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
## 2022-02-21 NOTE — ASSESSMENT & PLAN NOTE
Diabetes  A1c stable at 6 7  He will continue current regimen of medications    His eye and foot exams are up-to-date  Lab Results   Component Value Date    HGBA1C 6 7 (A) 02/21/2022

## 2022-02-23 ENCOUNTER — APPOINTMENT (OUTPATIENT)
Dept: LAB | Facility: CLINIC | Age: 62
End: 2022-02-23
Payer: COMMERCIAL

## 2022-02-23 DIAGNOSIS — R35.1 NOCTURIA: ICD-10-CM

## 2022-02-23 DIAGNOSIS — I10 BENIGN ESSENTIAL HYPERTENSION: ICD-10-CM

## 2022-02-23 DIAGNOSIS — E11.42 TYPE 2 DIABETES MELLITUS WITH DIABETIC POLYNEUROPATHY, WITH LONG-TERM CURRENT USE OF INSULIN (HCC): ICD-10-CM

## 2022-02-23 DIAGNOSIS — Z79.4 TYPE 2 DIABETES MELLITUS WITH DIABETIC POLYNEUROPATHY, WITH LONG-TERM CURRENT USE OF INSULIN (HCC): ICD-10-CM

## 2022-02-23 DIAGNOSIS — E04.1 NON-TOXIC UNINODULAR GOITER: ICD-10-CM

## 2022-02-23 LAB
ALBUMIN SERPL BCP-MCNC: 3.4 G/DL (ref 3.5–5)
ALP SERPL-CCNC: 90 U/L (ref 46–116)
ALT SERPL W P-5'-P-CCNC: 59 U/L (ref 12–78)
ANION GAP SERPL CALCULATED.3IONS-SCNC: 5 MMOL/L (ref 4–13)
AST SERPL W P-5'-P-CCNC: 28 U/L (ref 5–45)
BILIRUB SERPL-MCNC: 0.23 MG/DL (ref 0.2–1)
BUN SERPL-MCNC: 12 MG/DL (ref 5–25)
CALCIUM ALBUM COR SERPL-MCNC: 9.7 MG/DL (ref 8.3–10.1)
CALCIUM SERPL-MCNC: 9.2 MG/DL (ref 8.3–10.1)
CHLORIDE SERPL-SCNC: 103 MMOL/L (ref 100–108)
CHOLEST SERPL-MCNC: 157 MG/DL
CO2 SERPL-SCNC: 31 MMOL/L (ref 21–32)
CREAT SERPL-MCNC: 0.77 MG/DL (ref 0.6–1.3)
ERYTHROCYTE [DISTWIDTH] IN BLOOD BY AUTOMATED COUNT: 14.6 % (ref 11.6–15.1)
EST. AVERAGE GLUCOSE BLD GHB EST-MCNC: 148 MG/DL
GFR SERPL CREATININE-BSD FRML MDRD: 98 ML/MIN/1.73SQ M
GLUCOSE P FAST SERPL-MCNC: 105 MG/DL (ref 65–99)
HBA1C MFR BLD: 6.8 %
HCT VFR BLD AUTO: 38.1 % (ref 36.5–49.3)
HDLC SERPL-MCNC: 43 MG/DL
HGB BLD-MCNC: 11.8 G/DL (ref 12–17)
LDLC SERPL CALC-MCNC: 84 MG/DL (ref 0–100)
MCH RBC QN AUTO: 24.9 PG (ref 26.8–34.3)
MCHC RBC AUTO-ENTMCNC: 31 G/DL (ref 31.4–37.4)
MCV RBC AUTO: 81 FL (ref 82–98)
NONHDLC SERPL-MCNC: 114 MG/DL
PLATELET # BLD AUTO: 317 THOUSANDS/UL (ref 149–390)
PMV BLD AUTO: 8.9 FL (ref 8.9–12.7)
POTASSIUM SERPL-SCNC: 4.4 MMOL/L (ref 3.5–5.3)
PROT SERPL-MCNC: 7.1 G/DL (ref 6.4–8.2)
PSA SERPL-MCNC: 0.7 NG/ML (ref 0–4)
RBC # BLD AUTO: 4.73 MILLION/UL (ref 3.88–5.62)
SODIUM SERPL-SCNC: 139 MMOL/L (ref 136–145)
TRIGL SERPL-MCNC: 151 MG/DL
TSH SERPL DL<=0.05 MIU/L-ACNC: 2.08 UIU/ML (ref 0.36–3.74)
WBC # BLD AUTO: 10.21 THOUSAND/UL (ref 4.31–10.16)

## 2022-02-23 PROCEDURE — 80053 COMPREHEN METABOLIC PANEL: CPT

## 2022-02-23 PROCEDURE — 83036 HEMOGLOBIN GLYCOSYLATED A1C: CPT

## 2022-02-23 PROCEDURE — G0103 PSA SCREENING: HCPCS

## 2022-02-23 PROCEDURE — 36415 COLL VENOUS BLD VENIPUNCTURE: CPT

## 2022-02-23 PROCEDURE — 80061 LIPID PANEL: CPT

## 2022-02-23 PROCEDURE — 84443 ASSAY THYROID STIM HORMONE: CPT

## 2022-02-23 PROCEDURE — 85027 COMPLETE CBC AUTOMATED: CPT

## 2022-02-23 PROCEDURE — 3044F HG A1C LEVEL LT 7.0%: CPT | Performed by: FAMILY MEDICINE

## 2022-03-23 ENCOUNTER — TELEPHONE (OUTPATIENT)
Dept: FAMILY MEDICINE CLINIC | Facility: CLINIC | Age: 62
End: 2022-03-23

## 2022-03-23 NOTE — TELEPHONE ENCOUNTER
Patient stated his cpap machine has been recalled  He has "Respironics M-series"  He said he read this on Internet a few weeks ago and has not been using it for the past few weeks because he believes he has been getting sinus issues/infections from using it  He then called [facility] today and they confirmed it has in fact been recalled  He was told if cpap is more than 11years old, insurance will likely pay for it, which pt stated his cpap is from 2007       Please advise what patient can do in regards to getting another cpap ordered, if another sleep study is needed, etc

## 2022-03-25 DIAGNOSIS — G47.30 SLEEP APNEA, UNSPECIFIED TYPE: Primary | ICD-10-CM

## 2022-03-25 NOTE — TELEPHONE ENCOUNTER
Spoke with pt, info for CPAP machine obtained  Orders placed to new machine and faxed to [place] as requested

## 2022-04-08 ENCOUNTER — VBI (OUTPATIENT)
Dept: ADMINISTRATIVE | Facility: OTHER | Age: 62
End: 2022-04-08

## 2022-04-13 ENCOUNTER — OFFICE VISIT (OUTPATIENT)
Dept: ENDOCRINOLOGY | Facility: CLINIC | Age: 62
End: 2022-04-13
Payer: COMMERCIAL

## 2022-04-13 VITALS
HEART RATE: 64 BPM | BODY MASS INDEX: 37.24 KG/M2 | SYSTOLIC BLOOD PRESSURE: 140 MMHG | WEIGHT: 266 LBS | HEIGHT: 71 IN | DIASTOLIC BLOOD PRESSURE: 102 MMHG

## 2022-04-13 DIAGNOSIS — E11.9 TYPE 2 DIABETES MELLITUS WITHOUT COMPLICATION, WITHOUT LONG-TERM CURRENT USE OF INSULIN (HCC): Primary | ICD-10-CM

## 2022-04-13 DIAGNOSIS — E04.1 NON-TOXIC UNINODULAR GOITER: ICD-10-CM

## 2022-04-13 DIAGNOSIS — E11.42 TYPE 2 DIABETES MELLITUS WITH DIABETIC POLYNEUROPATHY, WITH LONG-TERM CURRENT USE OF INSULIN (HCC): ICD-10-CM

## 2022-04-13 DIAGNOSIS — I10 ESSENTIAL HYPERTENSION: ICD-10-CM

## 2022-04-13 DIAGNOSIS — Z79.4 TYPE 2 DIABETES MELLITUS WITH DIABETIC POLYNEUROPATHY, WITH LONG-TERM CURRENT USE OF INSULIN (HCC): ICD-10-CM

## 2022-04-13 PROCEDURE — 1036F TOBACCO NON-USER: CPT | Performed by: PHYSICIAN ASSISTANT

## 2022-04-13 PROCEDURE — 3077F SYST BP >= 140 MM HG: CPT | Performed by: PHYSICIAN ASSISTANT

## 2022-04-13 PROCEDURE — 99214 OFFICE O/P EST MOD 30 MIN: CPT | Performed by: PHYSICIAN ASSISTANT

## 2022-04-13 PROCEDURE — 3008F BODY MASS INDEX DOCD: CPT | Performed by: PHYSICIAN ASSISTANT

## 2022-04-13 PROCEDURE — 3080F DIAST BP >= 90 MM HG: CPT | Performed by: PHYSICIAN ASSISTANT

## 2022-04-13 NOTE — PATIENT INSTRUCTIONS
Semaglutide (By injection)   Semaglutide ([id number]-tide)  Treats type 2 diabetes  Lowers the risk of heart attack, stroke, or death in patients with type 2 diabetes and heart or blood vessel disease  Also used to help lose weight and keep the weight off in patients with obesity caused by certain conditions  Brand Name(s): Ozempic 0 25 MG or 0 5 MG Doses, Ozempic 1 MG Doses, Wegovy   There may be other brand names for this medicine  When This Medicine Should Not Be Used: This medicine is not right for everyone  Do not use it if you had an allergic reaction to semaglutide, or if you have multiple endocrine neoplasia syndrome type 2 (MEN 2) or if you or anyone in your family has had medullary thyroid cancer  How to Use This Medicine:   Injectable  · Your doctor will prescribe your exact dose and tell you how often it should be given  This medicine is given as a shot under your skin  It is given into your stomach, thigh, or upper arm  · You may be taught how to give your medicine at home  Make sure you understand all instructions before giving yourself an injection  Do not use more medicine or use it more often than your doctor tells you to  · If you use insulin in addition to this medicine, do not mix them into the same syringe  You may give the shots in the same area (including your stomach), but do not give the shots right next to each other  · Check the liquid in the pen  It should be clear and colorless  Do not use it if it is cloudy, discolored, or has particles in it  · You will be shown the body areas where this shot can be given  Use a different body area each time you give yourself a shot  Keep track of where you give each shot to make sure you rotate body areas  · Use a new needle and syringe each time you inject your medicine  · Never share medicine pens with others under any circumstances  Sharing needles or pens can result in transmission of infection    · This medicine should come with a Medication Guide  Ask your pharmacist for a copy if you do not have one  · Missed dose:   ? Ozempic®: If you miss a dose of this medicine, use it as soon as possible within 5 days after the missed dose  If you miss a dose for more than 5 days, skip the missed dose and go back to your regular dosing schedule  ? Wegovy: If you miss a dose, and the next scheduled dose is more than 2 days away, use it as soon as possible  If you miss a dos, and the next scheduled dose is less than 2 days away, skip the missed dose and go back to your regular dosing schedule  If you miss a dose of this medicine for more than 2 weeks, use it on the next scheduled dose  Ask your doctor about how to restart your treatment  · Store your new, unused medicine pen in its original carton in the refrigerator  Do not freeze  You may store the opened Ozempic® pen in the refrigerator or at room temperature for 56 days or the opened [facility] pen in the refrigerator or at room temperature for 28 days  Throw away the pen after you use it for 56 days for Ozempic® or 28 days for [facility], even if it still has medicine in it  Drugs and Foods to Avoid:   Ask your doctor or pharmacist before using any other medicine, including over-the-counter medicines, vitamins, and herbal products  · Some medicines may affect how semaglutide works  Tell your doctor if you are using other diabetes medicine (including glimepiride, glipizide, glyburide, or insulin) or any oral medicine  Warnings While Using This Medicine:   · Tell your doctor if you are pregnant or planning to become pregnant  Do not use this medicine for at least 2 months before you plan to become pregnant  · Tell your doctor if you are breastfeeding, or if you have kidney disease, pancreas problems, diabetes, digestion problems, or a history of diabetic retinopathy or depression  · This medicine may cause the following problems:  ? Increased risk of thyroid tumor  ?  Pancreatitis (swelling of the pancreas)  ? Gallbladder problems  ? Low blood sugar (when used with other diabetes medicine)  ? Kidney problems  ? Eye or vision problems, including diabetic retinopathy  ? Increased heart rate  ? Increased risk for depression or thoughts of suicide  · Your doctor will do lab tests at regular visits to check on the effects of this medicine  Keep all appointments  · Keep all medicine out of the reach of children  Never share your medicine with anyone  Possible Side Effects While Using This Medicine:   Call your doctor right away if you notice any of these side effects:  · Allergic reaction: Itching or hives, swelling in your face or hands, swelling or tingling in your mouth or throat, chest tightness, trouble breathing  · Blurred vision or any other change in vision  · Change in how much or how often you urinate, lower back or side pain, blood in your urine  · Shaking, trembling, sweating, fast or pounding heartbeat, lightheadedness, hunger, confusion  · Sudden and severe stomach pain, nausea, vomiting, fever, yellow eyes or skin  · Unusual moods or behaviors, depression, thoughts of hurting yourself or others  If you notice these less serious side effects, talk with your doctor:   · Constipation, diarrhea, passing gas, stomach upset or bloating  · Headache, dizziness  · Redness, itching, bump, swelling, or any changes in your skin where the shot was given  · Tiredness  If you notice other side effects that you think are caused by this medicine, tell your doctor  Call your doctor for medical advice about side effects  You may report side effects to FDA at 9-643-FDA-8359    © Copyright [employer] 2022 Information is for End User's use only and may not be sold, redistributed or otherwise used for commercial purposes  The above information is an  only  It is not intended as medical advice for individual conditions or treatments   Talk to your doctor, nurse or pharmacist before following any medical regimen to see if it is safe and effective for you  9529

## 2022-04-13 NOTE — ASSESSMENT & PLAN NOTE
Diabetes is under good control  Discussed importance of lifestyle modification/weight loss  He declines referral to dietician, but does want to try to lose weight  Discussed option of adding GLP1 agonist to his regimen and he is interested  Start ozempic 0 25mg weekly x 4 weeks then increase to 0 5mg weekly if tolerated  He will stop Januvia and Glimepiride when starting ozempic  Check BG 2x per day and send log for review in two weeks  Discussed risks/side effects and he will let us know if any hypoglycemia so basaglar dose can be reduced  Encouraged him to cut back on the snacking at night  Discussed treatment with statin but he declines     Lab Results   Component Value Date    HGBA1C 6 8 (H) 02/23/2022

## 2022-04-13 NOTE — ASSESSMENT & PLAN NOTE
Blood pressure high today, but typically has been OK  Continue current regimen and follow up with Dr Luis Alberto Diaz
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
## 2022-04-14 ENCOUNTER — TELEPHONE (OUTPATIENT)
Dept: ADMINISTRATIVE | Facility: OTHER | Age: 62
End: 2022-04-14

## 2022-04-14 NOTE — TELEPHONE ENCOUNTER
----- Message from Deysi Ibarra sent at 4/13/2022  9:03 AM EDT -----  Regarding: HM DM FOOT EXAM  04/13/22 9:04 AM    Hello, our patient Annabelle Paul has had Diabetic Foot Exam completed/performed   Please assist in updating the patient chart by calling [facility] [address]    Phone: [phone]; Fax: [phone]        Thank you,  Rebeka Boudreaux  [facility]

## 2022-04-14 NOTE — TELEPHONE ENCOUNTER
Upon review of the In Basket request we were able to locate, review, and update the patient chart as requested for Diabetic Foot Exam     Any additional questions or concerns should be emailed to the Practice Liaisons via [email or web address] email, please do not reply via In Basket      Thank you  Dania Peres MA

## 2022-05-02 ENCOUNTER — TELEPHONE (OUTPATIENT)
Dept: FAMILY MEDICINE CLINIC | Facility: CLINIC | Age: 62
End: 2022-05-02

## 2022-05-02 NOTE — TELEPHONE ENCOUNTER
Since we are unable to obtain authorization patient may have appoint with [place] was pulmonology for further evaluation  If he is interested I will place order in [facility] and he may call for appointment    Please provide phone #

## 2022-05-02 NOTE — TELEPHONE ENCOUNTER
Please see response from pt's insurance  Pt does not meet medical necessity for sleep study  Please call pt with next step to follow  Referral to sleep doctor?   ________________________________________    Michael Echols sent to [facility]  Cc: Michael Echols  The prior authorization request for this study has not been approved  You can schedule a peer to peer by calling Matilde Momin at [phone], case# [id number]  with the deadline date of <add date here>   Pec called the insurance on Friday and was told the following : According to what the nurse told me last Friday, sleep study is not needed for recalled device  She said patient can just contact the  and they will send new device  [ x] Does not meet Medical Necessity   [ ] No prior imaging   [ ] PT or conservative treatment incomplete   [ ] Freddy Campbell   [ ] Frequency     If you choose not to complete a peer to peer then please reply to this message to let us know   Please notify your patient and contact central scheduling by calling [phone] to cancel the appointment and cancel the order in [facility] you,

## 2022-05-02 NOTE — TELEPHONE ENCOUNTER
Spoke with patient  Made aware of  provider's instructions  Patient verbalized understanding and was agreeable w/ plan  Tel # for [employer] provided

## 2022-05-06 ENCOUNTER — HOSPITAL ENCOUNTER (OUTPATIENT)
Dept: ULTRASOUND IMAGING | Facility: HOSPITAL | Age: 62
Discharge: HOME | End: 2022-05-06
Attending: INTERNAL MEDICINE
Payer: COMMERCIAL

## 2022-05-06 DIAGNOSIS — E04.1 NON-TOXIC UNINODULAR GOITER: ICD-10-CM

## 2022-05-06 PROCEDURE — 76536 US EXAM OF HEAD AND NECK: CPT

## 2022-05-10 ENCOUNTER — TELEPHONE (OUTPATIENT)
Dept: FAMILY MEDICINE CLINIC | Facility: CLINIC | Age: 62
End: 2022-05-10

## 2022-05-10 NOTE — TELEPHONE ENCOUNTER
Spoke w/ pt- made aware sleep study is not covered by insurance  Pt is referred to sleep study/pulmnologist provider- Dr Braulio [phone]  Pt will call and schedule apt

## 2022-05-10 NOTE — TELEPHONE ENCOUNTER
----- Message from Lion Victor MD sent at 0/12/5584  9:02 AM EDT -----  Regarding: FW: Sleep study  Please inquire  I sent order to sleep med physician, Dr Nehemias Tan, for pt to have re-evaluation so they could possibly authorize his sleep study   Please give #  ----- Message -----  From: Fady Herrera  Sent: 5/9/2022  10:29 AM EDT  To: Lion Victor MD  Subject: FW: Sleep study                                    ----- Message -----  From: Leida Solis  Sent: 5/8/2022   6:55 PM EDT  To: Patrick Oneal [facility]  Subject: Sleep study                                      Rosanne Castillo, I have received a notification from you to schedule for a SleepStudy ,I thought it was denied by insurance company please leave me know, and tried to get through to pulmonology and just messages any suggestions would be appreciated,Thanks, Iverson Cheadle

## 2022-05-17 ENCOUNTER — TELEPHONE (OUTPATIENT)
Dept: SLEEP CENTER | Facility: CLINIC | Age: 62
End: 2022-05-17

## 2022-05-17 NOTE — TELEPHONE ENCOUNTER
----- Message from Kosta Garcia MD sent at 5/13/2022  3:20 PM EDT -----  Approved    ----- Message -----  From: Shauna Goddard  Sent: 3/5/7817  10:35 AM EDT  To: Sleep Medicine [facility] Provider    This sleep study needs approval      If approved please sign and return to clerical pool  If denied please include reasons why  Also provide alternative testing if warranted  Please sign and return to clerical pool

## 2022-05-23 DIAGNOSIS — E11.9 TYPE 2 DIABETES MELLITUS WITHOUT COMPLICATION, WITHOUT LONG-TERM CURRENT USE OF INSULIN (HCC): ICD-10-CM

## 2022-06-28 DIAGNOSIS — E11.9 TYPE 2 DIABETES MELLITUS WITHOUT COMPLICATION, WITHOUT LONG-TERM CURRENT USE OF INSULIN (HCC): Primary | ICD-10-CM

## 2022-06-28 RX ORDER — INSULIN GLARGINE 100 [IU]/ML
30 INJECTION, SOLUTION SUBCUTANEOUS DAILY
Qty: 3 ML | Refills: 0 | Status: SHIPPED | OUTPATIENT
Start: 2022-06-28 | End: 2022-07-08

## 2022-06-28 NOTE — TELEPHONE ENCOUNTER
Nhan Valdez, I am moving to [address] [place] [address] [place]  On 6/30/2022 my [address] is to be sent out from mail order and I am afraid if they are a little late I may run out   I am ask if you could send out just I pen of the Basaglar to Marcela at [employer]  [place]   [phone] thank you ,Marianne Looney

## 2022-07-06 ENCOUNTER — CONSULT (OUTPATIENT)
Dept: PULMONOLOGY | Facility: CLINIC | Age: 62
End: 2022-07-06
Payer: COMMERCIAL

## 2022-07-06 VITALS
TEMPERATURE: 98.3 F | OXYGEN SATURATION: 94 % | HEART RATE: 74 BPM | HEIGHT: 70 IN | BODY MASS INDEX: 32.11 KG/M2 | SYSTOLIC BLOOD PRESSURE: 150 MMHG | DIASTOLIC BLOOD PRESSURE: 82 MMHG | WEIGHT: 224.3 LBS

## 2022-07-06 DIAGNOSIS — G47.33 OBSTRUCTIVE SLEEP APNEA: Primary | ICD-10-CM

## 2022-07-06 DIAGNOSIS — R79.81 ELEVATED CO2 LEVEL: ICD-10-CM

## 2022-07-06 DIAGNOSIS — G47.30 SLEEP APNEA, UNSPECIFIED TYPE: ICD-10-CM

## 2022-07-06 PROCEDURE — 3077F SYST BP >= 140 MM HG: CPT | Performed by: INTERNAL MEDICINE

## 2022-07-06 PROCEDURE — 3079F DIAST BP 80-89 MM HG: CPT | Performed by: INTERNAL MEDICINE

## 2022-07-06 PROCEDURE — 99204 OFFICE O/P NEW MOD 45 MIN: CPT | Performed by: INTERNAL MEDICINE

## 2022-07-06 NOTE — PROGRESS NOTES
Assessment/Plan:    Obstructive sleep apnea  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 with a history of AALIYAH diagnosed in 2008, initially on CPAP, with graduation to BiPAP in 2012 after repeat sleep study conducted  Patient reports that he had been compliant with BiPAP usage up until last year, when he was unable to obtain a replacement humidifier, followed by continuing to not use due to machine recall  He reports that over the past year, his symptoms have returned, manifested by 4-5 sleep cycle interruptions/night, in addition to daytime fatigue and somnolence  He denies any symptoms suggestive of congestive heart failure or obstructive lung disease  He has been prescribed lunesta and also takes melatonin to assist with sleep disturbances  Will repeat diagnostic overnight split polysomnography  May continue to use lunesta, but cautioned on potential side-effects  Diagnoses and all orders for this visit:    Obstructive sleep apnea    Sleep apnea, unspecified type  -     Split Study; Future          Subjective:      Patient ID: Mariya Landon is a 65 y o  male  Mr Venus Cooper is a 61-year-old gentleman with a history of obstructive sleep apnea, diabetes mellitus type 2 requiring both oral glycemic and insulin regimen, hypertension, insomnia-on Lunesta and melatonin, history of DVT on Xarelto and chronic pain- on a regimen including an opioid regimen and gabapentin, BMI of 32, who presents with sleep cycle interruptions, daytime drowsiness and fatigue  Patient has a history of obstructive sleep apnea, which she states was diagnosed in 2008, and had been on CPAP up until 2012, rate underwent a repeat sleep study and graduated BiPAP  Patient states that last year, he has stopped using hi BiPAP, due to any initial issue with not being able to obtain a replacement humidifier, followed by the information that his machine has recalled    Symptoms have gradually returned, he sought evaluation by his PCP, who wanted to obtain updated sleep study, however it was denied by his insurance  He was further instructed to obtain pulmonology consultation for further evaluation and management  He denies any orthopnea, PND, chest pain dyspnea on exertion, leg swelling, difficulty climbing stairs, and states that he is able to walk a city block without limitations  The following portions of the patient's history were reviewed and updated as appropriate: allergies, current medications, past family history, past medical history, past social history, past surgical history and problem list     Review of Systems   Constitutional: Positive for fatigue  HENT:        Dry mouth   Respiratory: Positive for shortness of breath  Negative for wheezing  Cardiovascular: Negative for chest pain, palpitations and leg swelling  Gastrointestinal: Negative for constipation and diarrhea  Musculoskeletal: Positive for back pain  Neurological: Negative for dizziness, syncope, weakness and headaches  Psychiatric/Behavioral: Positive for sleep disturbance  Negative for behavioral problems  Objective:      /82 (BP Location: Right arm, Patient Position: Sitting, Cuff Size: Standard)   Pulse 74   Temp 98 3 °F (36 8 °C)   Ht 5' 10" (1 778 m)   Wt 102 kg (224 lb 4 8 oz)   SpO2 94%   BMI 32 18 kg/m²          Physical Exam  Vitals reviewed  Constitutional:       Appearance: Normal appearance  He is obese  HENT:      Head: Normocephalic and atraumatic  Mouth/Throat:      Mouth: Mucous membranes are moist    Eyes:      Extraocular Movements: Extraocular movements intact  Cardiovascular:      Rate and Rhythm: Normal rate and regular rhythm  Heart sounds: S1 normal and S2 normal    Pulmonary:      Effort: Pulmonary effort is normal       Breath sounds: Normal breath sounds  No wheezing, rhonchi or rales  Abdominal:      Palpations: Abdomen is soft  Musculoskeletal:         General: Normal range of motion        Cervical back: Neck supple  Right lower leg: No edema  Left lower leg: No edema  Skin:     General: Skin is warm and dry  Neurological:      Mental Status: He is alert and oriented to person, place, and time     Psychiatric:         Mood and Affect: Mood normal          Behavior: Behavior normal

## 2022-07-06 NOTE — ASSESSMENT & PLAN NOTE
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 with a history of AALIYAH diagnosed in 2008, initially on CPAP, with graduation to BiPAP in 2012 after repeat sleep study conducted  Patient reports that he had been compliant with BiPAP usage up until last year, when he was unable to obtain a replacement humidifier, followed by continuing to not use due to machine recall  He reports that over the past year, his symptoms have returned, manifested by 4-5 sleep cycle interruptions/night, in addition to daytime fatigue and somnolence  He denies any symptoms suggestive of congestive heart failure or obstructive lung disease  He has been prescribed lunesta and also takes melatonin to assist with sleep disturbances  Will repeat diagnostic overnight split polysomnography  May continue to use lunesta, but cautioned on potential side-effects

## 2022-07-06 NOTE — LETTER
July 7, 8874     [address] [address]    Patient: Tu Clay   YOB: 1960   Date of Visit: 7/6/2022       Dear Dr Nadeen Sarmiento: Thank you for referring Andreina Yoon to me for evaluation  Below are my notes for this consultation  If you have questions, please do not hesitate to call me  I look forward to following your patient along with you  Sincerely,        Ladarius Wallace,         CC: No Recipients  Ranjeet Rbuin MD  7/7/2022 10:10 PM  Attested  Assessment/Plan:    Obstructive sleep apnea  Mr Alan Martin is a 65year old gentelman with a history of AALIYAH diagnosed in 2008, initially on CPAP, with graduation to BiPAP in 2012 after repeat sleep study conducted  Patient reports that he had been compliant with BiPAP usage up until last year, when he was unable to obtain a replacement humidifier, followed by continuing to not use due to machine recall  He reports that over the past year, his symptoms have returned, manifested by 4-5 sleep cycle interruptions/night, in addition to daytime fatigue and somnolence  He denies any symptoms suggestive of congestive heart failure or obstructive lung disease  He has been prescribed lunesta and also takes melatonin to assist with sleep disturbances  Will repeat diagnostic overnight split polysomnography  May continue to use lunesta, but cautioned on potential side-effects  Diagnoses and all orders for this visit:    Obstructive sleep apnea    Sleep apnea, unspecified type  -     Split Study; Future          Subjective:      Patient ID: Tu Clay is a 65 y o  male      Mr Alan Martin is a 67-year-old gentleman with a history of obstructive sleep apnea, diabetes mellitus type 2 requiring both oral glycemic and insulin regimen, hypertension, insomnia-on Lunesta and melatonin, history of DVT on Xarelto and chronic pain- on a regimen including an opioid regimen and gabapentin, BMI of 32, who presents with sleep cycle interruptions, daytime drowsiness and fatigue  Patient has a history of obstructive sleep apnea, which she states was diagnosed in 2008, and had been on CPAP up until 2012, rate underwent a repeat sleep study and graduated BiPAP  Patient states that last year, he has stopped using hi BiPAP, due to any initial issue with not being able to obtain a replacement humidifier, followed by the information that his machine has recalled  Symptoms have gradually returned, he sought evaluation by his PCP, who wanted to obtain updated sleep study, however it was denied by his insurance  He was further instructed to obtain pulmonology consultation for further evaluation and management  He denies any orthopnea, PND, chest pain dyspnea on exertion, leg swelling, difficulty climbing stairs, and states that he is able to walk a city block without limitations  The following portions of the patient's history were reviewed and updated as appropriate: allergies, current medications, past family history, past medical history, past social history, past surgical history and problem list     Review of Systems   Constitutional: Positive for fatigue  HENT:        Dry mouth   Respiratory: Positive for shortness of breath  Negative for wheezing  Cardiovascular: Negative for chest pain, palpitations and leg swelling  Gastrointestinal: Negative for constipation and diarrhea  Musculoskeletal: Positive for back pain  Neurological: Negative for dizziness, syncope, weakness and headaches  Psychiatric/Behavioral: Positive for sleep disturbance  Negative for behavioral problems  Objective:      /82 (BP Location: Right arm, Patient Position: Sitting, Cuff Size: Standard)   Pulse 74   Temp 98 3 °F (36 8 °C)   Ht 5' 10" (1 778 m)   Wt 102 kg (224 lb 4 8 oz)   SpO2 94%   BMI 32 18 kg/m²          Physical Exam  Vitals reviewed  Constitutional:       Appearance: Normal appearance  He is obese     HENT: Head: Normocephalic and atraumatic  Mouth/Throat:      Mouth: Mucous membranes are moist    Eyes:      Extraocular Movements: Extraocular movements intact  Cardiovascular:      Rate and Rhythm: Normal rate and regular rhythm  Heart sounds: S1 normal and S2 normal    Pulmonary:      Effort: Pulmonary effort is normal       Breath sounds: Normal breath sounds  No wheezing, rhonchi or rales  Abdominal:      Palpations: Abdomen is soft  Musculoskeletal:         General: Normal range of motion  Cervical back: Neck supple  Right lower leg: No edema  Left lower leg: No edema  Skin:     General: Skin is warm and dry  Neurological:      Mental Status: He is alert and oriented to person, place, and time  Psychiatric:         Mood and Affect: Mood normal          Behavior: Behavior normal          Attestation signed by Tito Hernandez DO at 7/7/2022 10:10 PM:  I have personally seen and examined the patient on 7/7/22 at 9:32 am  I discussed the patient with the resident including, but not limited to, verifying findings; reviewing labs and x-rays; developing the plan of care  I have reviewed the note and assessment performed by the resident and agree with the residents documented findings and plan of care with the following additions/exceptions  Please see my following comments for details and adjustments  Assessment/Plan  65 y o  M with PMHx of DM, HTN, Depression, history of DVT on Xarelto, antithrombin 3 deficiency, chronic insomnia, chronic pain on oxycodone and AALIYAH on BIPAP who comes in for management of AALIYAH and insomnia  1   AALIYAH on unknown severity previously on BIPAP    He has now been off BIPAP for 1 year and has been unable to obtain part and his machine is not functional      -  Check a split night sleep study and attempt to obtain a new machine as soon as possible      -  I also discussed in depth the risk of leaving sleep apnea untreated including hypertension, heart failure, arrhythmia, MI and stroke  -  The patient is agreeable to undergo testing and treatment of obstructive sleep apnea  He understands that pitfalls she may encounter along the way and is willing to attempt CPAP treatment  2   Elevated CO2 on BMP - this is likely due to chronic hypercapnia related to noncompliance with BIPAP, chronic opiate use and BZRA use  In addition he may have some restrictive lung disease due to obesity as well  -  Check PFT with [facility] to assess for chronic hypercapnia      -  He will benefit from reduction in opiates, zanaflex and BZRA    3  Chronic insomnia - he is currently on Lunesta  The dosing is unclear  It is written that he is on 3mg but he states in the note that he has it at 10mg  I am not sure this is accurate as Rx states 3 mg  The max dose of the medication is 3mg and he should start weaning down if he is taking more  This is being prescribed by his pain management doctor  He should also avoid driving with that dose of medications  He may also benefit from CBT-I  We will discuss this at the next visit when his apnea is adequately managed  HPI:  Tu Clay is a 65 y o  male with PMHx as below who comes in for management of AALIYAH and insomnia  He was diagnosed with AALIYAH several years ago  Patient notes weight gain and symptoms of difficulty falling asleep, difficulty staying asleep, snoring, excessive daytime sleepiness with an Lawton score of 4  He denies awakenings with gasping, witnessed apneas, morning headaches, awakenings with dry mouth and symptoms of restless legs  he denies symptoms of cataplexy, sleep paralysis, hypnopompic or hypnagogic hallucinations  Sleep History:  he goes to bed at approximately 10:30-11:30, will get to sleep in 15 min, will get out of bed at 3:30 - 4 am   he will get up 4-5 times at night for unknown reason    It will then take a few minutes to fall back asleep    he does not nap during the day   He has been using medications such as Lunesta 3mg qHS and melatonin at bedtime but still struggles to fall asleep     /82 (BP Location: Right arm, Patient Position: Sitting, Cuff Size: Standard)   Pulse 74   Temp 98 3 °F (36 8 °C)   Ht 5' 10" (1 778 m)   Wt 102 kg (224 lb 4 8 oz)   SpO2 94%   BMI 32 18 kg/m²   RA  General:  Patient is awake, alert, non-toxic and in no acute respiratory distress  Eyes: PERRL, no scleral icterus  Neck: No JVD  CV:  Regular, +S1 and S2, No murmurs, gallops or rubs appreciated  Lungs: Clear to auscultation bilateral without wheeze, rales or rhonci  Abdomen: Soft, +BS, Non-tender, non-distended  Extremities: No clubbing, cyanosis or edema  Neuro: No focal motor/sensory deficits  Lab Results   Component Value Date    WBC 10 21 (H) 02/23/2022    HGB 11 8 (L) 02/23/2022    HCT 38 1 02/23/2022    MCV 81 (L) 02/23/2022     02/23/2022     Lab Results   Component Value Date    SODIUM 139 02/23/2022    K 4 4 02/23/2022     02/23/2022    CO2 31 02/23/2022    BUN 12 02/23/2022    CREATININE 0 77 02/23/2022    GLUC 158 (H) 09/22/2020    CALCIUM 9 2 02/23/2022

## 2022-07-11 ENCOUNTER — RA CDI HCC (OUTPATIENT)
Dept: OTHER | Facility: HOSPITAL | Age: 62
End: 2022-07-11

## 2022-07-11 NOTE — PROGRESS NOTES
F33 2  [facility] 75  coding opportunities          Chart Reviewed number of suggestions sent to Provider: 1     Patients Insurance     Medicare Insurance: Sue Vila

## 2022-07-12 ENCOUNTER — APPOINTMENT (OUTPATIENT)
Dept: LAB | Facility: CLINIC | Age: 62
End: 2022-07-12
Payer: COMMERCIAL

## 2022-07-12 DIAGNOSIS — E11.9 TYPE 2 DIABETES MELLITUS WITHOUT COMPLICATION, WITHOUT LONG-TERM CURRENT USE OF INSULIN (HCC): ICD-10-CM

## 2022-07-12 LAB
ANION GAP SERPL CALCULATED.3IONS-SCNC: 5 MMOL/L (ref 4–13)
BUN SERPL-MCNC: 18 MG/DL (ref 5–25)
CALCIUM SERPL-MCNC: 9.5 MG/DL (ref 8.4–10.2)
CHLORIDE SERPL-SCNC: 106 MMOL/L (ref 96–108)
CO2 SERPL-SCNC: 31 MMOL/L (ref 21–32)
CREAT SERPL-MCNC: 0.73 MG/DL (ref 0.6–1.3)
EST. AVERAGE GLUCOSE BLD GHB EST-MCNC: 123 MG/DL
GFR SERPL CREATININE-BSD FRML MDRD: 100 ML/MIN/1.73SQ M
GLUCOSE P FAST SERPL-MCNC: 109 MG/DL (ref 65–99)
HBA1C MFR BLD: 5.9 %
POTASSIUM SERPL-SCNC: 3.8 MMOL/L (ref 3.5–5.3)
SODIUM SERPL-SCNC: 142 MMOL/L (ref 135–147)

## 2022-07-12 PROCEDURE — 36415 COLL VENOUS BLD VENIPUNCTURE: CPT

## 2022-07-12 PROCEDURE — 80048 BASIC METABOLIC PNL TOTAL CA: CPT

## 2022-07-12 PROCEDURE — 3044F HG A1C LEVEL LT 7.0%: CPT | Performed by: INTERNAL MEDICINE

## 2022-07-12 PROCEDURE — 83036 HEMOGLOBIN GLYCOSYLATED A1C: CPT
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
## 2022-07-19 ENCOUNTER — OFFICE VISIT (OUTPATIENT)
Dept: ENDOCRINOLOGY | Facility: CLINIC | Age: 62
End: 2022-07-19
Payer: COMMERCIAL

## 2022-07-19 VITALS
BODY MASS INDEX: 33.5 KG/M2 | HEART RATE: 75 BPM | HEIGHT: 70 IN | DIASTOLIC BLOOD PRESSURE: 84 MMHG | WEIGHT: 234 LBS | SYSTOLIC BLOOD PRESSURE: 148 MMHG

## 2022-07-19 DIAGNOSIS — E11.42 TYPE 2 DIABETES MELLITUS WITH DIABETIC POLYNEUROPATHY, WITH LONG-TERM CURRENT USE OF INSULIN (HCC): Primary | ICD-10-CM

## 2022-07-19 DIAGNOSIS — E04.1 NON-TOXIC UNINODULAR GOITER: ICD-10-CM

## 2022-07-19 DIAGNOSIS — Z79.4 TYPE 2 DIABETES MELLITUS WITH DIABETIC POLYNEUROPATHY, WITH LONG-TERM CURRENT USE OF INSULIN (HCC): Primary | ICD-10-CM

## 2022-07-19 PROCEDURE — 99214 OFFICE O/P EST MOD 30 MIN: CPT | Performed by: INTERNAL MEDICINE

## 2022-07-19 NOTE — PATIENT INSTRUCTIONS
Please take two shots of the Ozempic 0 5mg  That will equal 1mg per week  Whe you are down to two Ozempic pens, please let me know and we can send in for the 1mg pens    Lower the Basaglar to 15units once daily  Continue the metformin

## 2022-07-20 ENCOUNTER — HOSPITAL ENCOUNTER (OUTPATIENT)
Dept: PULMONOLOGY | Facility: HOSPITAL | Age: 62
Discharge: HOME | End: 2022-07-20
Attending: INTERNAL MEDICINE
Payer: COMMERCIAL

## 2022-07-20 DIAGNOSIS — R79.81 ELEVATED CO2 LEVEL: ICD-10-CM

## 2022-07-20 LAB
BASE EXCESS BLDA CALC-SCNC: 6 MMOL/L (ref -2–3)
CA-I BLD-SCNC: 1.3 MMOL/L (ref 1.12–1.32)
FIO2 GAS DIL.REBREATH: 21 L
GLUCOSE SERPL-MCNC: 95 MG/DL (ref 65–140)
HCO3 BLDA-SCNC: 32.3 MMOL/L (ref 22–28)
HCT VFR BLD CALC: 34 % (ref 36.5–49.3)
HGB BLDA-MCNC: 11.6 G/DL (ref 12–17)
PCO2 BLD: 34 MMOL/L (ref 21–32)
PCO2 BLD: 53.4 MM HG (ref 36–44)
PH BLD: 7.39 [PH] (ref 7.35–7.45)
PO2 BLD: 92 MM HG (ref 75–129)
POTASSIUM BLD-SCNC: 3.9 MMOL/L (ref 3.5–5.3)
SAO2 % BLD FROM PO2: 97 % (ref 60–85)
SODIUM BLD-SCNC: 141 MMOL/L (ref 136–145)
SPECIMEN SOURCE: ABNORMAL

## 2022-07-20 PROCEDURE — 94060 EVALUATION OF WHEEZING: CPT | Performed by: INTERNAL MEDICINE

## 2022-07-20 PROCEDURE — 94760 N-INVAS EAR/PLS OXIMETRY 1: CPT

## 2022-07-20 PROCEDURE — 84132 ASSAY OF SERUM POTASSIUM: CPT

## 2022-07-20 PROCEDURE — 94729 DIFFUSING CAPACITY: CPT

## 2022-07-20 PROCEDURE — 94060 EVALUATION OF WHEEZING: CPT

## 2022-07-20 PROCEDURE — 94726 PLETHYSMOGRAPHY LUNG VOLUMES: CPT | Performed by: INTERNAL MEDICINE

## 2022-07-20 PROCEDURE — 94726 PLETHYSMOGRAPHY LUNG VOLUMES: CPT

## 2022-07-20 PROCEDURE — 82803 BLOOD GASES ANY COMBINATION: CPT

## 2022-07-20 PROCEDURE — 94729 DIFFUSING CAPACITY: CPT | Performed by: INTERNAL MEDICINE

## 2022-07-20 PROCEDURE — 85014 HEMATOCRIT: CPT

## 2022-07-20 PROCEDURE — 82330 ASSAY OF CALCIUM: CPT

## 2022-07-20 PROCEDURE — 84295 ASSAY OF SERUM SODIUM: CPT

## 2022-07-20 PROCEDURE — 36600 WITHDRAWAL OF ARTERIAL BLOOD: CPT

## 2022-07-20 PROCEDURE — 82947 ASSAY GLUCOSE BLOOD QUANT: CPT

## 2022-07-20 RX ORDER — ALBUTEROL SULFATE 2.5 MG/3ML
2.5 SOLUTION RESPIRATORY (INHALATION) ONCE
Status: COMPLETED | OUTPATIENT
Start: 2022-07-20 | End: 2022-07-20

## 2022-07-20 RX ADMIN — ALBUTEROL SULFATE 2.5 MG: 2.5 SOLUTION RESPIRATORY (INHALATION) at 12:41

## 2022-08-08 DIAGNOSIS — E11.9 TYPE 2 DIABETES MELLITUS WITHOUT COMPLICATION, WITHOUT LONG-TERM CURRENT USE OF INSULIN (HCC): Primary | ICD-10-CM

## 2022-08-08 RX ORDER — SEMAGLUTIDE 1.34 MG/ML
1 INJECTION, SOLUTION SUBCUTANEOUS WEEKLY
Qty: 9 ML | Refills: 2 | Status: SHIPPED | OUTPATIENT
Start: 2022-08-08 | End: 2022-11-06

## 2022-08-08 NOTE — TELEPHONE ENCOUNTER
Opened in error  Patient requested refill, however there should be refills on file at [address]  My chart message sent back to patient advising

## 2022-08-17 ENCOUNTER — TELEPHONE (OUTPATIENT)
Dept: FAMILY MEDICINE CLINIC | Facility: CLINIC | Age: 62
End: 2022-08-17

## 2022-08-17 NOTE — TELEPHONE ENCOUNTER
Pt called to inform office that he moved to [place]  Appointment for 8/22nd cancelled  Please update chart to reflect we are no longer pt's PCP

## 2022-08-19 ENCOUNTER — OFFICE VISIT (OUTPATIENT)
Dept: FAMILY MEDICINE CLINIC | Facility: CLINIC | Age: 62
End: 2022-08-19
Payer: COMMERCIAL

## 2022-08-19 ENCOUNTER — TELEPHONE (OUTPATIENT)
Dept: FAMILY MEDICINE CLINIC | Facility: CLINIC | Age: 62
End: 2022-08-19

## 2022-08-19 VITALS
DIASTOLIC BLOOD PRESSURE: 86 MMHG | WEIGHT: 227 LBS | HEIGHT: 70 IN | TEMPERATURE: 98.6 F | OXYGEN SATURATION: 97 % | BODY MASS INDEX: 32.5 KG/M2 | HEART RATE: 72 BPM | SYSTOLIC BLOOD PRESSURE: 138 MMHG

## 2022-08-19 DIAGNOSIS — R01.1 NEWLY RECOGNIZED HEART MURMUR: ICD-10-CM

## 2022-08-19 DIAGNOSIS — I10 ESSENTIAL HYPERTENSION: ICD-10-CM

## 2022-08-19 DIAGNOSIS — Z23 IMMUNIZATION DUE: ICD-10-CM

## 2022-08-19 DIAGNOSIS — E66.09 CLASS 1 OBESITY DUE TO EXCESS CALORIES WITH SERIOUS COMORBIDITY AND BODY MASS INDEX (BMI) OF 32.0 TO 32.9 IN ADULT: ICD-10-CM

## 2022-08-19 DIAGNOSIS — M46.96 INFLAMMATORY SPONDYLOPATHY OF LUMBAR REGION (HCC): ICD-10-CM

## 2022-08-19 DIAGNOSIS — D68.59 ANTITHROMBIN 3 DEFICIENCY (HCC): ICD-10-CM

## 2022-08-19 DIAGNOSIS — Z79.4 TYPE 2 DIABETES MELLITUS WITH DIABETIC POLYNEUROPATHY, WITH LONG-TERM CURRENT USE OF INSULIN (HCC): ICD-10-CM

## 2022-08-19 DIAGNOSIS — Z79.01 ANTICOAGULANT LONG-TERM USE: ICD-10-CM

## 2022-08-19 DIAGNOSIS — Z76.89 ENCOUNTER TO ESTABLISH CARE WITH NEW DOCTOR: Primary | ICD-10-CM

## 2022-08-19 DIAGNOSIS — E11.42 TYPE 2 DIABETES MELLITUS WITH DIABETIC POLYNEUROPATHY, WITH LONG-TERM CURRENT USE OF INSULIN (HCC): ICD-10-CM

## 2022-08-19 DIAGNOSIS — I82.531 CHRONIC DEEP VEIN THROMBOSIS (DVT) OF RIGHT POPLITEAL VEIN (HCC): ICD-10-CM

## 2022-08-19 DIAGNOSIS — Z11.59 ENCOUNTER FOR HEPATITIS C SCREENING TEST FOR LOW RISK PATIENT: ICD-10-CM

## 2022-08-19 DIAGNOSIS — F32.A MILD DEPRESSION: ICD-10-CM

## 2022-08-19 PROBLEM — E66.811 CLASS 1 OBESITY DUE TO EXCESS CALORIES WITH SERIOUS COMORBIDITY AND BODY MASS INDEX (BMI) OF 32.0 TO 32.9 IN ADULT: Status: ACTIVE | Noted: 2022-08-19

## 2022-08-19 PROBLEM — F32.2 MAJOR DEPRESSIVE DISORDER, SINGLE EPISODE, SEVERE WITHOUT PSYCHOTIC FEATURES (HCC): Status: RESOLVED | Noted: 2019-04-15 | Resolved: 2022-08-19

## 2022-08-19 PROBLEM — S72.001A CLOSED FRACTURE OF NECK OF RIGHT FEMUR (HCC): Status: RESOLVED | Noted: 2019-12-23 | Resolved: 2022-08-19

## 2022-08-19 PROBLEM — Z87.81 HISTORY OF FEMUR FRACTURE: Status: ACTIVE | Noted: 2022-08-19

## 2022-08-19 PROCEDURE — 99203 OFFICE O/P NEW LOW 30 MIN: CPT | Performed by: FAMILY MEDICINE

## 2022-08-19 PROCEDURE — 3075F SYST BP GE 130 - 139MM HG: CPT | Performed by: FAMILY MEDICINE

## 2022-08-19 PROCEDURE — 3079F DIAST BP 80-89 MM HG: CPT | Performed by: FAMILY MEDICINE

## 2022-08-19 PROCEDURE — G0009 ADMIN PNEUMOCOCCAL VACCINE: HCPCS

## 2022-08-19 PROCEDURE — 90677 PCV20 VACCINE IM: CPT

## 2022-08-19 PROCEDURE — 3725F SCREEN DEPRESSION PERFORMED: CPT | Performed by: FAMILY MEDICINE

## 2022-08-19 RX ORDER — ATORVASTATIN CALCIUM 10 MG/1
10 TABLET, FILM COATED ORAL DAILY
Qty: 30 TABLET | Refills: 0 | Status: SHIPPED | OUTPATIENT
Start: 2022-08-19 | End: 2022-08-30 | Stop reason: SINTOL

## 2022-08-19 NOTE — PROGRESS NOTES
Assessment/Plan:         Diagnoses and all orders for this visit:    Encounter to establish care with new doctor    Newly recognized heart murmur  -     ECG 12 lead; Future  -     Echo complete w/ contrast if indicated; Future    Essential hypertension  Comments:  controlled  Orders:  -     ECG 12 lead; Future  -     Echo complete w/ contrast if indicated; Future    Chronic deep vein thrombosis (DVT) of right popliteal vein (HCC)  Comments:  maintained on xarelto    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HCC)    Anticoagulant long-term use    Inflammatory spondylopathy of lumbar region [facility])  Comments:  on combination narcotic analgesics managed by pain specialist    Type 2 diabetes mellitus with diabetic polyneuropathy, with long-term current use of insulin (HCC)  Comments:  controlled on ozempic, metformin and insulin, managed by endo  Orders:  -     atorvastatin (LIPITOR) 10 mg tablet; Take 1 tablet (10 mg total) by mouth daily  -     ECG 12 lead; Future  -     Echo complete w/ contrast if indicated; Future    Class 1 obesity due to excess calories with serious comorbidity and body mass index (BMI) of 32 0 to 32 9 in adult    Mild depression  Comments:  controlled on zoloft    Encounter for hepatitis C screening test for low risk patient  -     Hepatitis C antibody; Future    Immunization due  -     Pneumococcal Conjugate Vaccine 20-valent (Pcv20)          Subjective:   Chief Complaint   Patient presents with   [address] patient to establish care, moved to the area and doesn't want to drive so far, no concerns today        Patient ID: Janna Ravi is a 65 y o  male      New pt  Used to see [facility]  His diabetes is managed by endo, he also sees pulmonol for management of sleep apnea  Pain meds managed by [facility]- sees every 3 months  Chart review shows he is on zoloft; cymbalta is on allergy list- caused headaches - pt states that Dr Kacey Pascual manages his zoloft- he has mild depression sx on screening today  Not on a statin      7/6/2022  [place] [address], [place]  Assessment/Plan   65 y o    M with PMHx of DM, HTN, Depression, history of DVT on Xarelto, antithrombin 3 deficiency, chronic insomnia, chronic pain on oxycodone and AALIYAH on BIPAP who comes in for management of AALIYAH and insomnia  1   AALIYAH on unknown severity previously on BIPAP  He has now been off BIPAP for 1 year and has been unable to obtain part and his machine is not functional       -  Check a split night sleep study and attempt to obtain a new machine as soon as possible       -  I also discussed in depth the risk of leaving sleep apnea untreated including hypertension, heart failure, arrhythmia, MI and stroke  -  The patient is agreeable to undergo testing and treatment of obstructive sleep apnea  He understands that pitfalls she may encounter along the way and is willing to attempt CPAP treatment  2   Elevated CO2 on BMP - this is likely due to chronic hypercapnia related to noncompliance with BIPAP, chronic opiate use and BZRA use  In addition he may have some restrictive lung disease due to obesity as well  -  Check PFT with [facility] to assess for chronic hypercapnia       -  He will benefit from reduction in opiates, zanaflex and BZRA   3  Chronic insomnia - he is currently on Lunesta  The dosing is unclear  It is written that he is on 3mg but he states in the note that he has it at 10mg  I am not sure this is accurate as Rx states 3 mg  The max dose of the medication is 3mg and he should start weaning down if he is taking more  This is being prescribed by his pain management doctor  He should also avoid driving with that dose of medications  He may also benefit from CBT-I  We will discuss this at the next visit when his apnea is adequately managed       HPI:    Jamison Riley is a 65 y o male with PMHx as below who comes in for management of AALIYAH and insomnia  He was diagnosed with AALIYAH several years ago  Patient notes weight gain and symptoms of difficulty falling asleep, difficulty staying asleep, snoring, excessive daytime sleepiness with an [place] score of 4  He denies awakenings with gasping, witnessed apneas, morning headaches, awakenings with dry mouth and symptoms of restless legs  he denies symptoms of cataplexy, sleep paralysis, hypnopompic or hypnagogic hallucinations  The following portions of the patient's history were reviewed and updated as appropriate: allergies, current medications, past family history, past medical history, past social history, past surgical history and problem list     Review of Systems      Objective:      /86 (BP Location: Left arm, Patient Position: Sitting, Cuff Size: Standard)   Pulse 72   Temp 98 6 °F (37 °C) (Tympanic)   Ht 5' 10" (1 778 m)   Wt 103 kg (227 lb)   SpO2 97%   BMI 32 57 kg/m²          Physical Exam  Constitutional:       General: He is not in acute distress  Appearance: He is well-developed  He is not ill-appearing, toxic-appearing or diaphoretic  HENT:      Head: Normocephalic and atraumatic  Mouth/Throat:      Lips: Pink  Mouth: Mucous membranes are moist       Pharynx: Oropharynx is clear  Uvula midline  Eyes:      General: Lids are normal       Conjunctiva/sclera: Conjunctivae normal    Neck:      Trachea: Phonation normal    Cardiovascular:      Rate and Rhythm: Normal rate and regular rhythm  Pulses: Normal pulses  Heart sounds: S1 normal and S2 normal  Murmur heard  No friction rub  No gallop  Pulmonary:      [place]: Pulmonary effort is normal       Breath sounds: Normal breath sounds and air entry  Abdominal:      General: Bowel sounds are normal  There is no distension  Palpations: Abdomen is soft  There is no hepatomegaly, splenomegaly or mass  Tenderness: There is no abdominal tenderness        Hernia: There is no hernia in the ventral area  Musculoskeletal:      Right lower leg: No edema  Left lower leg: No edema  Lymphadenopathy:      Cervical: No cervical adenopathy  Skin:     General: Skin is warm and dry  Coloration: Skin is not pale  Neurological:      Mental Status: He is alert and oriented to person, place, and time  Gait: Gait normal    Psychiatric:         Attention and Perception: Attention normal          Mood and Affect: Mood and affect normal          Behavior: Behavior normal  Behavior is cooperative  BMI Counseling: Body mass index is 32 57 kg/m²  The BMI is above normal  Nutrition recommendations include reducing portion sizes  Exercise recommendations include exercising 3-5 times per week  Depression Screening Follow-up Plan: Patient's depression screening was positive with a PHQ-2 score of   Their PHQ-9 score was 8  Patient's depressive symptoms likely due to other medical condition  Would recommend treatment of underlying condition  Will continue to monitor at next office visit

## 2022-08-19 NOTE — PATIENT INSTRUCTIONS
Depression   AMBULATORY CARE:   Depression  is a medical condition that causes feelings of sadness or hopelessness that do not go away  Depression may cause you to lose interest in things you used to enjoy  These feelings may interfere with your daily life  Common symptoms include the following:   · Appetite changes, or weight gain or loss    · Trouble going to sleep or staying asleep, or sleeping too much    · Fatigue or lack of energy    · Feeling restless, irritable, or withdrawn    · Feeling worthless, hopeless, discouraged, or guilty    · Trouble concentrating, remembering things, doing daily tasks, or making decisions    · Thoughts about hurting or killing yourself    Call your local emergency number (911 in the [address] [facility]) if:   · You think about harming yourself or someone else  · You have done something on purpose to hurt yourself  Call your therapist or doctor if:   · Your symptoms do not improve  · You cannot make it to your next appointment  · You have new symptoms  · You have questions or concerns about your condition or care  The following resources are available at any time to help you, if needed:   · [address] [place] [address]: 7-616.456.6097 (2-160-067-PIRI)     · Suicide Hotline: 5-515.556.5174 (8-457-WZJOVET)     · For a list of international numbers: https://save org/find-help/international-resources/    Treatment for depression  may include medicine to relieve depression  Medicine is often used together with therapy  Therapy is a way for you to talk about your feelings and anything that may be causing depression  Therapy can be done alone or in a group  It may also be done with family members or a significant other  Self-care:   · Get regular physical activity  Try to be active for 30 minutes, 3 to 5 days a week  Physical activity can help relieve depression  Work with your healthcare provider to develop a plan that you enjoy   It may help to ask someone to be active with you  · Create a regular sleep schedule  A routine can help you relax before bed  Listen to music, read, or do yoga  Try to go to bed and wake up at the same time every day  Sleep is important for emotional health  · Eat a variety of healthy foods  Healthy foods include fruits, vegetables, whole-grain breads, low-fat dairy products, lean meats, fish, and cooked beans  A healthy meal plan is low in fat, salt, and added sugar  · Do not drink alcohol or use drugs  Alcohol and drugs can make depression worse  Talk to your therapist or doctor if you need help quitting  Follow up with your healthcare provider as directed: Your healthcare provider will monitor your progress at follow-up visits  He or she will also monitor your medicine if you take antidepressants  Your healthcare provider will ask if the medicine is helping  Tell him or her about any side effects or problems you may have with your medicine  The type or amount of medicine may need to be changed  Write down your questions so you remember to ask them during your visits  © Copyright [address] 2022 Information is for End User's use only and may not be sold, redistributed or otherwise used for commercial purposes  All illustrations and images included in CareNotes® are the copyrighted property of A D A M , Inc  or [address] [place], [place] [address]joseph   The above information is an  only  It is not intended as medical advice for individual conditions or treatments  Talk to your doctor, nurse or pharmacist before following any medical regimen to see if it is safe and effective for you

## 2022-08-19 NOTE — TELEPHONE ENCOUNTER
[address] [facility] [place] in [place]  with Dr Michelle Chen   [address]   [facility]  [phone]  August 22, @ 2:00    [facility] no appt available until December

## 2022-08-23 NOTE — TELEPHONE ENCOUNTER
08/23/22 8:17 AM        Thank you for your request  Your request has been received, reviewed, and the patient chart updated  The PCP has successfully been removed with a patient attribution note  This message will now be completed          Thank you  Jacob Lowe

## 2022-08-24 PROBLEM — R01.1 NEWLY RECOGNIZED HEART MURMUR: Status: ACTIVE | Noted: 2022-08-24

## 2022-08-26 ENCOUNTER — HOSPITAL ENCOUNTER (OUTPATIENT)
Dept: NON INVASIVE DIAGNOSTICS | Facility: CLINIC | Age: 62
Discharge: HOME | End: 2022-08-26
Payer: COMMERCIAL

## 2022-08-26 VITALS
HEIGHT: 70 IN | HEART RATE: 70 BPM | SYSTOLIC BLOOD PRESSURE: 138 MMHG | DIASTOLIC BLOOD PRESSURE: 86 MMHG | WEIGHT: 227 LBS | BODY MASS INDEX: 32.5 KG/M2

## 2022-08-26 DIAGNOSIS — R01.1 NEWLY RECOGNIZED HEART MURMUR: ICD-10-CM

## 2022-08-26 DIAGNOSIS — I10 ESSENTIAL HYPERTENSION: ICD-10-CM

## 2022-08-26 DIAGNOSIS — E11.42 TYPE 2 DIABETES MELLITUS WITH DIABETIC POLYNEUROPATHY, WITH LONG-TERM CURRENT USE OF INSULIN (HCC): ICD-10-CM

## 2022-08-26 DIAGNOSIS — Z79.4 TYPE 2 DIABETES MELLITUS WITH DIABETIC POLYNEUROPATHY, WITH LONG-TERM CURRENT USE OF INSULIN (HCC): ICD-10-CM

## 2022-08-26 LAB
AORTIC ROOT: 3.1 CM
APICAL FOUR CHAMBER EJECTION FRACTION: 73 %
ASCENDING AORTA: 3 CM
ATRIAL RATE: 62 BPM
E WAVE DECELERATION TIME: 224 MS
FRACTIONAL SHORTENING: 46 (ref 28–44)
INTERVENTRICULAR SEPTUM IN DIASTOLE (PARASTERNAL SHORT AXIS VIEW): 1.2 CM
INTERVENTRICULAR SEPTUM: 1.2 CM (ref 0.6–1.1)
LAAS-AP2: 26.2 CM2
LAAS-AP4: 21 CM2
LEFT ATRIUM SIZE: 4.1 CM
LEFT INTERNAL DIMENSION IN SYSTOLE: 2.5 CM (ref 2.1–4)
LEFT VENTRICULAR INTERNAL DIMENSION IN DIASTOLE: 4.6 CM (ref 3.5–6)
LEFT VENTRICULAR POSTERIOR WALL IN END DIASTOLE: 1.2 CM
LEFT VENTRICULAR STROKE VOLUME: 76 ML
LVSV (TEICH): 76 ML
MV E'TISSUE VEL-SEP: 12 CM/S
MV PEAK A VEL: 0.7 M/S
MV PEAK E VEL: 99 CM/S
MV STENOSIS PRESSURE HALF TIME: 65 MS
MV VALVE AREA P 1/2 METHOD: 3.38
P AXIS: 50 DEGREES
PR INTERVAL: 190 MS
QRS AXIS: 40 DEGREES
QRSD INTERVAL: 106 MS
QT INTERVAL: 392 MS
QTC INTERVAL: 397 MS
RIGHT ATRIUM AREA SYSTOLE A4C: 18.7 CM2
RIGHT VENTRICLE ID DIMENSION: 3 CM
SL CV LEFT ATRIUM LENGTH A2C: 5.8 CM
SL CV LV EF: 65
SL CV PED ECHO LEFT VENTRICLE DIASTOLIC VOLUME (MOD BIPLANE) 2D: 99 ML
SL CV PED ECHO LEFT VENTRICLE SYSTOLIC VOLUME (MOD BIPLANE) 2D: 22 ML
T WAVE AXIS: 20 DEGREES
VENTRICULAR RATE: 62 BPM

## 2022-08-26 PROCEDURE — 93005 ELECTROCARDIOGRAM TRACING: CPT

## 2022-08-26 PROCEDURE — 93010 ELECTROCARDIOGRAM REPORT: CPT | Performed by: INTERNAL MEDICINE

## 2022-08-26 PROCEDURE — 93306 TTE W/DOPPLER COMPLETE: CPT | Performed by: INTERNAL MEDICINE

## 2022-08-26 PROCEDURE — 93306 TTE W/DOPPLER COMPLETE: CPT

## 2022-08-30 ENCOUNTER — TELEPHONE (OUTPATIENT)
Dept: FAMILY MEDICINE CLINIC | Facility: CLINIC | Age: 62
End: 2022-08-30

## 2022-08-30 NOTE — TELEPHONE ENCOUNTER
----- Message from Florencio Haney sent at 8/29/2022  6:58 PM EDT -----  Regarding: Statin  Nhan Pickens, I have tried taking  (Atorvastatin Calcium) for a week thinking the extra joint pain would subside it hasnt so I am not taking this medicine anymore I have tried and this is not for me   Usman Doctor

## 2022-08-31 ENCOUNTER — TELEPHONE (OUTPATIENT)
Dept: PULMONOLOGY | Facility: CLINIC | Age: 62
End: 2022-08-31

## 2022-08-31 NOTE — TELEPHONE ENCOUNTER
Based on what? He has AALIYAH and was previously on BIPAP and does not have a machine  How am I to get him a machine?

## 2022-08-31 NOTE — TELEPHONE ENCOUNTER
Why was the auth denied? He needs a machine and an updated study to find pressures? What do they want me to do? He also has chronic respiratory failure with elevated CO2 levels    Home study is not appropriate

## 2022-09-07 ENCOUNTER — TELEPHONE (OUTPATIENT)
Dept: PULMONOLOGY | Facility: CLINIC | Age: 62
End: 2022-09-07

## 2022-09-07 DIAGNOSIS — G47.33 OBSTRUCTIVE SLEEP APNEA: Primary | ICD-10-CM

## 2022-09-07 NOTE — TELEPHONE ENCOUNTER
Called patient and left [facility] for open Home study this Friday in [address] , said he can call central to get that scheduled and if he wanted to with me to call the office and they will transfer him over to me

## 2022-09-07 NOTE — TELEPHONE ENCOUNTER
As long as they will allow us to utilize that for a study, that is fine    But I want to make sure they dont need an updated diagnostic or home

## 2022-09-09 ENCOUNTER — HOSPITAL ENCOUNTER (OUTPATIENT)
Dept: SLEEP CENTER | Facility: HOSPITAL | Age: 62
Discharge: HOME | End: 2022-09-09
Attending: INTERNAL MEDICINE
Payer: COMMERCIAL

## 2022-09-09 DIAGNOSIS — G47.33 OBSTRUCTIVE SLEEP APNEA: ICD-10-CM

## 2022-09-09 PROCEDURE — G0399 HOME SLEEP TEST/TYPE 3 PORTA: HCPCS

## 2022-09-09 NOTE — PROGRESS NOTES
Home Sleep Study Documentation    HOME STUDY DEVICE: Noxturnal yes                                           Pilar G3 no      Pre-Sleep Home Study:    Set-up and instructions performed by: ANTELMO Lopez    Technician performed demonstration for Patient: yes    Return demonstration performed by Patient: yes    Written instructions provided to Patient: yes    Patient signed consent form: yes        Post-Sleep Home Study:    Additional comments by Patient: pending    Home Sleep Study Failed:pending    Failure reason: pending    Reported or Detected: pending    Scored by: pending

## 2022-09-19 DIAGNOSIS — G47.33 OBSTRUCTIVE SLEEP APNEA: Primary | ICD-10-CM

## 2022-09-19 PROCEDURE — 95806 SLEEP STUDY UNATT&RESP EFFT: CPT | Performed by: INTERNAL MEDICINE

## 2022-09-20 ENCOUNTER — TELEPHONE (OUTPATIENT)
Dept: SLEEP CENTER | Facility: CLINIC | Age: 62
End: 2022-09-20

## 2022-09-20 NOTE — TELEPHONE ENCOUNTER
Patient of Dr Yvonne Doherty in the [facility] pulmonary office  Called patient and advised study resulted and shows mild AALIYAH  APAP ordered  Patient agreeable to use [employer] for DME provider  Advised I will send message to pulmonary office to process order  Someone from [address] should then reach out to arrange set up of machine  Also advised that insurance will require follow up with the physician 31-90 days after starting use of machine  I will send message to pulmonary office to reach out to reschedule his 10/4/22 appointment with Dr Yvonne Doherty  Patient agreeable

## 2022-09-22 DIAGNOSIS — E11.9 TYPE 2 DIABETES MELLITUS WITHOUT COMPLICATION, WITHOUT LONG-TERM CURRENT USE OF INSULIN (HCC): ICD-10-CM

## 2022-09-22 RX ORDER — ACETAMINOPHEN, DEXTROMETHORPHAN HBR, DOXYLAMINE SUCCINATE 650; 30; 12.5 MG/30ML; MG/30ML; MG/30ML
LIQUID ORAL
Qty: 100 EACH | Refills: 3 | Status: SHIPPED | OUTPATIENT
Start: 2022-09-22

## 2022-09-29 ENCOUNTER — PATIENT MESSAGE (OUTPATIENT)
Dept: PULMONOLOGY | Facility: CLINIC | Age: 62
End: 2022-09-29

## 2022-10-05 ENCOUNTER — OFFICE VISIT (OUTPATIENT)
Dept: ENDOCRINOLOGY | Age: 62
End: 2022-10-05
Payer: COMMERCIAL

## 2022-10-05 VITALS
WEIGHT: 232.4 LBS | DIASTOLIC BLOOD PRESSURE: 74 MMHG | OXYGEN SATURATION: 93 % | BODY MASS INDEX: 33.27 KG/M2 | TEMPERATURE: 98.1 F | HEART RATE: 66 BPM | HEIGHT: 70 IN | SYSTOLIC BLOOD PRESSURE: 140 MMHG

## 2022-10-05 DIAGNOSIS — E11.42 TYPE 2 DIABETES MELLITUS WITH DIABETIC POLYNEUROPATHY, WITH LONG-TERM CURRENT USE OF INSULIN (HCC): ICD-10-CM

## 2022-10-05 DIAGNOSIS — E04.1 NON-TOXIC UNINODULAR GOITER: Primary | ICD-10-CM

## 2022-10-05 DIAGNOSIS — I10 ESSENTIAL HYPERTENSION: ICD-10-CM

## 2022-10-05 DIAGNOSIS — Z79.4 TYPE 2 DIABETES MELLITUS WITH DIABETIC POLYNEUROPATHY, WITH LONG-TERM CURRENT USE OF INSULIN (HCC): ICD-10-CM

## 2022-10-05 LAB
DME PARACHUTE DELIVERY DATE REQUESTED: NORMAL
DME PARACHUTE DELIVERY NOTE: NORMAL
DME PARACHUTE ITEM DESCRIPTION: NORMAL
DME PARACHUTE ORDER STATUS: NORMAL
DME PARACHUTE SUPPLIER NAME: NORMAL
DME PARACHUTE SUPPLIER PHONE: NORMAL

## 2022-10-05 PROCEDURE — 99214 OFFICE O/P EST MOD 30 MIN: CPT | Performed by: INTERNAL MEDICINE
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
## 2022-10-12 ENCOUNTER — CLINICAL SUPPORT (OUTPATIENT)
Dept: FAMILY MEDICINE CLINIC | Facility: CLINIC | Age: 62
End: 2022-10-12
Payer: COMMERCIAL

## 2022-10-12 DIAGNOSIS — Z23 NEED FOR INFLUENZA VACCINATION: Primary | ICD-10-CM

## 2022-10-12 LAB
DME PARACHUTE DELIVERY DATE EXPECTED: NORMAL
DME PARACHUTE DELIVERY DATE REQUESTED: NORMAL
DME PARACHUTE DELIVERY NOTE: NORMAL
DME PARACHUTE ITEM DESCRIPTION: NORMAL
DME PARACHUTE ORDER STATUS: NORMAL
DME PARACHUTE SUPPLIER NAME: NORMAL
DME PARACHUTE SUPPLIER PHONE: NORMAL

## 2022-10-12 PROCEDURE — 90682 RIV4 VACC RECOMBINANT DNA IM: CPT

## 2022-10-12 PROCEDURE — G0008 ADMIN INFLUENZA VIRUS VAC: HCPCS

## 2022-10-14 ENCOUNTER — APPOINTMENT (OUTPATIENT)
Dept: LAB | Facility: CLINIC | Age: 62
End: 2022-10-14
Payer: COMMERCIAL

## 2022-10-14 DIAGNOSIS — Z79.4 CURRENT USE OF INSULIN (HCC): ICD-10-CM

## 2022-10-14 DIAGNOSIS — E11.9 TYPE 2 DIABETES MELLITUS WITHOUT COMPLICATION, WITHOUT LONG-TERM CURRENT USE OF INSULIN (HCC): ICD-10-CM

## 2022-10-14 DIAGNOSIS — Z79.4 TYPE 2 DIABETES MELLITUS WITH DIABETIC POLYNEUROPATHY, WITH LONG-TERM CURRENT USE OF INSULIN (HCC): ICD-10-CM

## 2022-10-14 DIAGNOSIS — I10 ESSENTIAL HYPERTENSION: ICD-10-CM

## 2022-10-14 DIAGNOSIS — E11.42 TYPE 2 DIABETES MELLITUS WITH DIABETIC POLYNEUROPATHY, WITH LONG-TERM CURRENT USE OF INSULIN (HCC): ICD-10-CM

## 2022-10-14 DIAGNOSIS — Z11.59 ENCOUNTER FOR HEPATITIS C SCREENING TEST FOR LOW RISK PATIENT: ICD-10-CM

## 2022-10-14 LAB
ANION GAP SERPL CALCULATED.3IONS-SCNC: 2 MMOL/L (ref 4–13)
BUN SERPL-MCNC: 11 MG/DL (ref 5–25)
CALCIUM SERPL-MCNC: 9.2 MG/DL (ref 8.3–10.1)
CHLORIDE SERPL-SCNC: 102 MMOL/L (ref 96–108)
CO2 SERPL-SCNC: 32 MMOL/L (ref 21–32)
CREAT SERPL-MCNC: 0.77 MG/DL (ref 0.6–1.3)
CREAT UR-MCNC: 74.1 MG/DL
EST. AVERAGE GLUCOSE BLD GHB EST-MCNC: 126 MG/DL
GFR SERPL CREATININE-BSD FRML MDRD: 97 ML/MIN/1.73SQ M
GLUCOSE P FAST SERPL-MCNC: 105 MG/DL (ref 65–99)
HBA1C MFR BLD: 6 %
HCV AB SER QL: NORMAL
MICROALBUMIN UR-MCNC: 7.4 MG/L (ref 0–20)
MICROALBUMIN/CREAT 24H UR: 10 MG/G CREATININE (ref 0–30)
POTASSIUM SERPL-SCNC: 4.2 MMOL/L (ref 3.5–5.3)
SODIUM SERPL-SCNC: 136 MMOL/L (ref 135–147)

## 2022-10-14 PROCEDURE — 82043 UR ALBUMIN QUANTITATIVE: CPT

## 2022-10-14 PROCEDURE — 82570 ASSAY OF URINE CREATININE: CPT

## 2022-10-14 PROCEDURE — 83036 HEMOGLOBIN GLYCOSYLATED A1C: CPT

## 2022-10-14 PROCEDURE — 80048 BASIC METABOLIC PNL TOTAL CA: CPT

## 2022-10-14 PROCEDURE — 36415 COLL VENOUS BLD VENIPUNCTURE: CPT

## 2022-10-14 PROCEDURE — 86803 HEPATITIS C AB TEST: CPT

## 2022-10-17 RX ORDER — INSULIN GLARGINE 100 [IU]/ML
30 INJECTION, SOLUTION SUBCUTANEOUS
Qty: 30 ML | Refills: 2 | Status: SHIPPED | OUTPATIENT
Start: 2022-10-17

## 2022-10-18 ENCOUNTER — TELEPHONE (OUTPATIENT)
Dept: PULMONOLOGY | Facility: CLINIC | Age: 62
End: 2022-10-18

## 2022-10-18 LAB
DME PARACHUTE DELIVERY DATE ACTUAL: NORMAL
DME PARACHUTE DELIVERY DATE EXPECTED: NORMAL
DME PARACHUTE DELIVERY DATE REQUESTED: NORMAL
DME PARACHUTE DELIVERY NOTE: NORMAL
DME PARACHUTE ITEM DESCRIPTION: NORMAL
DME PARACHUTE ORDER STATUS: NORMAL
DME PARACHUTE SUPPLIER NAME: NORMAL
DME PARACHUTE SUPPLIER PHONE: NORMAL

## 2022-10-18 NOTE — TELEPHONE ENCOUNTER
I set the pt  Up in [facility] on [employer] (no modem) BIPAP 25/5cm PS 4cm and gave the N20 (L) nasal mask

## 2022-10-19 ENCOUNTER — TELEPHONE (OUTPATIENT)
Dept: ENDOCRINOLOGY | Age: 62
End: 2022-10-19

## 2022-10-26 ENCOUNTER — TELEPHONE (OUTPATIENT)
Dept: PULMONOLOGY | Facility: CLINIC | Age: 62
End: 2022-10-26

## 2022-10-26 NOTE — TELEPHONE ENCOUNTER
Patient sent a chart message 3 minute after my chart note  [place] [facility] is currently taking care of getting this compliance   Thank you

## 2022-10-26 NOTE — TELEPHONE ENCOUNTER
Patient is calling, he received his Bipap on 10/18  He has been using it since he got it but he feels his pressure settings are too low  He called [facility] and they advised him to contact our office and let his provider know   Please advise

## 2022-10-27 NOTE — TELEPHONE ENCOUNTER
Spoke with Jenny Wells  Per [employer], patient needs to take in his SD card for download to get compliance  Patient asked what DME office is closest to him and he thinks the office in [facility] is closer  I provided him with the phone number of [phone] so he can call and make arrangements  Since it is a distance for him to go to the DME office, I asked them to have them call me once they download his card so we can get compliance right away and see if the pressure needs to be done  He stated he would really like his pressure raised  I asked him to wait at the DME office so that I can get Dr Elsie Lynn see the compliance and let us know what pressure to set his machine, so the DME representative can do it there before he leaves their office  Please forward call to me when patient calls back regarding this  Thank you

## 2022-10-28 ENCOUNTER — TELEPHONE (OUTPATIENT)
Dept: PULMONOLOGY | Facility: CLINIC | Age: 62
End: 2022-10-28

## 2022-10-28 NOTE — TELEPHONE ENCOUNTER
Compliance Report Scanned to this encounter  Yamila, I had asked the patient to have your office contact me once you had gotten his compliance because getting out is a struggle for him, his distance, so instead of the patient having to go back and forth, I was going to try to get Dr Rogerio Parry to take a look at his compliance while he was in the office so if he was going to adjust the settings, it could be done for him before he left  I apologize that you were not able to get a hold of us right away  The compliance is scanned, I have also sent Dr Rogerio Parry a message to have him look at it  Hopefully you are able to make the pressure changes remotely  Will follow up once I hear from Dr Rogerio Parry   Let me know if you hear from him first and If I need to do anything else  Thank you

## 2022-10-28 NOTE — TELEPHONE ENCOUNTER
Pt  took machine into [employer] for an SD card reading and this was forwarded to [employer] [facility] to enter into Media  I also sent a message to Dr Sukumar Barron to review this and added the pt  Wants to return to using CPAP

## 2022-10-28 NOTE — TELEPHONE ENCOUNTER
The data does not look bad, what issues is he having with it?    I can also do a virtual visit if that helps him

## 2022-10-31 NOTE — TELEPHONE ENCOUNTER
Dr Alycia Manzo, I scheduled him for a virtual visit with you tomorrow at 220pm   I told him it's better if he explains exactly what he wants so we are not going back and forth  His issue is still not resolved  Yamila, thank you for assisting  Dr Alycia Manzo is the only one that can resolve this  Patient is happy to have a virtual with him tomorrow

## 2022-11-01 ENCOUNTER — TELEMEDICINE (OUTPATIENT)
Dept: PULMONOLOGY | Facility: CLINIC | Age: 62
End: 2022-11-01

## 2022-11-01 ENCOUNTER — TELEPHONE (OUTPATIENT)
Dept: PULMONOLOGY | Facility: CLINIC | Age: 62
End: 2022-11-01

## 2022-11-01 VITALS — BODY MASS INDEX: 32.35 KG/M2 | HEIGHT: 70 IN | WEIGHT: 226 LBS

## 2022-11-01 DIAGNOSIS — G47.33 OBSTRUCTIVE SLEEP APNEA: Primary | ICD-10-CM
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
## 2022-11-01 NOTE — TELEPHONE ENCOUNTER
I called this pt  And advised him to bring his machine into the [address] office to have the pressure changed  He will do so when he has time

## 2022-11-02 ENCOUNTER — TELEPHONE (OUTPATIENT)
Dept: PULMONOLOGY | Facility: CLINIC | Age: 62
End: 2022-11-02

## 2022-11-02 ENCOUNTER — TELEPHONE (OUTPATIENT)
Dept: FAMILY MEDICINE CLINIC | Facility: CLINIC | Age: 62
End: 2022-11-02

## 2022-11-02 NOTE — TELEPHONE ENCOUNTER
Pt  took machine to [facility] today for the pressure change 25/7cm PS 4cm and he will try the Dreamwear nasal mask

## 2022-11-02 NOTE — TELEPHONE ENCOUNTER
Andriy Bean called from Dr Arti Linder office regarding medications that have been prescribing    Lunesta 3mg at bedtime  Hyoscyamine Sulfate ER 0 375 mg daily (IBS)  Zoloft 75mg daily  Prevacid 30mg daily    They are requesting that the PCP take over prescribing these      They will prescribe all pain medication  for him
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
## 2022-11-09 NOTE — TELEPHONE ENCOUNTER
I am willing to take over managing those meds, but pt would have to set up a separate visit for new controlled substance agreement (40min) for the Lunesta (separate from his AW[facility] appt next month)

## 2022-11-10 NOTE — TELEPHONE ENCOUNTER
Called and informed Dr Kilo Lang office in regards to PCP agreeing to take over medications  Called patient to schedule an appointment  Patient states that he had a sleep study which concluded that the Soren Killer is affecting him while he is sleeping  Patient will be weaning off the Soren Killer

## 2022-11-30 ENCOUNTER — RA CDI HCC (OUTPATIENT)
Dept: OTHER | Facility: HOSPITAL | Age: 62
End: 2022-11-30

## 2022-11-30 NOTE — PROGRESS NOTES
[id number]  [facility] 75  coding opportunities          Chart Reviewed number of suggestions sent to Provider: 1     Patients Insurance     Medicare Insurance: [employer]

## 2022-12-08 ENCOUNTER — OFFICE VISIT (OUTPATIENT)
Dept: FAMILY MEDICINE CLINIC | Facility: CLINIC | Age: 62
End: 2022-12-08

## 2022-12-08 VITALS
HEART RATE: 70 BPM | SYSTOLIC BLOOD PRESSURE: 139 MMHG | WEIGHT: 235.4 LBS | DIASTOLIC BLOOD PRESSURE: 73 MMHG | BODY MASS INDEX: 36.95 KG/M2 | OXYGEN SATURATION: 96 % | HEIGHT: 67 IN | TEMPERATURE: 97.1 F

## 2022-12-08 DIAGNOSIS — F32.A MILD DEPRESSION: ICD-10-CM

## 2022-12-08 DIAGNOSIS — Z79.01 ANTICOAGULANT LONG-TERM USE: ICD-10-CM

## 2022-12-08 DIAGNOSIS — E11.42 TYPE 2 DIABETES MELLITUS WITH DIABETIC POLYNEUROPATHY, WITH LONG-TERM CURRENT USE OF INSULIN (HCC): ICD-10-CM

## 2022-12-08 DIAGNOSIS — Z00.00 MEDICARE ANNUAL WELLNESS VISIT, SUBSEQUENT: Primary | ICD-10-CM

## 2022-12-08 DIAGNOSIS — I10 ESSENTIAL HYPERTENSION: ICD-10-CM

## 2022-12-08 DIAGNOSIS — Z13.220 LIPID SCREENING: ICD-10-CM

## 2022-12-08 DIAGNOSIS — E11.9 ENCOUNTER FOR DIABETIC FOOT EXAM (HCC): ICD-10-CM

## 2022-12-08 DIAGNOSIS — Z79.4 TYPE 2 DIABETES MELLITUS WITH DIABETIC POLYNEUROPATHY, WITH LONG-TERM CURRENT USE OF INSULIN (HCC): ICD-10-CM

## 2022-12-08 DIAGNOSIS — R20.2 RIGHT HAND PARESTHESIA: ICD-10-CM

## 2022-12-08 DIAGNOSIS — E66.01 CLASS 2 SEVERE OBESITY DUE TO EXCESS CALORIES WITH SERIOUS COMORBIDITY AND BODY MASS INDEX (BMI) OF 36.0 TO 36.9 IN ADULT (HCC): ICD-10-CM

## 2022-12-08 PROBLEM — E66.812 CLASS 2 SEVERE OBESITY DUE TO EXCESS CALORIES WITH SERIOUS COMORBIDITY AND BODY MASS INDEX (BMI) OF 36.0 TO 36.9 IN ADULT (HCC): Status: ACTIVE | Noted: 2022-08-19

## 2022-12-08 NOTE — PATIENT INSTRUCTIONS
Medicare Preventive Visit Patient Instructions  Thank you for completing your Welcome to Medicare Visit or Medicare Annual Wellness Visit today  Your next wellness visit will be due in one year (12/9/2023)  The screening/preventive services that you may require over the next 5-10 years are detailed below  Some tests may not apply to you based off risk factors and/or age  Screening tests ordered at today's visit but not completed yet may show as past due  Also, please note that scanned in results may not display below  Preventive Screenings:  Service Recommendations Previous Testing/Comments   Colorectal Cancer Screening  · Colonoscopy    · Fecal Occult Blood Test (FOBT)/Fecal Immunochemical Test (FIT)  · Fecal DNA/Cologuard Test  · Flexible Sigmoidoscopy Age: 39-70 years old   Colonoscopy: every 10 years (May be performed more frequently if at higher risk)  OR  FOBT/FIT: every 1 year  OR  Cologuard: every 3 years  OR  Sigmoidoscopy: every 5 years  Screening may be recommended earlier than age 39 if at higher risk for colorectal cancer  Also, an individualized decision between you and your healthcare provider will decide whether screening between the ages of 74-80 would be appropriate   Colonoscopy: 11/24/2021  FOBT/FIT: Not on file  Cologuard: Not on file  Sigmoidoscopy: Not on file    Screening Current     Prostate Cancer Screening Individualized decision between patient and health care provider in men between ages of 53-78   Medicare will cover every 12 months beginning on the day after your 50th birthday PSA: 0 7 ng/mL     Screening Current     Hepatitis C Screening Once for adults born between 1945 and 1965  More frequently in patients at high risk for Hepatitis C Hep C Antibody: 10/14/2022    Screening Current   Diabetes Screening 1-2 times per year if you're at risk for diabetes or have pre-diabetes Fasting glucose: 105 mg/dL (10/14/2022)  A1C: 6 0 % (10/14/2022)  Screening Not Indicated  History Diabetes Cholesterol Screening Once every 5 years if you don't have a lipid disorder  May order more often based on risk factors  Lipid panel: 02/23/2022  Screening Current      Other Preventive Screenings Covered by Medicare:  1  Abdominal Aortic Aneurysm (AAA) Screening: covered once if your at risk  You're considered to be at risk if you have a family history of AAA or a male between the age of 73-68 who smoking at least 100 cigarettes in your lifetime  2  Lung Cancer Screening: covers low dose CT scan once per year if you meet all of the following conditions: (1) Age 50-69; (2) No signs or symptoms of lung cancer; (3) Current smoker or have quit smoking within the last 15 years; (4) You have a tobacco smoking history of at least 20 pack years (packs per day x number of years you smoked); (5) You get a written order from a healthcare provider  3  Glaucoma Screening: covered annually if you're considered high risk: (1) You have diabetes OR (2) Family history of glaucoma OR (3)  aged 48 and older OR (3)  American aged 72 and older  3  Osteoporosis Screening: covered every 2 years if you meet one of the following conditions: (1) Have a vertebral abnormality; (2) On glucocorticoid therapy for more than 3 months; (3) Have primary hyperparathyroidism; (4) On osteoporosis medications and need to assess response to drug therapy  5  HIV Screening: covered annually if you're between the age of 12-76  Also covered annually if you are younger than 13 and older than 72 with risk factors for HIV infection  For pregnant patients, it is covered up to 3 times per pregnancy      Immunizations:  Immunization Recommendations   Influenza Vaccine Annual influenza vaccination during flu season is recommended for all persons aged >= 6 months who do not have contraindications   Pneumococcal Vaccine   * Pneumococcal conjugate vaccine = PCV13 (Prevnar 13), PCV15 (Vaxneuvance), PCV20 (Prevnar 20)  * Pneumococcal polysaccharide vaccine = PPSV23 (Pneumovax) Adults 2364 years old: 1-3 doses may be recommended based on certain risk factors  Adults 72 years old: 1-2 doses may be recommended based off what pneumonia vaccine you previously received   Hepatitis B Vaccine 3 dose series if at intermediate or high risk (ex: diabetes, end stage renal disease, liver disease)   Tetanus (Td) Vaccine - COST NOT COVERED BY MEDICARE PART B Following completion of primary series, a booster dose should be given every 10 years to maintain immunity against tetanus  Td may also be given as tetanus wound prophylaxis  Tdap Vaccine - COST NOT COVERED BY MEDICARE PART B Recommended at least once for all adults  For pregnant patients, recommended with each pregnancy  Shingles Vaccine (Shingrix) - COST NOT COVERED BY MEDICARE PART B  2 shot series recommended in those aged 48 and above     Health Maintenance Due:      Topic Date Due   • HIV Screening  Never done   • Colorectal Cancer Screening  11/22/2031   • Hepatitis C Screening  Completed     Immunizations Due:      Topic Date Due   • Hepatitis B Vaccine (1 of 3 - 3-dose series) Never done   • COVID-19 Vaccine (4 - Booster for Moy Peter series) 03/04/2022     Advance Directives   What are advance directives? Advance directives are legal documents that state your wishes and plans for medical care  These plans are made ahead of time in case you lose your ability to make decisions for yourself  Advance directives can apply to any medical decision, such as the treatments you want, and if you want to donate organs  What are the types of advance directives? There are many types of advance directives, and each state has rules about how to use them  You may choose a combination of any of the following:  · Living will: This is a written record of the treatment you want  You can also choose which treatments you do not want, which to limit, and which to stop at a certain time   This includes surgery, medicine, IV fluid, and tube feedings  · Durable power of  for healthcare [facility]): This is a written record that states who you want to make healthcare choices for you when you are unable to make them for yourself  This person, called a proxy, is usually a family member or a friend  You may choose more than 1 proxy  · Do not resuscitate (DNR) order:  A DNR order is used in case your heart stops beating or you stop breathing  It is a request not to have certain forms of treatment, such as CPR  A DNR order may be included in other types of advance directives  · Medical directive: This covers the care that you want if you are in a coma, near death, or unable to make decisions for yourself  You can list the treatments you want for each condition  Treatment may include pain medicine, surgery, blood transfusions, dialysis, IV or tube feedings, and a ventilator (breathing machine)  · Values history: This document has questions about your views, beliefs, and how you feel and think about life  This information can help others choose the care that you would choose  Why are advance directives important? An advance directive helps you control your care  Although spoken wishes may be used, it is better to have your wishes written down  Spoken wishes can be misunderstood, or not followed  Treatments may be given even if you do not want them  An advance directive may make it easier for your family to make difficult choices about your care  Weight Management   Why it is important to manage your weight:  Being overweight increases your risk of health conditions such as heart disease, high blood pressure, type 2 diabetes, and certain types of cancer  It can also increase your risk for osteoarthritis, sleep apnea, and other respiratory problems  Aim for a slow, steady weight loss  Even a small amount of weight loss can lower your risk of health problems    How to lose weight safely:  A safe and healthy way to lose weight is to eat fewer calories and get regular exercise  You can lose up about 1 pound a week by decreasing the number of calories you eat by 500 calories each day  Healthy meal plan for weight management:  A healthy meal plan includes a variety of foods, contains fewer calories, and helps you stay healthy  A healthy meal plan includes the following:  · Eat whole-grain foods more often  A healthy meal plan should contain fiber  Fiber is the part of grains, fruits, and vegetables that is not broken down by your body  Whole-grain foods are healthy and provide extra fiber in your diet  Some examples of whole-grain foods are whole-wheat breads and pastas, oatmeal, brown rice, and bulgur  · Eat a variety of vegetables every day  Include dark, leafy greens such as spinach, kale, chandu greens, and mustard greens  Eat yellow and orange vegetables such as carrots, sweet potatoes, and winter squash  · Eat a variety of fruits every day  Choose fresh or canned fruit (canned in its own juice or light syrup) instead of juice  Fruit juice has very little or no fiber  · Eat low-fat dairy foods  Drink fat-free (skim) milk or 1% milk  Eat fat-free yogurt and low-fat cottage cheese  Try low-fat cheeses such as mozzarella and other reduced-fat cheeses  · Choose meat and other protein foods that are low in fat  Choose beans or other legumes such as split peas or lentils  Choose fish, skinless poultry (chicken or turkey), or lean cuts of red meat (beef or pork)  Before you cook meat or poultry, cut off any visible fat  · Use less fat and oil  Try baking foods instead of frying them  Add less fat, such as margarine, sour cream, regular salad dressing and mayonnaise to foods  Eat fewer high-fat foods  Some examples of high-fat foods include french fries, doughnuts, ice cream, and cakes  · Eat fewer sweets  Limit foods and drinks that are high in sugar   This includes candy, cookies, regular soda, and sweetened drinks  Exercise:  Exercise at least 30 minutes per day on most days of the week  Some examples of exercise include walking, biking, dancing, and swimming  You can also fit in more physical activity by taking the stairs instead of the elevator or parking farther away from stores  Ask your healthcare provider about the best exercise plan for you  Narcotic (Opioid) Safety    Use narcotics safely:  · Take prescribed narcotics exactly as directed  · Do not give narcotics to others or take narcotics that belong to someone else  · Do not mix narcotics without medicines or alcohol  · Do not drive or operate heavy machinery after you take the narcotic  · Monitor for side effects and notify your healthcare provider if you experienced side effects such as nausea, sleepiness, itching, or trouble thinking clearly  Manage constipation:    Constipation is the most common side effect of narcotic medicine  Constipation is when you have hard, dry bowel movements, or you go longer than usual between bowel movements  Tell your healthcare provider about all changes in your bowel movements while you are taking narcotics  He or she may recommend laxative medicine to help you have a bowel movement  He or she may also change the kind of narcotic you are taking, or change when you take it  The following are more ways you can prevent or relieve constipation:    · Drink liquids as directed  You may need to drink extra liquids to help soften and move your bowels  Ask how much liquid to drink each day and which liquids are best for you  · Eat high-fiber foods  This may help decrease constipation by adding bulk to your bowel movements  High-fiber foods include fruits, vegetables, whole-grain breads and cereals, and beans  Your healthcare provider or dietitian can help you create a high-fiber meal plan  Your provider may also recommend a fiber supplement if you cannot get enough fiber from food  · Exercise regularly    Regular physical activity can help stimulate your intestines  Walking is a good exercise to prevent or relieve constipation  Ask which exercises are best for you  · Schedule a time each day to have a bowel movement  This may help train your body to have regular bowel movements  Bend forward while you are on the toilet to help move the bowel movement out  Sit on the toilet for at least 10 minutes, even if you do not have a bowel movement  Store narcotics safely:   · Store narcotics where others cannot easily get them  Keep them in a locked cabinet or secure area  Do not  keep them in a purse or other bag you carry with you  A person may be looking for something else and find the narcotics  · Make sure narcotics are stored out of the reach of children  A child can easily overdose on narcotics  Narcotics may look like candy to a small child  The best way to dispose of narcotics: The laws vary by country and area  In the [place], the best way is to return the narcotics through a take-back program  This program is offered by the [employer]  The following are options for using the program:  · Take the narcotics to a MISSY collection site  The site is often a law enforcement center  Call your local law enforcement center for scheduled take-back days in your area  You will be given information on where to go if the collection site is in a different location  · Take the narcotics to an approved pharmacy or hospital   A pharmacy or hospital may be set up as a collection site  You will need to ask if it is a MISSY collection site if you were not directed there  A pharmacy or doctor's office may not be able to take back narcotics unless it is a MISSY site  · Use a mail-back system  This means you are given containers to put the narcotics into  You will then mail them in the containers  · Use a take-back drop box  This is a place to leave the narcotics at any time   People and animals will not be able to get into the box  Your local law enforcement agency can tell you where to find a drop box in your area  Other ways to manage pain:   · Ask your healthcare provider about non-narcotic medicines to control pain  Nonprescription medicines include NSAIDs (such as ibuprofen) and acetaminophen  Prescription medicines include muscle relaxers, antidepressants, and steroids  · Pain may be managed without any medicines  Some ways to relieve pain include massage, aromatherapy, or meditation  Physical or occupational therapy may also help  For more information:   · Drug Enforcement Administration  [facility] [address]  Phone: 3- 601 - [phone]  Web Address: [facility] gov/drug_disposal/    · [address]  and Drug Administration  [facility] , [address] [place] [address]  Phone: 1- 461 - 416-2654  Web Address: http://[employer]/     © Copyright [employer] 2018 Information is for End User's use only and may not be sold, redistributed or otherwise used for commercial purposes   All illustrations and images included in CareNotes® are the copyrighted property of [employer]  or [address] [place] [address] [facility]

## 2022-12-08 NOTE — PROGRESS NOTES
Patient's shoes and socks removed  Right Foot/Ankle   Right Foot Inspection  Skin Exam: skin normal, skin intact and dry skin  No warmth, no callus, no erythema, no maceration, no abnormal color, no pre-ulcer, no ulcer and no callus  Sensory   Monofilament testing: intact    Vascular  The right DP pulse is 2+  The right PT pulse is 2+  Left Foot/Ankle  Left Foot Inspection  Skin Exam: skin normal, skin intact and dry skin  No warmth, no erythema, no maceration, normal color, no pre-ulcer, no ulcer and no callus  Sensory   Monofilament testing: intact    Vascular  The left DP pulse is 2+  The left PT pulse is 2+       Assign Risk Category  No deformity present  No loss of protective sensation  No weak pulses  Risk: 0
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
## 2022-12-13 ENCOUNTER — OFFICE VISIT (OUTPATIENT)
Dept: PULMONOLOGY | Facility: CLINIC | Age: 62
End: 2022-12-13

## 2022-12-13 VITALS
HEIGHT: 68 IN | OXYGEN SATURATION: 97 % | HEART RATE: 77 BPM | DIASTOLIC BLOOD PRESSURE: 80 MMHG | WEIGHT: 233.4 LBS | TEMPERATURE: 97.8 F | SYSTOLIC BLOOD PRESSURE: 154 MMHG | BODY MASS INDEX: 35.37 KG/M2

## 2022-12-13 DIAGNOSIS — G47.33 OBSTRUCTIVE SLEEP APNEA: Primary | ICD-10-CM

## 2022-12-13 DIAGNOSIS — Z99.89 CPAP (CONTINUOUS POSITIVE AIRWAY PRESSURE) DEPENDENCE: ICD-10-CM

## 2022-12-13 DIAGNOSIS — R35.1 NOCTURIA: ICD-10-CM

## 2022-12-13 DIAGNOSIS — G47.00 INSOMNIA, UNSPECIFIED TYPE: ICD-10-CM

## 2022-12-13 DIAGNOSIS — F11.90 CHRONIC, CONTINUOUS USE OF OPIOIDS: ICD-10-CM

## 2022-12-13 NOTE — PROGRESS NOTES
Progress Note - Sleep Medicine  Tu Clay 65 y o  male MRN: [id number]       Impression & Plan:   65 y o  M with PMHx of DM, HTN, Depression, history of DVT on Xarelto, antithrombin 3 deficiency, chronic insomnia, chronic pain on oxycodone and AALIYAH on BIPAP who comes in for management of AALIYAH and insomnia  1   Mild AALIYAH (AHI - 9 9)  On autoBIPAP with 100% compliance  He found significant improvement in symptoms including improved mood, improved energy, refreshed when awakening, and no daytime sleepiness  Residual AHI 2 6 LIYAH 1 4      -  No BIPAP adjustments are indicated at this time  Will maintain same settings at BIPAP to min EPAP 7, PSV 4, IPAP 25  Follow compliance at next visit      2  Chronic hypercapnia related to AALIYAH, mild gas trapping, chronic opiate use and BZRA use  In addition he may have some restrictive lung disease due to obesity as well  -  LIYAH decreased from 3 2 to 1 4 in setting of patient making medication changes  He decreased frequency of Oxycodone 30 mg from TID to BID  Also decreased Zanaflex 4 mg from TID to BID  Pt stopped Lunesta 3 mg qHS as well       3  Chronic insomnia - Resolved  - Patient sleeps close to 6 hours on average per night without sleep onset latency  - Patient stopped Lunesta as recommended  He increased melatonin from 3 mg to 9 mg qHS and found success with falling asleep in a few minutes with this dosage  4  Nocturia      - Patient wakes up 4 times with the urge to urinate  Currently on Terazosin  - Shared decision making was made to not start an additional medication to help with nocturia and sleep maintenance since patient QOL is not affected by nocturia  No problem-specific Assessment & Plan notes found for this encounter        Diagnoses and all orders for this visit:    Obstructive sleep apnea        ______________________________________________________________________    HPI:    Tu Danna presents today for follow-up for sleep apnea  Patient is compliant with BIPAP  Patient reports that since starting on BIPAP his mood and energy levels have significantly improved  He says he feels refreshed in the morning and denies daytime sleepiness  Patient says he may have taken two short naps in the last month  Patient is happy to report that he stopped taking Lunesta and instead increased his dose of Melatonin from 3 mg to 9 mg qHs  Patient falls asleep in a few minutes  However, he does wake up about 4 times a night due to urge to urinate and falls asleep quickly after returning to bed  This does not bother the patient  Patient also decreased oxycodone 30 mg from TID to BID and decreased Zanaflex 4 mg from TID to BID  Patient is comfortable using BIPAP via a nasal pillow  He noticed that sometimes the machine will alert that there is an air leak however he does not feel one  Review of Systems:  Review of Systems   Constitutional: Positive for activity change (increased energy and activity)  Negative for fatigue  HENT: Negative for trouble swallowing  Eyes: Negative for redness  Respiratory: Negative for choking and shortness of breath  Cardiovascular: Negative for chest pain and palpitations  Gastrointestinal: Negative for nausea  Genitourinary: Positive for frequency  Musculoskeletal: Positive for arthralgias (hip pain 2/2 hip fracture) and myalgias (LE muscle cramps)  Neurological: Negative for headaches  Psychiatric/Behavioral: Negative for confusion, decreased concentration, dysphoric mood and sleep disturbance           Social history updates:  Social History     Tobacco Use   Smoking Status Former   • Packs/day: 1 50   • Years: 13 00   • Pack years: 19 50   • Types: Cigarettes   • Start date: 5   • Quit date: 1992   • Years since quittin 9   Smokeless Tobacco Never   Tobacco Comments    Glad I quit     Social History     Socioeconomic History   • Marital status: /Civil Union Spouse name: Not on file   • Number of children: Not on file   • Years of education: 15; has high school diploma   • Highest education level: Not on file   Occupational History   • Occupation: retired /[employer]   Tobacco Use   • Smoking status: Former     Packs/day: 1 50     Years: 13 00     Pack years: 19 50     Types: Cigarettes     Start date:      Quit date: 1992     Years since quittin 9   • Smokeless tobacco: Never   • Tobacco comments:     Glad I quit   Vaping Use   • Vaping Use: Never used   Substance and Sexual Activity   • Alcohol use: No   • Drug use: No   • Sexual activity: Not Currently     Partners: Female     Birth control/protection: Abstinence, Female Sterilization     Comment: Very seldom have sex do to back and pain in legs   Other Topics Concern   • Not on file   Social History Narrative    2 living siblings, also 4 step-siblings    Activities: participates in activities inside of the home, light  Living independently with spouse    Retired  and      Social Determinants of Health     Financial Resource Strain: Low Risk    • Difficulty of Paying Living Expenses: Not very hard   Food Insecurity: Not on file   Transportation Needs: No Transportation Needs   • Lack of Transportation (Medical): No   • Lack of Transportation (Non-Medical):  No   Physical Activity: Not on file   Stress: Not on file   Social Connections: Not on file   Intimate Partner Violence: Not on file   Housing Stability: Not on file       PhysicalExamination:  Vitals:   /80 (BP Location: Left arm, Patient Position: Sitting, Cuff Size: Large)   Pulse 77   Temp 97 8 °F (36 6 °C)   Ht 5' 8" (1 727 m)   Wt 106 kg (233 lb 6 4 oz)   SpO2 97%   BMI 35 49 kg/m²     Physical Exam  General: Awake alert and oriented x 3, conversant without conversational dyspnea, NAD, normal affect  HEENT:  PERRL, Sclera noninjected, nonicteric OU, Nares patent,  no craniofacial abnormalities, Mucous membranes, moist, no oral lesions, normal dentition, Mallampati class 4  NECK: Trachea midline, no accessory muscle use, no stridor, no cervical or supraclavicular adenopathy  CARDIAC: Reg, single s1/S2, no m/r/g  PULM: CTA bilaterally no wheezing, rhonchi or rales  NEURO: no focal neurologic deficits, AAOx3, moving all extremities appropriately    Diagnostic Data:  Labs: I personally reviewed the most recent laboratory data pertinent to today's visit  Appointment on 10/14/2022   Component Date Value   • Hepatitis C Ab 10/14/2022 Non-reactive    • Hemoglobin A1C 10/14/2022 6 0 (H)    • EAG 10/14/2022 126    • Sodium 10/14/2022 136    • Potassium 10/14/2022 4 2    • Chloride 10/14/2022 102    • CO2 10/14/2022 32    • ANION GAP 10/14/2022 2 (L)    • BUN 10/14/2022 11    • Creatinine 10/14/2022 0 77    • Glucose, Fasting 10/14/2022 105 (H)    • Calcium 10/14/2022 9 2    • eGFR 10/14/2022 97    • Creatinine, Ur 10/14/2022 74 1    • Microalbum  ,U,Random 10/14/2022 7 4    • Microalb Creat Ratio 10/14/2022 10        I have personally reviewed pertinent lab results  Lab Results   Component Value Date    WBC 10 21 (H) 02/23/2022    HGB 11 6 (L) 07/20/2022    HCT 34 (L) 07/20/2022    MCV 81 (L) 02/23/2022     02/23/2022     Lab Results   Component Value Date    GLUCOSE 95 07/20/2022    CALCIUM 9 2 10/14/2022     12/14/2015    K 4 2 10/14/2022    CO2 32 10/14/2022     10/14/2022    BUN 11 10/14/2022    CREATININE 0 77 10/14/2022     No results found for: IGE  Lab Results   Component Value Date    ALT 59 02/23/2022    AST 28 02/23/2022    ALKPHOS 90 02/23/2022    BILITOT 0 30 12/14/2015     Lab Results   Component Value Date    IRON 47 (L) 04/11/2014     Lab Results   Component Value Date    [id number] 513 04/11/2014     No results found for: FOLATE      Arterial Blood Gas result:  pO2 92; pCO2 53 4; pH 7 39;  HCO3 32 3, %O2 Sat 97  Sleep studies:  Home Study 9/19/2022: mild AALIYAH, ELVIN -9 9       New Compliance Data:  11/13/22-12/12/22            Type of CPAP:  AutoBIPAP, min EPAP 7, PSV 4, IPAP 25  Percent usage: 100%                                   Average time used: 5 hr 45 min                                   Residual AHI: 2 6 (LIYAH 1 4)    Compliance Data:  10/3/22 - 11/1/22                                   Type of CPAP:  autoBIPAP min EPAP 5, PSV 4, IPAP 25    EPAP avg 6 5, IPAP avg 10 1                                   Percent usage: 50%                                    Average time used: 5 hr 9 minutes                                   Residual AHI: 4 7 (LIYAH - 3 2)                                     Gisselle Cramer DO   Family Medicine  PGY [address] [place], [place]

## 2022-12-13 NOTE — LETTER
December 13, 2022     Betty Joiner, [facility]  [place] [facility]  [phone]    Patient: Kade Kumar   YOB: 1960   Date of Visit: 12/13/2022       Dear Dr Jes Deleon Recipients: Thank you for referring Carlos Rincon to me for evaluation  Below are my notes for this consultation  If you have questions, please do not hesitate to call me  I look forward to following your patient along with you  Sincerely,        Monica Puente DO        CC: No Recipients  Kasie Rolon DO  12/13/2022 10:52 AM  Attested  Progress Note - Sleep Medicine  Sun Patten 65 y o  male MRN: [id number]       Impression & Plan:   65 y o  M with PMHx of DM, HTN, Depression, history of DVT on Xarelto, antithrombin 3 deficiency, chronic insomnia, chronic pain on oxycodone and AALIYAH on BIPAP who comes in for management of AALIYAH and insomnia  1   Mild AALIYAH (AHI - 9 9)  On autoBIPAP with 100% compliance  He found significant improvement in symptoms including improved mood, improved energy, refreshed when awakening, and no daytime sleepiness  Residual AHI 2 6 LIYAH 1 4      -  No BIPAP adjustments are indicated at this time  Will maintain same settings at BIPAP to min EPAP 7, PSV 4, IPAP 25  Follow compliance at next visit      2  Chronic hypercapnia related to AALIYAH, mild gas trapping, chronic opiate use and BZRA use  In addition he may have some restrictive lung disease due to obesity as well  -  LIYAH decreased from 3 2 to 1 4 in setting of patient making medication changes  He decreased frequency of Oxycodone 30 mg from TID to BID  Also decreased Zanaflex 4 mg from TID to BID  Pt stopped Lunesta 3 mg qHS as well       3  Chronic insomnia - Resolved  - Patient sleeps close to 6 hours on average per night without sleep onset latency  - Patient stopped Lunesta as recommended   He increased melatonin from 3 mg to 9 mg qHS and found success with falling asleep in a few minutes with this dosage  4  Nocturia      - Patient wakes up 4 times with the urge to urinate  Currently on Terazosin  - Shared decision making was made to not start an additional medication to help with nocturia and sleep maintenance since patient QOL is not affected by nocturia  No problem-specific Assessment & Plan notes found for this encounter  Diagnoses and all orders for this visit:    Obstructive sleep apnea        ______________________________________________________________________    HPI:    Jim Cadet presents today for follow-up for sleep apnea  Patient is compliant with BIPAP  Patient reports that since starting on BIPAP his mood and energy levels have significantly improved  He says he feels refreshed in the morning and denies daytime sleepiness  Patient says he may have taken two short naps in the last month  Patient is happy to report that he stopped taking Lunesta and instead increased his dose of Melatonin from 3 mg to 9 mg qHs  Patient falls asleep in a few minutes  However, he does wake up about 4 times a night due to urge to urinate and falls asleep quickly after returning to bed  This does not bother the patient  Patient also decreased oxycodone 30 mg from TID to BID and decreased Zanaflex 4 mg from TID to BID  Patient is comfortable using BIPAP via a nasal pillow  He noticed that sometimes the machine will alert that there is an air leak however he does not feel one  Review of Systems:  Review of Systems   Constitutional: Positive for activity change (increased energy and activity)  Negative for fatigue  HENT: Negative for trouble swallowing  Eyes: Negative for redness  Respiratory: Negative for choking and shortness of breath  Cardiovascular: Negative for chest pain and palpitations  Gastrointestinal: Negative for nausea  Genitourinary: Positive for frequency     Musculoskeletal: Positive for arthralgias (hip pain 2/2 hip fracture) and myalgias (LE muscle cramps)  Neurological: Negative for headaches  Psychiatric/Behavioral: Negative for confusion, decreased concentration, dysphoric mood and sleep disturbance  Social history updates:  Social History     Tobacco Use   Smoking Status Former   • Packs/day: 1 50   • Years: 13 00   • Pack years: 19 50   • Types: Cigarettes   • Start date:    • Quit date: 1992   • Years since quittin 9   Smokeless Tobacco Never   Tobacco Comments    Glad I quit     Social History     Socioeconomic History   • Marital status: /Civil Union     Spouse name: Not on file   • Number of children: Not on file   • Years of education: 15; has high school diploma   • Highest education level: Not on file   Occupational History   • Occupation: retired /   Tobacco Use   • Smoking status: Former     Packs/day: 1 50     Years: 13 00     Pack years: 19 50     Types: Cigarettes     Start date:      Quit date: 1992     Years since quittin 9   • Smokeless tobacco: Never   • Tobacco comments:     Glad I quit   Vaping Use   • Vaping Use: Never used   Substance and Sexual Activity   • Alcohol use: No   • Drug use: No   • Sexual activity: Not Currently     Partners: Female     Birth control/protection: Abstinence, Female Sterilization     Comment: Very seldom have sex do to back and pain in legs   Other Topics Concern   • Not on file   Social History Narrative    2 living siblings, also 4 step-siblings    Activities: participates in activities inside of the home, light  Living independently with spouse    [place]  and      Social Determinants of Health     Financial Resource Strain: Low Risk    • Difficulty of Paying Living Expenses: Not very hard   Food Insecurity: Not on file   Transportation Needs: No Transportation Needs   • Lack of Transportation (Medical): No   • Lack of Transportation (Non-Medical):  No   Physical Activity: Not on file   Stress: Not on file   Social Connections: Not on file   Intimate Partner Violence: Not on file   Housing Stability: Not on file       PhysicalExamination:  Vitals:   /80 (BP Location: Left arm, Patient Position: Sitting, Cuff Size: Large)   Pulse 77   Temp 97 8 °F (36 6 °C)   Ht 5' 8" (1 727 m)   Wt 106 kg (233 lb 6 4 oz)   SpO2 97%   BMI 35 49 kg/m²     Physical Exam  General: Awake alert and oriented x 3, conversant without conversational dyspnea, NAD, normal affect  HEENT:  PERRL, Sclera noninjected, nonicteric OU, Nares patent,  no craniofacial abnormalities, Mucous membranes, moist, no oral lesions, normal dentition, Mallampati class 4  NECK: Trachea midline, no accessory muscle use, no stridor, no cervical or supraclavicular adenopathy  CARDIAC: Reg, single s1/S2, no m/r/g  PULM: CTA bilaterally no wheezing, rhonchi or rales  NEURO: no focal neurologic deficits, AAOx3, moving all extremities appropriately    Diagnostic Data:  Labs: I personally reviewed the most recent laboratory data pertinent to today's visit  Appointment on 10/14/2022   Component Date Value   • Hepatitis C Ab 10/14/2022 Non-reactive    • Hemoglobin A1C 10/14/2022 6 0 (H)    • EAG 10/14/2022 126    • Sodium 10/14/2022 136    • Potassium 10/14/2022 4 2    • Chloride 10/14/2022 102    • CO2 10/14/2022 32    • ANION GAP 10/14/2022 2 (L)    • BUN 10/14/2022 11    • Creatinine 10/14/2022 0 77    • Glucose, Fasting 10/14/2022 105 (H)    • Calcium 10/14/2022 9 2    • eGFR 10/14/2022 97    • Creatinine, Ur 10/14/2022 74 1    • Microalbum  ,U,Random 10/14/2022 7 4    • Microalb Creat Ratio 10/14/2022 10        I have personally reviewed pertinent lab results    Lab Results   Component Value Date    WBC 10 21 (H) 02/23/2022    HGB 11 6 (L) 07/20/2022    HCT 34 (L) 07/20/2022    MCV 81 (L) 02/23/2022     02/23/2022     Lab Results   Component Value Date    GLUCOSE 95 07/20/2022    CALCIUM 9 2 10/14/2022     12/14/2015    K 4 2 10/14/2022    CO2 32 10/14/2022     10/14/2022    BUN 11 10/14/2022    CREATININE 0 77 10/14/2022     No results found for: IGE  Lab Results   Component Value Date    ALT 59 02/23/2022    AST 28 02/23/2022    ALKPHOS 90 02/23/2022    BILITOT 0 30 12/14/2015     Lab Results   Component Value Date    IRON 47 (L) 04/11/2014     Lab Results   Component Value Date    [id number] 513 04/11/2014     No results found for: FOLATE      Arterial Blood Gas result:  pO2 92; pCO2 53 4; pH 7 39;  HCO3 32 3, %O2 Sat 97  Sleep studies:  · Home Study 9/19/2022: mild AALIYAH, ELVIN -9 9  New Compliance Data:  11/13/22-12/12/22            Type of CPAP:  AutoBIPAP, min EPAP 7, PSV 4, IPAP 25  Percent usage: 100%                                   Average time used: 5 hr 45 min                                   Residual AHI: 2 6 (LIYAH 1 4)    Compliance Data:  10/3/22 - 11/1/22                                   Type of CPAP:  autoBIPAP min EPAP 5, PSV 4, IPAP 25  EPAP avg 6 5, IPAP avg 10 1                                   Percent usage: 50%                                    Average time used: 5 hr 9 minutes                                   Residual AHI: 4 7 (LIYAH - 3 2)                                     Winsome Kang DO   Family Medicine  PGY [address] [place] [id number] signed by Kash Eden DO at 12/13/2022 11:27 AM:  I have personally seen and examined the patient on 12/13/22 at 9:45 pm  I discussed the patient with the resident including, but not limited to, verifying findings; reviewing labs and x-rays; discussing with consultants; developing the plan of care  I have reviewed the note and assessment performed by the resident and agree with the resident’s documented findings and plan of care with the following additions/exceptions  Please see my following comments for details and adjustments       Assessment/Plan:  65 y o  M with PMHx of DM, HTN, Depression, history of DVT on Xarelto, antithrombin 3 deficiency, chronic insomnia, chronic pain on oxycodone and AALIYAH on BIPAP who comes in for management of AALIYAH and insomnia  1   Mild AALIYAH (AHI - 9 9)  On autoBIPAP with excellent compliance and good clinical response  Residual AHI - 2 6, LIYAH 1 4      -  Conitnue autoBIPAP with min EPAP 7, PSV of 4, IPAP 25         -  Renew supplies annually       -  I also discussed in depth the risk of leaving sleep apnea untreated including hypertension, heart failure, arrhythmia, MI and stroke  2   Chronic hypercapnia related to AALIYAH, mild gas trapping, chronic opiate use and BZRA use  In addition he may have some restrictive lung disease due to obesity as well  -  He was successful at stopping Lunesta and reducing opiates and Zanaflex  His mental status has significant improved and he is feeling much better overall         -  We can consider an ABG in the future if needed  3   Chronic insomnia - he is currently using melatonin and doing well with that  He is successfully off Lunesta at this time  4   Nocturia - he is currently on Terazosin  He will need to follow with his urologies tot discuss treatment strategies to prevent nocturnal awakenings     Mr Patten returns to the office and is doing much better  He is much more alert on the BIPAP  He feels refreshed in the morning and he has been able to successfully wean down his medications  He has stopped lunesta and is on lower doses of opiates and muscle relaxant  He is able to do things through the day  He PAP pressure or mask intolerance  He also denies issues with insomnia and has been successful just using melatonin alone  His only compliant is that he is still getting up 4 times a night to urinate  He has note seen his urologist in some time      /80 (BP Location: Left arm, Patient Position: Sitting, Cuff Size: Large)   Pulse 77   Temp 97 8 °F (36 6 °C)   Ht 5' 8" (1 727 m)   Wt 106 kg (233 lb 6 4 oz)   SpO2 97%   BMI 35 49 kg/m²   RA  General:  Patient is awake, alert, non-toxic and in no acute respiratory distress  Eyes: PERRL, no scleral icterus  Neck: No JVD  CV:  Regular, +S1 and S2, No murmurs, gallops or rubs appreciated  Lungs: Clear to auscultation bilateral without wheeze, rales or rhonci  Abdomen: Soft, +BS, Non-tender, non-distended  Extremities: No clubbing, cyanosis or edema  Neuro: No focal motor/sensory deficits  Skin: Warm, No rashes or ulcerations  Lab Results   Component Value Date    WBC 10 21 (H) 02/23/2022    HGB 11 6 (L) 07/20/2022    HCT 34 (L) 07/20/2022    MCV 81 (L) 02/23/2022     02/23/2022     Lab Results   Component Value Date    SODIUM 136 10/14/2022    K 4 2 10/14/2022     10/14/2022    CO2 32 10/14/2022    BUN 11 10/14/2022    CREATININE 0 77 10/14/2022    GLUC 158 (H) 09/22/2020    CALCIUM 9 2 10/14/2022     Lab Results   Component Value Date    ALT 59 02/23/2022    AST 28 02/23/2022    ALKPHOS 90 02/23/2022    BILITOT 0 30 12/14/2015     Lab Results   Component Value Date    IRON 47 (L) 04/11/2014      PFT 7/20/22  Results:  FEV1/FVC Ratio: 72 %  FEV1: 2 75 L     76 % predicted  Forced Vital Capacity: 3 83 L    80 % predicted  After administration of bronchodilator:  No change     Lung volumes:  Total Lung Capacity 89 % predicted Residual volume 122 % predicted     DLCO corrected for patients hemoglobin level: 101 % predicted     Flow volume loop:  Consistent with restriction     Interpretation:     • Spirometry is normal  • No change with bronchodilators  • There is very mild air trapping on lung volumes  • Normal diffusion capacity     ABG   Units 7/20/22 12:24 PM 3/28/18 12:46 PM 3/28/18 11:10 AM     pH, Art i-STAT 7 350 - 7 450 7 390  7 453 High   7 448    pCO2, Art i-STAT 36 0 - 44 0 mm HG 53 4 High   38 3  37 4    pO2, ART i-STAT 75 0 - 129 0 mm HG 92 0  136 0 High   124 0    BE, i-STAT -2 - 3 mmol/L 6 High   3  2    HCO3, Art i-STAT 22 0 - 28 0 mmol/L 32 3 High   26 8  25 9    CO2, i-STAT 21 - 32 mmol/L 34 High   28  27    O2 Sat, i-STAT 60 - 85 % 97 High   99 High  R  99 High  R    SODIUM, I-STAT 136 - 145 mmol/l 141  143  143    Potassium, i-STAT 3 5 - 5 3 mmol/L 3 9  3 6  3 3 Low     Calcium, Ionized i-STAT 1 12 - 1 32 mmol/L 1 30  1 21  1 15    Hct, i-STAT 36 5 - 49 3 % 34 Low   33 Low   28 Low     Hgb, i-STAT 12 0 - 17 0 g/dl 11 6 Low   11 2 Low   9 5 Low     Glucose, i-STAT 65 - 140 mg/dl 95  175 High   141 High     POC FIO2 L 21          Arterial Blood Gas result:  pO2 92; pCO2 53 4; pH 7 39;  HCO3 32 3, %O2 Sat 97  Sleep studies:  Home Study 9/19/2022: mild AALIYAH, ELVIN -9 9  New Compliance Data:  11/13/22-12/12/22                                  Type of CPAP:  AutoBIPAP, min EPAP 7, PSV 4, IPAP 25  Percent usage: 100%                                   Average time used: 5 hr 45 min                                   Residual AHI: 2 6 (LIYAH 1 4)    Compliance Data:  11/13/22-12/12/22                                  Type of CPAP:  AutoBIPAP, min EPAP 7, PSV 4, IPAP 25  Percent usage: 100%                                   Average time used: 5 hr 45 min                                   Residual AHI: 2 6 (LIYAH 1 4)     Compliance Data:  10/3/22 - 11/1/22                                   Type of CPAP:  autoBIPAP min EPAP 5, PSV 4, IPAP 25    EPAP avg 6 5, IPAP avg 10 1                                   Percent usage: 50%                                    Average time used: 5 hr 9 minutes                                   Residual AHI: 4 7 (LIYAH - 3 2)

## 2022-12-21 DIAGNOSIS — E11.9 TYPE 2 DIABETES MELLITUS WITHOUT COMPLICATION, WITHOUT LONG-TERM CURRENT USE OF INSULIN (HCC): ICD-10-CM

## 2023-01-03 ENCOUNTER — APPOINTMENT (OUTPATIENT)
Dept: LAB | Facility: CLINIC | Age: 63
End: 2023-01-03

## 2023-01-03 DIAGNOSIS — Z13.220 LIPID SCREENING: ICD-10-CM

## 2023-01-03 DIAGNOSIS — I10 ESSENTIAL HYPERTENSION: ICD-10-CM

## 2023-01-03 DIAGNOSIS — Z79.4 TYPE 2 DIABETES MELLITUS WITH DIABETIC POLYNEUROPATHY, WITH LONG-TERM CURRENT USE OF INSULIN (HCC): ICD-10-CM

## 2023-01-03 DIAGNOSIS — E11.42 TYPE 2 DIABETES MELLITUS WITH DIABETIC POLYNEUROPATHY, WITH LONG-TERM CURRENT USE OF INSULIN (HCC): ICD-10-CM

## 2023-01-04 LAB
ANION GAP SERPL CALCULATED.3IONS-SCNC: 6 MMOL/L (ref 4–13)
BUN SERPL-MCNC: 12 MG/DL (ref 5–25)
CALCIUM SERPL-MCNC: 9.5 MG/DL (ref 8.3–10.1)
CHLORIDE SERPL-SCNC: 105 MMOL/L (ref 96–108)
CHOLEST SERPL-MCNC: 152 MG/DL
CO2 SERPL-SCNC: 30 MMOL/L (ref 21–32)
CREAT SERPL-MCNC: 0.74 MG/DL (ref 0.6–1.3)
CREAT UR-MCNC: 30.3 MG/DL
GFR SERPL CREATININE-BSD FRML MDRD: 99 ML/MIN/1.73SQ M
GLUCOSE P FAST SERPL-MCNC: 104 MG/DL (ref 65–99)
HDLC SERPL-MCNC: 61 MG/DL
LDLC SERPL CALC-MCNC: 79 MG/DL (ref 0–100)
MICROALBUMIN UR-MCNC: 5.1 MG/L (ref 0–20)
MICROALBUMIN/CREAT 24H UR: 17 MG/G CREATININE (ref 0–30)
NONHDLC SERPL-MCNC: 91 MG/DL
POTASSIUM SERPL-SCNC: 4.7 MMOL/L (ref 3.5–5.3)
SODIUM SERPL-SCNC: 141 MMOL/L (ref 135–147)
TRIGL SERPL-MCNC: 62 MG/DL

## 2023-01-05 LAB
EST. AVERAGE GLUCOSE BLD GHB EST-MCNC: 117 MG/DL
HBA1C MFR BLD: 5.7 %

## 2023-01-05 RX ORDER — SEMAGLUTIDE 1.34 MG/ML
INJECTION, SOLUTION SUBCUTANEOUS
COMMUNITY
Start: 2023-01-01

## 2023-01-09 ENCOUNTER — OFFICE VISIT (OUTPATIENT)
Dept: ENDOCRINOLOGY | Age: 63
End: 2023-01-09

## 2023-01-09 VITALS
OXYGEN SATURATION: 98 % | BODY MASS INDEX: 36.07 KG/M2 | HEIGHT: 68 IN | DIASTOLIC BLOOD PRESSURE: 72 MMHG | WEIGHT: 238 LBS | SYSTOLIC BLOOD PRESSURE: 148 MMHG | HEART RATE: 71 BPM | TEMPERATURE: 98.3 F

## 2023-01-09 DIAGNOSIS — E66.01 CLASS 2 SEVERE OBESITY DUE TO EXCESS CALORIES WITH SERIOUS COMORBIDITY AND BODY MASS INDEX (BMI) OF 36.0 TO 36.9 IN ADULT (HCC): ICD-10-CM

## 2023-01-09 DIAGNOSIS — Z79.4 CURRENT USE OF INSULIN (HCC): ICD-10-CM

## 2023-01-09 DIAGNOSIS — Z79.4 TYPE 2 DIABETES MELLITUS WITH DIABETIC POLYNEUROPATHY, WITH LONG-TERM CURRENT USE OF INSULIN (HCC): ICD-10-CM

## 2023-01-09 DIAGNOSIS — Z87.81 HISTORY OF FEMUR FRACTURE: ICD-10-CM

## 2023-01-09 DIAGNOSIS — M21.372 LEFT FOOT DROP: ICD-10-CM

## 2023-01-09 DIAGNOSIS — E11.42 TYPE 2 DIABETES MELLITUS WITH DIABETIC POLYNEUROPATHY, WITH LONG-TERM CURRENT USE OF INSULIN (HCC): ICD-10-CM

## 2023-01-09 DIAGNOSIS — G47.33 OBSTRUCTIVE SLEEP APNEA: ICD-10-CM

## 2023-01-09 DIAGNOSIS — E11.9 TYPE 2 DIABETES MELLITUS WITHOUT COMPLICATION, WITHOUT LONG-TERM CURRENT USE OF INSULIN (HCC): ICD-10-CM

## 2023-01-09 DIAGNOSIS — E04.1 NON-TOXIC UNINODULAR GOITER: Primary | ICD-10-CM

## 2023-01-09 RX ORDER — INSULIN GLARGINE 100 [IU]/ML
15 INJECTION, SOLUTION SUBCUTANEOUS
Qty: 30 ML | Refills: 2 | Status: SHIPPED | OUTPATIENT
Start: 2023-01-09 | End: 2023-04-09
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
## 2023-01-17 DIAGNOSIS — F32.A MILD DEPRESSION: Primary | ICD-10-CM

## 2023-01-17 DIAGNOSIS — K21.9 GASTROESOPHAGEAL REFLUX DISEASE WITHOUT ESOPHAGITIS: ICD-10-CM

## 2023-01-17 DIAGNOSIS — K92.9: ICD-10-CM

## 2023-01-17 NOTE — TELEPHONE ENCOUNTER
Requested medication(s) are due for refill today: Yes  Patient has already received a courtesy refill: No  Other reason request has been forwarded to provider: Need diagnosis codes

## 2023-01-20 DIAGNOSIS — K92.9: Primary | ICD-10-CM

## 2023-01-20 DIAGNOSIS — K21.9 GASTROESOPHAGEAL REFLUX DISEASE WITHOUT ESOPHAGITIS: ICD-10-CM

## 2023-01-20 RX ORDER — LANSOPRAZOLE 30 MG/1
30 CAPSULE, DELAYED RELEASE ORAL DAILY
Qty: 90 CAPSULE | Refills: 0 | Status: SHIPPED | OUTPATIENT
Start: 2023-01-20

## 2023-01-20 RX ORDER — LANSOPRAZOLE 15 MG/1
15 CAPSULE, DELAYED RELEASE ORAL DAILY
Qty: 30 CAPSULE | Refills: 5 | Status: SHIPPED | OUTPATIENT
Start: 2023-01-20 | End: 2023-01-20

## 2023-01-20 RX ORDER — LANSOPRAZOLE 30 MG/1
30 CAPSULE, DELAYED RELEASE ORAL DAILY
Qty: 90 CAPSULE | Refills: 0 | Status: SHIPPED | OUTPATIENT
Start: 2023-01-20 | End: 2023-01-20 | Stop reason: SDUPTHER

## 2023-01-20 RX ORDER — HYOSCYAMINE SULFATE EXTENDED-RELEASE 0.38 MG/1
0.38 TABLET ORAL DAILY
Qty: 30 TABLET | Refills: 5 | Status: SHIPPED | OUTPATIENT
Start: 2023-01-20

## 2023-01-20 NOTE — TELEPHONE ENCOUNTER
Pt called states the Rx on the Prevacid should be 30mgs and not 15mgs  He is asking to send in a new Rc for Prevacaid 30mgs 90days q po daily   He was states this is the first time the office is filling his Rx

## 2023-02-06 PROBLEM — Z00.00 MEDICARE ANNUAL WELLNESS VISIT, SUBSEQUENT: Status: RESOLVED | Noted: 2022-12-08 | Resolved: 2023-02-06

## 2023-03-03 LAB
LEFT EYE DIABETIC RETINOPATHY: NORMAL
RIGHT EYE DIABETIC RETINOPATHY: NORMAL

## 2023-03-09 DIAGNOSIS — I82.431 ACUTE DEEP VEIN THROMBOSIS (DVT) OF POPLITEAL VEIN OF RIGHT LOWER EXTREMITY (HCC): ICD-10-CM

## 2023-03-09 DIAGNOSIS — D68.59 ANTITHROMBIN 3 DEFICIENCY (HCC): ICD-10-CM

## 2023-03-09 DIAGNOSIS — I82.531 CHRONIC DEEP VEIN THROMBOSIS (DVT) OF RIGHT POPLITEAL VEIN (HCC): Primary | ICD-10-CM

## 2023-03-09 NOTE — TELEPHONE ENCOUNTER
Pt called asking if provider can refill his medication for Xarelto  This medication was ordered by other provider in which he states he not seeing that provider any longer       Xarelto 20mg 1 tab daily #90    [facility]

## 2023-04-06 ENCOUNTER — APPOINTMENT (OUTPATIENT)
Dept: LAB | Facility: CLINIC | Age: 63
End: 2023-04-06

## 2023-04-06 DIAGNOSIS — E11.42 TYPE 2 DIABETES MELLITUS WITH DIABETIC POLYNEUROPATHY, WITH LONG-TERM CURRENT USE OF INSULIN (HCC): ICD-10-CM

## 2023-04-06 DIAGNOSIS — Z87.81 HISTORY OF FEMUR FRACTURE: ICD-10-CM

## 2023-04-06 DIAGNOSIS — Z79.4 TYPE 2 DIABETES MELLITUS WITH DIABETIC POLYNEUROPATHY, WITH LONG-TERM CURRENT USE OF INSULIN (HCC): ICD-10-CM

## 2023-04-06 LAB — 25(OH)D3 SERPL-MCNC: 34 NG/ML (ref 30–100)

## 2023-04-07 LAB
EST. AVERAGE GLUCOSE BLD GHB EST-MCNC: 120 MG/DL
HBA1C MFR BLD: 5.8 %

## 2023-05-09 ENCOUNTER — APPOINTMENT (OUTPATIENT)
Age: 63
End: 2023-05-09

## 2023-05-09 ENCOUNTER — OFFICE VISIT (OUTPATIENT)
Age: 63
End: 2023-05-09

## 2023-05-09 VITALS
SYSTOLIC BLOOD PRESSURE: 134 MMHG | DIASTOLIC BLOOD PRESSURE: 72 MMHG | HEART RATE: 74 BPM | HEIGHT: 68 IN | WEIGHT: 233.8 LBS | TEMPERATURE: 98.8 F | BODY MASS INDEX: 35.43 KG/M2 | OXYGEN SATURATION: 96 %

## 2023-05-09 DIAGNOSIS — Z79.4 TYPE 2 DIABETES MELLITUS WITH DIABETIC POLYNEUROPATHY, WITH LONG-TERM CURRENT USE OF INSULIN (HCC): Primary | ICD-10-CM

## 2023-05-09 DIAGNOSIS — E04.1 NON-TOXIC UNINODULAR GOITER: ICD-10-CM

## 2023-05-09 DIAGNOSIS — R10.9 PAIN IN ABDOMEN ON PALPATION: ICD-10-CM

## 2023-05-09 DIAGNOSIS — E11.9 TYPE 2 DIABETES MELLITUS WITHOUT COMPLICATION, WITHOUT LONG-TERM CURRENT USE OF INSULIN (HCC): ICD-10-CM

## 2023-05-09 DIAGNOSIS — Z79.4 CURRENT USE OF INSULIN (HCC): ICD-10-CM

## 2023-05-09 DIAGNOSIS — E11.42 TYPE 2 DIABETES MELLITUS WITH DIABETIC POLYNEUROPATHY, WITH LONG-TERM CURRENT USE OF INSULIN (HCC): Primary | ICD-10-CM

## 2023-05-09 LAB
ALBUMIN SERPL BCP-MCNC: 3.5 G/DL (ref 3.5–5)
ALP SERPL-CCNC: 64 U/L (ref 46–116)
ALT SERPL W P-5'-P-CCNC: 26 U/L (ref 12–78)
AMYLASE SERPL-CCNC: 49 IU/L (ref 25–115)
ANION GAP SERPL CALCULATED.3IONS-SCNC: 1 MMOL/L (ref 4–13)
AST SERPL W P-5'-P-CCNC: 14 U/L (ref 5–45)
BACTERIA UR QL AUTO: ABNORMAL /HPF
BILIRUB SERPL-MCNC: 0.32 MG/DL (ref 0.2–1)
BILIRUB UR QL STRIP: NEGATIVE
BUN SERPL-MCNC: 11 MG/DL (ref 5–25)
CALCIUM SERPL-MCNC: 9.2 MG/DL (ref 8.3–10.1)
CAOX CRY URNS QL MICRO: ABNORMAL /HPF
CHLORIDE SERPL-SCNC: 106 MMOL/L (ref 96–108)
CLARITY UR: ABNORMAL
CO2 SERPL-SCNC: 29 MMOL/L (ref 21–32)
COLOR UR: YELLOW
CREAT SERPL-MCNC: 0.72 MG/DL (ref 0.6–1.3)
GFR SERPL CREATININE-BSD FRML MDRD: 100 ML/MIN/1.73SQ M
GLUCOSE P FAST SERPL-MCNC: 74 MG/DL (ref 65–99)
GLUCOSE UR STRIP-MCNC: NEGATIVE MG/DL
HGB UR QL STRIP.AUTO: NEGATIVE
KETONES UR STRIP-MCNC: NEGATIVE MG/DL
LEUKOCYTE ESTERASE UR QL STRIP: NEGATIVE
LIPASE SERPL-CCNC: 50 U/L (ref 73–393)
MUCOUS THREADS UR QL AUTO: ABNORMAL
NITRITE UR QL STRIP: NEGATIVE
NON-SQ EPI CELLS URNS QL MICRO: ABNORMAL /HPF
PH UR STRIP.AUTO: 6 [PH]
POTASSIUM SERPL-SCNC: 3.9 MMOL/L (ref 3.5–5.3)
PROT SERPL-MCNC: 7.3 G/DL (ref 6.4–8.4)
PROT UR STRIP-MCNC: ABNORMAL MG/DL
RBC #/AREA URNS AUTO: ABNORMAL /HPF
SODIUM SERPL-SCNC: 136 MMOL/L (ref 135–147)
SP GR UR STRIP.AUTO: 1.03 (ref 1–1.03)
UROBILINOGEN UR STRIP-ACNC: <2 MG/DL
WBC #/AREA URNS AUTO: ABNORMAL /HPF

## 2023-05-09 RX ORDER — INSULIN GLARGINE 100 [IU]/ML
15 INJECTION, SOLUTION SUBCUTANEOUS
Qty: 13.5 ML | Refills: 0 | Status: SHIPPED | OUTPATIENT
Start: 2023-05-09 | End: 2023-08-07
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
## 2023-05-20 ENCOUNTER — HOSPITAL ENCOUNTER (OUTPATIENT)
Dept: ULTRASOUND IMAGING | Facility: HOSPITAL | Age: 63
Discharge: HOME | End: 2023-05-20

## 2023-05-20 DIAGNOSIS — R10.9 PAIN IN ABDOMEN ON PALPATION: ICD-10-CM

## 2023-06-19 DIAGNOSIS — E11.42 TYPE 2 DIABETES MELLITUS WITH DIABETIC POLYNEUROPATHY, WITH LONG-TERM CURRENT USE OF INSULIN (HCC): ICD-10-CM

## 2023-06-19 DIAGNOSIS — Z79.4 TYPE 2 DIABETES MELLITUS WITH DIABETIC POLYNEUROPATHY, WITH LONG-TERM CURRENT USE OF INSULIN (HCC): ICD-10-CM

## 2023-06-19 RX ORDER — SEMAGLUTIDE 1.34 MG/ML
INJECTION, SOLUTION SUBCUTANEOUS
Qty: 9 ML | Refills: 3 | Status: SHIPPED | OUTPATIENT
Start: 2023-06-19

## 2023-07-01 DIAGNOSIS — K92.9: ICD-10-CM

## 2023-07-01 DIAGNOSIS — F32.A MILD DEPRESSION: ICD-10-CM

## 2023-07-03 RX ORDER — HYOSCYAMINE SULFATE EXTENDED-RELEASE 0.38 MG/1
TABLET ORAL
Qty: 30 TABLET | Refills: 5 | Status: SHIPPED | OUTPATIENT
Start: 2023-07-03

## 2023-07-13 ENCOUNTER — RA CDI HCC (OUTPATIENT)
Dept: OTHER | Facility: HOSPITAL | Age: 63
End: 2023-07-13

## 2023-07-13 NOTE — PROGRESS NOTES
F33.2  [facility] coding opportunities          Chart Reviewed number of suggestions sent to Provider: 1     Patients Insurance     Medicare Insurance: [address] [place] [address]

## 2023-07-20 ENCOUNTER — OFFICE VISIT (OUTPATIENT)
Dept: FAMILY MEDICINE CLINIC | Facility: CLINIC | Age: 63
End: 2023-07-20
Payer: COMMERCIAL

## 2023-07-20 VITALS
OXYGEN SATURATION: 97 % | TEMPERATURE: 97.6 F | HEART RATE: 68 BPM | DIASTOLIC BLOOD PRESSURE: 82 MMHG | SYSTOLIC BLOOD PRESSURE: 129 MMHG

## 2023-07-20 DIAGNOSIS — F11.20 CONTINUOUS OPIOID DEPENDENCE (HCC): ICD-10-CM

## 2023-07-20 DIAGNOSIS — M46.96 INFLAMMATORY SPONDYLOPATHY OF LUMBAR REGION (HCC): ICD-10-CM

## 2023-07-20 DIAGNOSIS — Z23 NEED FOR SHINGLES VACCINE: ICD-10-CM

## 2023-07-20 DIAGNOSIS — Z87.898 HISTORY OF ABDOMINAL PAIN: ICD-10-CM

## 2023-07-20 DIAGNOSIS — F11.90 CHRONIC, CONTINUOUS USE OF OPIOIDS: ICD-10-CM

## 2023-07-20 DIAGNOSIS — Z79.4 TYPE 2 DIABETES MELLITUS WITH DIABETIC POLYNEUROPATHY, WITH LONG-TERM CURRENT USE OF INSULIN (HCC): Primary | ICD-10-CM

## 2023-07-20 DIAGNOSIS — F32.A MILD DEPRESSION: ICD-10-CM

## 2023-07-20 DIAGNOSIS — Z79.01 ANTICOAGULANT LONG-TERM USE: ICD-10-CM

## 2023-07-20 DIAGNOSIS — D68.59 ANTITHROMBIN 3 DEFICIENCY (HCC): ICD-10-CM

## 2023-07-20 DIAGNOSIS — I82.531 CHRONIC DEEP VEIN THROMBOSIS (DVT) OF RIGHT POPLITEAL VEIN (HCC): ICD-10-CM

## 2023-07-20 DIAGNOSIS — K86.89 PANCREATIC DUCT DILATED: ICD-10-CM

## 2023-07-20 DIAGNOSIS — E11.42 TYPE 2 DIABETES MELLITUS WITH DIABETIC POLYNEUROPATHY, WITH LONG-TERM CURRENT USE OF INSULIN (HCC): Primary | ICD-10-CM

## 2023-07-20 DIAGNOSIS — Z87.898 HISTORY OF BILATERAL FLANK PAIN: ICD-10-CM

## 2023-07-20 DIAGNOSIS — I89.9 LYMPH NODE DISORDER: ICD-10-CM

## 2023-07-20 DIAGNOSIS — L30.9 DERMATITIS: ICD-10-CM

## 2023-07-20 LAB — SL AMB POCT HEMOGLOBIN AIC: 6 (ref ?–6.5)

## 2023-07-20 PROCEDURE — 90750 HZV VACC RECOMBINANT IM: CPT

## 2023-07-20 PROCEDURE — 90471 IMMUNIZATION ADMIN: CPT

## 2023-07-20 PROCEDURE — 99214 OFFICE O/P EST MOD 30 MIN: CPT | Performed by: FAMILY MEDICINE

## 2023-07-20 PROCEDURE — 83036 HEMOGLOBIN GLYCOSYLATED A1C: CPT | Performed by: FAMILY MEDICINE

## 2023-07-20 NOTE — PROGRESS NOTES
Name: Terell Escoto      : 1960      MRN: [id number]  Encounter Provider: Felipe Morris DO  Encounter Date: 2023   Encounter department: [facility] AT [address] [place] [address]     1. Type 2 diabetes mellitus with diabetic polyneuropathy, with long-term current use of insulin (HCC)  -     POCT hemoglobin A1c  -     Ambulatory referral to clinical pharmacy; Future    2. Need for shingles vaccine  -     Zoster Vaccine Recombinant IM    3. Mild depression    4. Inflammatory spondylopathy of lumbar region ([address])    5. 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[address])    6. Anticoagulant long-term use    7. Chronic deep vein thrombosis (DVT) of right popliteal vein (HCC)    8. Continuous opioid dependence ([address])    9. Chronic, continuous use of opioids 2/2 post-laminectomy syndrome    10. Pancreatic duct dilated  -     CT abdomen w contrast; Future; Expected date: 2023    11. Lymph node disorder  -     CT abdomen w contrast; Future; Expected date: 2023    12. History of abdominal pain  -     CT abdomen w contrast; Future; Expected date: 2023    13. History of bilateral flank pain  -     CT abdomen w contrast; Future; Expected date: 2023    14.  Dermatitis  -     triamcinolone (KENALOG) 0.1 % cream; Apply topically 2 (two) times a day as needed for rash           Subjective      Chief Complaint   Patient presents with   • Follow-up     6 month follow up   • Rash     Rash on both arms on forearm, noticed a couple weeks ago        Scheduled f/u  States "nurse came from [facility] on Tuesday- usually they did phone visits, this time they came out- did HbA1c was 6 then, too, did PAD test where they hooked me up to like jumper cables- said normal, had to draw a clock, memory thing"  "The only thing is here - b/l forearms- "tried the poison wash and surgical scrub and didn't change at all- doesn't hurt or itch - just seen it in the mirror and looks lumpy - first noticed 3 weeks ago, unchanged over time  Pt states was having LUQ abd pain assoc with b/l mid thoracic/flank pain "for a couple of weeks" in May- his endo did US - states pain resolved completely after about 2 weeks total duration  BP was 150/90 on rooming    US abdomen completePerformed 5/20/2023  Final result  FINDINGS:  PANCREAS: The pancreatic duct is mildly prominent in the body measuring 3 mm. A 1.1 x 0.7 x 1.5 cm hypoechoic nodule is noted superior to the pancreatic head consistent with a small svetlana hepatis lymph node. AORTA AND IVC: Visualized portions are normal for patient age. LIVER:  Size: Within normal range. The liver measures 16.9 cm in the midclavicular line. Contour: Surface contour is smooth. Parenchyma: There is mild diffuse increased echogenicity with smooth echotexture, without significant beam attenuation or loss of periportal echogenicity. Most consistent with mild hepatic steatosis. No liver mass identified. Limited imaging of the main portal vein shows it to be patent and hepatopetal.  BILIARY:  There is a gallbladder polyp measuring 3 x 2 x 2 and 6 x 4 x 6 mm. It has a pedunculated ball-on-the-wall appearance. According to current consensus recommendations (SRU 2022; 000:1-12), for polyps of this size ( <= 9 mm) which have an extremely low   risk morphology, no follow-up is recommended. Reference: Management of Incidentally Detected Gallbladder Polyps: Society of Radiologists in Ultrasound Consensus Conference Recommendations. Radiology 2022; 000:1-12. https://pubs. rsna.org/doi/full/10.1148/radiol. 720445  No intrahepatic biliary dilatation. CBD measures 7 mm. No choledocholithiasis. KIDNEY:  Right kidney measures 12.2 x 5.9 x 5.6 cm. Volume 207.4 mL  Kidney within normal limits. Left kidney measures 12.3 x 5.6 x 5.2 cm. Volume 188.2 mL  Multiple cortical cysts identified, largest as follows: 2.2 x 2.1 x 1.9 cm  Left extrarenal pelvis is noted. Bladder: Bilateral ureteral jets are seen.   Prostate is mildly enlarged measuring 4.8 x 4.3 x 4.8 cm (51.46 mL). SPLEEN:  Measures 10.8 x 6.0 x 10.6 cm. Volume 358.8 mL  Within normal limits. ASCITES: None. IMPRESSION:  Mild hepatic steatosis. Multiple gallbladder polyps requiring no additional follow-up. Renal ultrasound demonstrating no significant abnormality. Mild prostamegaly.       Review of Systems    Current Outpatient Medications on File Prior to Visit   Medication Sig   • amLODIPine-benazepril (LOTREL) 10-40 MG per capsule TAKE 1 CAPSULE DAILY   • Ascorbic Acid (vitamin C) 1000 MG tablet Take 1,000 mg by mouth daily   • Calcium Carb-Cholecalciferol (CALCIUM 500+D PO) Take 1,600 mg by mouth daily   • fentaNYL (DURAGESIC) 100 mcg/hr TD 72 hr patch Place 1 patch on the skin every other day Change every 48 hours   • gabapentin (NEURONTIN) 800 mg tablet Take 1 tablet by mouth 3 (three) times a day   • glucose blood (ONE TOUCH ULTRA TEST) test strip Use to test with bf and dinner   • hyoscyamine (LEVBID) 0.375 mg 12 hr tablet TAKE ONE TABLET BY MOUTH EVERY DAY   • Insulin Glargine Solostar (Basaglar KwikPen) 100 UNIT/ML SOPN Inject 0.15 mL (15 Units total) under the skin daily at bedtime   • Insulin Pen Needle (Unifine Pentips Plus) 32G X 4 MM MISC USE ONCE DAILY WITH BASAGLAR PEN   • lansoprazole (PREVACID) 30 mg capsule Take 1 capsule (30 mg total) by mouth daily   • Melatonin 5 MG TABS Take 9 mg by mouth daily at bedtime   • metFORMIN (GLUCOPHAGE) 1000 MG tablet TAKE 1 TABLET TWICE A DAY WITH MEALS   • metoprolol succinate (TOPROL-XL) 25 mg 24 hr tablet TAKE 1 TABLET DAILY   • multivitamin (THERAGRAN) TABS Take 1 tablet by mouth daily   • OneTouch Delica Lancets 40U MISC by Other route daily As directed   • oxyCODONE (ROXICODONE) 30 MG immediate release tablet Take 20 mg by mouth 3 (three) times a day   • Ozempic, 1 MG/DOSE, 4 MG/3ML injection pen INJECT 1 MG UNDER THE SKIN ONCE A WEEK   • Red Yeast Rice 600 MG CAPS Take 600 mg by mouth 2 (two) times a day    • rivaroxaban (Xarelto) 20 mg tablet Take 1 tablet (20 mg total) by mouth daily with breakfast   • sertraline (ZOLOFT) 50 mg tablet TAKE 1 AND 1/2 TABLETS BY MOUTH DAILY   • terazosin (HYTRIN) 5 mg capsule TAKE 1 CAPSULE DAILY   • TiZANidine (ZANAFLEX) 4 MG capsule Take 1 capsule (4 mg total) by mouth 3 (three) times a day (Patient taking differently: Take 4 mg by mouth 3 (three) times a day Taking 2 tablets daily)       Objective     /82 (BP Location: Right arm, Patient Position: Sitting, Cuff Size: Standard)   Pulse 68   Temp 97.6 °F (36.4 °C) (Tympanic)   SpO2 97%     Physical Exam  Angel Johnson, DO

## 2023-07-21 ENCOUNTER — TELEPHONE (OUTPATIENT)
Dept: FAMILY MEDICINE CLINIC | Facility: CLINIC | Age: 63
End: 2023-07-21

## 2023-07-21 DIAGNOSIS — Z79.4 TYPE 2 DIABETES MELLITUS WITH DIABETIC POLYNEUROPATHY, WITH LONG-TERM CURRENT USE OF INSULIN (HCC): Primary | ICD-10-CM

## 2023-07-21 DIAGNOSIS — E11.42 TYPE 2 DIABETES MELLITUS WITH DIABETIC POLYNEUROPATHY, WITH LONG-TERM CURRENT USE OF INSULIN (HCC): Primary | ICD-10-CM

## 2023-07-21 NOTE — TELEPHONE ENCOUNTER
[address] Paul Roper, Pharmacist    Recommendation: No further outreach warranted. Reason for Documentation: Patient has been identified as having a history of Type 2 Diabetes, aged 37-78 years without ASCVD with LDL- C  mg/dL, and not currently being on statin therapy. The ADA Standards of Care recommend moderate-intensity statin therapy in patients with diabetes, or high-intensity statin therapy in those at higher risk (with 10-year ASCVD risk of 20% or greater). The 10-year ASCVD risk score (Stephon AGUERO, et al., 2019) is: 15.5%    Values used to calculate the score:      Age: 59 years      Sex: Male      Is Non- : No      Diabetic: Yes      Tobacco smoker: No      Systolic Blood Pressure: 761 mmHg      Is BP treated: Yes      HDL Cholesterol: 61 mg/dL      Total Cholesterol: 152 mg/dL    Previous statin trial: atorvastatin    Lab Results   Component Value Date    LDLCALC 79 01/03/2023    LDLCALC 84 02/23/2022    [address] 91 02/08/2021     No results found for: "LDLDIRECT"    Lab Results   Component Value Date    ALT 26 05/09/2023    AST 14 05/09/2023    ALKPHOS 64 05/09/2023    BILITOT 0.30 12/14/2015     Patient has consistently declined statin therapy. He is currently taking red yeast rice as an alternative.     Pharmacist Tracking Tool  Reason For Outreach: Population Health Pharmacist  Demographics:  Intervention Method: Chart Review  Type of Intervention: New  Topics Addressed: Diabetes and Quality measures  Pharmacologic Interventions: Med Rec  Non-Pharmacologic Interventions: Care Gap  Time:  Direct Patient Care: 0 mins  Care Coordination: 15 mins  Recommendation Recipient: N/A  Outcome: N/A

## 2023-07-24 RX ORDER — TRIAMCINOLONE ACETONIDE 1 MG/G
CREAM TOPICAL 2 TIMES DAILY PRN
Qty: 30 G | Refills: 0 | Status: SHIPPED | OUTPATIENT
Start: 2023-07-24

## 2023-08-01 ENCOUNTER — HOSPITAL ENCOUNTER (OUTPATIENT)
Dept: CT IMAGING | Facility: HOSPITAL | Age: 63
Discharge: HOME | End: 2023-08-01
Payer: COMMERCIAL

## 2023-08-01 DIAGNOSIS — I89.9 LYMPH NODE DISORDER: ICD-10-CM

## 2023-08-01 DIAGNOSIS — Z87.898 HISTORY OF ABDOMINAL PAIN: ICD-10-CM

## 2023-08-01 DIAGNOSIS — K86.89 PANCREATIC DUCT DILATED: ICD-10-CM

## 2023-08-01 DIAGNOSIS — Z87.898 HISTORY OF BILATERAL FLANK PAIN: ICD-10-CM

## 2023-08-01 PROCEDURE — G1004 CDSM NDSC: HCPCS

## 2023-08-01 PROCEDURE — 74160 CT ABDOMEN W/CONTRAST: CPT

## 2023-08-01 RX ADMIN — IOHEXOL 100 ML: 350 INJECTION, SOLUTION INTRAVENOUS at 14:00

## 2023-08-10 ENCOUNTER — TELEPHONE (OUTPATIENT)
Dept: PULMONOLOGY | Facility: CLINIC | Age: 63
End: 2023-08-10

## 2023-08-10 DIAGNOSIS — G47.33 OBSTRUCTIVE SLEEP APNEA: Primary | ICD-10-CM

## 2023-08-10 NOTE — TELEPHONE ENCOUNTER
Pt LVM requesting assistance with lowering his pressure on his bipap machine. Pt states it is causing him to have an ear ache.  Please advise

## 2023-08-10 NOTE — TELEPHONE ENCOUNTER
I called and spoke to Jes Lebron to see what is going on with his Bipap. He did explain that he feels the pressure is too high. Unfortunately, His machine is not one that calls in his compliance. He has to take his SD card into [facility] office so they can download a compliance and so they can evaluate his machine. Mr. Arti Rodriguez voiced understanding and will be taking his machine in today. Compliance will be faxed to me and I will have one of the doctors review it to see if the pressure needs to be adjusted and get back to him.

## 2023-08-10 NOTE — TELEPHONE ENCOUNTER
Patient came in the office with his Compliance. I had Dr. Nithya Cordero review it and he put in a pressure change order. Patient took a copy of the order so he can present it to [address] today on his way home so they can change his pressure.

## 2023-08-13 PROBLEM — I70.0 ABDOMINAL AORTIC ATHEROSCLEROSIS (HCC): Status: ACTIVE | Noted: 2023-08-13

## 2023-08-13 PROBLEM — K86.89 PANCREATIC DUCT DILATED: Status: RESOLVED | Noted: 2023-07-20 | Resolved: 2023-08-13

## 2023-08-16 ENCOUNTER — TELEPHONE (OUTPATIENT)
Dept: FAMILY MEDICINE CLINIC | Facility: CLINIC | Age: 63
End: 2023-08-16

## 2023-08-16 NOTE — TELEPHONE ENCOUNTER
Pt's spouse called into the office this morning to cancel an appt. Yesenia Haile called in stating that the pt is scheduled with our office for his next shingles vaccine but due to the cost of the vaccine she stated that he will be going to a pharmacy to have it done. I informed her that the next set for the vaccine is 6 months from the date of the last vaccine in which she told me was July 20th. Pt was informed to keep original appt to follow up with the doctor.

## 2023-09-07 DIAGNOSIS — D68.59 ANTITHROMBIN 3 DEFICIENCY (HCC): ICD-10-CM

## 2023-09-07 DIAGNOSIS — I82.531 CHRONIC DEEP VEIN THROMBOSIS (DVT) OF RIGHT POPLITEAL VEIN (HCC): ICD-10-CM

## 2023-09-09 DIAGNOSIS — D68.59 ANTITHROMBIN 3 DEFICIENCY (HCC): ICD-10-CM

## 2023-09-09 DIAGNOSIS — E11.42 TYPE 2 DIABETES MELLITUS WITH DIABETIC POLYNEUROPATHY, WITH LONG-TERM CURRENT USE OF INSULIN (HCC): Primary | ICD-10-CM

## 2023-09-09 DIAGNOSIS — I10 BENIGN ESSENTIAL HYPERTENSION: ICD-10-CM

## 2023-09-09 DIAGNOSIS — E04.1 NON-TOXIC UNINODULAR GOITER: ICD-10-CM

## 2023-09-09 DIAGNOSIS — Z79.01 ANTICOAGULANT LONG-TERM USE: ICD-10-CM

## 2023-09-09 DIAGNOSIS — Z13.220 LIPID SCREENING: ICD-10-CM

## 2023-09-09 DIAGNOSIS — Z79.4 TYPE 2 DIABETES MELLITUS WITH DIABETIC POLYNEUROPATHY, WITH LONG-TERM CURRENT USE OF INSULIN (HCC): Primary | ICD-10-CM

## 2023-09-09 RX ORDER — RIVAROXABAN 20 MG/1
20 TABLET, FILM COATED ORAL
Qty: 90 TABLET | Refills: 1 | Status: SHIPPED | OUTPATIENT
Start: 2023-09-09

## 2023-09-18 DIAGNOSIS — E11.9 TYPE 2 DIABETES MELLITUS WITHOUT COMPLICATION, WITHOUT LONG-TERM CURRENT USE OF INSULIN (HCC): ICD-10-CM

## 2023-09-18 RX ORDER — ACETAMINOPHEN, DEXTROMETHORPHAN HBR, DOXYLAMINE SUCCINATE 650; 30; 12.5 MG/30ML; MG/30ML; MG/30ML
LIQUID ORAL
Qty: 100 EACH | Refills: 3 | Status: SHIPPED | OUTPATIENT
Start: 2023-09-18

## 2023-09-19 ENCOUNTER — OFFICE VISIT (OUTPATIENT)
Dept: ENDOCRINOLOGY | Facility: CLINIC | Age: 63
End: 2023-09-19
Payer: COMMERCIAL

## 2023-09-19 VITALS
WEIGHT: 232.25 LBS | BODY MASS INDEX: 35.2 KG/M2 | HEIGHT: 68 IN | HEART RATE: 69 BPM | OXYGEN SATURATION: 97 % | RESPIRATION RATE: 18 BRPM | SYSTOLIC BLOOD PRESSURE: 130 MMHG | DIASTOLIC BLOOD PRESSURE: 70 MMHG | TEMPERATURE: 97.8 F

## 2023-09-19 DIAGNOSIS — E55.9 VITAMIN D DEFICIENCY: ICD-10-CM

## 2023-09-19 DIAGNOSIS — Z79.4 TYPE 2 DIABETES MELLITUS WITHOUT COMPLICATION, WITH LONG-TERM CURRENT USE OF INSULIN (HCC): Primary | ICD-10-CM

## 2023-09-19 DIAGNOSIS — E11.9 TYPE 2 DIABETES MELLITUS WITHOUT COMPLICATION, WITH LONG-TERM CURRENT USE OF INSULIN (HCC): Primary | ICD-10-CM

## 2023-09-19 DIAGNOSIS — Z79.4 CURRENT USE OF INSULIN (HCC): ICD-10-CM

## 2023-09-19 DIAGNOSIS — E78.2 MIXED HYPERLIPIDEMIA: ICD-10-CM

## 2023-09-19 DIAGNOSIS — E04.2 MULTIPLE THYROID NODULES: ICD-10-CM

## 2023-09-19 DIAGNOSIS — I10 ESSENTIAL HYPERTENSION: ICD-10-CM

## 2023-09-19 LAB — SL AMB POCT HEMOGLOBIN AIC: 5.9 (ref ?–6.5)

## 2023-09-19 PROCEDURE — 99214 OFFICE O/P EST MOD 30 MIN: CPT | Performed by: STUDENT IN AN ORGANIZED HEALTH CARE EDUCATION/TRAINING PROGRAM

## 2023-09-19 PROCEDURE — 83036 HEMOGLOBIN GLYCOSYLATED A1C: CPT | Performed by: STUDENT IN AN ORGANIZED HEALTH CARE EDUCATION/TRAINING PROGRAM

## 2023-09-19 RX ORDER — INSULIN GLARGINE 100 [IU]/ML
15 INJECTION, SOLUTION SUBCUTANEOUS
Qty: 15 ML | Refills: 0 | Status: SHIPPED | OUTPATIENT
Start: 2023-09-19 | End: 2023-12-18
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
## 2023-09-21 DIAGNOSIS — K21.9 GASTROESOPHAGEAL REFLUX DISEASE WITHOUT ESOPHAGITIS: ICD-10-CM

## 2023-09-21 RX ORDER — LANSOPRAZOLE 30 MG/1
30 CAPSULE, DELAYED RELEASE ORAL DAILY
Qty: 90 CAPSULE | Refills: 1 | Status: SHIPPED | OUTPATIENT
Start: 2023-09-21

## 2023-11-06 ENCOUNTER — OFFICE VISIT (OUTPATIENT)
Age: 63
End: 2023-11-06
Payer: COMMERCIAL

## 2023-11-06 VITALS
WEIGHT: 234 LBS | DIASTOLIC BLOOD PRESSURE: 72 MMHG | RESPIRATION RATE: 18 BRPM | BODY MASS INDEX: 35.46 KG/M2 | HEIGHT: 68 IN | HEART RATE: 67 BPM | SYSTOLIC BLOOD PRESSURE: 132 MMHG | TEMPERATURE: 98.8 F | OXYGEN SATURATION: 97 %

## 2023-11-06 DIAGNOSIS — G47.33 OBSTRUCTIVE SLEEP APNEA: Primary | ICD-10-CM

## 2023-11-06 DIAGNOSIS — F11.90 CHRONIC, CONTINUOUS USE OF OPIOIDS: ICD-10-CM

## 2023-11-06 DIAGNOSIS — G47.00 INSOMNIA, UNSPECIFIED TYPE: ICD-10-CM

## 2023-11-06 PROCEDURE — 99214 OFFICE O/P EST MOD 30 MIN: CPT

## 2023-11-06 NOTE — ASSESSMENT & PLAN NOTE
-Mild AALYIAH with AHI 9.9 on prior home sleep study  -On Auto BiPAP, pressures changed 3 months ago from EPAP7, PSV 4, IPAP 25 to EPAP 4, IPAP 12, and PSV 4 due to ear pain  -No longer experiencing ear pain on new pressures, but now feels like he's not getting enough  -Compliance report from 10/7-11/5 reviewed. Showed 100% compliance with average nightly use of 5 hours 21 minutes. Residual AHI 6.3, LIYAH 4.1. Average leakage 17.6 L/min. IPAP 12, EPAP 4, PSV 4  -Due to increasing AHI and patient feeling like he's not getting enough pressure, will increase BiPAP to IPAP 16, EPAP 4, and PSV 4  -Check compliance data again in 3 months.

## 2023-11-06 NOTE — ASSESSMENT & PLAN NOTE
-Chronic opiate and muscle relaxant use likely contributing to chronic hypercapnia  -Continue wearing BiPAP nightly
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
## 2023-11-10 LAB
DME PARACHUTE DELIVERY DATE ACTUAL: NORMAL
DME PARACHUTE DELIVERY DATE REQUESTED: NORMAL
DME PARACHUTE ITEM DESCRIPTION: NORMAL
DME PARACHUTE ORDER STATUS: NORMAL
DME PARACHUTE SUPPLIER NAME: NORMAL
DME PARACHUTE SUPPLIER PHONE: NORMAL

## 2023-12-04 DIAGNOSIS — I10 BENIGN ESSENTIAL HYPERTENSION: ICD-10-CM

## 2023-12-04 DIAGNOSIS — N40.0 BENIGN PROSTATIC HYPERPLASIA WITHOUT LOWER URINARY TRACT SYMPTOMS: ICD-10-CM

## 2023-12-04 RX ORDER — TERAZOSIN 5 MG/1
CAPSULE ORAL
Qty: 90 CAPSULE | Refills: 3 | Status: SHIPPED | OUTPATIENT
Start: 2023-12-04

## 2023-12-04 RX ORDER — METOPROLOL SUCCINATE 25 MG/1
TABLET, EXTENDED RELEASE ORAL
Qty: 90 TABLET | Refills: 3 | Status: SHIPPED | OUTPATIENT
Start: 2023-12-04

## 2023-12-07 RX ORDER — AMLODIPINE AND BENAZEPRIL HYDROCHLORIDE 10; 40 MG/1; MG/1
CAPSULE ORAL
Qty: 90 CAPSULE | Refills: 0 | Status: SHIPPED | OUTPATIENT
Start: 2023-12-07

## 2023-12-12 ENCOUNTER — APPOINTMENT (OUTPATIENT)
Dept: LAB | Facility: CLINIC | Age: 63
End: 2023-12-12
Payer: COMMERCIAL

## 2023-12-12 DIAGNOSIS — E78.2 MIXED HYPERLIPIDEMIA: ICD-10-CM

## 2023-12-12 DIAGNOSIS — E55.9 VITAMIN D DEFICIENCY: ICD-10-CM

## 2023-12-12 DIAGNOSIS — Z13.220 LIPID SCREENING: ICD-10-CM

## 2023-12-12 DIAGNOSIS — D68.59 ANTITHROMBIN 3 DEFICIENCY (HCC): ICD-10-CM

## 2023-12-12 DIAGNOSIS — Z79.4 TYPE 2 DIABETES MELLITUS WITH DIABETIC POLYNEUROPATHY, WITH LONG-TERM CURRENT USE OF INSULIN (HCC): ICD-10-CM

## 2023-12-12 DIAGNOSIS — Z79.01 ANTICOAGULANT LONG-TERM USE: ICD-10-CM

## 2023-12-12 DIAGNOSIS — I10 ESSENTIAL HYPERTENSION: ICD-10-CM

## 2023-12-12 DIAGNOSIS — Z79.4 TYPE 2 DIABETES MELLITUS WITHOUT COMPLICATION, WITH LONG-TERM CURRENT USE OF INSULIN (HCC): ICD-10-CM

## 2023-12-12 DIAGNOSIS — I10 BENIGN ESSENTIAL HYPERTENSION: ICD-10-CM

## 2023-12-12 DIAGNOSIS — E11.9 TYPE 2 DIABETES MELLITUS WITHOUT COMPLICATION, WITH LONG-TERM CURRENT USE OF INSULIN (HCC): ICD-10-CM

## 2023-12-12 DIAGNOSIS — E11.42 TYPE 2 DIABETES MELLITUS WITH DIABETIC POLYNEUROPATHY, WITH LONG-TERM CURRENT USE OF INSULIN (HCC): ICD-10-CM

## 2023-12-12 DIAGNOSIS — E04.1 NON-TOXIC UNINODULAR GOITER: ICD-10-CM

## 2023-12-12 LAB
25(OH)D3 SERPL-MCNC: 85.4 NG/ML (ref 30–100)
ALBUMIN SERPL BCP-MCNC: 4.1 G/DL (ref 3.5–5)
ALP SERPL-CCNC: 61 U/L (ref 34–104)
ALT SERPL W P-5'-P-CCNC: 13 U/L (ref 7–52)
ANION GAP SERPL CALCULATED.3IONS-SCNC: 7 MMOL/L
AST SERPL W P-5'-P-CCNC: 17 U/L (ref 13–39)
BASOPHILS # BLD AUTO: 0.02 THOUSANDS/ÂΜL (ref 0–0.1)
BASOPHILS NFR BLD AUTO: 0 % (ref 0–1)
BILIRUB SERPL-MCNC: 0.27 MG/DL (ref 0.2–1)
BUN SERPL-MCNC: 13 MG/DL (ref 5–25)
CALCIUM SERPL-MCNC: 11 MG/DL (ref 8.4–10.2)
CHLORIDE SERPL-SCNC: 101 MMOL/L (ref 96–108)
CHOLEST SERPL-MCNC: 138 MG/DL
CO2 SERPL-SCNC: 31 MMOL/L (ref 21–32)
CREAT SERPL-MCNC: 0.68 MG/DL (ref 0.6–1.3)
CREAT UR-MCNC: 88.2 MG/DL
EOSINOPHIL # BLD AUTO: 0.15 THOUSAND/ÂΜL (ref 0–0.61)
EOSINOPHIL NFR BLD AUTO: 2 % (ref 0–6)
ERYTHROCYTE [DISTWIDTH] IN BLOOD BY AUTOMATED COUNT: 13.5 % (ref 11.6–15.1)
EST. AVERAGE GLUCOSE BLD GHB EST-MCNC: 134 MG/DL
GFR SERPL CREATININE-BSD FRML MDRD: 102 ML/MIN/1.73SQ M
GLUCOSE P FAST SERPL-MCNC: 94 MG/DL (ref 65–99)
HBA1C MFR BLD: 6.3 %
HCT VFR BLD AUTO: 36.6 % (ref 36.5–49.3)
HDLC SERPL-MCNC: 51 MG/DL
HGB BLD-MCNC: 12.1 G/DL (ref 12–17)
IMM GRANULOCYTES # BLD AUTO: 0.03 THOUSAND/UL (ref 0–0.2)
IMM GRANULOCYTES NFR BLD AUTO: 0 % (ref 0–2)
LDLC SERPL CALC-MCNC: 74 MG/DL (ref 0–100)
LYMPHOCYTES # BLD AUTO: 2.09 THOUSANDS/ÂΜL (ref 0.6–4.47)
LYMPHOCYTES NFR BLD AUTO: 26 % (ref 14–44)
MCH RBC QN AUTO: 26.3 PG (ref 26.8–34.3)
MCHC RBC AUTO-ENTMCNC: 33.1 G/DL (ref 31.4–37.4)
MCV RBC AUTO: 80 FL (ref 82–98)
MICROALBUMIN UR-MCNC: 11.7 MG/L
MICROALBUMIN/CREAT 24H UR: 13 MG/G CREATININE (ref 0–30)
MONOCYTES # BLD AUTO: 0.74 THOUSAND/ÂΜL (ref 0.17–1.22)
MONOCYTES NFR BLD AUTO: 9 % (ref 4–12)
NEUTROPHILS # BLD AUTO: 5.01 THOUSANDS/ÂΜL (ref 1.85–7.62)
NEUTS SEG NFR BLD AUTO: 63 % (ref 43–75)
NRBC BLD AUTO-RTO: 0 /100 WBCS
PLATELET # BLD AUTO: 301 THOUSANDS/UL (ref 149–390)
PMV BLD AUTO: 9.3 FL (ref 8.9–12.7)
POTASSIUM SERPL-SCNC: 3.9 MMOL/L (ref 3.5–5.3)
PROT SERPL-MCNC: 6.8 G/DL (ref 6.4–8.4)
RBC # BLD AUTO: 4.6 MILLION/UL (ref 3.88–5.62)
SODIUM SERPL-SCNC: 139 MMOL/L (ref 135–147)
TRIGL SERPL-MCNC: 65 MG/DL
TSH SERPL DL<=0.05 MIU/L-ACNC: 0.95 UIU/ML (ref 0.45–4.5)
WBC # BLD AUTO: 8.04 THOUSAND/UL (ref 4.31–10.16)

## 2023-12-12 PROCEDURE — 82306 VITAMIN D 25 HYDROXY: CPT

## 2023-12-12 PROCEDURE — 80053 COMPREHEN METABOLIC PANEL: CPT

## 2023-12-12 PROCEDURE — 84443 ASSAY THYROID STIM HORMONE: CPT

## 2023-12-12 PROCEDURE — 80061 LIPID PANEL: CPT

## 2023-12-12 PROCEDURE — 83036 HEMOGLOBIN GLYCOSYLATED A1C: CPT

## 2023-12-12 PROCEDURE — 82570 ASSAY OF URINE CREATININE: CPT

## 2023-12-12 PROCEDURE — 85025 COMPLETE CBC W/AUTO DIFF WBC: CPT

## 2023-12-12 PROCEDURE — 36415 COLL VENOUS BLD VENIPUNCTURE: CPT

## 2023-12-12 PROCEDURE — 82043 UR ALBUMIN QUANTITATIVE: CPT

## 2023-12-14 ENCOUNTER — TELEPHONE (OUTPATIENT)
Dept: FAMILY MEDICINE CLINIC | Facility: CLINIC | Age: 63
End: 2023-12-14

## 2023-12-14 NOTE — TELEPHONE ENCOUNTER
Ok- note in chart      All Conversations: Jury summons  (Newest Message First)  December 14, 2023  Susana Rudd   to Me   KL    12/14/23  4:11 PM  Please advise.  Lee Patten   to KEILY [facility] (supporting You)         12/14/23  4:01 PM  Dr. Pickens, I am contacting you asking you to write me a excuse from "[employer]y [employer], as there is no way I could do this especially with my back pain and other problems I will pick it up when it is finished ,Micha you Damon patten

## 2023-12-15 DIAGNOSIS — E11.9 TYPE 2 DIABETES MELLITUS WITHOUT COMPLICATION, WITHOUT LONG-TERM CURRENT USE OF INSULIN (HCC): ICD-10-CM

## 2023-12-17 DIAGNOSIS — K92.9: ICD-10-CM

## 2023-12-17 DIAGNOSIS — F32.A MILD DEPRESSION: ICD-10-CM

## 2023-12-18 RX ORDER — HYOSCYAMINE SULFATE EXTENDED-RELEASE 0.38 MG/1
TABLET ORAL
Qty: 30 TABLET | Refills: 5 | Status: SHIPPED | OUTPATIENT
Start: 2023-12-18

## 2024-01-02 ENCOUNTER — OFFICE VISIT (OUTPATIENT)
Dept: PULMONOLOGY | Facility: CLINIC | Age: 64
End: 2024-01-02
Payer: COMMERCIAL

## 2024-01-02 VITALS
TEMPERATURE: 98.6 F | HEART RATE: 72 BPM | WEIGHT: 235 LBS | DIASTOLIC BLOOD PRESSURE: 78 MMHG | SYSTOLIC BLOOD PRESSURE: 148 MMHG | BODY MASS INDEX: 35.73 KG/M2 | OXYGEN SATURATION: 99 %

## 2024-01-02 DIAGNOSIS — G47.33 OBSTRUCTIVE SLEEP APNEA: Primary | ICD-10-CM

## 2024-01-02 DIAGNOSIS — F11.90 CHRONIC, CONTINUOUS USE OF OPIOIDS: ICD-10-CM

## 2024-01-02 DIAGNOSIS — Z99.89 CPAP (CONTINUOUS POSITIVE AIRWAY PRESSURE) DEPENDENCE: ICD-10-CM

## 2024-01-02 PROCEDURE — 99214 OFFICE O/P EST MOD 30 MIN: CPT | Performed by: INTERNAL MEDICINE

## 2024-01-02 NOTE — LETTER
January 3, 2024     Heena Pickens DO  [address] [facility] [address]  [facility] [address]    Patient: Damon Patten   YOB: 1960   Date of Visit: 1/2/2024       Dear Dr. Pickens:    Thank you for referring Damon Patten to me for evaluation. Below are my notes for this consultation.    If you have questions, please do not hesitate to call me. I look forward to following your patient along with you.         Sincerely,        Minesh Miles DO        CC: No Recipients    Minesh Miles DO  1/3/2024 12:13 PM  Sign when Signing Visit  Progress Note - Sleep Medicine  Damon Patten 63 y.o. male MRN: [id number]       Impression & Plan:   63 y.o. M with PMHx of DM, HTN, Depression, history of DVT on Xarelto, antithrombin 3 deficiency, chronic insomnia, chronic pain on oxycodone and AALIYAH on BIPAP who comes in for management of AALIYAH and insomnia.  1.  Mild AALIYAH (AHI - 9.9) vs. Complex apnea.  On autoBIPAP with excellent compliance and good clinical response.  Residual AHI - 2.8, LIYAH 1.8.  He denies any significant discomfort and feels his sleep is refreshing.        -  Conitnue autoBIPAP with min EPAP 4, PSV of 4, IPAP 16.        -  The change in mask has seemed to help the residual AHI and likely reduction in opiates as well.        -  Renew supplies today.         -  I also discussed in depth the risk of leaving sleep apnea untreated including hypertension, heart failure, arrhythmia, MI and stroke.     2.  Chronic hypercapnia related to AALIYAH, mild gas trapping, chronic opiate use.  In addition he may have some restrictive lung disease due to obesity as well.  This has seemed to improve overall.      Some residual central apnea noted       -  He is currently on Ozempic and has been losing weight.  He has noted clinical improvement with that.      -  He is still on oxycodone for pain but has reduced the dose. This too has likely led to a reduction in AHI.        -  He stopedLunesta and reducing  opiates and Zanaflex.  His mental status has significant improved and he is feeling much better overall.       3.  Chronic insomnia which is more related to sleep maintenance - he is currently using melatonin 10 mg and doing well with that.  He is successfully off Lunesta at this time.  We did discuss the use of doxepin at low dose as well.  He will consider it but will hold off for now.        4.  Chronic pain - he is still on opiates and Zanaflex and is slowly attempting to wean it down.    5.  Nocturia - he is currently on Terazosin.  He will follow with urology.    ______________________________________________________________________    HPI:    Damon Patten presents today for follow-up for his AALIYAH, chronic hypercapnia and insomnia.  He notes significant improvement in his daytime sleepiness.  He also notes chronic hypercapnia and chronic back pain.  However, with reduction of pain medication, BZRA cessation, there seems to be some benefit to his alertness and mental state.  With the change in mask has has also ntoed some improvement in daytime sleepiness.        Answers submitted by the patient for this visit:  Pulmonology Questionnaire (Submitted on 12/26/2023)  Chief Complaint: Primary symptoms  Do you have shortness of breath that occurs with effort or exertion?: No  Do you have ear congestion?: Yes  Do you have heartburn?: No  Do you have fatigue?: No  Do you have nasal congestion?: No  Do you have shortness of breath when lying flat?: No  Do you have shortness of breath when you wake up?: No  Do you have sweats?: No  Have you experienced weight loss?: No  Which of the following makes your symptoms better?: nothing      Review of Systems:  Review of Systems   Constitutional:  Negative for appetite change and fever.   HENT:  Positive for ear pain and postnasal drip. Negative for rhinorrhea, sneezing, sore throat and trouble swallowing.    Eyes: Negative.    Cardiovascular:  Negative for chest pain.    Gastrointestinal: Negative.    Musculoskeletal:  Positive for back pain. Negative for myalgias.   Neurological:  Negative for headaches.   Psychiatric/Behavioral:  Negative for behavioral problems and sleep disturbance.          Social history updates:  Social History     Tobacco Use   Smoking Status Former   • Current packs/day: 0.00   • Average packs/day: 1.5 packs/day for 13.0 years (19.5 ttl pk-yrs)   • Types: Cigarettes   • Start date: 1979   • Quit date: 1992   • Years since quittin.0   Smokeless Tobacco Never   Tobacco Comments    Glad I quit     Social History     Socioeconomic History   • Marital status: /Civil Union     Spouse name: Not on file   • Number of children: Not on file   • Years of education: 12; has high school diploma   • Highest education level: Not on file   Occupational History   • Occupation: retired /[employer]   Tobacco Use   • Smoking status: Former     Current packs/day: 0.00     Average packs/day: 1.5 packs/day for 13.0 years (19.5 ttl pk-yrs)     Types: Cigarettes     Start date: 1979     Quit date: 1992     Years since quittin.0   • Smokeless tobacco: Never   • Tobacco comments:     Glad I quit   Vaping Use   • Vaping status: Never Used   Substance and Sexual Activity   • Alcohol use: No   • Drug use: No   • Sexual activity: Not Currently     Partners: Female     Birth control/protection: None     Comment: Very seldom have sex do to back and pain in legs   Other Topics Concern   • Not on file   Social History Narrative    2 living siblings, also 4 step-siblings    Activities: participates in activities inside of the home, light.    Living independently with spouse    Retired [employer] and      Social Determinants of Health     Financial Resource Strain: Low Risk  (2022)    Overall Financial Resource Strain (CARDIA)    • Difficulty of Paying Living Expenses: Not very hard   Recent Concern: Financial Resource Strain - Medium Risk  (12/1/2022)    Overall Financial Resource Strain (CARDIA)    • Difficulty of Paying Living Expenses: Somewhat hard   Food Insecurity: Not on file   Transportation Needs: No Transportation Needs (12/1/2022)    PRAPARE - Transportation    • Lack of Transportation (Medical): No    • Lack of Transportation (Non-Medical): No   Physical Activity: Not on file   Stress: Not on file   Social Connections: Not on file   Intimate Partner Violence: Not on file   Housing Stability: Not on file       PhysicalExamination:  Vitals:   /78 (BP Location: Left arm, Patient Position: Sitting, Cuff Size: Large)   Pulse 72   Temp 98.6 °F (37 °C) (Tympanic)   Wt 107 kg (235 lb)   SpO2 99%   BMI 35.73 kg/m²   General: Pleasant, Awake alert and oriented x 3, conversant without conversational dyspnea, NAD, normal affect  HEENT:  PERRL, Sclera noninjected, nonicteric OU, Nares patent,  no craniofacial abnormalities, Mucous membranes, moist, no oral lesions, normal dentition, Mallampati class 4  NECK: Trachea midline, no accessory muscle use, no stridor, no cervical or supraclavicular adenopathy, JVP not elevated  CARDIAC: Reg, single s1/S2, no m/r/g  PULM: CTA bilaterally no wheezing, rhonchi or rales  ABD: Normoactive bowel sounds, soft nontender, nondistended, no rebound, no rigidity, no guarding  EXT: No cyanosis, no clubbing, no edema, normal capillary refill  NEURO: no focal neurologic deficits, AAOx3, moving all extremities appropriately      Diagnostic Data:  Labs:  I personally reviewed the most recent laboratory data pertinent to today's visit  not applicable    I have personally reviewed pertinent lab results.  Lab Results   Component Value Date    WBC 8.04 12/12/2023    HGB 12.1 12/12/2023    HCT 36.6 12/12/2023    MCV 80 (L) 12/12/2023     12/12/2023     Lab Results   Component Value Date    GLUCOSE 95 07/20/2022    CALCIUM 11.0 (H) 12/12/2023     12/14/2015    K 3.9 12/12/2023    CO2 31 12/12/2023      "12/12/2023    BUN 13 12/12/2023    CREATININE 0.68 12/12/2023     No results found for: \"IGE\"  Lab Results   Component Value Date    ALT 13 12/12/2023    AST 17 12/12/2023    ALKPHOS 61 12/12/2023    BILITOT 0.30 12/14/2015     Lab Results   Component Value Date    IRON 47 (L) 04/11/2014     Lab Results   Component Value Date    [id number] 513 04/11/2014      PFT 7/20/22  Results:  FEV1/FVC Ratio: 72 %  FEV1: 2.75 L     76 % predicted  Forced Vital Capacity: 3.83 L    80 % predicted  After administration of bronchodilator:  No change     Lung volumes: Total Lung Capacity 89 % predicted Residual volume 122 % predicted     DLCO corrected for patients hemoglobin level: 101 % predicted     Flow volume loop:  Consistent with restriction     Interpretation:     Spirometry is normal  No change with bronchodilators  There is very mild air trapping on lung volumes  Normal diffusion capacity     ABG   Units 7/20/22 12:24 PM 3/28/18 12:46 PM 3/28/18 11:10 AM     pH, Art i-STAT 7.350 - 7.450 7.390  7.453 High   7.448    pCO2, Art i-STAT 36.0 - 44.0 mm HG 53.4 High   38.3  37.4    pO2, ART i-STAT 75.0 - 129.0 mm HG 92.0  136.0 High   124.0    BE, i-STAT -2 - 3 mmol/L 6 High   3  2    HCO3, Art i-STAT 22.0 - 28.0 mmol/L 32.3 High   26.8  25.9    CO2, i-STAT 21 - 32 mmol/L 34 High   28  27    O2 Sat, i-STAT 60 - 85 % 97 High   99 High  R  99 High  R    SODIUM, I-STAT 136 - 145 mmol/l 141  143  143    Potassium, i-STAT 3.5 - 5.3 mmol/L 3.9  3.6  3.3 Low     Calcium, Ionized i-STAT 1.12 - 1.32 mmol/L 1.30  1.21  1.15    Hct, i-STAT 36.5 - 49.3 % 34 Low   33 Low   28 Low     Hgb, i-STAT 12.0 - 17.0 g/dl 11.6 Low   11.2 Low   9.5 Low     Glucose, i-STAT 65 - 140 mg/dl 95  175 High   141 High     POC FIO2 L 21           Arterial Blood Gas result:  pO2 92; pCO2 53.4; pH 7.39;  HCO3 32.3, %O2 Sat 97.     Sleep studies:  Home Study 9/19/2022: mild AALIYAH, ELVIN -9.9.      New Compliance Data:  12/3/23-1/11/24                                  " Type of CPAP:  AutoBIPAP, min EPAP 4, PSV 4, IPAP 16.  EPAP 6 - 10, IPAP 10 - 15                                   Percent usage: 100% > 4 hrs 97%                                   Average time used: 5 hr 37 min                                   Residual AHI: 2.8 (LIYAH 1.8)    Compliance Data 10/7/23-11/5/23  Type of CPAP: AutoBIPAP, min EPAP 4, PSV 4, IPAP 16  Percentage of usage 100%  Average time used 5 hours 21 minutes  Residual AHI 6.3 (LIYAH 4.1)  Mask leakage 17.6 L/min     Compliance Data 5/12/2023 - 8/9/2023  Type of CPAP: AutoBIPAP, min EPAP 7, PSV 4, IPAP 25.   Percentage of usage 100%  Average time used 5 hours 3 minutes  Residual AHI 3.0 (LIYAH 2.4)  Mask leakage 30.1 L/min    Compliance Data:  11/13/22-12/12/22                                  Type of CPAP:  AutoBIPAP, min EPAP 7, PSV 4, IPAP 25.                                    Percent usage: 100%                                   Average time used: 5 hr 45 min                                   Residual AHI: 2.6 (LIYAH 1.4)     Compliance Data:  11/13/22-12/12/22                                  Type of CPAP:  AutoBIPAP, min EPAP 7, PSV 4, IPAP 25.                                    Percent usage: 100%                                   Average time used: 5 hr 45 min                                   Residual AHI: 2.6 (LIYAH 1.4)     Compliance Data:  10/3/22 - 11/1/22                                   Type of CPAP:  autoBIPAP min EPAP 5, PSV 4, IPAP 25.  EPAP avg 6.5, IPAP avg 10.1                                   Percent usage: 50%                                    Average time used: 5 hr 9 minutes                                   Residual AHI: 4.7 (LIYAH - 3.2)                                       Minesh Miles DO

## 2024-01-03 NOTE — PROGRESS NOTES
Progress Note - Sleep Medicine  Damon Patten 63 y.o. male MRN: [id number]       Impression & Plan:   63 y.o. M with PMHx of DM, HTN, Depression, history of DVT on Xarelto, antithrombin 3 deficiency, chronic insomnia, chronic pain on oxycodone and AALIYAH on BIPAP who comes in for management of AALIYAH and insomnia.  1.  Mild AALIYAH (AHI - 9.9) vs. Complex apnea in part related to opiates with residual central events.  On autoBIPAP with excellent compliance and good clinical response.  Residual AHI - 2.8, LIYAH 1.8.  He denies any significant discomfort and feels his sleep is refreshing.        -  Conitnue autoBIPAP with min EPAP 4, PSV of 4, IPAP 16.        -  The change in mask has seemed to help the residual AHI and likely reduction in opiates as well.        -  Renew supplies today.         -  I also discussed in depth the risk of leaving sleep apnea untreated including hypertension, heart failure, arrhythmia, MI and stroke.     2.  Chronic hypercapnia related to AALIYAH, mild gas trapping, chronic opiate use.  In addition he may have some restrictive lung disease due to obesity as well.  This has seemed to improve overall.      Some residual central apnea noted       -  He is currently on Ozempic and has been losing weight.  He has noted clinical improvement with that.      -  He is still on oxycodone for pain but has reduced the dose. This too has likely led to a reduction in AHI.        -  He stopedLunesta and reducing opiates and Zanaflex.  His mental status has significant improved and he is feeling much better overall.       3.  Chronic insomnia which is more related to sleep maintenance - he is currently using melatonin 10 mg and doing well with that.  He is successfully off Lunesta at this time.  We did discuss the use of doxepin at low dose as well.  He will consider it but will hold off for now.        4.  Chronic pain - he is still on opiates and Zanaflex and is slowly attempting to wean it down.    5.  Nocturia - he  is currently on Terazosin.  He will follow with urology.    ______________________________________________________________________    HPI:    Damon Patten presents today for follow-up for his AALIYAH, chronic hypercapnia and insomnia.  He notes significant improvement in his daytime sleepiness.  He also notes chronic hypercapnia and chronic back pain.  However, with reduction of pain medication, BZRA cessation, there seems to be some benefit to his alertness and mental state.  With the change in mask has has also ntoed some improvement in daytime sleepiness.        Answers submitted by the patient for this visit:  Pulmonology Questionnaire (Submitted on 2023)  Chief Complaint: Primary symptoms  Do you have shortness of breath that occurs with effort or exertion?: No  Do you have ear congestion?: Yes  Do you have heartburn?: No  Do you have fatigue?: No  Do you have nasal congestion?: No  Do you have shortness of breath when lying flat?: No  Do you have shortness of breath when you wake up?: No  Do you have sweats?: No  Have you experienced weight loss?: No  Which of the following makes your symptoms better?: nothing      Review of Systems:  Review of Systems   Constitutional:  Negative for appetite change and fever.   HENT:  Positive for ear pain and postnasal drip. Negative for rhinorrhea, sneezing, sore throat and trouble swallowing.    Eyes: Negative.    Cardiovascular:  Negative for chest pain.   Gastrointestinal: Negative.    Musculoskeletal:  Positive for back pain. Negative for myalgias.   Neurological:  Negative for headaches.   Psychiatric/Behavioral:  Negative for behavioral problems and sleep disturbance.          Social history updates:  Social History     Tobacco Use   Smoking Status Former    Current packs/day: 0.00    Average packs/day: 1.5 packs/day for 13.0 years (19.5 ttl pk-yrs)    Types: Cigarettes    Start date: 1979    Quit date: 1992    Years since quittin.0   Smokeless Tobacco  Never   Tobacco Comments    Glad I quit     Social History     Socioeconomic History    Marital status: /Civil Union     Spouse name: Not on file    Number of children: Not on file    Years of education: 12; has high school diploma    Highest education level: Not on file   Occupational History    Occupation: retired /[employer]   Tobacco Use    Smoking status: Former     Current packs/day: 0.00     Average packs/day: 1.5 packs/day for 13.0 years (19.5 ttl pk-yrs)     Types: Cigarettes     Start date: 1979     Quit date: 1992     Years since quittin.0    Smokeless tobacco: Never    Tobacco comments:     Glad I quit   Vaping Use    Vaping status: Never Used   Substance and Sexual Activity    Alcohol use: No    Drug use: No    Sexual activity: Not Currently     Partners: Female     Birth control/protection: None     Comment: Very seldom have sex do to back and pain in legs   Other Topics Concern    Not on file   Social History Narrative    2 living siblings, also 4 step-siblings    Activities: participates in activities inside of the home, light.    Living independently with spouse    Retired  and      Social Determinants of Health     Financial Resource Strain: Low Risk  (2022)    Overall Financial Resource Strain (CARDIA)     Difficulty of Paying Living Expenses: Not very hard   Recent Concern: Financial Resource Strain - Medium Risk (2022)    Overall Financial Resource Strain (CARDIA)     Difficulty of Paying Living Expenses: Somewhat hard   Food Insecurity: Not on file   Transportation Needs: No Transportation Needs (2022)    PRAPARE - Transportation     Lack of Transportation (Medical): No     Lack of Transportation (Non-Medical): No   Physical Activity: Not on file   Stress: Not on file   Social Connections: Not on file   Intimate Partner Violence: Not on file   Housing Stability: Not on file       PhysicalExamination:  Vitals:   /78 (BP Location:  "Left arm, Patient Position: Sitting, Cuff Size: Large)   Pulse 72   Temp 98.6 °F (37 °C) (Tympanic)   Wt 107 kg (235 lb)   SpO2 99%   BMI 35.73 kg/m²   General: Pleasant, Awake alert and oriented x 3, conversant without conversational dyspnea, NAD, normal affect  HEENT:  PERRL, Sclera noninjected, nonicteric OU, Nares patent,  no craniofacial abnormalities, Mucous membranes, moist, no oral lesions, normal dentition, Mallampati class 4  NECK: Trachea midline, no accessory muscle use, no stridor, no cervical or supraclavicular adenopathy, JVP not elevated  CARDIAC: Reg, single s1/S2, no m/r/g  PULM: CTA bilaterally no wheezing, rhonchi or rales  ABD: Normoactive bowel sounds, soft nontender, nondistended, no rebound, no rigidity, no guarding  EXT: No cyanosis, no clubbing, no edema, normal capillary refill  NEURO: no focal neurologic deficits, AAOx3, moving all extremities appropriately      Diagnostic Data:  Labs:  I personally reviewed the most recent laboratory data pertinent to today's visit  not applicable    I have personally reviewed pertinent lab results.  Lab Results   Component Value Date    WBC 8.04 12/12/2023    HGB 12.1 12/12/2023    HCT 36.6 12/12/2023    MCV 80 (L) 12/12/2023     12/12/2023     Lab Results   Component Value Date    GLUCOSE 95 07/20/2022    CALCIUM 11.0 (H) 12/12/2023     12/14/2015    K 3.9 12/12/2023    CO2 31 12/12/2023     12/12/2023    BUN 13 12/12/2023    CREATININE 0.68 12/12/2023     No results found for: \"IGE\"  Lab Results   Component Value Date    ALT 13 12/12/2023    AST 17 12/12/2023    ALKPHOS 61 12/12/2023    BILITOT 0.30 12/14/2015     Lab Results   Component Value Date    IRON 47 (L) 04/11/2014     Lab Results   Component Value Date    [id number] 513 04/11/2014      PFT 7/20/22  Results:  FEV1/FVC Ratio: 72 %  FEV1: 2.75 L     76 % predicted  Forced Vital Capacity: 3.83 L    80 % predicted  After administration of bronchodilator:  No change   "   Lung volumes: Total Lung Capacity 89 % predicted Residual volume 122 % predicted     DLCO corrected for patients hemoglobin level: 101 % predicted     Flow volume loop:  Consistent with restriction     Interpretation:     Spirometry is normal  No change with bronchodilators  There is very mild air trapping on lung volumes  Normal diffusion capacity     ABG   Units 7/20/22 12:24 PM 3/28/18 12:46 PM 3/28/18 11:10 AM     pH, Art i-STAT 7.350 - 7.450 7.390  7.453 High   7.448    pCO2, Art i-STAT 36.0 - 44.0 mm HG 53.4 High   38.3  37.4    pO2, ART i-STAT 75.0 - 129.0 mm HG 92.0  136.0 High   124.0    BE, i-STAT -2 - 3 mmol/L 6 High   3  2    HCO3, Art i-STAT 22.0 - 28.0 mmol/L 32.3 High   26.8  25.9    CO2, i-STAT 21 - 32 mmol/L 34 High   28  27    O2 Sat, i-STAT 60 - 85 % 97 High   99 High  R  99 High  R    SODIUM, I-STAT 136 - 145 mmol/l 141  143  143    Potassium, i-STAT 3.5 - 5.3 mmol/L 3.9  3.6  3.3 Low     Calcium, Ionized i-STAT 1.12 - 1.32 mmol/L 1.30  1.21  1.15    Hct, i-STAT 36.5 - 49.3 % 34 Low   33 Low   28 Low     Hgb, i-STAT 12.0 - 17.0 g/dl 11.6 Low   11.2 Low   9.5 Low     Glucose, i-STAT 65 - 140 mg/dl 95  175 High   141 High     POC FIO2 L 21           Arterial Blood Gas result:  pO2 92; pCO2 53.4; pH 7.39;  HCO3 32.3, %O2 Sat 97.     Sleep studies:  Home Study 9/19/2022: mild AALIYAH, ELVIN -9.9.      New Compliance Data:  12/3/23-1/11/24                                  Type of CPAP:  AutoBIPAP, min EPAP 4, PSV 4, IPAP 16.  EPAP 6 - 10, IPAP 10 - 15                                   Percent usage: 100% > 4 hrs 97%                                   Average time used: 5 hr 37 min                                   Residual AHI: 2.8 (LIYAH 1.8)    Compliance Data 10/7/23-11/5/23  Type of CPAP: AutoBIPAP, min EPAP 4, PSV 4, IPAP 16  Percentage of usage 100%  Average time used 5 hours 21 minutes  Residual AHI 6.3 (LIYAH 4.1)  Mask leakage 17.6 L/min     Compliance Data 5/12/2023 - 8/9/2023  Type of CPAP:  AutoBIPAP, min EPAP 7, PSV 4, IPAP 25.   Percentage of usage 100%  Average time used 5 hours 3 minutes  Residual AHI 3.0 (LIYAH 2.4)  Mask leakage 30.1 L/min    Compliance Data:  11/13/22-12/12/22                                  Type of CPAP:  AutoBIPAP, min EPAP 7, PSV 4, IPAP 25.                                    Percent usage: 100%                                   Average time used: 5 hr 45 min                                   Residual AHI: 2.6 (LIYAH 1.4)     Compliance Data:  11/13/22-12/12/22                                  Type of CPAP:  AutoBIPAP, min EPAP 7, PSV 4, IPAP 25.                                    Percent usage: 100%                                   Average time used: 5 hr 45 min                                   Residual AHI: 2.6 (LIYAH 1.4)     Compliance Data:  10/3/22 - 11/1/22                                   Type of CPAP:  autoBIPAP min EPAP 5, PSV 4, IPAP 25.  EPAP avg 6.5, IPAP avg 10.1                                   Percent usage: 50%                                    Average time used: 5 hr 9 minutes                                   Residual AHI: 4.7 (LIYAH - 3.2)                                       Minesh Miles DO

## 2024-01-10 ENCOUNTER — TELEPHONE (OUTPATIENT)
Dept: OTHER | Facility: OTHER | Age: 64
End: 2024-01-10

## 2024-01-15 ENCOUNTER — RA CDI HCC (OUTPATIENT)
Dept: OTHER | Facility: HOSPITAL | Age: 64
End: 2024-01-15

## 2024-01-19 ENCOUNTER — OFFICE VISIT (OUTPATIENT)
Dept: FAMILY MEDICINE CLINIC | Facility: CLINIC | Age: 64
End: 2024-01-19
Payer: COMMERCIAL

## 2024-01-19 VITALS
SYSTOLIC BLOOD PRESSURE: 150 MMHG | HEIGHT: 68 IN | OXYGEN SATURATION: 98 % | HEART RATE: 66 BPM | TEMPERATURE: 98.2 F | BODY MASS INDEX: 35.55 KG/M2 | WEIGHT: 234.6 LBS | DIASTOLIC BLOOD PRESSURE: 86 MMHG

## 2024-01-19 DIAGNOSIS — I10 ESSENTIAL HYPERTENSION: ICD-10-CM

## 2024-01-19 DIAGNOSIS — F32.A MILD DEPRESSION: ICD-10-CM

## 2024-01-19 DIAGNOSIS — Z79.01 ANTICOAGULANT LONG-TERM USE: ICD-10-CM

## 2024-01-19 DIAGNOSIS — E11.42 TYPE 2 DIABETES MELLITUS WITH DIABETIC POLYNEUROPATHY, WITH LONG-TERM CURRENT USE OF INSULIN (HCC): ICD-10-CM

## 2024-01-19 DIAGNOSIS — E66.01 CLASS 2 SEVERE OBESITY DUE TO EXCESS CALORIES WITH SERIOUS COMORBIDITY AND BODY MASS INDEX (BMI) OF 35.0 TO 35.9 IN ADULT: ICD-10-CM

## 2024-01-19 DIAGNOSIS — G56.03 BILATERAL CARPAL TUNNEL SYNDROME: ICD-10-CM

## 2024-01-19 DIAGNOSIS — Z79.4 TYPE 2 DIABETES MELLITUS WITH DIABETIC POLYNEUROPATHY, WITH LONG-TERM CURRENT USE OF INSULIN (HCC): ICD-10-CM

## 2024-01-19 DIAGNOSIS — M46.96 INFLAMMATORY SPONDYLOPATHY OF LUMBAR REGION (HCC): ICD-10-CM

## 2024-01-19 DIAGNOSIS — Z00.00 MEDICARE ANNUAL WELLNESS VISIT, SUBSEQUENT: Primary | ICD-10-CM

## 2024-01-19 DIAGNOSIS — F32.0 MILD MAJOR DEPRESSION, SINGLE EPISODE (HCC): ICD-10-CM

## 2024-01-19 DIAGNOSIS — D68.59 ANTITHROMBIN 3 DEFICIENCY (HCC): ICD-10-CM

## 2024-01-19 DIAGNOSIS — M62.81 MUSCLE WEAKNESS (GENERALIZED): ICD-10-CM

## 2024-01-19 DIAGNOSIS — I70.0 ABDOMINAL AORTIC ATHEROSCLEROSIS (HCC): ICD-10-CM

## 2024-01-19 DIAGNOSIS — F11.20 CONTINUOUS OPIOID DEPENDENCE (HCC): ICD-10-CM

## 2024-01-19 DIAGNOSIS — N40.0 BENIGN PROSTATIC HYPERPLASIA WITHOUT LOWER URINARY TRACT SYMPTOMS: ICD-10-CM

## 2024-01-19 DIAGNOSIS — I82.531 CHRONIC DEEP VEIN THROMBOSIS (DVT) OF RIGHT POPLITEAL VEIN (HCC): ICD-10-CM

## 2024-01-19 PROBLEM — S72.009A FRACTURE OF HIP (HCC): Status: RESOLVED | Noted: 2019-12-23 | Resolved: 2024-01-19

## 2024-01-19 PROCEDURE — G0439 PPPS, SUBSEQ VISIT: HCPCS | Performed by: FAMILY MEDICINE

## 2024-01-19 PROCEDURE — 99214 OFFICE O/P EST MOD 30 MIN: CPT | Performed by: FAMILY MEDICINE

## 2024-01-19 RX ORDER — PRAVASTATIN SODIUM 10 MG
10 TABLET ORAL
Qty: 30 TABLET | Refills: 5 | Status: SHIPPED | OUTPATIENT
Start: 2024-01-19 | End: 2024-02-01 | Stop reason: SDUPTHER

## 2024-01-19 RX ORDER — TERAZOSIN 10 MG/1
10 CAPSULE ORAL DAILY
Qty: 90 CAPSULE | Refills: 1 | Status: SHIPPED | OUTPATIENT
Start: 2024-01-19
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
## 2024-01-19 NOTE — PATIENT INSTRUCTIONS
Medicare Preventive Visit Patient Instructions  Thank you for completing your Welcome to Medicare Visit or Medicare Annual Wellness Visit today. Your next wellness visit will be due in one year (1/19/2025).  The screening/preventive services that you may require over the next 5-10 years are detailed below. Some tests may not apply to you based off risk factors and/or age. Screening tests ordered at today's visit but not completed yet may show as past due. Also, please note that scanned in results may not display below.  Preventive Screenings:  Service Recommendations Previous Testing/Comments   Colorectal Cancer Screening  Colonoscopy    Fecal Occult Blood Test (FOBT)/Fecal Immunochemical Test (FIT)  Fecal DNA/Cologuard Test  Flexible Sigmoidoscopy Age: 45-75 years old   Colonoscopy: every 10 years (May be performed more frequently if at higher risk)  OR  FOBT/FIT: every 1 year  OR  Cologuard: every 3 years  OR  Sigmoidoscopy: every 5 years  Screening may be recommended earlier than age 45 if at higher risk for colorectal cancer. Also, an individualized decision between you and your healthcare provider will decide whether screening between the ages of 76-85 would be appropriate. Colonoscopy: 11/24/2021  FOBT/FIT: Not on file  Cologuard: Not on file  Sigmoidoscopy: Not on file    Screening Current     Prostate Cancer Screening Individualized decision between patient and health care provider in men between ages of 55-69   Medicare will cover every 12 months beginning on the day after your 50th birthday PSA: 0.7 ng/mL           Hepatitis C Screening Once for adults born between 1945 and 1965  More frequently in patients at high risk for Hepatitis C Hep C Antibody: 10/14/2022    Screening Current   Diabetes Screening 1-2 times per year if you're at risk for diabetes or have pre-diabetes Fasting glucose: 94 mg/dL (12/12/2023)  A1C: 6.3 % (12/12/2023)  Screening Not Indicated  History Diabetes   Cholesterol Screening Once  every 5 years if you don't have a lipid disorder. May order more often based on risk factors. Lipid panel: 12/12/2023  Screening Not Indicated  History Lipid Disorder      Other Preventive Screenings Covered by Medicare:  Abdominal Aortic Aneurysm (AAA) Screening: covered once if your at risk. You're considered to be at risk if you have a family history of AAA or a male between the age of 65-75 who smoking at least 100 cigarettes in your lifetime.  Lung Cancer Screening: covers low dose CT scan once per year if you meet all of the following conditions: (1) Age 55-77; (2) No signs or symptoms of lung cancer; (3) Current smoker or have quit smoking within the last 15 years; (4) You have a tobacco smoking history of at least 20 pack years (packs per day x number of years you smoked); (5) You get a written order from a healthcare provider.  Glaucoma Screening: covered annually if you're considered high risk: (1) You have diabetes OR (2) Family history of glaucoma OR (3)  aged 50 and older OR (4)  American aged 65 and older  Osteoporosis Screening: covered every 2 years if you meet one of the following conditions: (1) Have a vertebral abnormality; (2) On glucocorticoid therapy for more than 3 months; (3) Have primary hyperparathyroidism; (4) On osteoporosis medications and need to assess response to drug therapy.  HIV Screening: covered annually if you're between the age of 15-65. Also covered annually if you are younger than 15 and older than 65 with risk factors for HIV infection. For pregnant patients, it is covered up to 3 times per pregnancy.    Immunizations:  Immunization Recommendations   Influenza Vaccine Annual influenza vaccination during flu season is recommended for all persons aged >= 6 months who do not have contraindications   Pneumococcal Vaccine   * Pneumococcal conjugate vaccine = PCV13 (Prevnar 13), PCV15 (Vaxneuvance), PCV20 (Prevnar 20)  * Pneumococcal polysaccharide vaccine  = PPSV23 (Pneumovax) Adults 19-65 yo with certain risk factors or if 65+ yo  If never received any pneumonia vaccine: recommend Prevnar 20 (PCV20)  Give PCV20 if previously received 1 dose of PCV13 or PPSV23   Hepatitis B Vaccine 3 dose series if at intermediate or high risk (ex: diabetes, end stage renal disease, liver disease)   Respiratory syncytial virus (RSV) Vaccine - COVERED BY MEDICARE PART D  * RSVPreF3 (Arexvy) CDC recommends that adults 60 years of age and older may receive a single dose of RSV vaccine using shared clinical decision-making (SCDM)   Tetanus (Td) Vaccine - COST NOT COVERED BY MEDICARE PART B Following completion of primary series, a booster dose should be given every 10 years to maintain immunity against tetanus. Td may also be given as tetanus wound prophylaxis.   Tdap Vaccine - COST NOT COVERED BY MEDICARE PART B Recommended at least once for all adults. For pregnant patients, recommended with each pregnancy.   Shingles Vaccine (Shingrix) - COST NOT COVERED BY MEDICARE PART B  2 shot series recommended in those 19 years and older who have or will have weakened immune systems or those 50 years and older     Health Maintenance Due:      Topic Date Due   • HIV Screening  Never done   • Colorectal Cancer Screening  11/22/2031   • Hepatitis C Screening  Completed     Immunizations Due:      Topic Date Due   • COVID-19 Vaccine (4 - 2023-24 season) 09/01/2023     Advance Directives   What are advance directives?  Advance directives are legal documents that state your wishes and plans for medical care. These plans are made ahead of time in case you lose your ability to make decisions for yourself. Advance directives can apply to any medical decision, such as the treatments you want, and if you want to donate organs.   What are the types of advance directives?  There are many types of advance directives, and each state has rules about how to use them. You may choose a combination of any of the  following:  Living will:  This is a written record of the treatment you want. You can also choose which treatments you do not want, which to limit, and which to stop at a certain time. This includes surgery, medicine, IV fluid, and tube feedings.   Durable power of  for healthcare (DPAHC):  This is a written record that states who you want to make healthcare choices for you when you are unable to make them for yourself. This person, called a proxy, is usually a family member or a friend. You may choose more than 1 proxy.  Do not resuscitate (DNR) order:  A DNR order is used in case your heart stops beating or you stop breathing. It is a request not to have certain forms of treatment, such as CPR. A DNR order may be included in other types of advance directives.  Medical directive:  This covers the care that you want if you are in a coma, near death, or unable to make decisions for yourself. You can list the treatments you want for each condition. Treatment may include pain medicine, surgery, blood transfusions, dialysis, IV or tube feedings, and a ventilator (breathing machine).  Values history:  This document has questions about your views, beliefs, and how you feel and think about life. This information can help others choose the care that you would choose.  Why are advance directives important?  An advance directive helps you control your care. Although spoken wishes may be used, it is better to have your wishes written down. Spoken wishes can be misunderstood, or not followed. Treatments may be given even if you do not want them. An advance directive may make it easier for your family to make difficult choices about your care.   Weight Management   Why it is important to manage your weight:  Being overweight increases your risk of health conditions such as heart disease, high blood pressure, type 2 diabetes, and certain types of cancer. It can also increase your risk for osteoarthritis, sleep apnea, and  other respiratory problems. Aim for a slow, steady weight loss. Even a small amount of weight loss can lower your risk of health problems.  How to lose weight safely:  A safe and healthy way to lose weight is to eat fewer calories and get regular exercise. You can lose up about 1 pound a week by decreasing the number of calories you eat by 500 calories each day.   Healthy meal plan for weight management:  A healthy meal plan includes a variety of foods, contains fewer calories, and helps you stay healthy. A healthy meal plan includes the following:  Eat whole-grain foods more often.  A healthy meal plan should contain fiber. Fiber is the part of grains, fruits, and vegetables that is not broken down by your body. Whole-grain foods are healthy and provide extra fiber in your diet. Some examples of whole-grain foods are whole-wheat breads and pastas, oatmeal, brown rice, and bulgur.  Eat a variety of vegetables every day.  Include dark, leafy greens such as spinach, kale, chandu greens, and mustard greens. Eat yellow and orange vegetables such as carrots, sweet potatoes, and winter squash.   Eat a variety of fruits every day.  Choose fresh or canned fruit (canned in its own juice or light syrup) instead of juice. Fruit juice has very little or no fiber.  Eat low-fat dairy foods.  Drink fat-free (skim) milk or 1% milk. Eat fat-free yogurt and low-fat cottage cheese. Try low-fat cheeses such as mozzarella and other reduced-fat cheeses.  Choose meat and other protein foods that are low in fat.  Choose beans or other legumes such as split peas or lentils. Choose fish, skinless poultry (chicken or turkey), or lean cuts of red meat (beef or pork). Before you cook meat or poultry, cut off any visible fat.   Use less fat and oil.  Try baking foods instead of frying them. Add less fat, such as margarine, sour cream, regular salad dressing and mayonnaise to foods. Eat fewer high-fat foods. Some examples of high-fat foods  include french fries, doughnuts, ice cream, and cakes.  Eat fewer sweets.  Limit foods and drinks that are high in sugar. This includes candy, cookies, regular soda, and sweetened drinks.  Exercise:  Exercise at least 30 minutes per day on most days of the week. Some examples of exercise include walking, biking, dancing, and swimming. You can also fit in more physical activity by taking the stairs instead of the elevator or parking farther away from stores. Ask your healthcare provider about the best exercise plan for you.   Narcotic (Opioid) Safety    Use narcotics safely:  Take prescribed narcotics exactly as directed  Do not give narcotics to others or take narcotics that belong to someone else  Do not mix narcotics without medicines or alcohol  Do not drive or operate heavy machinery after you take the narcotic  Monitor for side effects and notify your healthcare provider if you experienced side effects such as nausea, sleepiness, itching, or trouble thinking clearly.    Manage constipation:    Constipation is the most common side effect of narcotic medicine. Constipation is when you have hard, dry bowel movements, or you go longer than usual between bowel movements. Tell your healthcare provider about all changes in your bowel movements while you are taking narcotics. He or she may recommend laxative medicine to help you have a bowel movement. He or she may also change the kind of narcotic you are taking, or change when you take it. The following are more ways you can prevent or relieve constipation:    Drink liquids as directed.  You may need to drink extra liquids to help soften and move your bowels. Ask how much liquid to drink each day and which liquids are best for you.  Eat high-fiber foods.  This may help decrease constipation by adding bulk to your bowel movements. High-fiber foods include fruits, vegetables, whole-grain breads and cereals, and beans. Your healthcare provider or dietitian can help you  create a high-fiber meal plan. Your provider may also recommend a fiber supplement if you cannot get enough fiber from food.  Exercise regularly.  Regular physical activity can help stimulate your intestines. Walking is a good exercise to prevent or relieve constipation. Ask which exercises are best for you.  Schedule a time each day to have a bowel movement.  This may help train your body to have regular bowel movements. Bend forward while you are on the toilet to help move the bowel movement out. Sit on the toilet for at least 10 minutes, even if you do not have a bowel movement.    Store narcotics safely:   Store narcotics where others cannot easily get them.  Keep them in a locked cabinet or secure area. Do not  keep them in a purse or other bag you carry with you. A person may be looking for something else and find the narcotics.  Make sure narcotics are stored out of the reach of children.  A child can easily overdose on narcotics. Narcotics may look like candy to a small child.    The best way to dispose of narcotics:      The laws vary by country and area. In the [place], the best way is to return the narcotics through a take-back program. This program is offered by the [place] Drug Enforcement Agency (MISSY). The following are options for using the program:  Take the narcotics to a MISSY collection site.  The site is often a law enforcement center. Call your local law enforcement center for scheduled take-back days in your area. You will be given information on where to go if the collection site is in a different location.  Take the narcotics to an approved pharmacy or hospital.  A pharmacy or hospital may be set up as a collection site. You will need to ask if it is a MISSY collection site if you were not directed there. A pharmacy or doctor's office may not be able to take back narcotics unless it is a MISSY site.  Use a mail-back system.  This means you are given containers to put the narcotics into. You will  then mail them in the containers.  Use a take-back drop box.  This is a place to leave the narcotics at any time. People and animals will not be able to get into the box. Your local law enforcement agency can tell you where to find a drop box in your area.    Other ways to manage pain:   Ask your healthcare provider about non-narcotic medicines to control pain.  Nonprescription medicines include NSAIDs (such as ibuprofen) and acetaminophen. Prescription medicines include muscle relaxers, antidepressants, and steroids.  Pain may be managed without any medicines.  Some ways to relieve pain include massage, aromatherapy, or meditation. Physical or occupational therapy may also help.    For more information:   Drug Enforcement Administration  [address] [place] [address]  Phone: 2- 084 - 601-5896  Web Address: https://www.deadiversion.[facility].gov/drug_disposal/     Food and Drug Administration  [address] [place] [address]  Phone: 0- 365 - 947-9636  Web Address: http://www.fda.gov     © Copyright [employer] 2018 Information is for End User's use only and may not be sold, redistributed or otherwise used for commercial purposes. All illustrations and images included in CareNotes® are the copyrighted property of [employer] Inc. or [employer]

## 2024-01-22 ENCOUNTER — OFFICE VISIT (OUTPATIENT)
Dept: ENDOCRINOLOGY | Facility: CLINIC | Age: 64
End: 2024-01-22
Payer: COMMERCIAL

## 2024-01-22 VITALS
HEART RATE: 82 BPM | HEIGHT: 68 IN | OXYGEN SATURATION: 98 % | BODY MASS INDEX: 35.68 KG/M2 | WEIGHT: 235.4 LBS | TEMPERATURE: 97.8 F | DIASTOLIC BLOOD PRESSURE: 80 MMHG | SYSTOLIC BLOOD PRESSURE: 130 MMHG

## 2024-01-22 DIAGNOSIS — Z79.4 TYPE 2 DIABETES MELLITUS WITH DIABETIC POLYNEUROPATHY, WITH LONG-TERM CURRENT USE OF INSULIN (HCC): Primary | ICD-10-CM

## 2024-01-22 DIAGNOSIS — E04.1 THYROID NODULE: ICD-10-CM

## 2024-01-22 DIAGNOSIS — E04.1 NON-TOXIC UNINODULAR GOITER: ICD-10-CM

## 2024-01-22 DIAGNOSIS — Z79.4 TYPE 2 DIABETES MELLITUS WITHOUT COMPLICATION, WITH LONG-TERM CURRENT USE OF INSULIN (HCC): ICD-10-CM

## 2024-01-22 DIAGNOSIS — E55.9 VITAMIN D DEFICIENCY: ICD-10-CM

## 2024-01-22 DIAGNOSIS — I10 ESSENTIAL HYPERTENSION: ICD-10-CM

## 2024-01-22 DIAGNOSIS — E11.9 TYPE 2 DIABETES MELLITUS WITHOUT COMPLICATION, WITH LONG-TERM CURRENT USE OF INSULIN (HCC): ICD-10-CM

## 2024-01-22 DIAGNOSIS — E11.42 TYPE 2 DIABETES MELLITUS WITH DIABETIC POLYNEUROPATHY, WITH LONG-TERM CURRENT USE OF INSULIN (HCC): Primary | ICD-10-CM

## 2024-01-22 DIAGNOSIS — Z79.4 CURRENT USE OF INSULIN (HCC): ICD-10-CM

## 2024-01-22 DIAGNOSIS — E78.2 MIXED HYPERLIPIDEMIA: ICD-10-CM

## 2024-01-22 PROCEDURE — 99214 OFFICE O/P EST MOD 30 MIN: CPT | Performed by: STUDENT IN AN ORGANIZED HEALTH CARE EDUCATION/TRAINING PROGRAM

## 2024-01-22 RX ORDER — INSULIN GLARGINE 100 [IU]/ML
15 INJECTION, SOLUTION SUBCUTANEOUS
Qty: 15 ML | Refills: 0 | Status: SHIPPED | OUTPATIENT
Start: 2024-01-22 | End: 2024-01-30

## 2024-01-22 NOTE — ASSESSMENT & PLAN NOTE
LEFT lower pole nodule measuring 3 x 2.5 x 2.5 cm, TR: 4,  RIGHT upper pole nodule measuring 0.6 x 0.5 x 0.5 cm, TR: 4,  Benign FNA results in 2021.  No compressive symptoms, no risk factors for thyroid cancer including radiation to head or neck, or family history of thyroid cancer.  Repeat thyroid ultrasound.
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
## 2024-01-22 NOTE — ASSESSMENT & PLAN NOTE
Lab Results   Component Value Date    HGBA1C 6.3 (H) 12/12/2023     Well-controlled A1c of 6.3%.  He has tolerated current regimen.  Which will be continued.  He is interested to try CGM, he will inquire from his insurance tish 2 or 3 sensor and reader are covered.  Return back in 3 months   Lab prior to next visit.

## 2024-01-25 ENCOUNTER — TELEPHONE (OUTPATIENT)
Dept: ENDOCRINOLOGY | Facility: CLINIC | Age: 64
End: 2024-01-25

## 2024-01-25 DIAGNOSIS — Z12.5 PROSTATE CANCER SCREENING: Primary | ICD-10-CM

## 2024-01-29 ENCOUNTER — TELEPHONE (OUTPATIENT)
Dept: ADMINISTRATIVE | Facility: OTHER | Age: 64
End: 2024-01-29

## 2024-01-29 NOTE — LETTER
Diabetic Eye Exam Form    Date Requested: 24  Patient: Damon Patten  Patient : 1960   Referring Provider: Heena Pickens DO      DIABETIC Eye Exam Date _______________________________      Type of Exam MUST be documented for Diabetic Eye Exams. Please CHECK ONE.     Retinal Exam       Dilated Retinal Exam       OCT       Optomap-Iris Exam      Fundus Photography       Left Eye - Please check Retinopathy or No Retinopathy        Exam did show retinopathy    Exam did not show retinopathy       Right Eye - Please check Retinopathy or No Retinopathy       Exam did show retinopathy    Exam did not show retinopathy       Comments __________________________________________________________    Practice Providing Exam ______________________________________________    Exam Performed By (print name) _______________________________________      Provider Signature ___________________________________________________      These reports are needed for  compliance.  Please fax this completed form and a copy of the Diabetic Eye Exam report to our office located at [address] [place] [address] as soon as possible via Fax [phone] attention Kerissa: Phone [phone]  We thank you for your assistance in treating our mutual patient.

## 2024-01-29 NOTE — TELEPHONE ENCOUNTER
----- Message from Susana Rudd sent at 1/26/2024  1:39 PM EST -----  Regarding: Care Gap Request  01/26/24 1:39 PM    Nhan, our patient Damon Patten has had Diabetic Eye Exam completed/performed. Please assist in updating the patient chart by making an External outreach to [facility] facility located in [place]. The date of service is 6/23.    Thank you,  Susana Rudd  [facility]

## 2024-01-30 DIAGNOSIS — E11.42 TYPE 2 DIABETES MELLITUS WITH DIABETIC POLYNEUROPATHY, WITH LONG-TERM CURRENT USE OF INSULIN (HCC): Primary | ICD-10-CM

## 2024-01-30 DIAGNOSIS — Z79.4 TYPE 2 DIABETES MELLITUS WITH DIABETIC POLYNEUROPATHY, WITH LONG-TERM CURRENT USE OF INSULIN (HCC): Primary | ICD-10-CM

## 2024-01-30 RX ORDER — INSULIN GLARGINE 300 U/ML
15 INJECTION, SOLUTION SUBCUTANEOUS
Qty: 6 ML | Refills: 0 | Status: SHIPPED | OUTPATIENT
Start: 2024-01-30 | End: 2024-02-09 | Stop reason: SDUPTHER

## 2024-01-30 NOTE — TELEPHONE ENCOUNTER
Upon review of the In Basket request and the patient's chart, initial outreach has been made via fax to facility. Please see Contacts section for details.     Thank you  ONEIL ORNELAS

## 2024-02-01 DIAGNOSIS — Z79.4 TYPE 2 DIABETES MELLITUS WITH DIABETIC POLYNEUROPATHY, WITH LONG-TERM CURRENT USE OF INSULIN (HCC): ICD-10-CM

## 2024-02-01 DIAGNOSIS — E11.42 TYPE 2 DIABETES MELLITUS WITH DIABETIC POLYNEUROPATHY, WITH LONG-TERM CURRENT USE OF INSULIN (HCC): ICD-10-CM

## 2024-02-01 RX ORDER — PRAVASTATIN SODIUM 10 MG
10 TABLET ORAL
Qty: 90 TABLET | Refills: 1 | Status: SHIPPED | OUTPATIENT
Start: 2024-02-01

## 2024-02-01 NOTE — TELEPHONE ENCOUNTER
, could you please send the script for (Pravastatin to [facility] for refill ) instead of [facility] I would really appreciate it,Thank You, Damon Patten

## 2024-02-02 NOTE — TELEPHONE ENCOUNTER
Rx went through to pharmacy no report of error, attempted to reach patient, patient was not available, if patient returns call please have him explain.

## 2024-02-03 ENCOUNTER — HOSPITAL ENCOUNTER (OUTPATIENT)
Dept: ULTRASOUND IMAGING | Facility: HOSPITAL | Age: 64
Discharge: HOME | End: 2024-02-03
Payer: COMMERCIAL

## 2024-02-03 DIAGNOSIS — E04.1 THYROID NODULE: ICD-10-CM

## 2024-02-03 PROCEDURE — 76536 US EXAM OF HEAD AND NECK: CPT

## 2024-02-05 ENCOUNTER — OFFICE VISIT (OUTPATIENT)
Dept: OBGYN CLINIC | Facility: HOSPITAL | Age: 64
End: 2024-02-05
Payer: COMMERCIAL

## 2024-02-05 ENCOUNTER — TELEPHONE (OUTPATIENT)
Dept: FAMILY MEDICINE CLINIC | Facility: CLINIC | Age: 64
End: 2024-02-05

## 2024-02-05 VITALS — HEIGHT: 71 IN | WEIGHT: 228 LBS | BODY MASS INDEX: 31.92 KG/M2

## 2024-02-05 DIAGNOSIS — M65.331 TRIGGER FINGER, RIGHT MIDDLE FINGER: ICD-10-CM

## 2024-02-05 DIAGNOSIS — G56.03 CARPAL TUNNEL SYNDROME, BILATERAL: Primary | ICD-10-CM

## 2024-02-05 DIAGNOSIS — Z01.812 ENCOUNTER FOR PRE-OPERATIVE LABORATORY TESTING: ICD-10-CM

## 2024-02-05 PROCEDURE — 99215 OFFICE O/P EST HI 40 MIN: CPT | Performed by: SURGERY

## 2024-02-05 RX ORDER — CEFAZOLIN SODIUM 2 G/50ML
2000 SOLUTION INTRAVENOUS ONCE
OUTPATIENT
Start: 2024-02-05 | End: 2024-02-05

## 2024-02-05 RX ORDER — CHLORHEXIDINE GLUCONATE ORAL RINSE 1.2 MG/ML
15 SOLUTION DENTAL ONCE
OUTPATIENT
Start: 2024-02-05 | End: 2024-02-05

## 2024-02-05 NOTE — TELEPHONE ENCOUNTER
Upon review of the In Basket request we were able to locate, review, and update the patient chart as requested for Diabetic Eye Exam.    Any additional questions or concerns should be emailed to the Practice Liaisons via the appropriate education email address, please do not reply via In EventCombo.    Thank you  ONEIL ORNELAS

## 2024-02-05 NOTE — PATIENT INSTRUCTIONS
What is it Carpal Tunnel Syndrome?    Carpal tunnel syndrome (CTS) is a condition brought on by increased pressure on the median nerve at the wrist. In effect, it is a pinched nerve at the wrist. Symptoms may include numbness, tingling, and pain in the arm, hand, and fingers. There is a space in the wrist called the carpal tunnel where the median nerve and nine tendons pass from the forearm into the hand (see Figure 1). Carpal tunnel syndrome happens when pressure builds up from swelling in this tunnel and puts pressure on the nerve. When the pressure from the swelling becomes great enough to disturb the way the nerve works, numbness, tingling, and pain may be felt in the hand and fingers (see Figure 2).    What causes it?  Usually the cause is unknown. Pressure on the nerve can happen several ways: swelling of the lining of the flexor tendons, called tenosynovitis; joint dislocations, fractures, and arthritis can narrow the tunnel; and keeping the wrist bent for long periods of time. Fluid retention during pregnancy can cause swelling in the tunnel and symptoms of carpal tunnel syndrome, which often go away after delivery. Thyroid conditions, rheumatoid arthritis, and diabetes also can be associated with carpal tunnel syndrome. There may be a combination of causes.    Signs and symptoms  Carpal tunnel syndrome symptoms usually include pain, numbness, tingling, or a combination of the three. The numbness or tingling most often takes place in the thumb, index, middle, and ring fingers. The symptoms usually are felt during the night but also may be noticed during daily activities such as driving or reading a newspaper. Patients may sometimes notice a weaker , occasional clumsiness, and a tendency to drop things. In severe cases, sensation may be permanently lost and the muscles at the base of the thumb slowly shrink (thenar atrophy), causing difficulty with pinch.    Diagnosis  A detailed history including medical  conditions, how the hands have been used, and whether there were any prior injuries is important. An x-ray may be taken to check for the other causes of the complaints such as arthritis or a fracture. In some cases, laboratory tests may be done if there is a suspected medical condition that is associated with CTS. Electrodiagnostic studies (NCV-nerve conduction velocities and EMG-electromyogram) may be done to confirm the diagnosis of carpal tunnel syndrome as well as to check for other possible nerve problems.    Treatment  Symptoms may often be relieved without surgery. Identifying and treating medical conditions, changing the patterns of hand use, or keeping the wrist splinted in a straight position may help reduce pressure on the nerve. Wearing wrist splints at night may relieve the symptoms that interfere with sleep. A steroid injection into the carpal tunnel may help relieve the symptoms by reducing swelling around the nerve.    When symptoms are severe or do not improve, surgery may be needed to make more room for the nerve. Pressure on the nerve is decreased by cutting the ligament that forms the roof (top) of the tunnel on the palm side of the hand (see Figure 3). Incisions for this surgery may vary, but the goal is the same: to enlarge the tunnel and decrease pressure on the nerve. Following surgery, soreness around the incision may last for several weeks or months. The numbness and tingling may disappear quickly or slowly. It may take several months for strength in the hand and wrist to return to normal. Carpal tunnel symptoms may not completely go away after surgery, especially in severe cases.        © 2012 [employer]  www.handcare.org      What to Expect After Hand Surgery  Below are typical postoperative expectations and recommendations for our patients having hand/elbow surgery. Please understand, however, that every case and every patient are different so these  recommendations are subject to change on an individual basis. Please be flexible if you are told something different on the day of surgery and understand that we do so with your best interest at heart.     Postoperative care:   Elevate your hand above your elbow for 24-48 hours after surgery to help with swelling.   Place your hand and arm over your head with motion at your shoulder three times a day.   Do NOT apply any cream/ointment/oil to your incisions including antibiotic ointment, peroxide, etc.   Do NOT soak your hands in standing water (dishwater, tubs, Jacuzzi's, pools, etc.) until given permission (typically 2-3 weeks after injury)   What to watch out for:   Increased numbness or tingling of your hand or fingers that is not relieved with elevation.   Increasing pain that is not controlled with medication.   Difficulty chewing, breathing, swallowing.   Chest pains or shortness of breath.   Fever over 101.4 degrees.   Bandages:   Please keep bandages clean and dry, you will need to cover bandages with plastic bag or cast bag when showering. Any sutures will be removed in the office, please do not try and remove these on your own. Any steri-strips will fall off on their own or we will remove them in the office as well.   For smaller procedures (carpal tunnel, trigger fingers, deQuervain's release, etc.) you will be able to remove the bandages 5-7 days from surgery. Once the bandages are removed you will be able to wash the hand and take short showers. Avoid soaking the wounds in any standing water (no dirty dishes, no pools/baths/hot tubs/ocean water, etc) until you are seen at your follow up visit.   For fractures, tendon repairs, and other larger procedures we will typically have you keep the bandages on until we see you back in the office at your follow up visit. In some cases we may have you meet with occupational therapy before we see you back. If this is the case the therapist will remove your bandages  for you and the above wound care instructions will apply.   We emphasize that you do not put anything on the surgical wounds including neosporin, lotions, oils, peroxide, etc. These can delay wound healing and open you up to infections. Bandaids are okay in some cases, but should only be in place for a short time as they can hold moisture in and break down the wound.   Motion and activity:   We encourage you to move any non-splinted fingers into a full tight fist as soon as possible after surgery unless otherwise specified by the surgical team. Hands get very stiff very quickly, so keep them moving!   Weight bearing status will depend on your surgery and will be specified in your postoperative instructions. Frequently we advise no lifting over 1-2 pounds with the operative hand, this allows you to perform activities of daily living (eating, brushing teeth/hair, etc), but also protects you from damaging the surgical site. In some cases we advise that you do not lift anything with the operative hand, but this will be specified in your instructions day of surgery.   Depending on your job and what surgery you had done some patients can return to work shortly after surgery. Typically if your job involves heavy lifting, griping, or manual labor we advise that you do not work until we see you back for your first follow up visit. These jobs carry a high risk of surgical site infections and wound healing complications  Sling:   Use of a sling will be specified in your postoperative instructions.   If you have a block done by anesthesia before surgery you will have limited use of the operative arm for around 24-48 hrs after and may require a sling to hold your arm up during that time. Once the block wears off you may discontinue use of the sling.   Some surgeries do not require a block or a sling at all, this will be specified on day of surgery and in your postoperative instructions.   Pain medication:   We will prescribe you  a one-time script of narcotic pain medications for postoperative pain, the amount will depend on what surgery was done. In addition to this we typically recommend Tylenol and Ibuprofen/naproxen for pain control, these medications work best when alternated every 3-4 hours.   Medications will vary between patients, so if you have any allergies or concerns please let us know and we can adjust our recommendations as needed.   Please let us know if you already take narcotic pain medicine regularly or if you are already established with pain management. Often times in these cases you may have signed a pain agreement with the prescribing provider and we will need to discuss with them regarding your postoperative care.   Follow-up:   Most follow up appointments are made when you schedule surgery, if you do not have a follow up by the time you schedule surgery please call the office to make an appointment. Typical follow up is 10-14 days from surgery unless otherwise specified.
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
## 2024-02-06 NOTE — TELEPHONE ENCOUNTER
Please advise pt that his surgeon's Physician's Assistant already reached out about this, and I advised that it will be ok to hold his Xarelto 48 hrs prior to surgery and restart it 24 hrs after.           February 5, 2024  Lee Patten   to [facility] (supporting You)     2/5/24  4:39 PM  Thank you  Susana Rudd   to Lee Patten         2/5/24  4:32 PM  Sergio Howard,  I will forward your message back to .  Thanks,  Susana  Last read by Lee Patten at  4:39 PM on 2/5/2024.  Susana Rudd   to Me   KL    2/5/24  4:32 PM  Please advise pt message below, thanks  CHELSEA    2/5/24  4:15 PM  Seda Abrams routed this conversation to [facility]  Lee Patten   to [facility] (supporting You)     2/5/24  4:14 PM  Dr. Pickens, I am scheduled to have hand surgery for 1.Carpal tunnel R.hand.   2.trigger finger R.hand on 3- with , my Question is regarding Zarellto she wanted me to touch base with you, I told her I have stopped Zarellto in the past for back pain injections without any incidents. Thanks Damon Patten

## 2024-02-09 ENCOUNTER — TELEPHONE (OUTPATIENT)
Dept: ENDOCRINOLOGY | Facility: CLINIC | Age: 64
End: 2024-02-09

## 2024-02-09 DIAGNOSIS — Z79.4 TYPE 2 DIABETES MELLITUS WITH DIABETIC POLYNEUROPATHY, WITH LONG-TERM CURRENT USE OF INSULIN (HCC): ICD-10-CM

## 2024-02-09 DIAGNOSIS — E11.42 TYPE 2 DIABETES MELLITUS WITH DIABETIC POLYNEUROPATHY, WITH LONG-TERM CURRENT USE OF INSULIN (HCC): ICD-10-CM

## 2024-02-09 RX ORDER — INSULIN GLARGINE 300 U/ML
16 INJECTION, SOLUTION SUBCUTANEOUS
Qty: 6 ML | Refills: 0 | Status: SHIPPED | OUTPATIENT
Start: 2024-02-09 | End: 2024-05-09

## 2024-02-09 NOTE — TELEPHONE ENCOUNTER
[facility] called. Need a new script for Toujeo Max SoloStar. Per pharmarcist injection pen can only be dialed in even numbers.   Reference # [id number]  [phone] within 1 business day

## 2024-02-12 ENCOUNTER — APPOINTMENT (OUTPATIENT)
Dept: LAB | Facility: CLINIC | Age: 64
End: 2024-02-12
Payer: COMMERCIAL

## 2024-02-12 DIAGNOSIS — Z01.812 ENCOUNTER FOR PRE-OPERATIVE LABORATORY TESTING: ICD-10-CM

## 2024-02-12 DIAGNOSIS — M65.331 TRIGGER FINGER, RIGHT MIDDLE FINGER: ICD-10-CM

## 2024-02-12 DIAGNOSIS — G56.03 CARPAL TUNNEL SYNDROME, BILATERAL: ICD-10-CM

## 2024-02-12 LAB
ANION GAP SERPL CALCULATED.3IONS-SCNC: 8 MMOL/L
BUN SERPL-MCNC: 15 MG/DL (ref 5–25)
CALCIUM SERPL-MCNC: 9.1 MG/DL (ref 8.4–10.2)
CHLORIDE SERPL-SCNC: 103 MMOL/L (ref 96–108)
CO2 SERPL-SCNC: 31 MMOL/L (ref 21–32)
CREAT SERPL-MCNC: 0.8 MG/DL (ref 0.6–1.3)
GFR SERPL CREATININE-BSD FRML MDRD: 95 ML/MIN/1.73SQ M
GLUCOSE P FAST SERPL-MCNC: 75 MG/DL (ref 65–99)
POTASSIUM SERPL-SCNC: 3.9 MMOL/L (ref 3.5–5.3)
SODIUM SERPL-SCNC: 142 MMOL/L (ref 135–147)

## 2024-02-12 PROCEDURE — 80048 BASIC METABOLIC PNL TOTAL CA: CPT

## 2024-02-12 PROCEDURE — 36415 COLL VENOUS BLD VENIPUNCTURE: CPT

## 2024-02-14 ENCOUNTER — OFFICE VISIT (OUTPATIENT)
Dept: LAB | Facility: HOSPITAL | Age: 64
End: 2024-02-14
Payer: COMMERCIAL

## 2024-02-14 DIAGNOSIS — Z01.812 ENCOUNTER FOR PRE-OPERATIVE LABORATORY TESTING: ICD-10-CM

## 2024-02-14 DIAGNOSIS — G56.03 CARPAL TUNNEL SYNDROME, BILATERAL: ICD-10-CM

## 2024-02-14 DIAGNOSIS — M65.331 TRIGGER FINGER, RIGHT MIDDLE FINGER: ICD-10-CM

## 2024-02-14 LAB
ATRIAL RATE: 62 BPM
PR INTERVAL: 212 MS
QRS AXIS: 143 DEGREES
QRSD INTERVAL: 104 MS
QT INTERVAL: 384 MS
QTC INTERVAL: 389 MS
T WAVE AXIS: 156 DEGREES
VENTRICULAR RATE: 62 BPM

## 2024-02-14 PROCEDURE — 93005 ELECTROCARDIOGRAM TRACING: CPT

## 2024-02-15 ENCOUNTER — ANESTHESIA EVENT (OUTPATIENT)
Age: 64
End: 2024-02-15

## 2024-02-15 ENCOUNTER — ANESTHESIA (OUTPATIENT)
Age: 64
End: 2024-02-15

## 2024-02-21 ENCOUNTER — TELEPHONE (OUTPATIENT)
Dept: OBGYN CLINIC | Facility: CLINIC | Age: 64
End: 2024-02-21

## 2024-02-21 PROBLEM — Z00.00 MEDICARE ANNUAL WELLNESS VISIT, SUBSEQUENT: Status: RESOLVED | Noted: 2022-12-08 | Resolved: 2024-02-21

## 2024-02-21 NOTE — TELEPHONE ENCOUNTER
Called patient and left VM to remind him to hold his Xarelto 48 hrs prior to surgery on 3/1, may restart 24 hrs after. This was discussed and cleared by his prescribing provider

## 2024-02-22 RX ORDER — TIZANIDINE 4 MG/1
4 TABLET ORAL 2 TIMES DAILY
COMMUNITY
Start: 2024-02-03

## 2024-02-22 NOTE — PRE-PROCEDURE INSTRUCTIONS
Pre-Surgery Instructions:   Medication Instructions    amLODIPine-benazepril (LOTREL) 10-40 MG per capsule Hold day of surgery.    Ascorbic Acid (vitamin C) 1000 MG tablet Stop taking 7 days prior to surgery.    fentaNYL (DURAGESIC) 100 mcg/hr TD 72 hr patch Pt instructed if needs to wear DOS -to notify all staff of  patch in place - if doesn't need DOS - dont wear.    gabapentin (NEURONTIN) 800 mg tablet Take day of surgery. With sip of water     glucose blood (ONE TOUCH ULTRA TEST) test strip Continue as prescribed     hyoscyamine (LEVBID) 0.375 mg 12 hr tablet Hold day of surgery.    lansoprazole (PREVACID) 30 mg capsule Take day of surgery. With sip of water     Melatonin 5 MG TABS Stop taking 7 days prior to surgery.    metFORMIN (GLUCOPHAGE) 1000 MG tablet Hold day of surgery.    metoprolol succinate (TOPROL-XL) 25 mg 24 hr tablet Take day of surgery. With sip of water     multivitamin (THERAGRAN) TABS Stop taking 7 days prior to surgery.    NON FORMULARY Stop taking 7 days prior to surgery.    NON FORMULARY Stop taking 7 days prior to surgery.    OneTouch Delica Lancets 33G MISC Continue to use as prescribed     oxyCODONE (ROXICODONE) 30 MG immediate release tablet Take day of surgery. With sip of water     Ozempic, 1 MG/DOSE, 4 MG/3ML injection pen Stop taking 7 days prior to surgery.including DOS     sertraline (ZOLOFT) 50 mg tablet Take day of surgery. With sip of water     terazosin (HYTRIN) 10 MG capsule Take day of surgery.    tiZANidine (ZANAFLEX) 4 mg tablet Take day of surgery. With sip of water     Unifine Pentips Plus 32G X 4 MM MISC Take night before surgery- basaglar insulin - 15 units, takes at night     Xarelto 20 MG tablet Per PCP - to hold Xarelto 48 hrs prior to surgery     Pt has cleanser and instructions of showering reviewed with pt - both night before and DOS - pt verbalized understanding of instructions.  Pt instructed no hair or body products on skin for DOS .  Pt instructed no razor  blade shaving within one week prior to surgery -ok to use electric razor up till 2/28- no shaving 2/29 or 3/1    Pt instructed if with any health status changes between now and DOS - notify surgeon office.    Medication instructions for day surgery reviewed. Please use only a sip of water to take your instructed medications. Avoid all over the counter vitamins, supplements and NSAIDS for one week prior to surgery per anesthesia guidelines. Tylenol is ok to take as needed.     You will receive a call one business day prior to surgery with an arrival time and hospital directions. If your surgery is scheduled on a Monday, the hospital will be calling you on the Friday prior to your surgery. If you have not heard from anyone by 8pm, please call the hospital supervisor through the hospital  at [phone]. ([place] [phone] or [place] [phone]).    Do not eat or drink anything after midnight the night before your surgery, including candy, mints, lifesavers, or chewing gum. Do not drink alcohol 24hrs before your surgery. Try not to smoke at least 24hrs before your surgery.       Follow the pre surgery showering instructions as listed in the “My Surgical Experience Booklet” or otherwise provided by your surgeon's office. Do not use a blade to shave the surgical area 1 week before surgery. It is okay to use a clean electric clippers up to 24 hours before surgery. Do not apply any lotions, creams, including makeup, cologne, deodorant, or perfumes after showering on the day of your surgery. Do not use dry shampoo, hair spray, hair gel, or any type of hair products.     No contact lenses, eye make-up, or artificial eyelashes. Remove nail polish, including gel polish, and any artificial, gel, or acrylic nails if possible. Remove all jewelry including rings and body piercing jewelry.     Wear causal clothing that is easy to take on and off. Consider your type of surgery.    Keep any valuables, jewelry,  piercings at home. Please bring any specially ordered equipment (sling, braces) if indicated.    Arrange for a responsible person to drive you to and from the hospital on the day of your surgery. Please confirm the visitor policy for the day of your procedure when you receive your phone call with an arrival time.     Call the surgeon's office with any new illnesses, exposures, or additional questions prior to surgery.    Please reference your “My Surgical Experience Booklet” for additional information to prepare for your upcoming surgery.

## 2024-02-26 ENCOUNTER — TELEPHONE (OUTPATIENT)
Age: 64
End: 2024-02-26

## 2024-02-26 NOTE — TELEPHONE ENCOUNTER
Patient wife called in and stated that she didn't get any labs for the patient. Advised Marguerite that the labs are in the system - conformed that they do use a . [facility] lab. Advised that they can look up the active labs.

## 2024-02-28 ENCOUNTER — HOSPITAL ENCOUNTER (OUTPATIENT)
Dept: RADIOLOGY | Facility: HOSPITAL | Age: 64
Discharge: HOME | End: 2024-02-28
Payer: COMMERCIAL

## 2024-02-28 DIAGNOSIS — G56.03 CARPAL TUNNEL SYNDROME, BILATERAL: ICD-10-CM

## 2024-02-28 PROCEDURE — 76882 US LMTD JT/FCL EVL NVASC XTR: CPT

## 2024-02-29 PROBLEM — G56.03 CARPAL TUNNEL SYNDROME, BILATERAL: Status: ACTIVE | Noted: 2024-02-29

## 2024-02-29 PROBLEM — M65.331 TRIGGER FINGER, RIGHT MIDDLE FINGER: Status: ACTIVE | Noted: 2024-02-29

## 2024-02-29 NOTE — DISCHARGE INSTR - AVS FIRST PAGE
Post Operative Instructions    You have had surgery on your arm today, please read and follow the information below:  Elevate your hand above your elbow during the next 24-48 hours to help with swelling.  Place your hand and arm over your head with motion at your shoulder three times a day.  Do not apply any cream/ointment/oil to your incisions including antibiotics.  Do not soak your hands in standing water (dishwater, tubs, Jacuzzi's, pools, etc.) until given permission (typically 2-3 weeks after injury)    Call the office if you notice any:  Increased numbness or tingling of your hand or fingers that is not relieved with elevation.  Increasing pain that is not controlled with medication.  Difficulty chewing, breathing, swallowing.  Chest pains or shortness of breath.  Fever over 101.4 degrees.    Bandage: Please keep bandages clean and dry. Remove bandage after 5 days. Once bandages are removed you may wash hands with soap and water. Short showers are okay as well, but please avoid soaking the hand as described above (ie no pools, baths, dirty dish water, hot tubs, ocean/lake water, etc). Sutures will be removed in the office at your first follow up visit, please do not remove them yourself.    Please do NOT put any topical agents on the surgical wound including neosporin, peroxide, tea tree oil, vitamin E, etc. as these can delay wound healing.    Motion: Move fingers into a fist 5 times a day, DO NOT move any splinted fingers.    Weight bearing status: Avoid heavy lifting (>5 pounds) with the extremity that was operated on until follow up appointment. Normal activities of daily living are OK.    Ice: Ice for 10 minutes every hour as needed for swelling x 24 hours.    Sling: No sling necessary.    Pain medication:   Naproxen 220 mg two times a day (this is over the counter!)  *do not take this medication if you were told by your doctor that you cannot take anti-inflammatories or NSAIDS  Tylenol Extended Release  650 mg every 8 hours (this is over the counter!)   Oxycodone 15 mg as prescribed by your pain management provider. Please note that no additional pain meds were sent to your pharmacy due to a standing pain script, if you require additional pain meds please contact your pain medication provider.         Follow-up Appointment: 10-14 days with Dr Hernandez        Please call the office if you have any questions or concerns regarding your post-operative care.

## 2024-03-01 ENCOUNTER — HOSPITAL ENCOUNTER (OUTPATIENT)
Age: 64
Setting detail: OUTPATIENT SURGERY
Discharge: HOME | End: 2024-03-01
Attending: SURGERY | Admitting: SURGERY
Payer: COMMERCIAL

## 2024-03-01 ENCOUNTER — ANESTHESIA EVENT (OUTPATIENT)
Age: 64
End: 2024-03-01
Payer: COMMERCIAL

## 2024-03-01 ENCOUNTER — ANESTHESIA (OUTPATIENT)
Age: 64
End: 2024-03-01
Payer: COMMERCIAL

## 2024-03-01 VITALS
WEIGHT: 231.6 LBS | OXYGEN SATURATION: 97 % | HEART RATE: 60 BPM | SYSTOLIC BLOOD PRESSURE: 184 MMHG | BODY MASS INDEX: 34.3 KG/M2 | HEIGHT: 69 IN | DIASTOLIC BLOOD PRESSURE: 87 MMHG | RESPIRATION RATE: 14 BRPM | TEMPERATURE: 97.4 F

## 2024-03-01 DIAGNOSIS — D68.59 ANTITHROMBIN 3 DEFICIENCY (HCC): ICD-10-CM

## 2024-03-01 DIAGNOSIS — I10 BENIGN ESSENTIAL HYPERTENSION: ICD-10-CM

## 2024-03-01 DIAGNOSIS — I82.531 CHRONIC DEEP VEIN THROMBOSIS (DVT) OF RIGHT POPLITEAL VEIN (HCC): ICD-10-CM

## 2024-03-01 LAB
GLUCOSE SERPL-MCNC: 108 MG/DL (ref 65–140)
GLUCOSE SERPL-MCNC: 86 MG/DL (ref 65–140)

## 2024-03-01 PROCEDURE — 64721 CARPAL TUNNEL SURGERY: CPT | Performed by: SURGERY

## 2024-03-01 PROCEDURE — 64721 CARPAL TUNNEL SURGERY: CPT | Performed by: PHYSICIAN ASSISTANT

## 2024-03-01 PROCEDURE — 26160 REMOVE TENDON SHEATH LESION: CPT | Performed by: SURGERY

## 2024-03-01 PROCEDURE — 82948 REAGENT STRIP/BLOOD GLUCOSE: CPT

## 2024-03-01 PROCEDURE — 26160 REMOVE TENDON SHEATH LESION: CPT | Performed by: PHYSICIAN ASSISTANT

## 2024-03-01 RX ORDER — KETOROLAC TROMETHAMINE 30 MG/ML
INJECTION, SOLUTION INTRAMUSCULAR; INTRAVENOUS AS NEEDED
Status: DISCONTINUED | OUTPATIENT
Start: 2024-03-01 | End: 2024-03-01

## 2024-03-01 RX ORDER — SODIUM CHLORIDE, SODIUM LACTATE, POTASSIUM CHLORIDE, CALCIUM CHLORIDE 600; 310; 30; 20 MG/100ML; MG/100ML; MG/100ML; MG/100ML
125 INJECTION, SOLUTION INTRAVENOUS CONTINUOUS
Status: DISCONTINUED | OUTPATIENT
Start: 2024-03-01 | End: 2024-03-01 | Stop reason: HOSPADM

## 2024-03-01 RX ORDER — OXYCODONE HYDROCHLORIDE 10 MG/1
10 TABLET ORAL EVERY 4 HOURS PRN
Status: DISCONTINUED | OUTPATIENT
Start: 2024-03-01 | End: 2024-03-01 | Stop reason: HOSPADM

## 2024-03-01 RX ORDER — CEFAZOLIN SODIUM 2 G/50ML
2000 SOLUTION INTRAVENOUS ONCE
Status: COMPLETED | OUTPATIENT
Start: 2024-03-01 | End: 2024-03-01

## 2024-03-01 RX ORDER — FENTANYL CITRATE/PF 50 MCG/ML
25 SYRINGE (ML) INJECTION
Status: COMPLETED | OUTPATIENT
Start: 2024-03-01 | End: 2024-03-01

## 2024-03-01 RX ORDER — DEXAMETHASONE SODIUM PHOSPHATE 10 MG/ML
INJECTION, SOLUTION INTRAMUSCULAR; INTRAVENOUS AS NEEDED
Status: DISCONTINUED | OUTPATIENT
Start: 2024-03-01 | End: 2024-03-01

## 2024-03-01 RX ORDER — ONDANSETRON 2 MG/ML
4 INJECTION INTRAMUSCULAR; INTRAVENOUS ONCE AS NEEDED
Status: DISCONTINUED | OUTPATIENT
Start: 2024-03-01 | End: 2024-03-01 | Stop reason: HOSPADM

## 2024-03-01 RX ORDER — CHLORHEXIDINE GLUCONATE ORAL RINSE 1.2 MG/ML
15 SOLUTION DENTAL ONCE
Status: COMPLETED | OUTPATIENT
Start: 2024-03-01 | End: 2024-03-01

## 2024-03-01 RX ORDER — PROPOFOL 10 MG/ML
INJECTION, EMULSION INTRAVENOUS CONTINUOUS PRN
Status: DISCONTINUED | OUTPATIENT
Start: 2024-03-01 | End: 2024-03-01

## 2024-03-01 RX ORDER — LABETALOL HYDROCHLORIDE 5 MG/ML
10 INJECTION, SOLUTION INTRAVENOUS ONCE
Status: COMPLETED | OUTPATIENT
Start: 2024-03-01 | End: 2024-03-01

## 2024-03-01 RX ORDER — LABETALOL HYDROCHLORIDE 5 MG/ML
INJECTION, SOLUTION INTRAVENOUS
Status: DISCONTINUED
Start: 2024-03-01 | End: 2024-03-01 | Stop reason: HOSPADM

## 2024-03-01 RX ORDER — PROMETHAZINE HYDROCHLORIDE 25 MG/ML
12.5 INJECTION, SOLUTION INTRAMUSCULAR; INTRAVENOUS ONCE AS NEEDED
Status: DISCONTINUED | OUTPATIENT
Start: 2024-03-01 | End: 2024-03-01 | Stop reason: HOSPADM

## 2024-03-01 RX ADMIN — CEFAZOLIN SODIUM 2000 MG: 2 SOLUTION INTRAVENOUS at 11:20

## 2024-03-01 RX ADMIN — PROPOFOL 80 MCG/KG/MIN: 10 INJECTION, EMULSION INTRAVENOUS at 11:23

## 2024-03-01 RX ADMIN — FENTANYL CITRATE 25 MCG: 50 INJECTION INTRAMUSCULAR; INTRAVENOUS at 12:26

## 2024-03-01 RX ADMIN — FENTANYL CITRATE 25 MCG: 50 INJECTION INTRAMUSCULAR; INTRAVENOUS at 12:07

## 2024-03-01 RX ADMIN — SODIUM CHLORIDE, SODIUM LACTATE, POTASSIUM CHLORIDE, AND CALCIUM CHLORIDE 125 ML/HR: .6; .31; .03; .02 INJECTION, SOLUTION INTRAVENOUS at 09:39

## 2024-03-01 RX ADMIN — FENTANYL CITRATE 25 MCG: 50 INJECTION INTRAMUSCULAR; INTRAVENOUS at 12:30

## 2024-03-01 RX ADMIN — DEXAMETHASONE SODIUM PHOSPHATE 10 MG: 10 INJECTION, SOLUTION INTRAMUSCULAR; INTRAVENOUS at 11:25

## 2024-03-01 RX ADMIN — Medication 10 MG: at 12:39

## 2024-03-01 RX ADMIN — CHLORHEXIDINE GLUCONATE 0.12% ORAL RINSE 15 ML: 1.2 LIQUID ORAL at 09:42

## 2024-03-01 RX ADMIN — KETOROLAC TROMETHAMINE 15 MG: 30 INJECTION, SOLUTION INTRAMUSCULAR; INTRAVENOUS at 11:34

## 2024-03-01 RX ADMIN — FENTANYL CITRATE 25 MCG: 50 INJECTION INTRAMUSCULAR; INTRAVENOUS at 12:04

## 2024-03-01 NOTE — ANESTHESIA PREPROCEDURE EVALUATION
Procedure:  RELEASE CARPAL TUNNEL (Right: Wrist)  RELEASE TRIGGER MIDDLE FINGER (Right: Hand)    Relevant Problems   CARDIO   (+) Abdominal aortic atherosclerosis (HCC)   (+) Chronic deep vein thrombosis (DVT) of right popliteal vein (HCC)   (+) Essential hypertension   (+) Mixed hyperlipidemia   (+) Newly recognized heart murmur      ENDO   (+) Type 2 diabetes mellitus with diabetic polyneuropathy, with long-term current use of insulin (HCC)      GI/HEPATIC   (+) Gastroesophageal reflux disease without esophagitis      HEMATOLOGY   (+) 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HCC)      MUSCULOSKELETAL   (+) Chondrocalcinosis of right knee   (+) Primary osteoarthritis of one hip, right      NEURO/PSYCH   (+) Chronic, continuous use of opioids 2/2 post-laminectomy syndrome   (+) Continuous opioid dependence (HCC)   (+) Mild depression      PULMONARY   (+) Obstructive sleep apnea      Other   (+) CPAP (continuous positive airway pressure) dependence   (+) Lymph node disorder   (+) Post laminectomy syndrome      POC     Physical Exam    Airway    Mallampati score: III  TM Distance: >3 FB  Neck ROM: full     Dental    upper dentures and lower dentures    Cardiovascular      Pulmonary      Other Findings        Anesthesia Plan  ASA Score- 3     Anesthesia Type- IV sedation with anesthesia with ASA Monitors.         Additional Monitors:     Airway Plan:            Plan Factors-Exercise tolerance (METS): >4 METS.    Chart reviewed.   Existing labs reviewed. Patient summary reviewed.                  Induction- intravenous.    Postoperative Plan-     Informed Consent- Anesthetic plan and risks discussed with patient.  I personally reviewed this patient with the CRNA. Discussed and agreed on the Anesthesia Plan with the CRNA..

## 2024-03-01 NOTE — OP NOTE
OPERATIVE REPORT  PATIENT NAME: Damon Patten  :  1960  MRN: [id number]  Pt Location: [facility]    SURGERY DATE: 24    Surgeons and Role:     * Amanda Hernandez MD - Primary     * Aurora Blanton PA-C - Assisting    Pre-Op Diagnosis:  Carpal tunnel syndrome, bilateral [G56.03]  Trigger finger, right middle finger [M65.331]    Post-Op Diagnosis Codes:     * Carpal tunnel syndrome, bilateral [G56.03]     * Trigger finger, right middle finger [M65.331]    Procedure(s):  RELEASE CARPAL TUNNEL (Right)  RELEASE TRIGGER MIDDLE FINGER (Right)    Specimen(s):  No specimens collected during this procedure.    Estimated Blood Loss:   Minimal    Anesthesia Type:   Conscious Sedation     IMPLANTS:  * No implants in log *    PERIOPERATIVE ANTIBIOTICS:    cefazolin, 2 grams    Tourniquet Time: 11 min  at 250 mmHg          Operative Indications:  The patient has a history of Carpal Tunnel Syndrome  right and Trigger Finger  right  long finger that was recalcitrant to conservative management.  The decision was made to bring the patient to the operating room for Open Carpal Tunnel Release  right and Trigger Finger Release  right  long finger.  Risks of the procedure were explained which include, but are not limited to bleeding; infection; damage to nerves, arteries,veins, tendons; scar; pain; need for reoperation; failure to give desired result; and risks of anaesthesia.  All questions were answered to satisfaction and they were willing to proceed.         Operative Findings:  Hypertrophy TCL  Hypertrophy A1 pulley with 2 associated retinacular cysts , excised  FDS interstitial tears, debrided      Complications:   None    Procedure and Technique:  After the patient, site, and procedure were identified, the patient was brought into the operating room in a supine position.  Local anaesthesia and sedation were provided.  A well padded tourniquet was applied to the extremity, set at 250 mmHg.  The  right upper extremity  was then prepped and drapped in a normal, sterile, orthopedic fashion.    An anterior approach to the carpal tunnel was undertaken. A 2 cm incision was made in line with the fourth digit, proximal to Jaime's line.  The skin and the subcutaneous tissue were sharply incised.  Under loupe magnification, dissection was carried down to the palmar fascia and it was incised. The transverse carpal ligament was visualized and  Released distally with a #64 blade. A freer was was passed above and below the TCL in a retrograde fashion, freeing up any adhesions. A Knife Lite was used to release the proximal portion of the transverse carpal ligament under direct visualization.  Distally the superficial arch was identified.  A complete release was carried out and exploration of the carpal canal revealed no significant tenosynovitis. The median nerve and its branches were intact.         A  longitudinal  incision is made in line with the long finger overlying the A1 pulley.. Dissection was  carried down under loupe magnification. Skin and subcutaneous tissues were sharply incised. The tissue was elevated off the A1 pulley. The A1 pulley was  identified and incised. There were 2  retinacular cysts noted, which were excised. Tenosynovectomy was carried out. The FDS and FDP were noted to have independent glide after tenosynovectomy.  The FDS was noted to have interstitial tears which were sharply debrided with a dissection scissor.  the patient was able to flex and extend without any triggering.       At the completion of the procedure, hemostasis was obtained with cautery and direct pressure.  The wounds were copiously irrigated with sterile solution.  The wounds were closed with Prolene.  Sterile dressings were applied, including Xeroform, gauze, tweeners, webril, ACE.  Please note, all sponge, needle, and instrument counts were correct prior to closure.  Loupe magnification was utilized.  The patient tolerated the procedure  well.     I was present for the entire procedure., A qualified resident physician was not available., and A physician assistant was required during the procedure for retraction, tissue handling, dissection and suturing.    Patient Disposition:  PACU     SIGNATURE: Amanda Hernandez MD  DATE: 03/01/24  TIME: 11:46 AM

## 2024-03-01 NOTE — ANESTHESIA POSTPROCEDURE EVALUATION
Post-Op Assessment Note    CV Status:  Stable  Pain Score: 0    Pain management: adequate       Mental Status:  Alert and awake   Hydration Status:  Euvolemic   PONV Controlled:  Controlled   Airway Patency:  Patent     Post Op Vitals Reviewed: Yes    No anethesia notable event occurred.    Staff: Anesthesiologist, CRNA               BP   179/89   Temp   97.1   Pulse  65   Resp   16   SpO2   97

## 2024-03-02 RX ORDER — RIVAROXABAN 20 MG/1
20 TABLET, FILM COATED ORAL
Qty: 90 TABLET | Refills: 1 | Status: SHIPPED | OUTPATIENT
Start: 2024-03-02

## 2024-03-02 RX ORDER — AMLODIPINE AND BENAZEPRIL HYDROCHLORIDE 10; 40 MG/1; MG/1
CAPSULE ORAL
Qty: 90 CAPSULE | Refills: 1 | Status: SHIPPED | OUTPATIENT
Start: 2024-03-02

## 2024-03-05 DIAGNOSIS — K21.9 GASTROESOPHAGEAL REFLUX DISEASE WITHOUT ESOPHAGITIS: ICD-10-CM

## 2024-03-05 RX ORDER — LANSOPRAZOLE 30 MG/1
30 CAPSULE, DELAYED RELEASE ORAL DAILY
Qty: 90 CAPSULE | Refills: 1 | Status: SHIPPED | OUTPATIENT
Start: 2024-03-05

## 2024-03-13 ENCOUNTER — OFFICE VISIT (OUTPATIENT)
Dept: OBGYN CLINIC | Facility: CLINIC | Age: 64
End: 2024-03-13

## 2024-03-13 VITALS
DIASTOLIC BLOOD PRESSURE: 82 MMHG | SYSTOLIC BLOOD PRESSURE: 160 MMHG | HEART RATE: 73 BPM | HEIGHT: 69 IN | BODY MASS INDEX: 34.21 KG/M2 | WEIGHT: 231 LBS

## 2024-03-13 DIAGNOSIS — Z98.890 S/P TRIGGER FINGER RELEASE: ICD-10-CM

## 2024-03-13 DIAGNOSIS — Z98.890 S/P CARPAL TUNNEL RELEASE: Primary | ICD-10-CM

## 2024-03-13 PROCEDURE — 99024 POSTOP FOLLOW-UP VISIT: CPT | Performed by: SURGERY

## 2024-03-13 NOTE — PROGRESS NOTES
Assessment/Plan:  Patient ID: Damon Patten 63 y.o. male   Surgery: Release Carpal Tunnel - Right and Release Trigger Middle Finger - Right  Date of Surgery: 3/1/2024    12 days s/p above surgery. Sutures were removed today. He was advised to perform scar massage. He may shower normally, advised to continue to avoid standing water until the end of the weekend to allow suture holes to close. Digital flexion is full, advised to work on full PIP extension of the long finger at home. I will see him back in the office as needed if symptoms worsen or fail to improve.     Follow Up:  PRN    To Do Next Visit:  Re-evaluation       CHIEF COMPLAINT:  Chief Complaint   Patient presents with    Post-op     Release Carpal Tunnel - Right   Release Trigger Middle Finger - Right 3/1/24 patient is here for sutures out             SUBJECTIVE:  Damon Patten is a 63 y.o. year old male who presents for follow up after Release Carpal Tunnel - Right and Release Trigger Middle Finger - Right. Today patient has No Complaints. Numbness and tingling has improved.       PHYSICAL EXAMINATION:  General: well developed and well nourished, alert, oriented times 3, and appears comfortable  Psychiatric: Normal    MUSCULOSKELETAL EXAMINATION:  Incision: clean, dry, and suture(s) intact   Surgery Site: normal, no evidence of infection   Range of Motion: As expected and full composite fist possible  Neurovascular status: Neuro intact, good cap refill  Activity Restrictions: Avoid standing water until the end of the weekend to allow suture holes to close   Done today: Sutures out      STUDIES REVIEWED:  No Studies to review      PROCEDURES PERFORMED:  Procedures  No Procedures performed today    Scribe Attestation      I,:  Yamileth Jones am acting as a scribe while in the presence of the attending physician.:       I,:  Amanda Hernandez MD personally performed the services described in this documentation    as scribed in my presence.:

## 2024-04-12 ENCOUNTER — APPOINTMENT (OUTPATIENT)
Dept: LAB | Facility: CLINIC | Age: 64
End: 2024-04-12
Payer: COMMERCIAL

## 2024-04-12 DIAGNOSIS — E55.9 VITAMIN D DEFICIENCY: ICD-10-CM

## 2024-04-12 DIAGNOSIS — E78.2 MIXED HYPERLIPIDEMIA: ICD-10-CM

## 2024-04-12 DIAGNOSIS — E11.42 TYPE 2 DIABETES MELLITUS WITH DIABETIC POLYNEUROPATHY, WITH LONG-TERM CURRENT USE OF INSULIN (HCC): ICD-10-CM

## 2024-04-12 DIAGNOSIS — Z79.4 TYPE 2 DIABETES MELLITUS WITH DIABETIC POLYNEUROPATHY, WITH LONG-TERM CURRENT USE OF INSULIN (HCC): ICD-10-CM

## 2024-04-12 DIAGNOSIS — I10 ESSENTIAL HYPERTENSION: ICD-10-CM

## 2024-04-12 DIAGNOSIS — Z12.5 PROSTATE CANCER SCREENING: ICD-10-CM

## 2024-04-12 LAB
25(OH)D3 SERPL-MCNC: 75.5 NG/ML (ref 30–100)
EST. AVERAGE GLUCOSE BLD GHB EST-MCNC: 131 MG/DL
HBA1C MFR BLD: 6.2 %
PSA SERPL-MCNC: 1.05 NG/ML (ref 0–4)

## 2024-04-12 PROCEDURE — 80061 LIPID PANEL: CPT

## 2024-04-12 PROCEDURE — G0103 PSA SCREENING: HCPCS

## 2024-04-12 PROCEDURE — 80053 COMPREHEN METABOLIC PANEL: CPT

## 2024-04-12 PROCEDURE — 83036 HEMOGLOBIN GLYCOSYLATED A1C: CPT

## 2024-04-12 PROCEDURE — 82570 ASSAY OF URINE CREATININE: CPT

## 2024-04-12 PROCEDURE — 82306 VITAMIN D 25 HYDROXY: CPT

## 2024-04-12 PROCEDURE — 82043 UR ALBUMIN QUANTITATIVE: CPT

## 2024-04-12 PROCEDURE — 36415 COLL VENOUS BLD VENIPUNCTURE: CPT

## 2024-04-13 LAB
ALBUMIN SERPL BCP-MCNC: 4 G/DL (ref 3.5–5)
ALP SERPL-CCNC: 53 U/L (ref 34–104)
ALT SERPL W P-5'-P-CCNC: 15 U/L (ref 7–52)
ANION GAP SERPL CALCULATED.3IONS-SCNC: 6 MMOL/L (ref 4–13)
AST SERPL W P-5'-P-CCNC: 14 U/L (ref 13–39)
BILIRUB SERPL-MCNC: 0.25 MG/DL (ref 0.2–1)
BUN SERPL-MCNC: 13 MG/DL (ref 5–25)
CALCIUM SERPL-MCNC: 9 MG/DL (ref 8.4–10.2)
CHLORIDE SERPL-SCNC: 101 MMOL/L (ref 96–108)
CHOLEST SERPL-MCNC: 134 MG/DL
CO2 SERPL-SCNC: 32 MMOL/L (ref 21–32)
CREAT SERPL-MCNC: 0.71 MG/DL (ref 0.6–1.3)
CREAT UR-MCNC: 49.4 MG/DL
GFR SERPL CREATININE-BSD FRML MDRD: 100 ML/MIN/1.73SQ M
GLUCOSE P FAST SERPL-MCNC: 76 MG/DL (ref 65–99)
HDLC SERPL-MCNC: 57 MG/DL
LDLC SERPL CALC-MCNC: 61 MG/DL (ref 0–100)
MICROALBUMIN UR-MCNC: <7 MG/L
MICROALBUMIN/CREAT 24H UR: <14 MG/G CREATININE (ref 0–30)
POTASSIUM SERPL-SCNC: 4 MMOL/L (ref 3.5–5.3)
PROT SERPL-MCNC: 6.6 G/DL (ref 6.4–8.4)
SODIUM SERPL-SCNC: 139 MMOL/L (ref 135–147)
TRIGL SERPL-MCNC: 80 MG/DL

## 2024-04-19 ENCOUNTER — OFFICE VISIT (OUTPATIENT)
Dept: ENDOCRINOLOGY | Facility: CLINIC | Age: 64
End: 2024-04-19
Payer: COMMERCIAL

## 2024-04-19 VITALS
TEMPERATURE: 98.1 F | OXYGEN SATURATION: 98 % | SYSTOLIC BLOOD PRESSURE: 170 MMHG | HEART RATE: 78 BPM | WEIGHT: 239 LBS | BODY MASS INDEX: 35.29 KG/M2 | DIASTOLIC BLOOD PRESSURE: 80 MMHG

## 2024-04-19 DIAGNOSIS — Z79.4 TYPE 2 DIABETES MELLITUS WITH DIABETIC POLYNEUROPATHY, WITH LONG-TERM CURRENT USE OF INSULIN (HCC): Primary | ICD-10-CM

## 2024-04-19 DIAGNOSIS — E04.1 NON-TOXIC UNINODULAR GOITER: ICD-10-CM

## 2024-04-19 DIAGNOSIS — E11.42 TYPE 2 DIABETES MELLITUS WITH DIABETIC POLYNEUROPATHY, WITH LONG-TERM CURRENT USE OF INSULIN (HCC): Primary | ICD-10-CM

## 2024-04-19 DIAGNOSIS — I10 ESSENTIAL HYPERTENSION: ICD-10-CM

## 2024-04-19 DIAGNOSIS — E78.2 MIXED HYPERLIPIDEMIA: ICD-10-CM

## 2024-04-19 PROCEDURE — 99214 OFFICE O/P EST MOD 30 MIN: CPT | Performed by: STUDENT IN AN ORGANIZED HEALTH CARE EDUCATION/TRAINING PROGRAM

## 2024-04-19 NOTE — ASSESSMENT & PLAN NOTE
Blood pressure is elevated 170/80, no symptoms, he has not taken his antihypertensive medication, he checks his blood pressure at home which normally at range, did advise him to be consistent with antihypertensive medication and monitor blood pressure at home.

## 2024-04-19 NOTE — ASSESSMENT & PLAN NOTE
He has stopped taking pravastatin due to myalgia.  LDL is  at goal, ( < 70 ).  Will continue monitor without initiation of statin.

## 2024-04-19 NOTE — ASSESSMENT & PLAN NOTE
Left lower to interpolar nodule measuring 3.5 x 2.5 x 3.1 cm.  2 times benign biopsy, has been stable for 5 years, I agree to repeat ultrasound in 2 years.

## 2024-04-19 NOTE — ASSESSMENT & PLAN NOTE
Lab Results   Component Value Date    HGBA1C 6.2 (H) 04/12/2024     Diabetes is well-controlled with A1c of 6.2%, he has tolerated his current regimen which will be maintained.  He was instructed to check his blood sugars once a day in different times and bring his sugar log to next visit.  Return back in 3 months.  Labs prior to next visit.

## 2024-04-23 NOTE — PROGRESS NOTES
"Name: Damon Patten      : 1960      MRN: [id number]  Encounter Provider: Heena Pickens DO  Encounter Date: 2024   Encounter department: [facility]    Assessment & Plan     1. Essential hypertension    2. Cellulitis of palm of hand  -     cephalexin (KEFLEX) 500 mg capsule; Take 1 capsule (500 mg total) by mouth 2 (two) times a day for 7 days  -     XR hand 3+ vw right; Future; Expected date: 2024    3. Hx of hand surgery  -     cephalexin (KEFLEX) 500 mg capsule; Take 1 capsule (500 mg total) by mouth 2 (two) times a day for 7 days  -     XR hand 3+ vw right; Future; Expected date: 2024    4. Cellulitis of right middle finger  -     XR hand 3+ vw right; Future; Expected date: 2024    5. Mass of finger of right hand    6. Mass of right hand  -     XR hand 3+ vw right; Future; Expected date: 2024    7. Mild depression    Advised also call ortho for eval of the right hand cellulitis/possible abscess       Subjective      Chief Complaint   Patient presents with    Follow-up     3 mo f/u     Hand Swelling     had carpal tunnel surgery and trigger finger done in right hand, is now swollen, had surgery done 3/1/24       Scheduled f/u visit  Last visit in January Doubled dose of terazosin for bp - increased to 10mg QD - pt states \"that made a big difference, sleep through the night, don't have to get up to go to the bathroom so much\"  His BP is much better controlled, as well  Also reports new problem palm of right hand at previous surgery site - \"Was a little bit swollen, but now seems it's moreso, some days it's down that I can close it, but like right now it's swollen and can't close my finger\" for about 2 weeks - shows visible lump plantar aspect middle MCP joint and states gets swollen around that joint - admits tenderness, no pain - there is redness visible surrounding the lump and pt admits has been red like that also for about 2 weeks- no heat on exam, " "is somewhat fluctuant  Wife states, \"it was flat there right after the surgery - she took out 2 cysts there, maybe from using it more it's doing it\"  Tried heat and ice- doesn't seem to make a difference  Pt states, \"Where they did the carpal tunnel, it's fine\"  Denies any fever or chills  Advised also call ortho for eval             Date of Service: 3/1/2024 11:29 AM  Case Time: Procedures: Surgeons:  Amanda Hernandez MD  Physician  Orthopedics  Op Note     Signed  3/1/2024 11:29 AM   RELEASE CARPAL TUNNEL  RELEASE TRIGGER MIDDLE FINGER  Operative Indications:  The patient has a history of Carpal Tunnel Syndrome  right and Trigger Finger  right  long finger that was recalcitrant to conservative management.  The decision was made to bring the patient to the operating room for Open Carpal Tunnel Release  right and Trigger Finger Release  right  long finger.  Risks of the procedure were explained which include, but are not limited to bleeding; infection; damage to nerves, arteries,veins, tendons; scar; pain; need for reoperation; failure to give desired result; and risks of anaesthesia.  All questions were answered to satisfaction and they were willing to proceed.     Operative Findings:  Hypertrophy TCL  Hypertrophy A1 pulley with 2 associated retinacular cysts , excised  FDS interstitial tears, debrided    Complications:   None          Review of Systems    Current Outpatient Medications on File Prior to Visit   Medication Sig    amLODIPine-benazepril (LOTREL) 10-40 MG per capsule TAKE 1 CAPSULE DAILY    Ascorbic Acid (vitamin C) 1000 MG tablet Take 1,000 mg by mouth daily    fentaNYL (DURAGESIC) 100 mcg/hr TD 72 hr patch Place 1 patch on the skin every other day Change every 48 hours    gabapentin (NEURONTIN) 800 mg tablet Take 1 tablet by mouth 3 (three) times a day    glucose blood (ONE TOUCH ULTRA TEST) test strip Use to test with bf and dinner    hyoscyamine (LEVBID) 0.375 mg 12 hr tablet TAKE ONE TABLET BY " MOUTH EVERY DAY (Patient taking differently: 0.375 mg in the morning Pt reports using for bladder spasms /neurogenic - instructed to take after surgery once at home)    insulin glargine (Toujeo Max SoloStar) 300 units/mL CONCENTRATED U-300 injection pen (2-unit dial) Inject 16 Units under the skin daily at bedtime (Patient taking differently: Inject 16 Units under the skin daily at bedtime Using Basaglar insulin - 15 units at night)    lansoprazole (PREVACID) 30 mg capsule TAKE ONE CAPSULE BY MOUTH EVERY DAY    Melatonin 5 MG TABS Take 9 mg by mouth daily at bedtime    metFORMIN (GLUCOPHAGE) 1000 MG tablet TAKE 1 TABLET TWICE A DAY WITH MEALS    metoprolol succinate (TOPROL-XL) 25 mg 24 hr tablet TAKE 1 TABLET DAILY    multivitamin (THERAGRAN) TABS Take 1 tablet by mouth daily    NON FORMULARY in the morning Vitamin D3 daily    NON FORMULARY in the morning Vitamin B 12    OneTouch Delica Lancets 33G MISC by Other route daily As directed    oxyCODONE (ROXICODONE) 30 MG immediate release tablet Take 20 mg by mouth 3 (three) times a day    Ozempic, 1 MG/DOSE, 4 MG/3ML injection pen INJECT 1 MG UNDER THE SKIN ONCE A WEEK (Patient taking differently: Inject 1 mg under the skin every 7 days Takes on Saturdays - instructed to hold Sat dose 2/24/24)    rivaroxaban (Xarelto) 20 mg tablet TAKE 1 TABLET DAILY WITH BREAKFAST    sertraline (ZOLOFT) 50 mg tablet TAKE 1 AND 1/2 TABLETS BY MOUTH DAILY    terazosin (HYTRIN) 10 MG capsule Take 1 capsule (10 mg total) by mouth daily    TiZANidine (ZANAFLEX) 4 MG capsule Take 1 capsule (4 mg total) by mouth 3 (three) times a day (Patient taking differently: Take 4 mg by mouth 3 (three) times a day Taking 2 tablets daily)    tiZANidine (ZANAFLEX) 4 mg tablet Take 4 mg by mouth 2 (two) times a day    Unifine Pentips Plus 32G X 4 MM MISC USE ONCE DAILY WITH BASAGLAR PEN (Patient taking differently: USE ONCE DAILY WITH BASAGLAR PEN- 15 units at night per pt)    patient supplied medication  "ULTRA-BETA PROSTATE (Patient not taking: Reported on 4/24/2024)       Objective     /82 (BP Location: Left arm, Patient Position: Sitting, Cuff Size: Standard)   Pulse 73   Temp 98.9 °F (37.2 °C) (Tympanic)   Ht 5' 9\" (1.753 m)   Wt 109 kg (241 lb)   SpO2 97%   BMI 35.59 kg/m²     Physical Exam  Vitals and nursing note reviewed.   Constitutional:       General: He is not in acute distress.     Appearance: He is well-groomed. He is not ill-appearing, toxic-appearing or diaphoretic.   HENT:      Head: Normocephalic and atraumatic.      Mouth/Throat:      Lips: Pink.      Mouth: Mucous membranes are moist.      Pharynx: Oropharynx is clear.   Eyes:      General: Lids are normal.      Conjunctiva/sclera: Conjunctivae normal.   Neck:      Vascular: No JVD.      Trachea: Trachea and phonation normal.   Cardiovascular:      Rate and Rhythm: Normal rate and regular rhythm.      Heart sounds: Normal heart sounds.   Pulmonary:      [place]: Pulmonary effort is normal.      Breath sounds: Normal breath sounds and air entry.   Abdominal:      Palpations: Abdomen is soft.      Tenderness: There is no abdominal tenderness.   Musculoskeletal:      Right hand: Tenderness present. No bony tenderness. Decreased range of motion (right middle digit). Normal strength. Normal sensation. Normal capillary refill. Normal pulse.        Hands:       Cervical back: Neck supple.      Right lower leg: No edema.      Left lower leg: No edema.      Comments: No numbness   Lymphadenopathy:      Cervical: No cervical adenopathy.   Skin:     General: Skin is warm and dry.      Coloration: Skin is not pale.   Neurological:      General: No focal deficit present.      Mental Status: He is alert and oriented to person, place, and time.      Gait: Gait normal.   Psychiatric:         Mood and Affect: Mood normal.         Behavior: Behavior normal. Behavior is cooperative.         Cognition and Memory: Cognition normal.       Heena Pickens, " DO

## 2024-04-24 ENCOUNTER — HOSPITAL ENCOUNTER (OUTPATIENT)
Dept: RADIOLOGY | Facility: HOSPITAL | Age: 64
Discharge: HOME | End: 2024-04-24
Payer: COMMERCIAL

## 2024-04-24 ENCOUNTER — TELEPHONE (OUTPATIENT)
Age: 64
End: 2024-04-24

## 2024-04-24 ENCOUNTER — OFFICE VISIT (OUTPATIENT)
Dept: FAMILY MEDICINE CLINIC | Facility: CLINIC | Age: 64
End: 2024-04-24
Payer: COMMERCIAL

## 2024-04-24 VITALS
TEMPERATURE: 98.9 F | DIASTOLIC BLOOD PRESSURE: 82 MMHG | BODY MASS INDEX: 35.7 KG/M2 | WEIGHT: 241 LBS | OXYGEN SATURATION: 97 % | HEIGHT: 69 IN | SYSTOLIC BLOOD PRESSURE: 130 MMHG | HEART RATE: 73 BPM

## 2024-04-24 DIAGNOSIS — R22.31 MASS OF RIGHT HAND: ICD-10-CM

## 2024-04-24 DIAGNOSIS — L03.119 CELLULITIS OF PALM OF HAND: ICD-10-CM

## 2024-04-24 DIAGNOSIS — Z98.890 HX OF HAND SURGERY: ICD-10-CM

## 2024-04-24 DIAGNOSIS — L03.011 CELLULITIS OF RIGHT MIDDLE FINGER: ICD-10-CM

## 2024-04-24 DIAGNOSIS — R22.31 MASS OF FINGER OF RIGHT HAND: ICD-10-CM

## 2024-04-24 DIAGNOSIS — F32.A MILD DEPRESSION: ICD-10-CM

## 2024-04-24 DIAGNOSIS — I10 ESSENTIAL HYPERTENSION: Primary | ICD-10-CM

## 2024-04-24 PROCEDURE — G2211 COMPLEX E/M VISIT ADD ON: HCPCS | Performed by: FAMILY MEDICINE

## 2024-04-24 PROCEDURE — 73130 X-RAY EXAM OF HAND: CPT

## 2024-04-24 PROCEDURE — 99214 OFFICE O/P EST MOD 30 MIN: CPT | Performed by: FAMILY MEDICINE

## 2024-04-24 RX ORDER — CEPHALEXIN 500 MG/1
500 CAPSULE ORAL 2 TIMES DAILY
Qty: 14 CAPSULE | Refills: 0 | Status: SHIPPED | OUTPATIENT
Start: 2024-04-24 | End: 2024-05-01

## 2024-04-24 NOTE — TELEPHONE ENCOUNTER
Called and spoke w/pt and relayed Dr Hernandez's msg.  Attempted to schedule pt for next week per Dr Hernandez's request and she is booked in both [place] and [place].  Pt would prefer [facility] if possible as he is coming from [place].  Please call to schedule pt. Thank you.

## 2024-04-24 NOTE — TELEPHONE ENCOUNTER
This appears to be scar tissue more than discrete swelling? We can see him in the office in the next week or so. I do not suspect an infectious process

## 2024-04-24 NOTE — TELEPHONE ENCOUNTER
Asked if photo was rcvd through [employer],  advised nothing yet.   I advised he can send via email but he doesn't know how to attach the photo.

## 2024-04-24 NOTE — TELEPHONE ENCOUNTER
Called and spoke w/pt and he will send pictures via [employer].  He states that he just got off the phone w/his family doctor who said the xrays did not show anything wrong w/his bones.  Will let Dr Hernandez know.

## 2024-04-24 NOTE — TELEPHONE ENCOUNTER
Caller: Self    Doctor: David    Reason for call: Patient states a lump has developed on hand that is red and swollen. Was seen by PCP today and they started patient on Keflex. Was advised to make hand surgeon aware. See PCPs (Dr Pickens) notes from today's visit-please advise    Call back#: [phone]

## 2024-05-01 ENCOUNTER — OFFICE VISIT (OUTPATIENT)
Dept: OBGYN CLINIC | Facility: CLINIC | Age: 64
End: 2024-05-01
Payer: COMMERCIAL

## 2024-05-01 VITALS — WEIGHT: 241 LBS | HEIGHT: 69 IN | BODY MASS INDEX: 35.7 KG/M2

## 2024-05-01 DIAGNOSIS — Z98.890 S/P TRIGGER FINGER RELEASE: Primary | ICD-10-CM

## 2024-05-01 DIAGNOSIS — L76.34 POSTOPERATIVE SEROMA OF SUBCUTANEOUS TISSUE AFTER NON-DERMATOLOGIC PROCEDURE: ICD-10-CM

## 2024-05-01 PROCEDURE — 99024 POSTOP FOLLOW-UP VISIT: CPT | Performed by: SURGERY

## 2024-05-01 PROCEDURE — 87205 SMEAR GRAM STAIN: CPT | Performed by: PHYSICIAN ASSISTANT

## 2024-05-01 PROCEDURE — 87075 CULTR BACTERIA EXCEPT BLOOD: CPT | Performed by: PHYSICIAN ASSISTANT

## 2024-05-01 PROCEDURE — 20551 NJX 1 TENDON ORIGIN/INSJ: CPT | Performed by: SURGERY

## 2024-05-01 PROCEDURE — 87070 CULTURE OTHR SPECIMN AEROBIC: CPT | Performed by: PHYSICIAN ASSISTANT

## 2024-05-01 RX ORDER — LIDOCAINE HYDROCHLORIDE 10 MG/ML
2 INJECTION, SOLUTION INFILTRATION; PERINEURAL
Status: COMPLETED | OUTPATIENT
Start: 2024-05-01 | End: 2024-05-01

## 2024-05-01 RX ADMIN — LIDOCAINE HYDROCHLORIDE 2 ML: 10 INJECTION, SOLUTION INFILTRATION; PERINEURAL at 09:15

## 2024-05-01 NOTE — PROGRESS NOTES
Assessment/Plan:  Patient ID: Damon Patten 63 y.o. male   Surgery: Release Carpal Tunnel - Right and Release Trigger Middle Finger - Right  Date of Surgery: 3/1/2024    2 months s/p above surgery. It was discussed with Lee that he may have developed a seroma to his right long finger A1 pulley. After local anesthetic was achieved, the mass was aspirated. The aspiration was sent for culture/gram stain and anaerobic cultures. I will let him know if anything concerning is resulted, at which time he may benefit from a I&D. He does have stiffness to his right long finger, which is common after a long standing trigger finger. OT was ordered for a HEP for ROM and stretching exercises. Follow up in 7-8 weeks for clinical re-evaluation.     Follow Up:  7-8 weeks     To Do Next Visit:  Re-evaluation       CHIEF COMPLAINT:  Chief Complaint   Patient presents with    Post-op     Carpal tunnel syndrome,  Rt middle finger painful          SUBJECTIVE:  Damon Patten is a 63 y.o. year old male who presents for follow up after Release Carpal Tunnel - Right and Release Trigger Middle Finger - Right. Today patient has a mass/swelling to his trigger finger release incision. This started approx. 3-4 weeks ago. His PCP did place him on Keflex, which he has one more dose of. He feels the mass/swelling may have improved since taking the oral ABX. He feels the soft tissue about his right long finger proximal phalanx is hard.       PHYSICAL EXAMINATION:  General: well developed and well nourished, alert, oriented times 3, and appears comfortable  Psychiatric: Normal    MUSCULOSKELETAL EXAMINATION:  Incision: healed  Surgery Site:  mass/swelling over A1 pulley, possible seroma   Range of Motion: Limited due to stiffness  Neurovascular status: Neuro intact, good cap refill  Activity Restrictions: No restrictions    STUDIES REVIEWED:  No new imaging to review     LABS REVIEWED:    HgA1c:   Lab Results   Component Value Date    HGBA1C 6.2 (H)  04/12/2024     BMP:   Lab Results   Component Value Date    GLUCOSE 95 07/20/2022    CALCIUM 9.0 04/12/2024     12/14/2015    K 4.0 04/12/2024    CO2 32 04/12/2024     04/12/2024    BUN 13 04/12/2024    CREATININE 0.71 04/12/2024           PROCEDURES PERFORMED:  Hand/upper extremity injection  Universal Protocol:  Consent: Verbal consent obtained. Written consent not obtained.  Risks and benefits: risks, benefits and alternatives were discussed  Consent given by: patient  Patient understanding: patient states understanding of the procedure being performed  Patient identity confirmed: verbally with patient  Supporting Documentation  Indications: pain   Procedure Details  Condition comment: right long finger A1 pulley mass   Needle size: 18 G  Ultrasound guidance: no  Medications administered: 2 mL lidocaine 1 %  Aspirate amount: 2 (CC's) mL  Aspirate: clear and blood-tinged  Analysis: fluid sample sent for laboratory analysis  Patient tolerance: patient tolerated the procedure well with no immediate complications  Dressing:  Sterile dressing applied              Scribe Attestation      I,:  Yamileth Jones am acting as a scribe while in the presence of the attending physician.:       I,:  Amanda Hernandez MD personally performed the services described in this documentation    as scribed in my presence.:

## 2024-05-08 ENCOUNTER — EVALUATION (OUTPATIENT)
Dept: OCCUPATIONAL THERAPY | Facility: CLINIC | Age: 64
End: 2024-05-08
Payer: COMMERCIAL

## 2024-05-08 DIAGNOSIS — L76.34 POSTOPERATIVE SEROMA OF SUBCUTANEOUS TISSUE AFTER NON-DERMATOLOGIC PROCEDURE: ICD-10-CM

## 2024-05-08 DIAGNOSIS — Z98.890 S/P TRIGGER FINGER RELEASE: ICD-10-CM

## 2024-05-08 PROCEDURE — 97110 THERAPEUTIC EXERCISES: CPT

## 2024-05-08 PROCEDURE — 97165 OT EVAL LOW COMPLEX 30 MIN: CPT

## 2024-05-08 PROCEDURE — 97140 MANUAL THERAPY 1/> REGIONS: CPT

## 2024-05-08 NOTE — PROGRESS NOTES
OT Evaluation     Today's date: 2024  Patient name: Damon Patten  : 1960  MRN: [id number]  Referring provider: Aurora Blanton PA-C  Dx:   Encounter Diagnosis     ICD-10-CM    1. Postoperative seroma of subcutaneous tissue after non-dermatologic procedure  L76.34 Ambulatory Referral to PT/OT Hand Therapy      2. S/P trigger finger release  Z98.890 Ambulatory Referral to PT/OT Hand Therapy          Start Time: 1030  Stop Time: 1126  Total time in clinic (min): 56 minutes    Assessment  Assessment details: Damon Patten is a 63 y.o., Right HD male referred to hand therapy s/p R CTR and RMF TFR on 3/1/24. Patient presents 24 with impaired ROM, strength, and coordination of the R hand and digits.  Deficits also noted in pain and functional use of the right UE. Patient's incisions are well healed however he continues to have a seroma/mass at A1 pulley of RMF. This was aspirated by Dr. Hernandez on 24. He had good relief with this but the mass has returned to original size which is likely limiting his motion and causing pain. He presents with edema and tenderness at P1 of RMF. Provided patient with HEP focused on digit ROM, stretching, edema massage, and massage to seroma/mass in palm. No active triggering noted throughout session today. Patient is a good candidate for OT services to decrease pain and edema and restore ROM, strength, and coordination for a return to independence in daily tasks.   Impairments: abnormal or restricted ROM, activity intolerance, impaired physical strength, lacks appropriate home exercise program and pain with function    Symptom irritability: lowUnderstanding of Dx/Px/POC: excellent  Goals  STGs/LTGs (4-6 weeks)  Patient will be independent in implementing HEP prescribed by therapist  Patient will report an average pain level of 1-2/10 with pressure over P1 of RMF  Patient will demonstrate 10-15 degree improvement in CABALLERO of the RMF for improved use of R in ADLs  Patient will  "demonstrate R  strength within 10% of L  strength for improved use of R hand in household chores  Patient will demonstrate R pinch strength within 10% of L  strength for improved use of R hand in meal preparation  Resolve edema through RMF    Plan  Plan details: Patient requested to be placed on hold for the next month and will focus on HEP only. He will contact the office if he would like to regularly attend formal OT.  Patient would benefit from: orthotics, skilled occupational therapy and custom splinting  Planned modality interventions: ultrasound, thermotherapy: paraffin bath, thermotherapy: hydrocollator packs and cryotherapy  Planned therapy interventions: IASTM, joint mobilization, kinesiology taping, manual therapy, massage, nerve gliding, neuromuscular re-education, orthotic fitting/training, orthotic management and training, patient education, strengthening, stretching, therapeutic activities, therapeutic exercise, home exercise program, graded exercise, graded activity, functional ROM exercises and flexibility  Frequency: 1x month  Duration in weeks: 6  Plan of Care beginning date: 5/8/2024  Plan of Care expiration date: 6/19/2024  Treatment plan discussed with: patient        Subjective Evaluation    History of Present Illness  Date of surgery: 3/1/2024  Mechanism of injury: surgery  Mechanism of injury: Damon Patten is a 63 y.o. male presenting to OT s/p CTR and RMF TFR on 3/1/24. He was seen by Dr. Hernandez on 5/1/24 after developing a seroma at his RMF incision site and had the mass aspirated. He presents today with pain at his proximal phalanx of his RMF . Reports that his pain went away and was able to move his fingers better when his seroma was aspirated. He is having difficulty with holding small objects and reports he has no strength in his hand. Reports he no longer has N/T in hand.  Patient Goals  Patient goals for therapy: increased motion and increased strength  Patient goal: \"I " "want to get the exercises to get my motion better\"  Pain  Current pain ratin  At best pain ratin  At worst pain ratin  Location: P1 of RMF  Quality: tender to palpation.  Relieving factors: heat  Exacerbated by: gripping, holding/carrying small objects.  Progression: improved    Social Support  Lives in: one-story house  Lives with: spouse    Employment status: not working  Hand dominance: right      Diagnostic Tests  X-ray: normal  Treatments  Current treatment: occupational therapy        Objective     Observations     Right Wrist/Hand   Positive for edema. Negative for trigger finger.     Tenderness     Additional Tenderness Details  Tenderness RMF P1, A1 pulley at site of seroma  No tenderness noted CTR site    Neurological Testing     Additional Neurological Details  Patient denies any N/T    Active Range of Motion     Right Wrist   Wrist flexion: 71 degrees   Wrist extension: 60 degrees     Left Thumb   Palmar Abduction     CMC: 45 degrees      Right Thumb   Palmar Abduction    CMC: 34  Opposition: Opposes to P2 of RSF    Right Digits   Flexion   Index     MCP: 75    PIP: 95    DIP: 65  Middle     MCP: 75    PIP: 80    DIP: 70  Ring     MCP: 75    PIP: 95    DIP: 65  Little     MCP: 85    PIP: 85    DIP: 70  Middle: 215 degrees    Strength/Myotome Testing     Left Wrist/Hand      (2nd hand position)     Trial 1: 105    Thumb Strength  Key/Lateral Pinch     Trial 1: 21  Tip/Two-Point Pinch     Trial 1: 14  Palmar/Three-Point Pinch     Trial 1: 18    Right Wrist/Hand   Wrist extension: 5  Wrist flexion: 5  Radial deviation: 5  Ulnar deviation: 5     (2nd hand position)     Trial 1: 40    Thumb Strength   Key/Lateral Pinch     Trial 1: 14  Tip/Two-Point Pinch     Trial 1: 8  Palmar/Three-Point Pinch     Trial 1: 8    Tests     Right Wrist/Hand   Negative Tinel's sign (medial nerve).              Access Code: [email or web address]  URL: [email or web address]  Date: 2024  Prepared by: " "José Miguel Berman    Exercises  - Wrist Tendon Gliding  - 4-5 x daily - 7 x weekly - 20 reps  - Finger Spreading  - 4-5 x daily - 7 x weekly - 20 reps  - Hand AROM PIP Blocking  - 4-5 x daily - 7 x weekly - 20 reps  - Hand AROM DIP Blocking  - 4-5 x daily - 7 x weekly - 20 reps  - Seated Single Digit Intrinsic Stretch  - 4-5 x daily - 7 x weekly - 10 reps - 5 hold  - Seated Wrist Extension Stretch  - 4-5 x daily - 7 x weekly - 10 reps - 5 hold  - Seated Edema Reduction Massage  - 4-5 x daily - 7 x weekly - 2-3 mins hold    Precautions: HTN, diabetic neuropathy, TFR/CTR 3/1/24  POC expires Unit limit Auth  expiration date PT/OT + Visit Limit?   6/19/24  Pending bomn                 Visit/Unit Tracking  AUTH Status:  Date 5/8 IE              Pending Used 1               Remaining                     Manuals 5/8 IE        Scar management         Edema control Edema massage 4', Review for HEP        STM Palmar mass 4' Review for HEP                 Neuro Re-Ed                                                                        Ther Ex         TGEs x20        Digit ROM Ab/adduction x20        BROM PIP, DIP PIP x20  DIP x20        Intrinsic stretches 10 x 5\" RMF        Extrinsic stretches Flexors 10 x 5\"                                            Ther Activity                           HEP POC; Review HEP and education                          Modalities         MHP 5'        US              "

## 2024-05-20 DIAGNOSIS — E11.42 TYPE 2 DIABETES MELLITUS WITH DIABETIC POLYNEUROPATHY, WITH LONG-TERM CURRENT USE OF INSULIN (HCC): ICD-10-CM

## 2024-05-20 DIAGNOSIS — Z79.4 TYPE 2 DIABETES MELLITUS WITH DIABETIC POLYNEUROPATHY, WITH LONG-TERM CURRENT USE OF INSULIN (HCC): ICD-10-CM

## 2024-05-20 RX ORDER — SEMAGLUTIDE 1.34 MG/ML
INJECTION, SOLUTION SUBCUTANEOUS
Qty: 9 ML | Refills: 1 | Status: SHIPPED | OUTPATIENT
Start: 2024-05-20 | End: 2024-05-24

## 2024-05-24 DIAGNOSIS — E11.42 TYPE 2 DIABETES MELLITUS WITH DIABETIC POLYNEUROPATHY, WITH LONG-TERM CURRENT USE OF INSULIN (HCC): ICD-10-CM

## 2024-05-24 DIAGNOSIS — E11.9 TYPE 2 DIABETES MELLITUS WITHOUT COMPLICATION, WITHOUT LONG-TERM CURRENT USE OF INSULIN (HCC): ICD-10-CM

## 2024-05-24 DIAGNOSIS — Z79.4 TYPE 2 DIABETES MELLITUS WITH DIABETIC POLYNEUROPATHY, WITH LONG-TERM CURRENT USE OF INSULIN (HCC): ICD-10-CM

## 2024-05-24 RX ORDER — INSULIN GLARGINE 300 U/ML
16 INJECTION, SOLUTION SUBCUTANEOUS
Qty: 6 ML | Refills: 0 | Status: SHIPPED | OUTPATIENT
Start: 2024-05-24 | End: 2024-08-22

## 2024-05-25 DIAGNOSIS — F32.A MILD DEPRESSION: ICD-10-CM

## 2024-05-25 DIAGNOSIS — K92.9: ICD-10-CM

## 2024-05-27 RX ORDER — HYOSCYAMINE SULFATE 0.38 MG/1
TABLET, EXTENDED RELEASE ORAL
Qty: 30 TABLET | Refills: 5 | Status: SHIPPED | OUTPATIENT
Start: 2024-05-27

## 2024-05-28 ENCOUNTER — TELEPHONE (OUTPATIENT)
Age: 64
End: 2024-05-28

## 2024-05-28 NOTE — TELEPHONE ENCOUNTER
PA for OZEMPIC    Submitted via    [x][id number]  []SurescriHybrent-Case ID #    []Faxed to plan   []Other website    []Phone call Case ID #      Office notes sent, clinical questions answered. Awaiting determination    Turnaround time for your insurance to make a decision on your Prior Authorization can take 7-21 business days.

## 2024-05-29 NOTE — TELEPHONE ENCOUNTER
PA for OZEMPIC Approved     Date(s) approved 03/28/24-05/27/25    Case # [id number]    Patient advised by          [x] [facility] Message  [] Phone call   []LMOM  []L/M to call office as no active Communication consent on file  []Unable to leave detailed message as VM not approved on Communication consent       Pharmacy advised by    [x]Fax  []Phone call    Approval letter scanned into [employer] Yes

## 2024-07-01 DIAGNOSIS — N40.0 BENIGN PROSTATIC HYPERPLASIA WITHOUT LOWER URINARY TRACT SYMPTOMS: ICD-10-CM

## 2024-07-01 RX ORDER — TERAZOSIN 10 MG/1
10 CAPSULE ORAL DAILY
Qty: 90 CAPSULE | Refills: 1 | Status: SHIPPED | OUTPATIENT
Start: 2024-07-01

## 2024-07-19 ENCOUNTER — APPOINTMENT (OUTPATIENT)
Dept: LAB | Facility: CLINIC | Age: 64
End: 2024-07-19
Payer: COMMERCIAL

## 2024-07-19 DIAGNOSIS — E11.42 TYPE 2 DIABETES MELLITUS WITH DIABETIC POLYNEUROPATHY, WITH LONG-TERM CURRENT USE OF INSULIN (HCC): ICD-10-CM

## 2024-07-19 DIAGNOSIS — Z79.4 TYPE 2 DIABETES MELLITUS WITH DIABETIC POLYNEUROPATHY, WITH LONG-TERM CURRENT USE OF INSULIN (HCC): ICD-10-CM

## 2024-07-19 DIAGNOSIS — I10 ESSENTIAL HYPERTENSION: ICD-10-CM

## 2024-07-19 DIAGNOSIS — E78.2 MIXED HYPERLIPIDEMIA: ICD-10-CM

## 2024-07-19 LAB
ALBUMIN SERPL BCG-MCNC: 4 G/DL (ref 3.5–5)
ALP SERPL-CCNC: 66 U/L (ref 34–104)
ALT SERPL W P-5'-P-CCNC: 12 U/L (ref 7–52)
ANION GAP SERPL CALCULATED.3IONS-SCNC: 9 MMOL/L (ref 4–13)
AST SERPL W P-5'-P-CCNC: 15 U/L (ref 13–39)
BILIRUB SERPL-MCNC: 0.22 MG/DL (ref 0.2–1)
BUN SERPL-MCNC: 15 MG/DL (ref 5–25)
CALCIUM SERPL-MCNC: 9.3 MG/DL (ref 8.4–10.2)
CHLORIDE SERPL-SCNC: 101 MMOL/L (ref 96–108)
CHOLEST SERPL-MCNC: 125 MG/DL
CO2 SERPL-SCNC: 31 MMOL/L (ref 21–32)
CREAT SERPL-MCNC: 0.76 MG/DL (ref 0.6–1.3)
EST. AVERAGE GLUCOSE BLD GHB EST-MCNC: 131 MG/DL
GFR SERPL CREATININE-BSD FRML MDRD: 97 ML/MIN/1.73SQ M
GLUCOSE P FAST SERPL-MCNC: 93 MG/DL (ref 65–99)
HBA1C MFR BLD: 6.2 %
HDLC SERPL-MCNC: 41 MG/DL
LDLC SERPL CALC-MCNC: 58 MG/DL (ref 0–100)
POTASSIUM SERPL-SCNC: 3.9 MMOL/L (ref 3.5–5.3)
PROT SERPL-MCNC: 6.9 G/DL (ref 6.4–8.4)
SODIUM SERPL-SCNC: 141 MMOL/L (ref 135–147)
TRIGL SERPL-MCNC: 128 MG/DL

## 2024-07-19 PROCEDURE — 36415 COLL VENOUS BLD VENIPUNCTURE: CPT

## 2024-07-19 PROCEDURE — 80053 COMPREHEN METABOLIC PANEL: CPT

## 2024-07-19 PROCEDURE — 83036 HEMOGLOBIN GLYCOSYLATED A1C: CPT

## 2024-07-19 PROCEDURE — 80061 LIPID PANEL: CPT

## 2024-08-01 ENCOUNTER — OFFICE VISIT (OUTPATIENT)
Dept: FAMILY MEDICINE CLINIC | Facility: CLINIC | Age: 64
End: 2024-08-01
Payer: COMMERCIAL

## 2024-08-01 VITALS
BODY MASS INDEX: 36.15 KG/M2 | TEMPERATURE: 98.2 F | DIASTOLIC BLOOD PRESSURE: 72 MMHG | HEART RATE: 72 BPM | OXYGEN SATURATION: 98 % | SYSTOLIC BLOOD PRESSURE: 120 MMHG | WEIGHT: 244.1 LBS | HEIGHT: 69 IN

## 2024-08-01 DIAGNOSIS — I10 ESSENTIAL HYPERTENSION: Primary | ICD-10-CM

## 2024-08-01 DIAGNOSIS — T88.7XXA MEDICATION SIDE EFFECTS: ICD-10-CM

## 2024-08-01 DIAGNOSIS — E11.42 TYPE 2 DIABETES MELLITUS WITH DIABETIC POLYNEUROPATHY, WITH LONG-TERM CURRENT USE OF INSULIN (HCC): ICD-10-CM

## 2024-08-01 DIAGNOSIS — Z79.4 TYPE 2 DIABETES MELLITUS WITH DIABETIC POLYNEUROPATHY, WITH LONG-TERM CURRENT USE OF INSULIN (HCC): ICD-10-CM

## 2024-08-01 PROCEDURE — G2211 COMPLEX E/M VISIT ADD ON: HCPCS | Performed by: FAMILY MEDICINE

## 2024-08-01 PROCEDURE — 99214 OFFICE O/P EST MOD 30 MIN: CPT | Performed by: FAMILY MEDICINE

## 2024-08-01 NOTE — PROGRESS NOTES
"Assessment/Plan:      Diagnoses and all orders for this visit:    Essential hypertension    Type 2 diabetes mellitus with diabetic polyneuropathy, with long-term current use of insulin (Formerly [facility])  Comments:  managed by endo    Medication side effects  Comments:  pt reports still gets \"queezy stomach\" on metformin 1000mg BID          Subjective:  Chief Complaint   Patient presents with    Follow-up     Back pain        Patient ID: Damon Patten is a 64 y.o. male.    Scheduled f/u  Asks whether he has to stay on the xarelto- I review his antithrom 3 def and hx clots - needs lifelong antiocoag - does see Hematol routinely  Pt states, \"Metformin - get queezy stomach from it still\" - DM2 is managed by endo- on 1000mg BID for couple of years - working well, but the GI side effects continue- pt has f/u with his endo in a couple of weeks- will ask about long-acting metformin - pt is also on ozempic weekly and insulin -DM2 is well controlled  Sees his podiatrist 8/20 for his diab foot exam          Review of Systems      Objective:     Physical Exam  Vitals and nursing note reviewed.   Constitutional:       General: He is not in acute distress.     Appearance: He is well-groomed. He is obese. He is not ill-appearing, toxic-appearing or diaphoretic.   HENT:      Head: Normocephalic and atraumatic.      Mouth/Throat:      Mouth: Oropharynx is clear and moist and mucous membranes are normal.   Eyes:      General: Lids are normal.      Conjunctiva/sclera: Conjunctivae normal.   Neck:      Trachea: Phonation normal.   Cardiovascular:      Rate and Rhythm: Normal rate and regular rhythm.      Pulses: Normal pulses.      Heart sounds: Normal heart sounds.   Pulmonary:      [place]: Pulmonary effort is normal.      Breath sounds: Normal breath sounds and air entry.   Abdominal:      Palpations: Abdomen is soft. There is no mass.      Tenderness: There is no abdominal tenderness.   Musculoskeletal:      Right lower leg: No edema.      Left " lower leg: No edema.   Skin:     General: Skin is warm and dry.      Capillary Refill: Capillary refill takes less than 2 seconds.      Coloration: Skin is not pale.   Neurological:      General: No focal deficit present.      Mental Status: He is alert and oriented to person, place, and time.      Gait: Gait normal.   Psychiatric:         Mood and Affect: Mood and affect and mood normal.         Behavior: Behavior normal. Behavior is cooperative.         Cognition and Memory: Cognition and memory normal.

## 2024-08-19 DIAGNOSIS — K21.9 GASTROESOPHAGEAL REFLUX DISEASE WITHOUT ESOPHAGITIS: ICD-10-CM

## 2024-08-19 RX ORDER — LANSOPRAZOLE 30 MG/1
30 CAPSULE, DELAYED RELEASE ORAL DAILY
Qty: 90 CAPSULE | Refills: 1 | Status: SHIPPED | OUTPATIENT
Start: 2024-08-19

## 2024-08-21 ENCOUNTER — OFFICE VISIT (OUTPATIENT)
Dept: ENDOCRINOLOGY | Facility: CLINIC | Age: 64
End: 2024-08-21
Payer: COMMERCIAL

## 2024-08-21 VITALS
SYSTOLIC BLOOD PRESSURE: 154 MMHG | TEMPERATURE: 98.4 F | HEART RATE: 88 BPM | OXYGEN SATURATION: 99 % | DIASTOLIC BLOOD PRESSURE: 76 MMHG | BODY MASS INDEX: 33.59 KG/M2 | WEIGHT: 226.8 LBS | HEIGHT: 69 IN

## 2024-08-21 DIAGNOSIS — I10 ESSENTIAL HYPERTENSION: ICD-10-CM

## 2024-08-21 DIAGNOSIS — Z79.4 TYPE 2 DIABETES MELLITUS WITH DIABETIC POLYNEUROPATHY, WITH LONG-TERM CURRENT USE OF INSULIN (HCC): Primary | ICD-10-CM

## 2024-08-21 DIAGNOSIS — E04.1 NON-TOXIC UNINODULAR GOITER: ICD-10-CM

## 2024-08-21 DIAGNOSIS — E78.2 MIXED HYPERLIPIDEMIA: ICD-10-CM

## 2024-08-21 DIAGNOSIS — E11.42 TYPE 2 DIABETES MELLITUS WITH DIABETIC POLYNEUROPATHY, WITH LONG-TERM CURRENT USE OF INSULIN (HCC): Primary | ICD-10-CM

## 2024-08-21 PROCEDURE — 99214 OFFICE O/P EST MOD 30 MIN: CPT | Performed by: STUDENT IN AN ORGANIZED HEALTH CARE EDUCATION/TRAINING PROGRAM

## 2024-08-21 RX ORDER — METFORMIN HCL 500 MG
1000 TABLET, EXTENDED RELEASE 24 HR ORAL 2 TIMES DAILY WITH MEALS
Qty: 360 TABLET | Refills: 0 | Status: SHIPPED | OUTPATIENT
Start: 2024-08-21 | End: 2024-11-19

## 2024-08-21 NOTE — ASSESSMENT & PLAN NOTE
Left lower to interpolar nodule measuring 3.5 x 2.5 x 3.1 cm.  2 prior benign biopsies.  Repeat thyroid ultrasound in 2 years.

## 2024-08-21 NOTE — ASSESSMENT & PLAN NOTE
Blood pressure is above goal 150/76,  Monitor blood pressure at home, be consistent with antihypertensive medications.
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
## 2024-08-27 ENCOUNTER — TELEPHONE (OUTPATIENT)
Dept: SLEEP CENTER | Facility: CLINIC | Age: 64
End: 2024-08-27

## 2024-08-27 ENCOUNTER — OFFICE VISIT (OUTPATIENT)
Dept: PULMONOLOGY | Facility: CLINIC | Age: 64
End: 2024-08-27
Payer: COMMERCIAL

## 2024-08-27 VITALS
WEIGHT: 227 LBS | BODY MASS INDEX: 33.62 KG/M2 | TEMPERATURE: 98.4 F | HEIGHT: 69 IN | DIASTOLIC BLOOD PRESSURE: 88 MMHG | OXYGEN SATURATION: 98 % | HEART RATE: 73 BPM | SYSTOLIC BLOOD PRESSURE: 142 MMHG | RESPIRATION RATE: 16 BRPM

## 2024-08-27 DIAGNOSIS — G47.33 OBSTRUCTIVE SLEEP APNEA: Primary | ICD-10-CM

## 2024-08-27 PROCEDURE — 99214 OFFICE O/P EST MOD 30 MIN: CPT | Performed by: INTERNAL MEDICINE

## 2024-08-27 NOTE — LETTER
August 27, 2024     Heena Pickens DO  [address]  [facility] [address]    Patient: Damon Patten   YOB: 1960   Date of Visit: 8/27/2024       Dear Dr. Pickens:    Thank you for referring Damon Patten to me for evaluation. Below are my notes for this consultation.    If you have questions, please do not hesitate to call me. I look forward to following your patient along with you.         Sincerely,        Minesh Miles DO        CC: No Recipients    Minesh Miles DO  8/27/2024 10:04 PM  Sign when Signing Visit  Progress Note - Sleep Medicine  Damon Patten 64 y.o. male MRN: [id number]       Impression & Plan:   64 y.o. M with PMHx of DM, HTN, Depression, history of DVT on Xarelto, antithrombin 3 deficiency, chronic insomnia, chronic pain on oxycodone and AALIYAH on BIPAP who comes in for management of AALIYAH and insomnia.  1.  Mild AALIYAH (AHI - 9.9) with possible treatment emergent sleep apnea in part related to opiates with residual central events.  On autoBIPAP with excellent compliance and good clinical response.  Residual AHI - 2.1, LIAYH 1.5.        -  Conitnue autoBIPAP with min EPAP 4, PSV of 4, IPAP 16.        -  He is still on chronic opiates but it does not seem to be a big factor based on his compliance      -  Renewed supplies today.         -  I also discussed in depth the risk of leaving sleep apnea untreated including hypertension, heart failure, arrhythmia, MI and stroke.     2.  Chronic hypercapnia related to AALIYAH, mild gas trapping, chronic opiate use.  In addition he may have some restrictive lung disease due to obesity as well.  This has seemed to have improved overall.      BMP CO2 remains stable and he has not had issues of alertness/sleepiness.        -  He is still on Ozempic and has been losing weight. Monitor AHI as he continues to reduce weight.       -  He is still on oxycodone for pain but is taking is twice a day instead of 3 times       -   Previously stopped Lunesta.       3.  Chronic insomnia which is more related to sleep maintenance - he is still using melatonin 10 mg and doing well with that.  He is successfully off Lunesta for an extended period at this time.       4.  Chronic pain - he is still on opiates at twice a day instead of 3 times a day and is doing well..     ______________________________________________________________________    HPI:    Damon Patten presents today for follow-up for his apnea.  Overall he states that he has been doing very well on his BIPAP.  He has not had any issues with pressure or mask intolerance.  He feels refreshed when he wakes up in the morning.   He continues to use opiates for chronic pain but has reduced it to twice a day instead of 3 times a day.  He also is still on Ozempic and continues to lose weight.            Social history updates:  Social History     Tobacco Use   Smoking Status Former   • Current packs/day: 0.00   • Average packs/day: 1.5 packs/day for 15.0 years (22.5 ttl pk-yrs)   • Types: Cigarettes   • Start date: 1979   • Quit date: 1996   • Years since quittin.2   Smokeless Tobacco Never   Tobacco Comments    Glad I quit     Social History     Socioeconomic History   • Marital status: /Civil Union     Spouse name: Not on file   • Number of children: Not on file   • Years of education: 12; has high school diploma   • Highest education level: Not on file   Occupational History   • Occupation: retired /   Tobacco Use   • Smoking status: Former     Current packs/day: 0.00     Average packs/day: 1.5 packs/day for 15.0 years (22.5 ttl pk-yrs)     Types: Cigarettes     Start date: 1979     Quit date: 1996     Years since quittin.2   • Smokeless tobacco: Never   • Tobacco comments:     Glad I quit   Vaping Use   • Vaping status: Never Used   Substance and Sexual Activity   • Alcohol use: No     Comment: Denies any alcohol use per pt   • Drug use:  "No     Comment: Denies any drug use per pt   • Sexual activity: Not Currently     Partners: Female     Birth control/protection: None     Comment: Very seldom have sex do to back and pain in legs   Other Topics Concern   • Not on file   Social History Narrative    2 living siblings, also 4 step-siblings    Activities: participates in activities inside of the home, light.    Living independently with spouse    Retired  and      Social Determinants of Health     Financial Resource Strain: Medium Risk (1/19/2024)    Overall Financial Resource Strain (CARDIA)    • Difficulty of Paying Living Expenses: Somewhat hard   Food Insecurity: Not on file   Transportation Needs: No Transportation Needs (1/19/2024)    PRAPARE - Transportation    • Lack of Transportation (Medical): No    • Lack of Transportation (Non-Medical): No   Physical Activity: Not on file   Stress: Not on file   Social Connections: Not on file   Intimate Partner Violence: Not on file   Housing Stability: Not on file       PhysicalExamination:  Vitals:   /88 (BP Location: Left arm, Patient Position: Sitting, Cuff Size: Adult)   Pulse 73   Temp 98.4 °F (36.9 °C) (Tympanic)   Resp 16   Ht 5' 9\" (1.753 m)   Wt 103 kg (227 lb)   SpO2 98%   BMI 33.52 kg/m²   General: Pleasant, Awake alert and oriented x 3, conversant without conversational dyspnea, NAD, normal affect  HEENT:  PERRL, Sclera noninjected, nonicteric OU, Nares patent,  no craniofacial abnormalities, Mucous membranes, moist, no oral lesions, normal dentition, Mallampati class   NECK: Trachea midline, no accessory muscle use, no stridor, no cervical or supraclavicular adenopathy, JVP not elevated  CARDIAC: Reg, single s1/S2, no m/r/g  PULM: CTA bilaterally no wheezing, rhonchi or rales  ABD: Normoactive bowel sounds, soft nontender, nondistended, no rebound, no rigidity, no guarding  EXT: No cyanosis, no clubbing, no edema, normal capillary refill  NEURO: no focal neurologic " "deficits, AAOx3, moving all extremities appropriately      Diagnostic Data:  Labs:  I personally reviewed the most recent laboratory data pertinent to today's visit  not applicable    I have personally reviewed pertinent lab results.  Lab Results   Component Value Date    WBC 8.04 12/12/2023    HGB 12.1 12/12/2023    HCT 36.6 12/12/2023    MCV 80 (L) 12/12/2023     12/12/2023     Lab Results   Component Value Date    GLUCOSE 95 07/20/2022    CALCIUM 9.3 07/19/2024     12/14/2015    K 3.9 07/19/2024    CO2 31 07/19/2024     07/19/2024    BUN 15 07/19/2024    CREATININE 0.76 07/19/2024     No results found for: \"IGE\"  Lab Results   Component Value Date    ALT 12 07/19/2024    AST 15 07/19/2024    ALKPHOS 66 07/19/2024    BILITOT 0.30 12/14/2015     Lab Results   Component Value Date    IRON 47 (L) 04/11/2014     Lab Results   Component Value Date    [id number] 513 04/11/2014         PFT 7/20/22  Results:  FEV1/FVC Ratio: 72 %  FEV1: 2.75 L     76 % predicted  Forced Vital Capacity: 3.83 L    80 % predicted  After administration of bronchodilator:  No change     Lung volumes: Total Lung Capacity 89 % predicted Residual volume 122 % predicted     DLCO corrected for patients hemoglobin level: 101 % predicted     Flow volume loop:  Consistent with restriction     Interpretation:     Spirometry is normal  No change with bronchodilators  There is very mild air trapping on lung volumes  Normal diffusion capacity     ABG   Units 7/20/22 12:24 PM 3/28/18 12:46 PM 3/28/18 11:10 AM     pH, Art i-STAT 7.350 - 7.450 7.390  7.453 High   7.448    pCO2, Art i-STAT 36.0 - 44.0 mm HG 53.4 High   38.3  37.4    pO2, ART i-STAT 75.0 - 129.0 mm HG 92.0  136.0 High   124.0    BE, i-STAT -2 - 3 mmol/L 6 High   3  2    HCO3, Art i-STAT 22.0 - 28.0 mmol/L 32.3 High   26.8  25.9    CO2, i-STAT 21 - 32 mmol/L 34 High   28  27    O2 Sat, i-STAT 60 - 85 % 97 High   99 High  R  99 High  R    SODIUM, I-STAT 136 - 145 mmol/l 141  " 143  143    Potassium, i-STAT 3.5 - 5.3 mmol/L 3.9  3.6  3.3 Low     Calcium, Ionized i-STAT 1.12 - 1.32 mmol/L 1.30  1.21  1.15    Hct, i-STAT 36.5 - 49.3 % 34 Low   33 Low   28 Low     Hgb, i-STAT 12.0 - 17.0 g/dl 11.6 Low   11.2 Low   9.5 Low     Glucose, i-STAT 65 - 140 mg/dl 95  175 High   141 High     POC FIO2 L 21           Arterial Blood Gas result:  pO2 92; pCO2 53.4; pH 7.39;  HCO3 32.3, %O2 Sat 97.     Sleep studies:  Home Study 9/19/2022: mild AALIYAH, ELVIN -9.9.      New Compliance Data:  7/28/24-8/26/24                                  Type of CPAP:  AutoBIPAP, min EPAP 4, PSV 4, IPAP 16.  EPAP 6 - 10, IPAP 10 - 15                                   Percent usage: 100% > 4 hrs 93%                                   Average time used: 6 hr 31 min                                   Residual AHI: 2.1 (LIYAH 1.5)    Compliance Data:  12/3/23-1/11/24                                  Type of CPAP:  AutoBIPAP, min EPAP 4, PSV 4, IPAP 16.  EPAP 6 - 10, IPAP 10 - 15                                   Percent usage: 100% > 4 hrs 97%                                   Average time used: 5 hr 37 min                                   Residual AHI: 2.8 (LIYAH 1.8)     Compliance Data 10/7/23-11/5/23  Type of CPAP: AutoBIPAP, min EPAP 4, PSV 4, IPAP 16  Percentage of usage 100%  Average time used 5 hours 21 minutes  Residual AHI 6.3 (LIYAH 4.1)  Mask leakage 17.6 L/min     Compliance Data 5/12/2023 - 8/9/2023  Type of CPAP: AutoBIPAP, min EPAP 7, PSV 4, IPAP 25.   Percentage of usage 100%  Average time used 5 hours 3 minutes  Residual AHI 3.0 (LIYAH 2.4)  Mask leakage 30.1 L/min     Compliance Data:  11/13/22-12/12/22                                  Type of CPAP:  AutoBIPAP, min EPAP 7, PSV 4, IPAP 25.                                    Percent usage: 100%                                   Average time used: 5 hr 45 min                                   Residual AHI: 2.6 (LIYAH 1.4)     Compliance Data:  11/13/22-12/12/22                                   Type of CPAP:  AutoBIPAP, min EPAP 7, PSV 4, IPAP 25.                                    Percent usage: 100%                                   Average time used: 5 hr 45 min                                   Residual AHI: 2.6 (LIYAH 1.4)     Compliance Data:  10/3/22 - 11/1/22                                   Type of CPAP:  autoBIPAP min EPAP 5, PSV 4, IPAP 25.  EPAP avg 6.5, IPAP avg 10.1                                   Percent usage: 50%                                    Average time used: 5 hr 9 minutes                                   Residual AHI: 4.7 (LIYAH - 3.2)                                           Minesh Miles DO    Answers submitted by the patient for this visit:  Pulmonology Questionnaire (Submitted on 8/21/2024)  Chief Complaint: Primary symptoms  Have you had a change in appetite?: No  Do you have chest pain?: No  Do you have shortness of breath that occurs with effort or exertion?: No  Do you have ear congestion?: Yes  Do you have ear pain?: No  Do you have a fever?: No  Do you have headaches?: No  Do you have heartburn?: No  Do you have fatigue?: Yes  Do you have muscle pain?: Yes  Do you have nasal congestion?: Yes  Do you have shortness of breath when lying flat?: No  Do you have shortness of breath when you wake up?: No  Do you have post-nasal drip?: No  Do you have a runny nose?: No  Do you have sneezing?: No  Do you have a sore throat?: No  Do you have sweats?: No  Do you have trouble swallowing?: No  Have you experienced weight loss?: No  Which of the following makes your symptoms worse?: nothing  Which of the following makes your symptoms better?: nothing

## 2024-08-28 DIAGNOSIS — I82.531 CHRONIC DEEP VEIN THROMBOSIS (DVT) OF RIGHT POPLITEAL VEIN (HCC): ICD-10-CM

## 2024-08-28 DIAGNOSIS — D68.59 ANTITHROMBIN 3 DEFICIENCY (HCC): ICD-10-CM

## 2024-08-28 DIAGNOSIS — I10 BENIGN ESSENTIAL HYPERTENSION: ICD-10-CM

## 2024-08-28 LAB

## 2024-08-28 NOTE — PROGRESS NOTES
Progress Note - Sleep Medicine  Damon Patten 64 y.o. male MRN: [id number]       Impression & Plan:   64 y.o. M with PMHx of DM, HTN, Depression, history of DVT on Xarelto, antithrombin 3 deficiency, chronic insomnia, chronic pain on oxycodone and AALIYAH on BIPAP who comes in for management of AALIYAH and insomnia.  1.  Mild AALIYAH (AHI - 9.9) with possible treatment emergent sleep apnea in part related to opiates with residual central events.  On autoBIPAP with excellent compliance and good clinical response.  Residual AHI - 2.1, LIYAH 1.5.        -  Conitnue autoBIPAP with min EPAP 4, PSV of 4, IPAP 16.        -  He is still on chronic opiates but it does not seem to be a big factor based on his compliance      -  Renewed supplies today.         -  I also discussed in depth the risk of leaving sleep apnea untreated including hypertension, heart failure, arrhythmia, MI and stroke.     2.  Chronic hypercapnia related to AALIYAH, mild gas trapping, chronic opiate use.  In addition he may have some restrictive lung disease due to obesity as well.  This has seemed to have improved overall.      BMP CO2 remains stable and he has not had issues of alertness/sleepiness.        -  He is still on Ozempic and has been losing weight. Monitor AHI as he continues to reduce weight.       -  He is still on oxycodone for pain but is taking is twice a day instead of 3 times       -  Previously stopped Lunesta.       3.  Chronic insomnia which is more related to sleep maintenance - he is still using melatonin 10 mg and doing well with that.  He is successfully off Lunesta for an extended period at this time.       4.  Chronic pain - he is still on opiates at twice a day instead of 3 times a day and is doing well..     ______________________________________________________________________    HPI:    Damon Patten presents today for follow-up for his apnea.  Overall he states that he has been doing very well on his BIPAP.  He has not had any issues  with pressure or mask intolerance.  He feels refreshed when he wakes up in the morning.   He continues to use opiates for chronic pain but has reduced it to twice a day instead of 3 times a day.  He also is still on Ozempic and continues to lose weight.            Social history updates:  Social History     Tobacco Use   Smoking Status Former    Current packs/day: 0.00    Average packs/day: 1.5 packs/day for 15.0 years (22.5 ttl pk-yrs)    Types: Cigarettes    Start date: 1979    Quit date: 1996    Years since quittin.2   Smokeless Tobacco Never   Tobacco Comments    Glad I quit     Social History     Socioeconomic History    Marital status: /Civil Union     Spouse name: Not on file    Number of children: Not on file    Years of education: 12; has high school diploma    Highest education level: Not on file   Occupational History    Occupation: [employer]   Tobacco Use    Smoking status: Former     Current packs/day: 0.00     Average packs/day: 1.5 packs/day for 15.0 years (22.5 ttl pk-yrs)     Types: Cigarettes     Start date: 1979     Quit date: 1996     Years since quittin.2    Smokeless tobacco: Never    Tobacco comments:     Glad I quit   Vaping Use    Vaping status: Never Used   Substance and Sexual Activity    Alcohol use: No     Comment: Denies any alcohol use per pt    Drug use: No     Comment: Denies any drug use per pt    Sexual activity: Not Currently     Partners: Female     Birth control/protection: None     Comment: Very seldom have sex do to back and pain in legs   Other Topics Concern    Not on file   Social History Narrative    2 living siblings, also 4 step-siblings    Activities: participates in activities inside of the home, light.    Living independently with spouse    Retired  and      Social Determinants of Health     Financial Resource Strain: Medium Risk (2024)    Overall Financial Resource Strain (CARDIA)     Difficulty  "of Paying Living Expenses: Somewhat hard   Food Insecurity: Not on file   Transportation Needs: No Transportation Needs (1/19/2024)    PRAPARE - Transportation     Lack of Transportation (Medical): No     Lack of Transportation (Non-Medical): No   Physical Activity: Not on file   Stress: Not on file   Social Connections: Not on file   Intimate Partner Violence: Not on file   Housing Stability: Not on file       PhysicalExamination:  Vitals:   /88 (BP Location: Left arm, Patient Position: Sitting, Cuff Size: Adult)   Pulse 73   Temp 98.4 °F (36.9 °C) (Tympanic)   Resp 16   Ht 5' 9\" (1.753 m)   Wt 103 kg (227 lb)   SpO2 98%   BMI 33.52 kg/m²   General: Pleasant, Awake alert and oriented x 3, conversant without conversational dyspnea, NAD, normal affect  HEENT:  PERRL, Sclera noninjected, nonicteric OU, Nares patent,  no craniofacial abnormalities, Mucous membranes, moist, no oral lesions, normal dentition, Mallampati class   NECK: Trachea midline, no accessory muscle use, no stridor, no cervical or supraclavicular adenopathy, JVP not elevated  CARDIAC: Reg, single s1/S2, no m/r/g  PULM: CTA bilaterally no wheezing, rhonchi or rales  ABD: Normoactive bowel sounds, soft nontender, nondistended, no rebound, no rigidity, no guarding  EXT: No cyanosis, no clubbing, no edema, normal capillary refill  NEURO: no focal neurologic deficits, AAOx3, moving all extremities appropriately      Diagnostic Data:  Labs:  I personally reviewed the most recent laboratory data pertinent to today's visit  not applicable    I have personally reviewed pertinent lab results.  Lab Results   Component Value Date    WBC 8.04 12/12/2023    HGB 12.1 12/12/2023    HCT 36.6 12/12/2023    MCV 80 (L) 12/12/2023     12/12/2023     Lab Results   Component Value Date    GLUCOSE 95 07/20/2022    CALCIUM 9.3 07/19/2024     12/14/2015    K 3.9 07/19/2024    CO2 31 07/19/2024     07/19/2024    BUN 15 07/19/2024    CREATININE " "0.76 07/19/2024     No results found for: \"IGE\"  Lab Results   Component Value Date    ALT 12 07/19/2024    AST 15 07/19/2024    ALKPHOS 66 07/19/2024    BILITOT 0.30 12/14/2015     Lab Results   Component Value Date    IRON 47 (L) 04/11/2014     Lab Results   Component Value Date    [id number] 513 04/11/2014         PFT 7/20/22  Results:  FEV1/FVC Ratio: 72 %  FEV1: 2.75 L     76 % predicted  Forced Vital Capacity: 3.83 L    80 % predicted  After administration of bronchodilator:  No change     Lung volumes: Total Lung Capacity 89 % predicted Residual volume 122 % predicted     DLCO corrected for patients hemoglobin level: 101 % predicted     Flow volume loop:  Consistent with restriction     Interpretation:     Spirometry is normal  No change with bronchodilators  There is very mild air trapping on lung volumes  Normal diffusion capacity     ABG   Units 7/20/22 12:24 PM 3/28/18 12:46 PM 3/28/18 11:10 AM     pH, Art i-STAT 7.350 - 7.450 7.390  7.453 High   7.448    pCO2, Art i-STAT 36.0 - 44.0 mm HG 53.4 High   38.3  37.4    pO2, ART i-STAT 75.0 - 129.0 mm HG 92.0  136.0 High   124.0    BE, i-STAT -2 - 3 mmol/L 6 High   3  2    HCO3, Art i-STAT 22.0 - 28.0 mmol/L 32.3 High   26.8  25.9    CO2, i-STAT 21 - 32 mmol/L 34 High   28  27    O2 Sat, i-STAT 60 - 85 % 97 High   99 High  R  99 High  R    SODIUM, I-STAT 136 - 145 mmol/l 141  143  143    Potassium, i-STAT 3.5 - 5.3 mmol/L 3.9  3.6  3.3 Low     Calcium, Ionized i-STAT 1.12 - 1.32 mmol/L 1.30  1.21  1.15    Hct, i-STAT 36.5 - 49.3 % 34 Low   33 Low   28 Low     Hgb, i-STAT 12.0 - 17.0 g/dl 11.6 Low   11.2 Low   9.5 Low     Glucose, i-STAT 65 - 140 mg/dl 95  175 High   141 High     POC FIO2 L 21           Arterial Blood Gas result:  pO2 92; pCO2 53.4; pH 7.39;  HCO3 32.3, %O2 Sat 97.     Sleep studies:  Home Study 9/19/2022: mild AALIYAH, ELVIN -9.9.      New Compliance Data:  7/28/24-8/26/24                                  Type of CPAP:  AutoBIPAP, min EPAP 4, PSV 4, " IPAP 16.  EPAP 6 - 10, IPAP 10 - 15                                   Percent usage: 100% > 4 hrs 93%                                   Average time used: 6 hr 31 min                                   Residual AHI: 2.1 (LIYAH 1.5)    Compliance Data:  12/3/23-1/11/24                                  Type of CPAP:  AutoBIPAP, min EPAP 4, PSV 4, IPAP 16.  EPAP 6 - 10, IPAP 10 - 15                                   Percent usage: 100% > 4 hrs 97%                                   Average time used: 5 hr 37 min                                   Residual AHI: 2.8 (LIYAH 1.8)     Compliance Data 10/7/23-11/5/23  Type of CPAP: AutoBIPAP, min EPAP 4, PSV 4, IPAP 16  Percentage of usage 100%  Average time used 5 hours 21 minutes  Residual AHI 6.3 (LIYAH 4.1)  Mask leakage 17.6 L/min     Compliance Data 5/12/2023 - 8/9/2023  Type of CPAP: AutoBIPAP, min EPAP 7, PSV 4, IPAP 25.   Percentage of usage 100%  Average time used 5 hours 3 minutes  Residual AHI 3.0 (LIYAH 2.4)  Mask leakage 30.1 L/min     Compliance Data:  11/13/22-12/12/22                                  Type of CPAP:  AutoBIPAP, min EPAP 7, PSV 4, IPAP 25.                                    Percent usage: 100%                                   Average time used: 5 hr 45 min                                   Residual AHI: 2.6 (LIYAH 1.4)     Compliance Data:  11/13/22-12/12/22                                  Type of CPAP:  AutoBIPAP, min EPAP 7, PSV 4, IPAP 25.                                    Percent usage: 100%                                   Average time used: 5 hr 45 min                                   Residual AHI: 2.6 (LIYAH 1.4)     Compliance Data:  10/3/22 - 11/1/22                                   Type of CPAP:  autoBIPAP min EPAP 5, PSV 4, IPAP 25.  EPAP avg 6.5, IPAP avg 10.1                                   Percent usage: 50%                                    Average time used: 5 hr 9 minutes                                   Residual AHI: 4.7 (LIYAH -  3.2)                                           Minesh Miles DO    Answers submitted by the patient for this visit:  Pulmonology Questionnaire (Submitted on 8/21/2024)  Chief Complaint: Primary symptoms  Have you had a change in appetite?: No  Do you have chest pain?: No  Do you have shortness of breath that occurs with effort or exertion?: No  Do you have ear congestion?: Yes  Do you have ear pain?: No  Do you have a fever?: No  Do you have headaches?: No  Do you have heartburn?: No  Do you have fatigue?: Yes  Do you have muscle pain?: Yes  Do you have nasal congestion?: Yes  Do you have shortness of breath when lying flat?: No  Do you have shortness of breath when you wake up?: No  Do you have post-nasal drip?: No  Do you have a runny nose?: No  Do you have sneezing?: No  Do you have a sore throat?: No  Do you have sweats?: No  Do you have trouble swallowing?: No  Have you experienced weight loss?: No  Which of the following makes your symptoms worse?: nothing  Which of the following makes your symptoms better?: nothing

## 2024-08-29 RX ORDER — AMLODIPINE AND BENAZEPRIL HYDROCHLORIDE 10; 40 MG/1; MG/1
CAPSULE ORAL
Qty: 90 CAPSULE | Refills: 1 | Status: SHIPPED | OUTPATIENT
Start: 2024-08-29

## 2024-08-29 RX ORDER — RIVAROXABAN 20 MG/1
20 TABLET, FILM COATED ORAL
Qty: 90 TABLET | Refills: 1 | Status: SHIPPED | OUTPATIENT
Start: 2024-08-29

## 2024-09-06 ENCOUNTER — TELEPHONE (OUTPATIENT)
Dept: ADMINISTRATIVE | Facility: OTHER | Age: 64
End: 2024-09-06

## 2024-09-06 NOTE — TELEPHONE ENCOUNTER
----- Message from Susana LUNA sent at 9/6/2024  8:26 AM EDT -----  Regarding: Care Gap Request  09/06/24 8:26 AM    Hello, our patient Damon Patten has had Diabetic Foot Exam completed/performed. Please assist in updating the patient chart by pulling the document from the Media Tab. The date of service is 9/4/24.     Thank you,  Susana Rudd MA  [facility] [place]

## 2024-09-09 NOTE — TELEPHONE ENCOUNTER
09/09/24 10:09 AM     Chart reviewed for Diabetic Foot Exam was/were submitted to the patient's insurance.     Debra Barton   [facility]

## 2024-09-09 NOTE — TELEPHONE ENCOUNTER
Upon review of the In Basket request we were able to locate, review, and update the patient chart as requested for Diabetic Foot Exam.    Any additional questions or concerns should be emailed to the Practice Liaisons via the appropriate education email address, please do not reply via In Basket.    Thank you  Debra Barton   [facility]

## 2024-09-10 DIAGNOSIS — E11.9 TYPE 2 DIABETES MELLITUS WITHOUT COMPLICATION, WITHOUT LONG-TERM CURRENT USE OF INSULIN (HCC): ICD-10-CM

## 2024-09-10 RX ORDER — ACETAMINOPHEN, DEXTROMETHORPHAN HBR, DOXYLAMINE SUCCINATE 650; 30; 12.5 MG/30ML; MG/30ML; MG/30ML
LIQUID ORAL
Qty: 100 EACH | Refills: 1 | Status: SHIPPED | OUTPATIENT
Start: 2024-09-10

## 2024-10-02 DIAGNOSIS — E11.42 TYPE 2 DIABETES MELLITUS WITH DIABETIC POLYNEUROPATHY, WITH LONG-TERM CURRENT USE OF INSULIN (HCC): Primary | ICD-10-CM

## 2024-10-02 DIAGNOSIS — Z79.4 TYPE 2 DIABETES MELLITUS WITH DIABETIC POLYNEUROPATHY, WITH LONG-TERM CURRENT USE OF INSULIN (HCC): Primary | ICD-10-CM

## 2024-10-22 ENCOUNTER — TELEPHONE (OUTPATIENT)
Age: 64
End: 2024-10-22

## 2024-10-22 NOTE — TELEPHONE ENCOUNTER
Pt called to check on update on forms sent by his podiatrist back in September. He stated forms that were faxed back on 9/4 were never received. Please refax to [facility] at [phone].   Thank you.

## 2024-11-07 DIAGNOSIS — K92.9: ICD-10-CM

## 2024-11-07 DIAGNOSIS — F32.A MILD DEPRESSION: ICD-10-CM

## 2024-11-08 RX ORDER — HYOSCYAMINE SULFATE 0.38 MG/1
TABLET, EXTENDED RELEASE ORAL
Qty: 30 TABLET | Refills: 5 | Status: SHIPPED | OUTPATIENT
Start: 2024-11-08

## 2024-11-25 ENCOUNTER — APPOINTMENT (OUTPATIENT)
Dept: LAB | Facility: CLINIC | Age: 64
End: 2024-11-25
Payer: COMMERCIAL

## 2024-11-25 DIAGNOSIS — Z79.4 TYPE 2 DIABETES MELLITUS WITH DIABETIC POLYNEUROPATHY, WITH LONG-TERM CURRENT USE OF INSULIN (HCC): ICD-10-CM

## 2024-11-25 DIAGNOSIS — E11.42 TYPE 2 DIABETES MELLITUS WITH DIABETIC POLYNEUROPATHY, WITH LONG-TERM CURRENT USE OF INSULIN (HCC): ICD-10-CM

## 2024-11-25 LAB
ALBUMIN SERPL BCG-MCNC: 4.1 G/DL (ref 3.5–5)
ALP SERPL-CCNC: 61 U/L (ref 34–104)
ALT SERPL W P-5'-P-CCNC: 12 U/L (ref 7–52)
ANION GAP SERPL CALCULATED.3IONS-SCNC: 5 MMOL/L (ref 4–13)
AST SERPL W P-5'-P-CCNC: 13 U/L (ref 13–39)
BILIRUB SERPL-MCNC: 0.32 MG/DL (ref 0.2–1)
BUN SERPL-MCNC: 13 MG/DL (ref 5–25)
CALCIUM SERPL-MCNC: 9.1 MG/DL (ref 8.4–10.2)
CHLORIDE SERPL-SCNC: 102 MMOL/L (ref 96–108)
CO2 SERPL-SCNC: 33 MMOL/L (ref 21–32)
CREAT SERPL-MCNC: 0.67 MG/DL (ref 0.6–1.3)
EST. AVERAGE GLUCOSE BLD GHB EST-MCNC: 120 MG/DL
GFR SERPL CREATININE-BSD FRML MDRD: 102 ML/MIN/1.73SQ M
GLUCOSE P FAST SERPL-MCNC: 96 MG/DL (ref 65–99)
HBA1C MFR BLD: 5.8 %
POTASSIUM SERPL-SCNC: 4 MMOL/L (ref 3.5–5.3)
PROT SERPL-MCNC: 7.1 G/DL (ref 6.4–8.4)
SODIUM SERPL-SCNC: 140 MMOL/L (ref 135–147)

## 2024-11-25 PROCEDURE — 80053 COMPREHEN METABOLIC PANEL: CPT

## 2024-11-25 PROCEDURE — 36415 COLL VENOUS BLD VENIPUNCTURE: CPT

## 2024-11-25 PROCEDURE — 83036 HEMOGLOBIN GLYCOSYLATED A1C: CPT

## 2024-11-26 ENCOUNTER — RESULTS FOLLOW-UP (OUTPATIENT)
Dept: ENDOCRINOLOGY | Facility: CLINIC | Age: 64
End: 2024-11-26

## 2024-11-26 DIAGNOSIS — I10 BENIGN ESSENTIAL HYPERTENSION: ICD-10-CM

## 2024-11-27 RX ORDER — METOPROLOL SUCCINATE 25 MG/1
25 TABLET, EXTENDED RELEASE ORAL DAILY
Qty: 90 TABLET | Refills: 1 | Status: SHIPPED | OUTPATIENT
Start: 2024-11-27

## 2024-12-23 ENCOUNTER — OFFICE VISIT (OUTPATIENT)
Dept: ENDOCRINOLOGY | Facility: CLINIC | Age: 64
End: 2024-12-23
Payer: COMMERCIAL

## 2024-12-23 VITALS
DIASTOLIC BLOOD PRESSURE: 74 MMHG | OXYGEN SATURATION: 98 % | SYSTOLIC BLOOD PRESSURE: 138 MMHG | BODY MASS INDEX: 33.59 KG/M2 | HEIGHT: 69 IN | HEART RATE: 64 BPM | TEMPERATURE: 97.8 F | WEIGHT: 226.8 LBS

## 2024-12-23 DIAGNOSIS — E78.2 MIXED HYPERLIPIDEMIA: ICD-10-CM

## 2024-12-23 DIAGNOSIS — I10 ESSENTIAL HYPERTENSION: ICD-10-CM

## 2024-12-23 DIAGNOSIS — E11.42 TYPE 2 DIABETES MELLITUS WITH DIABETIC POLYNEUROPATHY, WITH LONG-TERM CURRENT USE OF INSULIN (HCC): Primary | ICD-10-CM

## 2024-12-23 DIAGNOSIS — Z79.4 TYPE 2 DIABETES MELLITUS WITH DIABETIC POLYNEUROPATHY, WITH LONG-TERM CURRENT USE OF INSULIN (HCC): Primary | ICD-10-CM

## 2024-12-23 PROCEDURE — 99214 OFFICE O/P EST MOD 30 MIN: CPT | Performed by: STUDENT IN AN ORGANIZED HEALTH CARE EDUCATION/TRAINING PROGRAM

## 2024-12-23 NOTE — ASSESSMENT & PLAN NOTE
Blood pressure at today visit is 138/74 the goal less than 130/80.  Will continue current regimen including Lotrel 10-40 mg daily.

## 2024-12-23 NOTE — ASSESSMENT & PLAN NOTE
Lab Results   Component Value Date    HGBA1C 5.8 (H) 11/25/2024     Diabetes is well-controlled with A1c of 5.8%, currently on Basaglar 16 units at night which was decreased to 14 units daily, metformin 500 mg daily will be maintained (extended release form and higher dose caused diarrhea) and has tolerated Ozempic for which will be maintained.  He was instructed to check his blood sugar once or twice a day and bring his sugar log to next visit.  Ophthalmology and podiatry follow-up.  Return back in 6 months.  Labs prior to next visit.    Orders:    metFORMIN (GLUCOPHAGE) 500 mg tablet; Take 1 tablet (500 mg total) by mouth daily with breakfast

## 2024-12-23 NOTE — ASSESSMENT & PLAN NOTE
He reports intolerance to statins due to myalgia, he is off his statin and his LDL is at goal.  Will monitor lipid profile over time.

## 2024-12-23 NOTE — PROGRESS NOTES
Name: Damon Patten      : 1960      MRN: [id number]  Encounter Provider: Segundo Ibarra MD  Encounter Date: 2024   Encounter department: [facility] [place]  :  Assessment & Plan  Type 2 diabetes mellitus with diabetic polyneuropathy, with long-term current use of insulin ([facility])    Lab Results   Component Value Date    HGBA1C 5.8 (H) 2024     Diabetes is well-controlled with A1c of 5.8%, currently on Basaglar 16 units at night which was decreased to 14 units daily, metformin 500 mg daily will be maintained (extended release form and higher dose caused diarrhea) and has tolerated Ozempic for which will be maintained.  He was instructed to check his blood sugar once or twice a day and bring his sugar log to next visit.  Ophthalmology and podiatry follow-up.  Return back in 6 months.  Labs prior to next visit.    Orders:    metFORMIN (GLUCOPHAGE) 500 mg tablet; Take 1 tablet (500 mg total) by mouth daily with breakfast    Essential hypertension  Blood pressure at today visit is 138/74 the goal less than 130/80.  Will continue current regimen including Lotrel 10-40 mg daily.         Mixed hyperlipidemia  He reports intolerance to statins due to myalgia, he is off his statin and his LDL is at goal.  Will monitor lipid profile over time.             History of Present Illness   HPI  Damon Patten is a 64 y.o. male who presents for follow-up for type 2 diabetes with long-term current use of insulin.        Denies recent illness or hospitalizations.    Current regimen:   Basaglar 16 units at night  Metformin 500 mg once a day  Ozempic 1 mg weekly      SMBG: x 2, blood sugars are ranging from 95 - 140 mg/dl  Hypoglycemic episodes:   H/o of hypoglycemia causing hospitalization or Intervention such as glucagon injection  or ambulance call : no  Has awareness: yes       Opthamology:  UTD;   Podiatry: UTD    on ACE inhibitor or ARB: Lotrel 10 - 40 mg daily  on statin:  statin caused muscle ache     History obtained from: patient    Review of Systems   Constitutional:  Negative for fatigue and unexpected weight change.   Gastrointestinal:  Negative for diarrhea and nausea.   Endocrine: Negative for cold intolerance, heat intolerance, polyphagia and polyuria.   Musculoskeletal:  Positive for back pain.          Objective   There were no vitals taken for this visit.     Physical Exam  Vitals and nursing note reviewed.   Constitutional:       General: He is not in acute distress.     Appearance: He is well-developed.   HENT:      Head: Normocephalic and atraumatic.   Eyes:      Conjunctiva/sclera: Conjunctivae normal.   Neck:      Comments: Left-sided thyroid nodule which is not firm and is mobile  Cardiovascular:      Rate and Rhythm: Normal rate and regular rhythm.      Heart sounds: No murmur heard.  Pulmonary:      Effort: Pulmonary effort is normal. No respiratory distress.      Breath sounds: Normal breath sounds.   Musculoskeletal:         General: No swelling.      Cervical back: Neck supple.   Skin:     General: Skin is warm and dry.   Neurological:      Mental Status: He is alert.   Psychiatric:         Mood and Affect: Mood normal.             Component      Latest Ref Rng 7/19/2024 11/25/2024   Sodium      135 - 147 mmol/L  140    Potassium      3.5 - 5.3 mmol/L  4.0    Chloride      96 - 108 mmol/L  102    Carbon Dioxide      21 - 32 mmol/L  33 (H)    ANION GAP      4 - 13 mmol/L  5    BUN      5 - 25 mg/dL  13    Creatinine      0.60 - 1.30 mg/dL  0.67    GLUCOSE, FASTING      65 - 99 mg/dL  96    Calcium      8.4 - 10.2 mg/dL  9.1    AST      13 - 39 U/L  13    ALT      7 - 52 U/L  12    ALK PHOS      34 - 104 U/L  61    Total Protein      6.4 - 8.4 g/dL  7.1    Albumin      3.5 - 5.0 g/dL  4.1    Total Bilirubin      0.20 - 1.00 mg/dL  0.32    GFR, Calculated      ml/min/1.73sq m  102    Cholesterol      See Comment mg/dL 125     Triglycerides      See Comment mg/dL  128     HDL      >=40 mg/dL 41     LDL Calculated      0 - 100 mg/dL 58     Hemoglobin A1C      Normal 4.0-5.6%; PreDiabetic 5.7-6.4%; Diabetic >=6.5%; Glycemic control for adults with diabetes <7.0% %  5.8 (H)    eAG, EST AVG Glucose      mg/dl  120       Legend:  (H) High

## 2024-12-24 ENCOUNTER — TELEPHONE (OUTPATIENT)
Age: 64
End: 2024-12-24

## 2024-12-26 DIAGNOSIS — N40.0 BENIGN PROSTATIC HYPERPLASIA WITHOUT LOWER URINARY TRACT SYMPTOMS: ICD-10-CM

## 2024-12-26 RX ORDER — TERAZOSIN 10 MG/1
10 CAPSULE ORAL DAILY
Qty: 90 CAPSULE | Refills: 1 | Status: SHIPPED | OUTPATIENT
Start: 2024-12-26

## 2025-01-19 ENCOUNTER — VBI (OUTPATIENT)
Dept: ADMINISTRATIVE | Facility: OTHER | Age: 65
End: 2025-01-19

## 2025-01-19 NOTE — TELEPHONE ENCOUNTER
01/19/25 10:49 AM     Chart reviewed for Blood Pressure was/were submitted to the patient's insurance.     Marco A Miranda MA   [facility]

## 2025-01-29 ENCOUNTER — VBI (OUTPATIENT)
Dept: ADMINISTRATIVE | Facility: OTHER | Age: 65
End: 2025-01-29

## 2025-01-29 DIAGNOSIS — K21.9 GASTROESOPHAGEAL REFLUX DISEASE WITHOUT ESOPHAGITIS: ICD-10-CM

## 2025-01-29 RX ORDER — LANSOPRAZOLE 30 MG/1
30 CAPSULE, DELAYED RELEASE ORAL DAILY
Qty: 90 CAPSULE | Refills: 1 | Status: SHIPPED | OUTPATIENT
Start: 2025-01-29

## 2025-01-29 NOTE — TELEPHONE ENCOUNTER
01/29/25 7:16 AM     Chart reviewed for Hemoglobin A1c was/were submitted to the patient's insurance.     Leslie Balderas MA   [facility]

## 2025-01-30 ENCOUNTER — TELEPHONE (OUTPATIENT)
Age: 65
End: 2025-01-30

## 2025-01-30 NOTE — TELEPHONE ENCOUNTER
Patient  called state   increase his  dosage of metformin to 1000. He got medication from [facility] for  500. He want to  should he take to pills. Please  advise  he would like a call back

## 2025-02-12 DIAGNOSIS — E11.42 TYPE 2 DIABETES MELLITUS WITH DIABETIC POLYNEUROPATHY, WITH LONG-TERM CURRENT USE OF INSULIN (HCC): ICD-10-CM

## 2025-02-12 DIAGNOSIS — Z79.4 TYPE 2 DIABETES MELLITUS WITH DIABETIC POLYNEUROPATHY, WITH LONG-TERM CURRENT USE OF INSULIN (HCC): ICD-10-CM

## 2025-02-18 DIAGNOSIS — Z79.4 TYPE 2 DIABETES MELLITUS WITH DIABETIC POLYNEUROPATHY, WITH LONG-TERM CURRENT USE OF INSULIN (HCC): Primary | ICD-10-CM

## 2025-02-18 DIAGNOSIS — E11.42 TYPE 2 DIABETES MELLITUS WITH DIABETIC POLYNEUROPATHY, WITH LONG-TERM CURRENT USE OF INSULIN (HCC): Primary | ICD-10-CM

## 2025-02-24 DIAGNOSIS — I10 BENIGN ESSENTIAL HYPERTENSION: ICD-10-CM

## 2025-02-24 DIAGNOSIS — D68.59 ANTITHROMBIN 3 DEFICIENCY (HCC): ICD-10-CM

## 2025-02-24 DIAGNOSIS — E11.42 TYPE 2 DIABETES MELLITUS WITH DIABETIC POLYNEUROPATHY, WITH LONG-TERM CURRENT USE OF INSULIN (HCC): ICD-10-CM

## 2025-02-24 DIAGNOSIS — I82.531 CHRONIC DEEP VEIN THROMBOSIS (DVT) OF RIGHT POPLITEAL VEIN (HCC): ICD-10-CM

## 2025-02-24 DIAGNOSIS — Z79.4 TYPE 2 DIABETES MELLITUS WITH DIABETIC POLYNEUROPATHY, WITH LONG-TERM CURRENT USE OF INSULIN (HCC): ICD-10-CM

## 2025-02-25 DIAGNOSIS — E11.42 TYPE 2 DIABETES MELLITUS WITH DIABETIC POLYNEUROPATHY, WITH LONG-TERM CURRENT USE OF INSULIN (HCC): ICD-10-CM

## 2025-02-25 DIAGNOSIS — Z79.4 TYPE 2 DIABETES MELLITUS WITH DIABETIC POLYNEUROPATHY, WITH LONG-TERM CURRENT USE OF INSULIN (HCC): ICD-10-CM

## 2025-02-25 RX ORDER — RIVAROXABAN 20 MG/1
20 TABLET, FILM COATED ORAL
Qty: 90 TABLET | Refills: 0 | Status: SHIPPED | OUTPATIENT
Start: 2025-02-25

## 2025-02-25 RX ORDER — SEMAGLUTIDE 1.34 MG/ML
INJECTION, SOLUTION SUBCUTANEOUS
Qty: 9 ML | Refills: 3 | Status: SHIPPED | OUTPATIENT
Start: 2025-02-25

## 2025-02-25 RX ORDER — AMLODIPINE AND BENAZEPRIL HYDROCHLORIDE 10; 40 MG/1; MG/1
1 CAPSULE ORAL DAILY
Qty: 90 CAPSULE | Refills: 0 | Status: SHIPPED | OUTPATIENT
Start: 2025-02-25

## 2025-03-10 DIAGNOSIS — E11.9 TYPE 2 DIABETES MELLITUS WITHOUT COMPLICATION, WITHOUT LONG-TERM CURRENT USE OF INSULIN (HCC): ICD-10-CM

## 2025-03-11 RX ORDER — ACETAMINOPHEN, DEXTROMETHORPHAN HBR, DOXYLAMINE SUCCINATE 650; 30; 12.5 MG/30ML; MG/30ML; MG/30ML
LIQUID ORAL
Qty: 100 EACH | Refills: 3 | Status: SHIPPED | OUTPATIENT
Start: 2025-03-11

## 2025-03-31 DIAGNOSIS — Z79.4 TYPE 2 DIABETES MELLITUS WITH DIABETIC POLYNEUROPATHY, WITH LONG-TERM CURRENT USE OF INSULIN (HCC): ICD-10-CM

## 2025-03-31 DIAGNOSIS — E11.42 TYPE 2 DIABETES MELLITUS WITH DIABETIC POLYNEUROPATHY, WITH LONG-TERM CURRENT USE OF INSULIN (HCC): ICD-10-CM

## 2025-03-31 RX ORDER — INSULIN GLARGINE 300 U/ML
16 INJECTION, SOLUTION SUBCUTANEOUS
Qty: 6 ML | Refills: 0 | Status: SHIPPED | OUTPATIENT
Start: 2025-03-31 | End: 2025-03-31 | Stop reason: SDUPTHER

## 2025-03-31 NOTE — TELEPHONE ENCOUNTER
Patient is out of refills and only has one night left of medication.  Is there any chance the office has a sample to give him to hold him over?  Please reach out to patient.

## 2025-04-01 RX ORDER — INSULIN GLARGINE 300 U/ML
16 INJECTION, SOLUTION SUBCUTANEOUS
Qty: 6 ML | Refills: 1 | Status: SHIPPED | OUTPATIENT
Start: 2025-04-01 | End: 2025-06-30

## 2025-04-11 ENCOUNTER — OFFICE VISIT (OUTPATIENT)
Dept: FAMILY MEDICINE CLINIC | Facility: CLINIC | Age: 65
End: 2025-04-11
Payer: COMMERCIAL

## 2025-04-11 VITALS
RESPIRATION RATE: 16 BRPM | DIASTOLIC BLOOD PRESSURE: 85 MMHG | OXYGEN SATURATION: 97 % | HEART RATE: 64 BPM | HEIGHT: 69 IN | TEMPERATURE: 98.2 F | BODY MASS INDEX: 35.31 KG/M2 | SYSTOLIC BLOOD PRESSURE: 128 MMHG | WEIGHT: 238.4 LBS

## 2025-04-11 DIAGNOSIS — E66.812 CLASS 2 SEVERE OBESITY DUE TO EXCESS CALORIES WITH SERIOUS COMORBIDITY AND BODY MASS INDEX (BMI) OF 35.0 TO 35.9 IN ADULT (HCC): ICD-10-CM

## 2025-04-11 DIAGNOSIS — E66.01 CLASS 2 SEVERE OBESITY DUE TO EXCESS CALORIES WITH SERIOUS COMORBIDITY AND BODY MASS INDEX (BMI) OF 35.0 TO 35.9 IN ADULT (HCC): ICD-10-CM

## 2025-04-11 DIAGNOSIS — Z79.899 ENCOUNTER FOR LONG-TERM (CURRENT) USE OF MEDICATIONS: ICD-10-CM

## 2025-04-11 DIAGNOSIS — E11.42 TYPE 2 DIABETES MELLITUS WITH DIABETIC POLYNEUROPATHY, WITH LONG-TERM CURRENT USE OF INSULIN (HCC): ICD-10-CM

## 2025-04-11 DIAGNOSIS — Z13.29 THYROID DISORDER SCREEN: ICD-10-CM

## 2025-04-11 DIAGNOSIS — R09.81 CHRONIC NASAL CONGESTION: ICD-10-CM

## 2025-04-11 DIAGNOSIS — I82.531 CHRONIC DEEP VEIN THROMBOSIS (DVT) OF RIGHT POPLITEAL VEIN (HCC): ICD-10-CM

## 2025-04-11 DIAGNOSIS — J30.2 SEASONAL ALLERGIES: ICD-10-CM

## 2025-04-11 DIAGNOSIS — Z13.21 ENCOUNTER FOR VITAMIN DEFICIENCY SCREENING: ICD-10-CM

## 2025-04-11 DIAGNOSIS — Z00.00 MEDICARE ANNUAL WELLNESS VISIT, SUBSEQUENT: Primary | ICD-10-CM

## 2025-04-11 DIAGNOSIS — D68.59 ANTITHROMBIN 3 DEFICIENCY (HCC): ICD-10-CM

## 2025-04-11 DIAGNOSIS — R06.89 HYPERCAPNIA: ICD-10-CM

## 2025-04-11 DIAGNOSIS — F11.20 CONTINUOUS OPIOID DEPENDENCE (HCC): ICD-10-CM

## 2025-04-11 DIAGNOSIS — I10 ESSENTIAL HYPERTENSION: ICD-10-CM

## 2025-04-11 DIAGNOSIS — Z79.4 TYPE 2 DIABETES MELLITUS WITH DIABETIC POLYNEUROPATHY, WITH LONG-TERM CURRENT USE OF INSULIN (HCC): ICD-10-CM

## 2025-04-11 DIAGNOSIS — Z79.01 ANTICOAGULANT LONG-TERM USE: ICD-10-CM

## 2025-04-11 DIAGNOSIS — Z12.5 PROSTATE CANCER SCREENING: ICD-10-CM

## 2025-04-11 PROBLEM — R04.0 EPISTAXIS: Status: RESOLVED | Noted: 2019-04-15 | Resolved: 2025-04-11

## 2025-04-11 PROCEDURE — G0439 PPPS, SUBSEQ VISIT: HCPCS | Performed by: FAMILY MEDICINE

## 2025-04-11 PROCEDURE — G2211 COMPLEX E/M VISIT ADD ON: HCPCS | Performed by: FAMILY MEDICINE

## 2025-04-11 PROCEDURE — 99214 OFFICE O/P EST MOD 30 MIN: CPT | Performed by: FAMILY MEDICINE

## 2025-04-11 RX ORDER — NALOXEGOL OXALATE 12.5 MG/1
TABLET, FILM COATED ORAL
COMMUNITY
Start: 2025-02-26

## 2025-04-11 RX ORDER — AZELASTINE 1 MG/ML
1 SPRAY, METERED NASAL 2 TIMES DAILY
Qty: 30 ML | Refills: 1 | Status: SHIPPED | OUTPATIENT
Start: 2025-04-11

## 2025-04-11 RX ORDER — MOMETASONE FUROATE MONOHYDRATE 50 UG/1
2 SPRAY, METERED NASAL DAILY PRN
COMMUNITY

## 2025-04-11 NOTE — ASSESSMENT & PLAN NOTE
Ideally controlled, cpm  Lab Results   Component Value Date    HGBA1C 5.8 (H) 11/25/2024       Orders:    Vitamin D 25 hydroxy; Future    TSH, 3rd generation with Free T4 reflex; Future
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
## 2025-04-11 NOTE — PATIENT INSTRUCTIONS
Medicare Preventive Visit Patient Instructions  Thank you for completing your Welcome to Medicare Visit or Medicare Annual Wellness Visit today. Your next wellness visit will be due in one year (4/12/2026).  The screening/preventive services that you may require over the next 5-10 years are detailed below. Some tests may not apply to you based off risk factors and/or age. Screening tests ordered at today's visit but not completed yet may show as past due. Also, please note that scanned in results may not display below.  Preventive Screenings:  Service Recommendations Previous Testing/Comments   Colorectal Cancer Screening  Colonoscopy    Fecal Occult Blood Test (FOBT)/Fecal Immunochemical Test (FIT)  Fecal DNA/Cologuard Test  Flexible Sigmoidoscopy Age: 45-75 years old   Colonoscopy: every 10 years (May be performed more frequently if at higher risk)  OR  FOBT/FIT: every 1 year  OR  Cologuard: every 3 years  OR  Sigmoidoscopy: every 5 years  Screening may be recommended earlier than age 45 if at higher risk for colorectal cancer. Also, an individualized decision between you and your healthcare provider will decide whether screening between the ages of 76-85 would be appropriate. Colonoscopy: 11/24/2021  FOBT/FIT: Not on file  Cologuard: Not on file  Sigmoidoscopy: Not on file    Screening Current     Prostate Cancer Screening Individualized decision between patient and health care provider in men between ages of 55-69   Medicare will cover every 12 months beginning on the day after your 50th birthday PSA: 1.05 ng/mL     Screening Current     Hepatitis C Screening Once for adults born between 1945 and 1965  More frequently in patients at high risk for Hepatitis C Hep C Antibody: 10/14/2022    Screening Current   Diabetes Screening 1-2 times per year if you're at risk for diabetes or have pre-diabetes Fasting glucose: 96 mg/dL (11/25/2024)  A1C: 5.8 % (11/25/2024)  Screening Not Indicated  History Diabetes    Cholesterol Screening Once every 5 years if you don't have a lipid disorder. May order more often based on risk factors. Lipid panel: 07/19/2024  Screening Not Indicated  History Lipid Disorder      Other Preventive Screenings Covered by Medicare:  Abdominal Aortic Aneurysm (AAA) Screening: covered once if your at risk. You're considered to be at risk if you have a family history of AAA or a male between the age of 65-75 who smoking at least 100 cigarettes in your lifetime.  Lung Cancer Screening: covers low dose CT scan once per year if you meet all of the following conditions: (1) Age 55-77; (2) No signs or symptoms of lung cancer; (3) Current smoker or have quit smoking within the last 15 years; (4) You have a tobacco smoking history of at least 20 pack years (packs per day x number of years you smoked); (5) You get a written order from a healthcare provider.  Glaucoma Screening: covered annually if you're considered high risk: (1) You have diabetes OR (2) Family history of glaucoma OR (3)  aged 50 and older OR (4)  American aged 65 and older  Osteoporosis Screening: covered every 2 years if you meet one of the following conditions: (1) Have a vertebral abnormality; (2) On glucocorticoid therapy for more than 3 months; (3) Have primary hyperparathyroidism; (4) On osteoporosis medications and need to assess response to drug therapy.  HIV Screening: covered annually if you're between the age of 15-65. Also covered annually if you are younger than 15 and older than 65 with risk factors for HIV infection. For pregnant patients, it is covered up to 3 times per pregnancy.    Immunizations:  Immunization Recommendations   Influenza Vaccine Annual influenza vaccination during flu season is recommended for all persons aged >= 6 months who do not have contraindications   Pneumococcal Vaccine   * Pneumococcal conjugate vaccine = PCV13 (Prevnar 13), PCV15 (Vaxneuvance), PCV20 (Prevnar 20)  *  Pneumococcal polysaccharide vaccine = PPSV23 (Pneumovax) Adults 19-65 yo with certain risk factors or if 65+ yo  If never received any pneumonia vaccine: recommend Prevnar 20 (PCV20)  Give PCV20 if previously received 1 dose of PCV13 or PPSV23   Hepatitis B Vaccine 3 dose series if at intermediate or high risk (ex: diabetes, end stage renal disease, liver disease)   Respiratory syncytial virus (RSV) Vaccine - COVERED BY MEDICARE PART D  * RSVPreF3 (Arexvy) CDC recommends that adults 60 years of age and older may receive a single dose of RSV vaccine using shared clinical decision-making (SCDM)   Tetanus (Td) Vaccine - COST NOT COVERED BY MEDICARE PART B Following completion of primary series, a booster dose should be given every 10 years to maintain immunity against tetanus. Td may also be given as tetanus wound prophylaxis.   Tdap Vaccine - COST NOT COVERED BY MEDICARE PART B Recommended at least once for all adults. For pregnant patients, recommended with each pregnancy.   Shingles Vaccine (Shingrix) - COST NOT COVERED BY MEDICARE PART B  2 shot series recommended in those 19 years and older who have or will have weakened immune systems or those 50 years and older     Health Maintenance Due:      Topic Date Due   • HIV Screening  Never done   • Colorectal Cancer Screening  11/22/2031   • Hepatitis C Screening  Completed     Immunizations Due:      Topic Date Due   • COVID-19 Vaccine (4 - 2024-25 season) 09/01/2024     Advance Directives   What are advance directives?  Advance directives are legal documents that state your wishes and plans for medical care. These plans are made ahead of time in case you lose your ability to make decisions for yourself. Advance directives can apply to any medical decision, such as the treatments you want, and if you want to donate organs.   What are the types of advance directives?  There are many types of advance directives, and each state has rules about how to use them. You may  choose a combination of any of the following:  Living will:  This is a written record of the treatment you want. You can also choose which treatments you do not want, which to limit, and which to stop at a certain time. This includes surgery, medicine, IV fluid, and tube feedings.   Durable power of  for healthcare (DPAHC):  This is a written record that states who you want to make healthcare choices for you when you are unable to make them for yourself. This person, called a proxy, is usually a family member or a friend. You may choose more than 1 proxy.  Do not resuscitate (DNR) order:  A DNR order is used in case your heart stops beating or you stop breathing. It is a request not to have certain forms of treatment, such as CPR. A DNR order may be included in other types of advance directives.  Medical directive:  This covers the care that you want if you are in a coma, near death, or unable to make decisions for yourself. You can list the treatments you want for each condition. Treatment may include pain medicine, surgery, blood transfusions, dialysis, IV or tube feedings, and a ventilator (breathing machine).  Values history:  This document has questions about your views, beliefs, and how you feel and think about life. This information can help others choose the care that you would choose.  Why are advance directives important?  An advance directive helps you control your care. Although spoken wishes may be used, it is better to have your wishes written down. Spoken wishes can be misunderstood, or not followed. Treatments may be given even if you do not want them. An advance directive may make it easier for your family to make difficult choices about your care.   Weight Management   Why it is important to manage your weight:  Being overweight increases your risk of health conditions such as heart disease, high blood pressure, type 2 diabetes, and certain types of cancer. It can also increase your risk  for osteoarthritis, sleep apnea, and other respiratory problems. Aim for a slow, steady weight loss. Even a small amount of weight loss can lower your risk of health problems.  How to lose weight safely:  A safe and healthy way to lose weight is to eat fewer calories and get regular exercise. You can lose up about 1 pound a week by decreasing the number of calories you eat by 500 calories each day.   Healthy meal plan for weight management:  A healthy meal plan includes a variety of foods, contains fewer calories, and helps you stay healthy. A healthy meal plan includes the following:  Eat whole-grain foods more often.  A healthy meal plan should contain fiber. Fiber is the part of grains, fruits, and vegetables that is not broken down by your body. Whole-grain foods are healthy and provide extra fiber in your diet. Some examples of whole-grain foods are whole-wheat breads and pastas, oatmeal, brown rice, and bulgur.  Eat a variety of vegetables every day.  Include dark, leafy greens such as spinach, kale, chandu greens, and mustard greens. Eat yellow and orange vegetables such as carrots, sweet potatoes, and winter squash.   Eat a variety of fruits every day.  Choose fresh or canned fruit (canned in its own juice or light syrup) instead of juice. Fruit juice has very little or no fiber.  Eat low-fat dairy foods.  Drink fat-free (skim) milk or 1% milk. Eat fat-free yogurt and low-fat cottage cheese. Try low-fat cheeses such as mozzarella and other reduced-fat cheeses.  Choose meat and other protein foods that are low in fat.  Choose beans or other legumes such as split peas or lentils. Choose fish, skinless poultry (chicken or turkey), or lean cuts of red meat (beef or pork). Before you cook meat or poultry, cut off any visible fat.   Use less fat and oil.  Try baking foods instead of frying them. Add less fat, such as margarine, sour cream, regular salad dressing and mayonnaise to foods. Eat fewer high-fat foods.  Some examples of high-fat foods include french fries, doughnuts, ice cream, and cakes.  Eat fewer sweets.  Limit foods and drinks that are high in sugar. This includes candy, cookies, regular soda, and sweetened drinks.  Exercise:  Exercise at least 30 minutes per day on most days of the week. Some examples of exercise include walking, biking, dancing, and swimming. You can also fit in more physical activity by taking the stairs instead of the elevator or parking farther away from stores. Ask your healthcare provider about the best exercise plan for you.   Narcotic (Opioid) Safety    Use narcotics safely:  Take prescribed narcotics exactly as directed  Do not give narcotics to others or take narcotics that belong to someone else  Do not mix narcotics without medicines or alcohol  Do not drive or operate heavy machinery after you take the narcotic  Monitor for side effects and notify your healthcare provider if you experienced side effects such as nausea, sleepiness, itching, or trouble thinking clearly.    Manage constipation:    Constipation is the most common side effect of narcotic medicine. Constipation is when you have hard, dry bowel movements, or you go longer than usual between bowel movements. Tell your healthcare provider about all changes in your bowel movements while you are taking narcotics. He or she may recommend laxative medicine to help you have a bowel movement. He or she may also change the kind of narcotic you are taking, or change when you take it. The following are more ways you can prevent or relieve constipation:    Drink liquids as directed.  You may need to drink extra liquids to help soften and move your bowels. Ask how much liquid to drink each day and which liquids are best for you.  Eat high-fiber foods.  This may help decrease constipation by adding bulk to your bowel movements. High-fiber foods include fruits, vegetables, whole-grain breads and cereals, and beans. Your healthcare  provider or dietitian can help you create a high-fiber meal plan. Your provider may also recommend a fiber supplement if you cannot get enough fiber from food.  Exercise regularly.  Regular physical activity can help stimulate your intestines. Walking is a good exercise to prevent or relieve constipation. Ask which exercises are best for you.  Schedule a time each day to have a bowel movement.  This may help train your body to have regular bowel movements. Bend forward while you are on the toilet to help move the bowel movement out. Sit on the toilet for at least 10 minutes, even if you do not have a bowel movement.    Store narcotics safely:   Store narcotics where others cannot easily get them.  Keep them in a locked cabinet or secure area. Do not  keep them in a purse or other bag you carry with you. A person may be looking for something else and find the narcotics.  Make sure narcotics are stored out of the reach of children.  A child can easily overdose on narcotics. Narcotics may look like candy to a small child.    The best way to dispose of narcotics:      The laws vary by country and area. In the [place], the best way is to return the narcotics through a take-back program. This program is offered by the [place] Drug Enforcement Agency (MISSY). The following are options for using the program:  Take the narcotics to a MISSY collection site.  The site is often a law enforcement center. Call your local law enforcement center for scheduled take-back days in your area. You will be given information on where to go if the collection site is in a different location.  Take the narcotics to an approved pharmacy or hospital.  A pharmacy or hospital may be set up as a collection site. You will need to ask if it is a MISSY collection site if you were not directed there. A pharmacy or doctor's office may not be able to take back narcotics unless it is a MISSY site.  Use a mail-back system.  This means you are given containers to  put the narcotics into. You will then mail them in the containers.  Use a take-back drop box.  This is a place to leave the narcotics at any time. People and animals will not be able to get into the box. Your local law enforcement agency can tell you where to find a drop box in your area.    Other ways to manage pain:   Ask your healthcare provider about non-narcotic medicines to control pain.  Nonprescription medicines include NSAIDs (such as ibuprofen) and acetaminophen. Prescription medicines include muscle relaxers, antidepressants, and steroids.  Pain may be managed without any medicines.  Some ways to relieve pain include massage, aromatherapy, or meditation. Physical or occupational therapy may also help.    For more information:   Drug Enforcement Administration  [address] [place] [address]  Phone: 0- 987 - [phone]  Web Address: https://www.deadiversion.[facility].gov/drug_disposal/    [place] Food and Drug Administration  [address] [place] [address]  Phone: 8- 725 - 802-2243  Web Address: http://www.fda.gov     © Copyright [employer] 2018 Information is for End User's use only and may not be sold, redistributed or otherwise used for commercial purposes. All illustrations and images included in CareNotes® are the copyrighted property of A.D.A.M., Inc. or [employer]

## 2025-04-23 DIAGNOSIS — K92.9: ICD-10-CM

## 2025-04-23 DIAGNOSIS — F32.A MILD DEPRESSION: ICD-10-CM

## 2025-04-23 RX ORDER — HYOSCYAMINE SULFATE 0.38 MG/1
0.38 TABLET, EXTENDED RELEASE ORAL DAILY
Qty: 30 TABLET | Refills: 5 | Status: SHIPPED | OUTPATIENT
Start: 2025-04-23

## 2025-05-15 ENCOUNTER — TELEPHONE (OUTPATIENT)
Age: 65
End: 2025-05-15

## 2025-05-15 NOTE — TELEPHONE ENCOUNTER
PA for Ozempic 1mg SUBMITTED to [employer]    via    [x]CMM-KEY: [id number]  []Surescripts-Case ID #   []Availity-Auth ID # NDC #   []Faxed to plan   []Other website   []Phone call Case ID #     [x]PA sent as URGENT    All office notes, labs and other pertaining documents and studies sent. Clinical questions answered. Awaiting determination from insurance company.     Turnaround time for your insurance to make a decision on your Prior Authorization can take 7-21 business days.

## 2025-05-16 NOTE — TELEPHONE ENCOUNTER
PA for Ozempic 1mg APPROVED     Date(s) approved 05/15/2025-05/15/2026      Patient advised by      Picked up from pharmacy    [x]MyChart Message  []Phone call   []LMOM  []L/M to call office as no active Communication consent on file  []Unable to leave detailed message as VM not approved on Communication consent       Pharmacy advised by    [x]Fax  []Phone call  []Secure Chat    Specialty Pharmacy    []     Approval letter scanned into [employer] Yes

## 2025-05-26 DIAGNOSIS — I10 BENIGN ESSENTIAL HYPERTENSION: ICD-10-CM

## 2025-05-26 DIAGNOSIS — I82.531 CHRONIC DEEP VEIN THROMBOSIS (DVT) OF RIGHT POPLITEAL VEIN (HCC): ICD-10-CM

## 2025-05-26 DIAGNOSIS — D68.59 ANTITHROMBIN 3 DEFICIENCY (HCC): ICD-10-CM

## 2025-05-27 RX ORDER — AMLODIPINE AND BENAZEPRIL HYDROCHLORIDE 10; 40 MG/1; MG/1
1 CAPSULE ORAL DAILY
Qty: 90 CAPSULE | Refills: 0 | Status: SHIPPED | OUTPATIENT
Start: 2025-05-27

## 2025-05-27 RX ORDER — RIVAROXABAN 20 MG/1
20 TABLET, FILM COATED ORAL
Qty: 90 TABLET | Refills: 0 | Status: SHIPPED | OUTPATIENT
Start: 2025-05-27

## 2025-05-27 RX ORDER — METOPROLOL SUCCINATE 25 MG/1
25 TABLET, EXTENDED RELEASE ORAL DAILY
Qty: 90 TABLET | Refills: 1 | Status: SHIPPED | OUTPATIENT
Start: 2025-05-27

## 2025-06-02 ENCOUNTER — APPOINTMENT (OUTPATIENT)
Dept: LAB | Facility: CLINIC | Age: 65
End: 2025-06-02
Payer: COMMERCIAL

## 2025-06-02 DIAGNOSIS — E66.01 CLASS 2 SEVERE OBESITY DUE TO EXCESS CALORIES WITH SERIOUS COMORBIDITY AND BODY MASS INDEX (BMI) OF 35.0 TO 35.9 IN ADULT (HCC): ICD-10-CM

## 2025-06-02 DIAGNOSIS — Z13.21 ENCOUNTER FOR VITAMIN DEFICIENCY SCREENING: ICD-10-CM

## 2025-06-02 DIAGNOSIS — E66.812 CLASS 2 SEVERE OBESITY DUE TO EXCESS CALORIES WITH SERIOUS COMORBIDITY AND BODY MASS INDEX (BMI) OF 35.0 TO 35.9 IN ADULT (HCC): ICD-10-CM

## 2025-06-02 DIAGNOSIS — Z13.29 THYROID DISORDER SCREEN: ICD-10-CM

## 2025-06-02 DIAGNOSIS — Z79.4 TYPE 2 DIABETES MELLITUS WITH DIABETIC POLYNEUROPATHY, WITH LONG-TERM CURRENT USE OF INSULIN (HCC): ICD-10-CM

## 2025-06-02 DIAGNOSIS — E11.42 TYPE 2 DIABETES MELLITUS WITH DIABETIC POLYNEUROPATHY, WITH LONG-TERM CURRENT USE OF INSULIN (HCC): ICD-10-CM

## 2025-06-02 DIAGNOSIS — Z12.5 PROSTATE CANCER SCREENING: ICD-10-CM

## 2025-06-02 DIAGNOSIS — Z79.899 ENCOUNTER FOR LONG-TERM (CURRENT) USE OF MEDICATIONS: ICD-10-CM

## 2025-06-02 DIAGNOSIS — Z79.01 ANTICOAGULANT LONG-TERM USE: ICD-10-CM

## 2025-06-02 DIAGNOSIS — I10 ESSENTIAL HYPERTENSION: ICD-10-CM

## 2025-06-02 LAB
25(OH)D3 SERPL-MCNC: 55.1 NG/ML (ref 30–100)
ALBUMIN SERPL BCG-MCNC: 4.1 G/DL (ref 3.5–5)
ALP SERPL-CCNC: 81 U/L (ref 34–104)
ALT SERPL W P-5'-P-CCNC: 17 U/L (ref 7–52)
ANION GAP SERPL CALCULATED.3IONS-SCNC: 6 MMOL/L (ref 4–13)
AST SERPL W P-5'-P-CCNC: 18 U/L (ref 13–39)
BILIRUB SERPL-MCNC: 0.36 MG/DL (ref 0.2–1)
BUN SERPL-MCNC: 16 MG/DL (ref 5–25)
CALCIUM SERPL-MCNC: 9 MG/DL (ref 8.4–10.2)
CHLORIDE SERPL-SCNC: 101 MMOL/L (ref 96–108)
CHOLEST SERPL-MCNC: 153 MG/DL (ref ?–200)
CO2 SERPL-SCNC: 31 MMOL/L (ref 21–32)
CREAT SERPL-MCNC: 0.75 MG/DL (ref 0.6–1.3)
CREAT UR-MCNC: 76.5 MG/DL
EST. AVERAGE GLUCOSE BLD GHB EST-MCNC: 131 MG/DL
GFR SERPL CREATININE-BSD FRML MDRD: 97 ML/MIN/1.73SQ M
GLUCOSE P FAST SERPL-MCNC: 128 MG/DL (ref 65–99)
HBA1C MFR BLD: 6.2 %
HDLC SERPL-MCNC: 47 MG/DL
LDLC SERPL CALC-MCNC: 76 MG/DL (ref 0–100)
MICROALBUMIN UR-MCNC: 18.7 MG/L
MICROALBUMIN/CREAT 24H UR: 24 MG/G CREATININE (ref 0–30)
POTASSIUM SERPL-SCNC: 4 MMOL/L (ref 3.5–5.3)
PROT SERPL-MCNC: 7 G/DL (ref 6.4–8.4)
PSA SERPL-MCNC: 0.93 NG/ML (ref 0–4)
SODIUM SERPL-SCNC: 138 MMOL/L (ref 135–147)
TRIGL SERPL-MCNC: 152 MG/DL (ref ?–150)
TSH SERPL DL<=0.05 MIU/L-ACNC: 1.11 UIU/ML (ref 0.45–4.5)

## 2025-06-02 PROCEDURE — 36415 COLL VENOUS BLD VENIPUNCTURE: CPT

## 2025-06-02 PROCEDURE — 82306 VITAMIN D 25 HYDROXY: CPT

## 2025-06-02 PROCEDURE — G0103 PSA SCREENING: HCPCS

## 2025-06-02 PROCEDURE — 82570 ASSAY OF URINE CREATININE: CPT

## 2025-06-02 PROCEDURE — 82043 UR ALBUMIN QUANTITATIVE: CPT

## 2025-06-02 PROCEDURE — 84443 ASSAY THYROID STIM HORMONE: CPT

## 2025-06-02 PROCEDURE — 83036 HEMOGLOBIN GLYCOSYLATED A1C: CPT

## 2025-06-02 PROCEDURE — 80061 LIPID PANEL: CPT

## 2025-06-02 PROCEDURE — 80053 COMPREHEN METABOLIC PANEL: CPT

## 2025-06-03 ENCOUNTER — RESULTS FOLLOW-UP (OUTPATIENT)
Dept: ENDOCRINOLOGY | Facility: CLINIC | Age: 65
End: 2025-06-03

## 2025-06-23 ENCOUNTER — OFFICE VISIT (OUTPATIENT)
Dept: ENDOCRINOLOGY | Facility: CLINIC | Age: 65
End: 2025-06-23
Payer: COMMERCIAL

## 2025-06-23 ENCOUNTER — TELEPHONE (OUTPATIENT)
Age: 65
End: 2025-06-23

## 2025-06-23 VITALS
DIASTOLIC BLOOD PRESSURE: 82 MMHG | SYSTOLIC BLOOD PRESSURE: 148 MMHG | BODY MASS INDEX: 34.69 KG/M2 | HEART RATE: 68 BPM | HEIGHT: 69 IN | RESPIRATION RATE: 18 BRPM | TEMPERATURE: 98.3 F | WEIGHT: 234.2 LBS | OXYGEN SATURATION: 97 %

## 2025-06-23 DIAGNOSIS — E11.42 TYPE 2 DIABETES MELLITUS WITH DIABETIC POLYNEUROPATHY, WITH LONG-TERM CURRENT USE OF INSULIN (HCC): Primary | ICD-10-CM

## 2025-06-23 DIAGNOSIS — I10 ESSENTIAL HYPERTENSION: ICD-10-CM

## 2025-06-23 DIAGNOSIS — E78.2 MIXED HYPERLIPIDEMIA: ICD-10-CM

## 2025-06-23 DIAGNOSIS — Z79.4 TYPE 2 DIABETES MELLITUS WITH DIABETIC POLYNEUROPATHY, WITH LONG-TERM CURRENT USE OF INSULIN (HCC): Primary | ICD-10-CM

## 2025-06-23 PROCEDURE — 99214 OFFICE O/P EST MOD 30 MIN: CPT | Performed by: STUDENT IN AN ORGANIZED HEALTH CARE EDUCATION/TRAINING PROGRAM

## 2025-06-23 NOTE — ASSESSMENT & PLAN NOTE
Outside of range today at 148/82.   Encouraged patient to get L knee pain addressed. Pt should keep BP log and monitor for goal of below 130/80.   Continue with Lotrel 10-40mg daily for now.

## 2025-06-23 NOTE — PROGRESS NOTES
Name: Damon Patten      : 1960      MRN: [id number]  Encounter Provider: Segundo Ibarra MD  Encounter Date: 2025   Encounter department: [facility] [place]  :  Assessment & Plan  Type 2 diabetes mellitus with diabetic polyneuropathy, with long-term current use of insulin (HCC)    Lab Results   Component Value Date    HGBA1C 6.2 (H) 2025     Well-controlled. Slight increase in A1c from 5.8 to 6.2. Insulin was decreased to 14U previously and pt also endorses decreased physical activity due to L knee pain.     Plan:  -continue with current regimen: metformin 500mg (note ER and increased dose caused diarrhea), ozempic 1mg weekly, Toujeo 14U QHS  -c/w lifestyle modifications, carb controlled diet and physical activity as tolerated -- encouraged pt to follow-up with PCP/ortho for management of L knee pain  -check labs prior to next visit  -follow-up in 6 months     Orders:    Hemoglobin A1C; Future    Comprehensive metabolic panel; Future    Essential hypertension  Outside of range today at 148/82.   Encouraged patient to get L knee pain addressed. Pt should keep BP log and monitor for goal of below 130/80.   Continue with Lotrel 10-40mg daily for now.         Mixed hyperlipidemia  LDL 76. Counseled on minimizing high cholesterol foods in diet.   Previously reported intolerance to statins due to myalgias.   Continue to monitor lipid panel.              History of Present Illness   65YO M who presents today for follow-up regarding his T2D. States that has been less active compared to before due to L knee pain. Has had injections in R knee approx 3 years ago which has helped. Pt denies any injuries/traumas, unsure whether he accidentally twisted his L knee.     States that he has had an eye exam within the past year. States that his eye doctor mentioned there's some signs of cataracts, but no diabetic neuropathy.     Denies any numbness in periphery of limbs; states  "if anything he has increased sensitivity if anything. Checks foot regularly for any wounds.      Review of Systems   Eyes:  Negative for visual disturbance.   Respiratory:  Negative for shortness of breath.    Cardiovascular:  Negative for chest pain and palpitations.   Gastrointestinal:  Negative for abdominal pain and diarrhea.   Neurological:  Negative for dizziness, light-headedness, numbness and headaches.          Objective   /82 (BP Location: Right arm, Patient Position: Sitting, Cuff Size: Standard)   Pulse 68   Temp 98.3 °F (36.8 °C) (Temporal)   Resp 18   Ht 5' 9\" (1.753 m)   Wt 106 kg (234 lb 3.2 oz)   SpO2 97%   BMI 34.59 kg/m²      Physical Exam  Vitals reviewed.   Constitutional:       General: He is not in acute distress.     Appearance: He is not ill-appearing, toxic-appearing or diaphoretic.   HENT:      Head: Normocephalic and atraumatic.      Nose: Nose normal. No congestion or rhinorrhea.     Eyes:      General:         Right eye: No discharge.         Left eye: No discharge.      Conjunctiva/sclera: Conjunctivae normal.       Cardiovascular:      Rate and Rhythm: Normal rate and regular rhythm.      Pulses: Normal pulses.      Heart sounds: Normal heart sounds. No murmur heard.     No friction rub. No gallop.   Pulmonary:      Effort: Pulmonary effort is normal. No respiratory distress.      Breath sounds: Normal breath sounds. No stridor. No wheezing, rhonchi or rales.   Chest:      Chest wall: No tenderness.     Musculoskeletal:      Right lower leg: No edema.      Left lower leg: No edema.     Skin:     General: Skin is warm and dry.     Neurological:      Mental Status: He is alert.     Psychiatric:         Mood and Affect: Mood normal.         Behavior: Behavior normal.         Donna Hurtado, DO  PGY-2  [facility]   "

## 2025-06-23 NOTE — ASSESSMENT & PLAN NOTE
Lab Results   Component Value Date    HGBA1C 6.2 (H) 06/02/2025     Well-controlled. Slight increase in A1c from 5.8 to 6.2. Insulin was decreased to 14U previously and pt also endorses decreased physical activity due to L knee pain.     Plan:  -continue with current regimen: metformin 500mg (note ER and increased dose caused diarrhea), ozempic 1mg weekly, Toujeo 14U QHS  -c/w lifestyle modifications, carb controlled diet and physical activity as tolerated -- encouraged pt to follow-up with PCP/ortho for management of L knee pain  -check labs prior to next visit  -follow-up in 6 months     Orders:    Hemoglobin A1C; Future    Comprehensive metabolic panel; Future

## 2025-06-23 NOTE — TELEPHONE ENCOUNTER
"[employer] called to confirm next visit with Primary Care Provider  Advised next appointment is scheduled  Tuesday 10/14 @ 11:15am with Dr. Pickens.    Preet also asked if patient has a diagnosis of \"diabetes\" and if patient is currently taking statin medication.  At this time, unable to provide without consent from the patient.    Preet Chu is sending a consent to fax# [phone].  Once received, is kindly requesting a return call to provide clinical information re: patients diagnosis, statin, etc.    [facility] call back#  phone# [phone]  Extension: [phone]      "

## 2025-06-23 NOTE — ASSESSMENT & PLAN NOTE
LDL 76. Counseled on minimizing high cholesterol foods in diet.   Previously reported intolerance to statins due to myalgias.   Continue to monitor lipid panel.

## 2025-06-24 DIAGNOSIS — N40.0 BENIGN PROSTATIC HYPERPLASIA WITHOUT LOWER URINARY TRACT SYMPTOMS: ICD-10-CM

## 2025-06-24 RX ORDER — TERAZOSIN 10 MG/1
10 CAPSULE ORAL DAILY
Qty: 90 CAPSULE | Refills: 3 | Status: SHIPPED | OUTPATIENT
Start: 2025-06-24

## 2025-07-01 ENCOUNTER — TELEPHONE (OUTPATIENT)
Age: 65
End: 2025-07-01

## 2025-07-01 NOTE — TELEPHONE ENCOUNTER
Patient advised granddaughter was just diagnosed with having pinworms. She has been around granddaughter for the last couple of days and is worried about this being contagious. Asking if there is an antibiotic to take as a precaution so she does not get infected, and should she distance keep distance from granddaughter until she recovers. Please kindly call back at [phone].

## 2025-07-02 DIAGNOSIS — B80 PINWORMS: Primary | ICD-10-CM

## 2025-07-18 DIAGNOSIS — K21.9 GASTROESOPHAGEAL REFLUX DISEASE WITHOUT ESOPHAGITIS: ICD-10-CM

## 2025-07-18 RX ORDER — LANSOPRAZOLE 30 MG/1
30 CAPSULE, DELAYED RELEASE ORAL DAILY
Qty: 90 CAPSULE | Refills: 1 | Status: SHIPPED | OUTPATIENT
Start: 2025-07-18

## (undated) DEVICE — SYRINGE 10ML SLIP TIP LF

## (undated) DEVICE — GLOVE SRG BIOGEL 6.5

## (undated) DEVICE — LIGHT HANDLE COVER SLEEVE DISP BLUE STELLAR

## (undated) DEVICE — SUT VICRYL 2-0 CT-1 27 IN J259H

## (undated) DEVICE — PREP SURGICAL PURPREP 26ML

## (undated) DEVICE — DRESSING MEPILEX AG BORDER 4 X 8 IN

## (undated) DEVICE — CHLORAPREP HI-LITE 26ML ORANGE

## (undated) DEVICE — DRAPE C-ARM X-RAY

## (undated) DEVICE — FLOSEAL MATRIX IS INDICATED IN SURGICAL PROCEDURES (OTHER THAN IN OPHTHALMIC) AS AN ADJUNCT TO HEMOSTASIS WHEN CONTROL OF BLEEDING BY LIGATURE OR CONVENTIONAL PROCEDURES IS INEFFECTIVE OR IMPRACTICAL.: Brand: FLOSEAL HEMOSTATIC MATRIX

## (undated) DEVICE — TIBURON SPLIT SHEET: Brand: CONVERTORS

## (undated) DEVICE — TELFA NON-ADHERENT ABSORBENT DRESSING: Brand: TELFA

## (undated) DEVICE — BUTTON SWITCH PENCIL HOLSTER: Brand: VALLEYLAB

## (undated) DEVICE — UNIVERSAL SPINE,KIT: Brand: CARDINAL HEALTH

## (undated) DEVICE — WET SKIN PREP TRAY: Brand: MEDLINE INDUSTRIES, INC.

## (undated) DEVICE — PLASTIC ADHESIVE BANDAGE: Brand: CURITY

## (undated) DEVICE — GLOVE SRG BIOGEL 7

## (undated) DEVICE — GLOVE INDICATOR PI UNDERGLOVE SZ 8.5 BLUE

## (undated) DEVICE — PAD CAST 4 IN COTTON NON STERILE

## (undated) DEVICE — PADDING CAST 4 IN  COTTON STRL

## (undated) DEVICE — STERILE BETHLEHEM ORIF HIP PK: Brand: CARDINAL HEALTH

## (undated) DEVICE — SNAP KOVER: Brand: UNBRANDED

## (undated) DEVICE — CUFF TOURNIQUET 18 X 4 IN QUICK CONNECT DISP 1 BLADDER

## (undated) DEVICE — INTENDED FOR TISSUE SEPARATION, AND OTHER PROCEDURES THAT REQUIRE A SHARP SURGICAL BLADE TO PUNCTURE OR CUT.: Brand: BARD-PARKER ® CARBON RIB-BACK BLADES

## (undated) DEVICE — TRAY FOLEY 16FR URIMETER SURESTEP

## (undated) DEVICE — DISPOSABLE EQUIPMENT COVER: Brand: SMALL TOWEL DRAPE

## (undated) DEVICE — SCREW SET 4.75MM SOLERA PERC: Type: IMPLANTABLE DEVICE | Site: SPINE LUMBAR | Status: NON-FUNCTIONAL

## (undated) DEVICE — 3M™ TEGADERM™ TRANSPARENT FILM DRESSING FRAME STYLE, 1626W, 4 IN X 4-3/4 IN (10 CM X 12 CM), 50/CT 4CT/CASE: Brand: 3M™ TEGADERM™

## (undated) DEVICE — ACE WRAP 6 IN UNSTERILE

## (undated) DEVICE — COBAN 4 IN STERILE

## (undated) DEVICE — DRAPE C-ARMOUR

## (undated) DEVICE — KNIFE LIGHT 10,PK: Brand: KNIFELIGHT

## (undated) DEVICE — SUT PROLENE 4-0 PS-2 18 IN 8682H

## (undated) DEVICE — PROXIMATE PLUS MD MULTI-DIRECTIONAL RELEASE SKIN STAPLERS CONTAINS 35 STAINLESS STEEL STAPLES APPROXIMATE CLOSED DIMENSIONS: 6.9MM X 3.9MM WIDE: Brand: PROXIMATE

## (undated) DEVICE — INSULATED BLADE ELECTRODE;CAUTION: FOR MANUFACTURING, PROCESSING, OR REPACKING.: Brand: EDGE

## (undated) DEVICE — BETADINE SCRUB BRUSH

## (undated) DEVICE — ANTIBACTERIAL VIOLET BRAIDED (POLYGLACTIN 910), SYNTHETIC ABSORBABLE SUTURE: Brand: COATED VICRYL

## (undated) DEVICE — SPONGE PVP SCRUB WING STERILE

## (undated) DEVICE — INTENDED FOR TISSUE SEPARATION, AND OTHER PROCEDURES THAT REQUIRE A SHARP SURGICAL BLADE TO PUNCTURE OR CUT.: Brand: BARD-PARKER SAFETY BLADES SIZE 15, STERILE

## (undated) DEVICE — GUIDEWIRE 2345050 BLUNT THREADED 24IN

## (undated) DEVICE — MEDI-VAC YANKAUER SUCTION HANDLE W/STRAIGHT TIP & CONTROL VENT: Brand: CARDINAL HEALTH

## (undated) DEVICE — STERILE BETHLEHEM PLASTIC HAND: Brand: CARDINAL HEALTH

## (undated) DEVICE — ACE WRAP 4 IN UNSTERILE

## (undated) DEVICE — GLOVE SRG BIOGEL 8

## (undated) DEVICE — INTENDED FOR TISSUE SEPARATION, AND OTHER PROCEDURES THAT REQUIRE A SHARP SURGICAL BLADE TO PUNCTURE OR CUT.: Brand: BARD-PARKER SAFETY BLADES SIZE 10, STERILE

## (undated) DEVICE — 2000CC GUARDIAN II: Brand: GUARDIAN

## (undated) DEVICE — TOOL 15BA50 LEGEND 15CM 5MM BA: Brand: MIDAS REX™

## (undated) DEVICE — 2.8MM THREADED GUIDE WIRE- TROCAR POINT 300MM

## (undated) DEVICE — 6617 IOBAN II PATIENT ISOLATION DRAPE 5/BX,4BX/CS: Brand: STERI-DRAPE™ IOBAN™ 2

## (undated) DEVICE — TOOL 14MH30 LEGEND 14CM 3MM: Brand: MIDAS REX ™

## (undated) DEVICE — OCCLUSIVE GAUZE STRIP,3% BISMUTH TRIBROMOPHENATE IN PETROLATUM BLEND: Brand: XEROFORM

## (undated) DEVICE — GLOVE SRG BIOGEL ECLIPSE 8

## (undated) DEVICE — GLOVE INDICATOR PI UNDERGLOVE SZ 7 BLUE

## (undated) DEVICE — SPONGE 4 X 4 XRAY 16 PLY STRL LF RFD

## (undated) DEVICE — 3M™ STERI-STRIP™ REINFORCED ADHESIVE SKIN CLOSURES, R1547, 1/2 IN X 4 IN (12 MM X 100 MM), 6 STRIPS/ENVELOPE: Brand: 3M™ STERI-STRIP™

## (undated) DEVICE — SURGIFOAM 8.5 X 12.5

## (undated) DEVICE — INSTRUMENT 8670015 PAK 2 NEEDLES TROCAR

## (undated) DEVICE — DRAPE SHEET X-LG

## (undated) DEVICE — ABDOMINAL PAD: Brand: DERMACEA

## (undated) DEVICE — SCD SEQUENTIAL COMPRESSION COMFORT SLEEVE MEDIUM KNEE LENGTH: Brand: KENDALL SCD

## (undated) DEVICE — DRAPE MICROSCOPE OPMI PENTERO

## (undated) DEVICE — GLOVE INDICATOR PI UNDERGLOVE SZ 8 BLUE

## (undated) DEVICE — BETHLEHEM UNIVERSAL  MIONR EXT: Brand: CARDINAL HEALTH

## (undated) DEVICE — DRAPE EQUIPMENT RF WAND

## (undated) DEVICE — BANDAGE, ESMARK LF STR 6"X9' (20/CS): Brand: CYPRESS

## (undated) DEVICE — TUBING SUCTION 5MM X 12 FT

## (undated) DEVICE — DRESSING MEPILEX AG BORDER 4 X 4 IN

## (undated) DEVICE — VIAL DECANTER

## (undated) DEVICE — IMPERVIOUS STOCKINETTE: Brand: DEROYAL

## (undated) DEVICE — SUT ETHILON 3-0 PS-1 18 IN 1663G

## (undated) DEVICE — GLOVE INDICATOR PI UNDERGLOVE SZ 6.5 BLUE

## (undated) DEVICE — 5.0MM CANNULATED DRILL BIT LARGE QC/300MM

## (undated) DEVICE — SUT SILK 2-0 SH 30 IN K833H

## (undated) DEVICE — PENCIL ELECTROSURG E-Z CLEAN -0035H

## (undated) DEVICE — SUT VICRYL PLUS 3-0 RB-1 CR/8 18 IN VCP713D

## (undated) DEVICE — PREP PAD BNS: Brand: CONVERTORS

## (undated) DEVICE — BLADE MINI RND TIP ONE SIDE SHARP